# Patient Record
Sex: FEMALE | Race: WHITE | Employment: OTHER | URBAN - METROPOLITAN AREA
[De-identification: names, ages, dates, MRNs, and addresses within clinical notes are randomized per-mention and may not be internally consistent; named-entity substitution may affect disease eponyms.]

---

## 2017-01-05 ENCOUNTER — TELEPHONE (OUTPATIENT)
Dept: FAMILY MEDICINE CLINIC | Age: 77
End: 2017-01-05

## 2017-01-05 NOTE — TELEPHONE ENCOUNTER
Fredrick Oliveira  [phone]    Ms. Brittney Josephia is requesting a return call from Leno Jernigan. She stated that she needs to speak to her regarding \"what she is going to do now that Dr. Pavel Sierra is leaving\".

## 2017-01-18 NOTE — TELEPHONE ENCOUNTER
LM that pt can see Dr Betzaida Mckeon and recommend that she set up an appt soon for her routine f/u.

## 2017-05-04 ENCOUNTER — OFFICE VISIT (OUTPATIENT)
Dept: FAMILY MEDICINE CLINIC | Age: 77
End: 2017-05-04

## 2017-05-04 ENCOUNTER — HOSPITAL ENCOUNTER (OUTPATIENT)
Dept: LAB | Age: 77
Discharge: HOME | End: 2017-05-04
Payer: MEDICARE

## 2017-05-04 VITALS
OXYGEN SATURATION: 97 % | SYSTOLIC BLOOD PRESSURE: 132 MMHG | DIASTOLIC BLOOD PRESSURE: 78 MMHG | RESPIRATION RATE: 18 BRPM | HEART RATE: 61 BPM | HEIGHT: 64 IN | TEMPERATURE: 98.2 F | WEIGHT: 173 LBS | BODY MASS INDEX: 29.53 KG/M2

## 2017-05-04 DIAGNOSIS — E78.5 HYPERLIPIDEMIA LDL GOAL <100: ICD-10-CM

## 2017-05-04 DIAGNOSIS — I10 HTN, GOAL BELOW 150/90: ICD-10-CM

## 2017-05-04 DIAGNOSIS — M81.0 OSTEOPOROSIS, UNSPECIFIED OSTEOPOROSIS TYPE, UNSPECIFIED PATHOLOGICAL FRACTURE PRESENCE: ICD-10-CM

## 2017-05-04 DIAGNOSIS — Z00.00 ENCOUNTER FOR MEDICARE ANNUAL WELLNESS EXAM: Primary | ICD-10-CM

## 2017-05-04 DIAGNOSIS — E03.9 UNSPECIFIED HYPOTHYROIDISM: ICD-10-CM

## 2017-05-04 PROCEDURE — 84443 ASSAY THYROID STIM HORMONE: CPT

## 2017-05-04 PROCEDURE — 80061 LIPID PANEL: CPT

## 2017-05-04 PROCEDURE — 80053 COMPREHEN METABOLIC PANEL: CPT

## 2017-05-04 NOTE — PROGRESS NOTES
Patient Name: Mikhail Blanchard   MRN: [id number]    Jose Alfredo Case is a 68 y.o. female who presents with the following: Transferring care from prior PCP Dr. Carney. The patient has hypertension and hyperlipidemia. She reports taking medications as instructed, no medication side effects noted, patient does not perform home BP monitoring, no TIA's, no chest pain on exertion, no dyspnea on exertion, no swelling of ankles, no orthostatic dizziness or lightheadedness. Diet and Lifestyle: generally follows a low fat low cholesterol diet, generally follows a low sodium diet, exercises regularly. Lab review: labs reviewed and discussed with patient. Hx of hypothyroidism diagnosed on lab work, on meds. Hx of Osteoporosis   Last DEXA in 2014. Was on Fosamax for 7 years about 20 years ago. Does not want to do further treatment for this but is amenable for repeat DEXA. Review of Systems   Constitutional: Negative for fever, malaise/fatigue and weight loss. Respiratory: Negative for cough, hemoptysis, shortness of breath and wheezing. Cardiovascular: Negative for chest pain, palpitations, leg swelling and PND. Gastrointestinal: Negative for abdominal pain, constipation, diarrhea, nausea and vomiting. The patient's medications, allergies, past medical history, surgical history, family history and social history were reviewed and updated where appropriate. Prior to Admission medications    Medication Sig Start Date End Date Taking? Authorizing Provider   levothyroxine (SYNTHROID) 112 mcg tablet Take 1 Tab by mouth Daily (before breakfast). 4/18/17  Yes Hung Hutchison MD   atenolol (TENORMIN) 25 mg tablet Take 1 Tab by mouth daily.  CHANGE OF THERAPY 11/4/16  Yes Chi Redd DO   losartan-hydroCHLOROthiazide [facility]) 100-25 mg per tablet TAKE 1 TABLET EVERY DAY 11/3/16  Yes Chi Redd DO   B.infantis-B.ani-B.long-B.bifi (PROBIOTIC 4X) 10-15 mg TbEC Take  by mouth.   Yes Historical Provider   simvastatin (ZOCOR) 40 mg tablet TAKE 1 TABLET BY MOUTH NIGHTLY 4/6/16  Yes Oneda Anon, DO   amoxicillin (AMOXIL) 500 mg capsule  9/23/15  Yes Historical Provider   calcium-vitamin D (CALCIUM 500 WITH VITAMIN D) 500 mg(1,250mg) -200 unit per tablet Take 1 Tab by mouth two (2) times daily (with meals). Yes Historical Provider   MULTIVITAMINS (MULTI-VITAMIN PO) Take  by mouth. Takes one po daily.    Yes Historical Provider       Allergies   Allergen Reactions    Benazepril Cough    Sulfa (Sulfonamide Antibiotics) Hives         Past Medical History:   Diagnosis Date    DDD (degenerative disc disease), lumbar 3/15/2016    Diverticulosis of colon 1/12    McGroarty/gi    DJD (degenerative joint disease)     Esophageal stricture 2/03    HTN, goal below 150/90     Hypercholesterolemia     IBS (irritable bowel syndrome)     IBS (irritable bowel syndrome) 8/30/2016    Osteoporosis     previously on Fosamax many years ago; does not want further treatment    Overactive bladder     Scarring of lung '04    Scarring @ the apices bilaterally Done @[facility]PI/histoplasmosis    Unspecified hypothyroidism             Past Surgical History:   Procedure Laterality Date    ABDOMEN SURGERY [address]    appendectomy    COLONOSCOPY  1/12    McGroarty/gi    HX APPENDECTOMY      HX CATARACT REMOVAL  3/12    L    HX CATARACT REMOVAL  04/2012    Bilateral     HX KNEE REPLACEMENT  1/10    right  Darnell/o/s    HX ORTHOPAEDIC  2006 and 2008    R knee arthroscopy Dr Selwyn Blank       Family History   Problem Relation Age of Onset    Heart Disease Mother     Diabetes Father     Heart Disease Father     Cancer Son      metastatic prostate ca    Diabetes Son     Heart Disease Son        Social History     Social History    Marital status: SINGLE     Spouse name: N/A    Number of children: N/A    Years of education: N/A     Occupational History    retired        but still works part time     Social History Main Topics    Smoking status: Former Smoker     Packs/day: 1.00     Years: 25.00     Types: Cigarettes    Smokeless tobacco: Never Used      Comment: quit in the '90's/cigarettes    Alcohol use Yes      Comment: social    Drug use: No    Sexual activity: Not on file     Other Topics Concern     Service No    Blood Transfusions No    Caffeine Concern No    Occupational Exposure No    Hobby Hazards No    Sleep Concern Yes     stress cause insomnia     Stress Concern Yes     son is a drug addict is dependent on her     Weight Concern No    Special Diet Yes     eats healthy and balanced     Back Care Yes     spinal stenosis,chronic pain    Exercise Yes     water aerobics    Bike Helmet No     NA    Seat Belt Yes    Self-Exams Yes     Social History Narrative           OBJECTIVE    Visit Vitals    /78 (BP 1 Location: Left arm, BP Patient Position: Sitting)    Pulse 61    Temp 98.2 °F (36.8 °C) (Oral)    Resp 18    Ht 5' 4\" (1.626 m)    Wt 173 lb (78.5 kg)    SpO2 97%    BMI 29.7 kg/m2       Physical Exam   Constitutional: She is well-developed, well-nourished, and in no distress. No distress. HENT:   Head: Normocephalic and atraumatic. Right Ear: External ear normal.   Left Ear: External ear normal.   Eyes: Conjunctivae and EOM are normal. Pupils are equal, round, and reactive to light. Neck: Normal range of motion. Neck supple. No thyromegaly present. Cardiovascular: Normal rate, regular rhythm and normal heart sounds. Exam reveals no gallop and no friction rub. No murmur heard. Pulmonary/Chest: Effort normal and breath sounds normal. No respiratory distress. She has no wheezes. Skin: She is not diaphoretic. Psychiatric: Mood, memory, affect and judgment normal.   Nursing note and vitals reviewed. ASSESSMENT  [address] Caralyn Picket is a 68 y.o. female who presents today for:    1.  Encounter for Medicare annual wellness exam  See other note. 2. HTN, goal below 150/90  Stable, continue current treatment.  - METABOLIC PANEL, COMPREHENSIVE    3. Hyperlipidemia LDL goal <100  Stable, continue current treatment pending review of labs. Will calculate ASCVD risk score pending labs. - LIPID PANEL    4. Unspecified hypothyroidism  Stable, continue current treatment pending review of labs. - TSH AND FREE T4    5. Osteoporosis, unspecified osteoporosis type, unspecified pathological fracture presence  Pt does not want further medication treatment; reviewed appropriate vitamin D and calcium intake. Continue weight bearing exercises. - DEXA BONE DENSITY STUDY AXIAL; Future       Medications Discontinued During This Encounter   Medication Reason    omeprazole (PRILOSEC) 40 mg capsule Not A Current Medication    dicyclomine (BENTYL) 10 mg capsule Not A Current Medication    FLUZONE HIGH-DOSE 2016-17, PF, syrg injection Not A Current Medication       Follow-up Disposition:  Return in about 6 months (around 11/4/2017) for HTN follow up. Medication risks/benefits/costs/interactions/alternatives discussed with patient. Advised patient to call back or return to office if symptoms worsen/change/persist. If patient cannot reach us or should anything more severe/urgent arise he/she should proceed directly to the nearest emergency department. Discussed expected course/resolution/complications of diagnosis in detail with patient. Patient given a written after visit summary which includes his/her diagnoses, current medications and vitals. Patient expressed understanding with the diagnosis and plan.      Va Contreras M.D.

## 2017-05-04 NOTE — PATIENT INSTRUCTIONS
For osteopenia or osteoporosis, it is recommended to do weight-bearing exercises, stop smoking, and to get at least vitamin D3 800 units/day and calcium at 1500 mg/day. Schedule of Personalized Health Plan  (Provide Copy to Patient)  The best way to stay healthy is to live a healthy lifestyle. A healthy lifestyle includes regular exercise, eating a well-balanced diet, keeping a healthy weight and not smoking. Regular physical exams and screening tests are another important way to take care of yourself. Preventive exams provided by health care providers can find health problems early when treatment works best and can keep you from getting certain diseases or illnesses. Preventive services include exams, lab tests, screenings, shots, monitoring and information to help you take care of your own health. All people over 65 should have a pneumonia shot. Pneumonia shots are usually only needed once in a lifetime unless your doctor decides differently. All people over 65 should have a yearly flu shot. People over 65 are at medium to high risk for Hepatitis B. Three shots are needed for complete protection. In addition to your physical exam, some screening tests are recommended:    Bone mass measurement (dexa scan) is recommended every two years  Diabetes Mellitus screening is recommended every year. Glaucoma is an eye disease caused by high pressure in the eye. An eye exam is recommended every year. Cardiovascular screening tests that check your cholesterol and other blood fat (lipid) levels are recommended every five years. Colorectal Cancer screening tests help to find pre-cancerous polyps (growths in the colon) so they can be removed before they turn into cancer. Tests ordered for screening depend on your personal and family history risk factors.     Screening for Breast Cancer is recommended yearly with a mammogram.    Screening for Cervical Cancer is recommended every two years (annually for certain risk factors, such as previous history of STD or abnormal PAP in past 7 years), with a Pelvic Exam with PAP    Here is a list of your current Health Maintenance items with a due date:  Health Maintenance   Topic Date Due    DTaP/Tdap/Td series (1 - Tdap) 05/10/1961    Pneumococcal 65+ Low/Medium Risk (2 of 2 - PPSV23) 10/19/2008    MEDICARE YEARLY EXAM  04/23/2017    INFLUENZA AGE 9 TO ADULT  08/01/2017    Bone Densitometry  09/02/2017    GLAUCOMA SCREENING Q2Y  04/05/2018    COLONOSCOPY  07/09/2023    ZOSTER VACCINE AGE 60>  Completed

## 2017-05-04 NOTE — PROGRESS NOTES
Jalyn Hunt is a 68 y.o. female and presents for annual Medicare Wellness Visit. Problem List: Reviewed with patient and discussed risk factors.     Patient Active Problem List   Diagnosis Code    HTN, goal below 150/90 I10    Other and unspecified hyperlipidemia E78.5    Unspecified hypothyroidism E03.9    Localized osteoarthritis of left knee M17.12    DDD (degenerative disc disease), lumbar M51.36    Lumbar spinal stenosis M48.06    Advance care planning Z71.89    Diverticulosis of large intestine K57.30    IBS (irritable bowel syndrome) K58.9    Esophageal stricture K22.2    Fibrosis of lung ([address] J84.10    Overactive bladder N32.81    Osteoporosis M81.0       Current medical providers:  Patient Care Team:  Domniic Jacinto MD as PCP - General (Family Practice)  Sommer Gamez MD (Gastroenterology)  Felton Wellington MD (Obstetrics & Gynecology)    PSH: Reviewed with patient  Past Surgical History:   Procedure Laterality Date    ABDOMEN SURGERY [address]    appendectomy    COLONOSCOPY  1/12    McGroarty/gi    HX APPENDECTOMY      HX CATARACT REMOVAL  3/12    L    HX CATARACT REMOVAL  04/2012    Bilateral     HX KNEE REPLACEMENT  1/10    right  Darnell/o/s   Caleb Gentile ORTHOPAEDIC  2006 and 2008    R knee arthroscopy Dr Blake Hernandez        SH: Reviewed with patient  Social History   Substance Use Topics    Smoking status: Former Smoker     Packs/day: 1.00     Years: 25.00     Types: Cigarettes    Smokeless tobacco: Never Used      Comment: quit in the '90's/cigarettes    Alcohol use Yes      Comment: social       FH: Reviewed with patient  Family History   Problem Relation Age of Onset    Heart Disease Mother     Diabetes Father     Heart Disease Father     Cancer Son      metastatic prostate ca    Diabetes Son     Heart Disease Son        Medications/Allergies: Reviewed with patient  Current Outpatient Prescriptions on File Prior to Visit   Medication Sig Dispense Refill    levothyroxine (SYNTHROID) 112 mcg tablet Take 1 Tab by mouth Daily (before breakfast). 90 Tab 0    atenolol (TENORMIN) 25 mg tablet Take 1 Tab by mouth daily. CHANGE OF THERAPY 90 Tab 3    losartan-hydroCHLOROthiazide (HYZAAR) 100-25 mg per tablet TAKE 1 TABLET EVERY DAY 90 Tab 3    B.infantis-B.ani-B.long-B.bifi (PROBIOTIC 4X) 10-15 mg TbEC Take  by mouth.  simvastatin (ZOCOR) 40 mg tablet TAKE 1 TABLET BY MOUTH NIGHTLY 90 Tab 3    amoxicillin (AMOXIL) 500 mg capsule   2    calcium-vitamin D (CALCIUM 500 WITH VITAMIN D) 500 mg(1,250mg) -200 unit per tablet Take 1 Tab by mouth two (2) times daily (with meals).  MULTIVITAMINS (MULTI-VITAMIN PO) Take  by mouth. Takes one po daily. No current facility-administered medications on file prior to visit. Allergies   Allergen Reactions    Benazepril Cough    Sulfa (Sulfonamide Antibiotics) Hives       Objective:  Visit Vitals    /78 (BP 1 Location: Left arm, BP Patient Position: Sitting)    Pulse 61    Temp 98.2 °F (36.8 °C) (Oral)    Resp 18    Ht 5' 4\" (1.626 m)    Wt 173 lb (78.5 kg)    SpO2 97%    BMI 29.7 kg/m2    Body mass index is 29.7 kg/(m^2). Assessment of cognitive impairment: Alert and oriented x 3    Depression Screen:   PHQ over the last two weeks 5/4/2017   Little interest or pleasure in doing things Not at all   Feeling down, depressed or hopeless Not at all   Total Score PHQ 2 0       Fall Risk Assessment:    Fall Risk Assessment, last 12 mths 5/4/2017   Able to walk? Yes   Fall in past 12 months? Yes   Fall with injury? No   Number of falls in past 12 months 1   Fall Risk Score 1       Functional Ability:   Does the patient exhibit a steady gait? yes   How long did it take the patient to get up and walk from a sitting position? 3 secs   Is the patient self reliant?  (ie can do own laundry, meals, household chores)  yes     Does the patient handle his/her own medications?   yes     Does the patient handle his/her own money? yes     Is the patients home safe (ie good lighting, handrails on stairs and bath, etc.)? yes     Did you notice or did patient express any hearing difficulties? no     Did you notice or did patient express any vision difficulties?   no     Were distance and reading eye charts used? no       Advance Care Planning:   Patient was offered the opportunity to discuss advance care planning:  yes     Does patient have an Advance Directive:  yes   If no, did you provide information on Caring Connections?  n/a       Plan:      Orders Placed This Encounter    DEXA BONE DENSITY STUDY AXIAL    METABOLIC PANEL, COMPREHENSIVE    LIPID PANEL    TSH AND FREE T4       Health Maintenance   Topic Date Due    DTaP/Tdap/Td series (1 - Tdap) 05/10/1961    Pneumococcal 65+ Low/Medium Risk (2 of 2 - PPSV23) 10/19/2008    INFLUENZA AGE 9 TO ADULT  08/01/2017    Bone Densitometry  09/02/2017    GLAUCOMA SCREENING Q2Y  04/05/2018    MEDICARE YEARLY EXAM  05/05/2018    COLONOSCOPY  07/09/2023    ZOSTER VACCINE AGE 60>  Completed       *Patient verbalized understanding and agreement with the plan. A copy of the After Visit Summary with personalized health plan was given to the patient today.

## 2017-05-04 NOTE — Clinical Note
Pt says she may have gotten tetanus and pneumonia vaccines at [address], [place], [address] - can we get records?

## 2017-05-04 NOTE — MR AVS SNAPSHOT
Visit Information Date & Time Provider Department Dept. Phone Encounter #  
 5/4/2017  8:00 AM Irasema Paula MD [facility] [phone] [id number] Follow-up Instructions Return in about 6 months (around 11/4/2017) for HTN follow up. Upcoming Health Maintenance Date Due DTaP/Tdap/Td series (1 - Tdap) 5/10/1961 Pneumococcal 65+ Low/Medium Risk (2 of 2 - PPSV23) 10/19/2008 MEDICARE YEARLY EXAM 4/23/2017 INFLUENZA AGE 9 TO ADULT 8/1/2017 Bone Densitometry 9/2/2017 GLAUCOMA SCREENING Q2Y 4/5/2018 COLONOSCOPY 7/9/2023 Allergies as of 5/4/2017  Review Complete On: 5/9/5163 By: Jeri Perdue LPN Severity Noted Reaction Type Reactions Benazepril  04/10/2013    Cough Sulfa (Sulfonamide Antibiotics)  03/20/2010    Hives Current Immunizations  Reviewed on 10/16/2013 Name Date Influenza High Dose Vaccine PF 10/12/2016 Influenza Vaccine 10/16/2013 Influenza Vaccine Split 10/9/2012, 4/6/2012, 9/28/2011 Pneumococcal Vaccine (Pcv) 10/19/2007 Varicella Virus Vaccine Live 7/2/2009 Not reviewed this visit You Were Diagnosed With   
  
 Codes Comments Encounter for Medicare annual wellness exam    -  Primary ICD-10-CM: Z00.00 ICD-9-CM: V70.0 Unspecified hypothyroidism     ICD-10-CM: E03.9 ICD-9-CM: 244.9 Hyperlipidemia LDL goal <100     ICD-10-CM: E78.5 ICD-9-CM: 272.4 Postmenopausal     ICD-10-CM: Z78.0 ICD-9-CM: V49.81 Screening for osteoporosis     ICD-10-CM: Z13.820 ICD-9-CM: V82.81 Osteoporosis, unspecified osteoporosis type, unspecified pathological fracture presence     ICD-10-CM: M81.0 ICD-9-CM: 733.00   
 HTN, goal below 150/90     ICD-10-CM: I10 
ICD-9-CM: 401.9 Vitals BP Pulse Temp Resp Height(growth percentile) Weight(growth percentile)  132/78 (BP 1 Location: Left arm, BP Patient Position: Sitting) 61 98.2 °F (36.8 °C) (Oral) 18 5' 4\" (1.626 m) 173 lb (78.5 kg) SpO2 BMI OB Status Smoking Status 97% 29.7 kg/m2 Postmenopausal Former Smoker BMI and BSA Data Body Mass Index Body Surface Area  
 29.7 kg/m 2 1.88 m 2 Preferred Pharmacy Pharmacy Name Phone [facility] [address] [place] [address] - [address] [place] [address] [address] [phone] Your Updated Medication List  
  
   
This list is accurate as of: 5/4/17  8:37 AM.  Always use your most recent med list.  
  
  
  
  
 amoxicillin 500 mg capsule Commonly known as:  AMOXIL  
  
 atenolol 25 mg tablet Commonly known as:  TENORMIN Take 1 Tab by mouth daily. CHANGE OF THERAPY CALCIUM 500 WITH VITAMIN D 500 mg(1,250mg) -200 unit per tablet Generic drug:  calcium-vitamin D Take 1 Tab by mouth two (2) times daily (with meals). levothyroxine 112 mcg tablet Commonly known as:  SYNTHROID Take 1 Tab by mouth Daily (before breakfast). losartan-hydroCHLOROthiazide 100-25 mg per tablet Commonly known as:  HYZAAR  
TAKE 1 TABLET EVERY DAY  
  
 MULTI-VITAMIN PO Take  by mouth. Takes one po daily. PROBIOTIC 4X 10-15 mg Tbec Generic drug:  B.infantis-B.ani-B.long-B.bifi Take  by mouth. simvastatin 40 mg tablet Commonly known as:  ZOCOR  
TAKE 1 TABLET BY MOUTH NIGHTLY We Performed the Following LIPID PANEL [08253 CPT(R)] METABOLIC PANEL, COMPREHENSIVE [99433 CPT(R)] TSH AND FREE T4 [56139 CPT(R)] Follow-up Instructions Return in about 6 months (around 11/4/2017) for HTN follow up. To-Do List   
 05/04/2017 Imaging:  DEXA BONE DENSITY STUDY AXIAL   
  
 05/31/2017 8:30 AM  
  Appointment with Miryam Nicolas MD; [facility] 2 at [facility] ([phone]) GENERAL INSTRUCTIONS Patient Instructions For osteopenia or osteoporosis, it is recommended to do weight-bearing exercises, stop smoking, and to get at least vitamin D3 800 units/day and calcium at 1500 mg/day. Schedule of Personalized Health Plan (Provide Copy to Patient) The best way to stay healthy is to live a healthy lifestyle. A healthy lifestyle includes regular exercise, eating a well-balanced diet, keeping a healthy weight and not smoking. Regular physical exams and screening tests are another important way to take care of yourself. Preventive exams provided by health care providers can find health problems early when treatment works best and can keep you from getting certain diseases or illnesses. Preventive services include exams, lab tests, screenings, shots, monitoring and information to help you take care of your own health. All people over 65 should have a pneumonia shot. Pneumonia shots are usually only needed once in a lifetime unless your doctor decides differently. All people over 65 should have a yearly flu shot. People over 65 are at medium to high risk for Hepatitis B. Three shots are needed for complete protection. In addition to your physical exam, some screening tests are recommended: 
 
 
Screening for Breast Cancer is recommended yearly with a mammogram. 
 
Screening for Cervical Cancer is recommended every two years (annually for certain risk factors, such as previous history of STD or abnormal PAP in past 7 years), with a Pelvic Exam with PAP Here is a list of your current Health Maintenance items with a due date: 
Health Maintenance Topic Date Due  
 DTaP/Tdap/Td series (1 - Tdap) 05/10/1961  Pneumococcal 65+ Low/Medium Risk (2 of 2 - PPSV23) 10/19/2008  MEDICARE YEARLY EXAM  04/23/2017  INFLUENZA AGE 9 TO ADULT  08/01/2017  Bone Densitometry  09/02/2017  GLAUCOMA SCREENING Q2Y  04/05/2018  COLONOSCOPY  07/09/2023  ZOSTER VACCINE AGE 60>  Completed Introducing [facility]! [employer] introduces [employer] patient portal. Now you can access parts of your medical record, email your doctor's office, and request medication refills online. 1. In your internet browser, go to [email or web address] 2. Click on the First Time User? Click Here link in the Sign In box. You will see the New Member Sign Up page. 3. Enter your Picaboo Access Code exactly as it appears below. You will not need to use this code after youve completed the sign-up process. If you do not sign up before the expiration date, you must request a new code. · Pic[employer] Access Code: [email or web address] Expires: 8/2/2017  8:24 AM 
 
4. Enter the last four digits of your Social Security Number (xxxx) and Date of Birth (mm/dd/yyyy) as indicated and click Submit. You will be taken to the next sign-up page. 5. Create a Picaboo ID. This will be your Picaboo login ID and cannot be changed, so think of one that is secure and easy to remember. 6. Create a Picaboo password. You can change your password at any time. 7. Enter your Password Reset Question and Answer. This can be used at a later time if you forget your password. 8. Enter your e-mail address. You will receive e-mail notification when new information is available in [address] 9. Click Sign Up. You can now view and download portions of your medical record. 10. Click the Download Summary menu link to download a portable copy of your medical information. If you have questions, please visit the Frequently Asked Questions section of the [employer] website. Remember, [employer] is NOT to be used for urgent needs. For medical emergencies, dial 911. Now available from your iPhone and Android! Please provide this summary of care documentation to your next provider. Your primary care clinician is listed as Easton Alva. If you have any questions after today's visit, please call [phone].

## 2017-05-04 NOTE — PROGRESS NOTES
1. Have you been to the ER, urgent care clinic since your last visit? Hospitalized since your last visit? No    2. Have you seen or consulted any other health care providers outside of the [address] [place] [address] since your last visit? Include any pap smears or colon screening.  No     Chief Complaint   Patient presents with    Hypertension     6month follow up,(due for annual wellness)    Thyroid Problem     6month follow up    Cholesterol Problem     6month follow up     Learning Assessment 8/30/2016   PRIMARY LEARNER Patient   HIGHEST LEVEL OF EDUCATION - PRIMARY LEARNER  > 4 YEARS [address] CAREGIVER No   PRIMARY LANGUAGE ENGLISH   LEARNER PREFERENCE PRIMARY READING   ANSWERED BY Patient   RELATIONSHIP SELF

## 2017-05-04 NOTE — LETTER
5/11/2017 10:25 AM 
 
Ms. Stephanie Guajardo [address] [facility] [address] [id number] Dear Stephanie Guajardo: 
 
Please find your most recent results below. Resulted Orders METABOLIC PANEL, COMPREHENSIVE Result Value Ref Range Glucose 92 65 - 99 mg/dL BUN 11 8 - 27 mg/dL Creatinine 0.76 0.57 - 1.00 mg/dL GFR est non-AA 76 >59 mL/min/1.73 GFR est AA 88 >59 mL/min/1.73  
 BUN/Creatinine ratio 14 12 - 28 Sodium 134 134 - 144 mmol/L Potassium 4.1 3.5 - 5.2 mmol/L Chloride 91 (L) 96 - 106 mmol/L  
 CO2 25 18 - 29 mmol/L Calcium 10.1 8.7 - 10.3 mg/dL Protein, total 6.7 6.0 - 8.5 g/dL Albumin 4.2 3.5 - 4.8 g/dL GLOBULIN, TOTAL 2.5 1.5 - 4.5 g/dL A-G Ratio 1.7 1.2 - 2.2 Bilirubin, total 0.5 0.0 - 1.2 mg/dL Alk. phosphatase 83 39 - 117 IU/L  
 AST (SGOT) 18 0 - 40 IU/L  
 ALT (SGPT) 15 0 - 32 IU/L Narrative Performed at:  [address]  [id number] : Makc Martinez MD, Phone:  [phone] LIPID PANEL Result Value Ref Range Cholesterol, total 159 100 - 199 mg/dL Triglyceride 86 0 - 149 mg/dL HDL Cholesterol 62 >39 mg/dL VLDL, calculated 17 5 - 40 mg/dL LDL, calculated 80 0 - 99 mg/dL Narrative Performed at:  [address]  [id number] : Mack Martinez MD, Phone:  [phone] TSH AND FREE T4 Result Value Ref Range TSH 3.000 0.450 - 4.500 uIU/mL T4, Free 1.60 0.82 - 1.77 ng/dL Narrative Performed at:  [address]  [id number] : Mack Martinez MD, Phone:  [phone] CVD REPORT Result Value Ref Range INTERPRETATION Note Comment:  
   Supplement report is available. Narrative Performed at:  [address] [place] [address]  [id number] : Ashvin Corbett PhD, Phone:  [phone] RECOMMENDATIONS: 
Your cholesterol numbers are normal; keep taking your statin and your thyroid levels are normal; keep taking your thyroid medications. Please call me if you have any questions: [phone] Sincerely, Va Contreras MD

## 2017-05-05 LAB
ALBUMIN SERPL-MCNC: 4.2 G/DL (ref 3.5–4.8)
ALBUMIN/GLOB SERPL: 1.7 {RATIO} (ref 1.2–2.2)
ALP SERPL-CCNC: 83 IU/L (ref 39–117)
ALT SERPL-CCNC: 15 IU/L (ref 0–32)
AST SERPL-CCNC: 18 IU/L (ref 0–40)
BILIRUB SERPL-MCNC: 0.5 MG/DL (ref 0–1.2)
BUN SERPL-MCNC: 11 MG/DL (ref 8–27)
BUN/CREAT SERPL: 14 (ref 12–28)
CALCIUM SERPL-MCNC: 10.1 MG/DL (ref 8.7–10.3)
CHLORIDE SERPL-SCNC: 91 MMOL/L (ref 96–106)
CHOLEST SERPL-MCNC: 159 MG/DL (ref 100–199)
CO2 SERPL-SCNC: 25 MMOL/L (ref 18–29)
CREAT SERPL-MCNC: 0.76 MG/DL (ref 0.57–1)
GLOBULIN SER CALC-MCNC: 2.5 G/DL (ref 1.5–4.5)
GLUCOSE SERPL-MCNC: 92 MG/DL (ref 65–99)
HDLC SERPL-MCNC: 62 MG/DL
INTERPRETATION, 910389: NORMAL
LDLC SERPL CALC-MCNC: 80 MG/DL (ref 0–99)
POTASSIUM SERPL-SCNC: 4.1 MMOL/L (ref 3.5–5.2)
PROT SERPL-MCNC: 6.7 G/DL (ref 6–8.5)
SODIUM SERPL-SCNC: 134 MMOL/L (ref 134–144)
T4 FREE SERPL-MCNC: 1.6 NG/DL (ref 0.82–1.77)
TRIGL SERPL-MCNC: 86 MG/DL (ref 0–149)
TSH SERPL DL<=0.005 MIU/L-ACNC: 3 UIU/ML (ref 0.45–4.5)
VLDLC SERPL CALC-MCNC: 17 MG/DL (ref 5–40)

## 2017-05-06 NOTE — PROGRESS NOTES
Please notify patient regarding their test results:    Cholesterol numbers are normal; keep taking your statin. Thyroid levels are normal; keep taking your thyroid medications.

## 2017-05-11 ENCOUNTER — TELEPHONE (OUTPATIENT)
Dept: FAMILY MEDICINE CLINIC | Age: 77
End: 2017-05-11

## 2017-05-11 NOTE — PROGRESS NOTES
Arnol Nation  [phone]    -  Patient returned your call- Send letter regarding Labs if every thing is okay with her Labs-  Does not want to play phone tag-

## 2017-05-11 NOTE — TELEPHONE ENCOUNTER
Danae Bell  [phone]    -  Patient returned your call- Send letter regarding Labs if every thing is okay with her Labs-  Does not want to play phone tag-

## 2017-05-22 RX ORDER — SIMVASTATIN 40 MG/1
TABLET, FILM COATED ORAL
Qty: 90 TAB | Refills: 1 | Status: SHIPPED | OUTPATIENT
Start: 2017-05-22 | End: 2017-10-02 | Stop reason: SDUPTHER

## 2017-05-22 NOTE — TELEPHONE ENCOUNTER
LOV 05/04/2017  NOV 11/06/2017    Lab Results   Component Value Date/Time    Cholesterol, total 159 05/04/2017 08:50 AM    HDL Cholesterol 62 05/04/2017 08:50 AM    LDL, calculated 80 05/04/2017 08:50 AM    VLDL, calculated 17 05/04/2017 08:50 AM    Triglyceride 86 05/04/2017 08:50 AM    CHOL/HDL Ratio 2.1 05/04/2010 10:39 AM

## 2017-05-25 RX ORDER — LEVOTHYROXINE SODIUM 112 UG/1
TABLET ORAL
Qty: 90 TAB | Refills: 1 | Status: SHIPPED | OUTPATIENT
Start: 2017-05-25 | End: 2018-01-17 | Stop reason: SDUPTHER

## 2017-05-30 ENCOUNTER — HOSPITAL ENCOUNTER (OUTPATIENT)
Dept: MAMMOGRAPHY | Age: 77
Discharge: HOME | End: 2017-05-30
Attending: FAMILY MEDICINE
Payer: MEDICARE

## 2017-05-30 DIAGNOSIS — M81.0 OSTEOPOROSIS, UNSPECIFIED OSTEOPOROSIS TYPE, UNSPECIFIED PATHOLOGICAL FRACTURE PRESENCE: ICD-10-CM

## 2017-05-30 PROCEDURE — 77080 DXA BONE DENSITY AXIAL: CPT

## 2017-05-31 ENCOUNTER — HOSPITAL ENCOUNTER (OUTPATIENT)
Dept: INTERVENTIONAL RADIOLOGY/VASCULAR | Age: 77
Discharge: HOME | End: 2017-05-31
Attending: ORTHOPAEDIC SURGERY | Admitting: ORTHOPAEDIC SURGERY
Payer: MEDICARE

## 2017-05-31 VITALS
HEIGHT: 64 IN | DIASTOLIC BLOOD PRESSURE: 61 MMHG | RESPIRATION RATE: 18 BRPM | OXYGEN SATURATION: 96 % | TEMPERATURE: 98.2 F | BODY MASS INDEX: 29.02 KG/M2 | WEIGHT: 170 LBS | SYSTOLIC BLOOD PRESSURE: 149 MMHG | HEART RATE: 59 BPM

## 2017-05-31 DIAGNOSIS — M48.061 SPINAL STENOSIS OF LUMBAR REGION WITHOUT NEUROGENIC CLAUDICATION: ICD-10-CM

## 2017-05-31 DIAGNOSIS — M51.36 DISC DEGENERATION, LUMBAR: ICD-10-CM

## 2017-05-31 DIAGNOSIS — M43.10 ACQUIRED SPONDYLOLISTHESIS: ICD-10-CM

## 2017-05-31 DIAGNOSIS — M54.50 RIGHT-SIDED LOW BACK PAIN WITHOUT SCIATICA: ICD-10-CM

## 2017-05-31 PROCEDURE — 74011000250 HC RX REV CODE- 250: Performed by: RADIOLOGY

## 2017-05-31 PROCEDURE — 64483 NJX AA&/STRD TFRM EPI L/S 1: CPT

## 2017-05-31 PROCEDURE — 74011636320 HC RX REV CODE- 636/320: Performed by: RADIOLOGY

## 2017-05-31 PROCEDURE — 74011250636 HC RX REV CODE- 250/636: Performed by: RADIOLOGY

## 2017-05-31 RX ORDER — METHYLPREDNISOLONE ACETATE 40 MG/ML
80 INJECTION, SUSPENSION INTRA-ARTICULAR; INTRALESIONAL; INTRAMUSCULAR; SOFT TISSUE ONCE
Status: COMPLETED | OUTPATIENT
Start: 2017-05-31 | End: 2017-05-31

## 2017-05-31 RX ORDER — LIDOCAINE HYDROCHLORIDE 10 MG/ML
10 INJECTION, SOLUTION EPIDURAL; INFILTRATION; INTRACAUDAL; PERINEURAL ONCE
Status: COMPLETED | OUTPATIENT
Start: 2017-05-31 | End: 2017-05-31

## 2017-05-31 RX ADMIN — IOHEXOL 10 ML: 180 INJECTION INTRAVENOUS at 09:34

## 2017-05-31 RX ADMIN — SODIUM BICARBONATE 5 ML: 0.2 INJECTION, SOLUTION INTRAVENOUS at 09:34

## 2017-05-31 RX ADMIN — LIDOCAINE HYDROCHLORIDE 10 ML: 10 INJECTION, SOLUTION EPIDURAL; INFILTRATION; INTRACAUDAL; PERINEURAL at 09:34

## 2017-05-31 RX ADMIN — METHYLPREDNISOLONE ACETATE 80 MG: 40 INJECTION, SUSPENSION INTRA-ARTICULAR; INTRALESIONAL; INTRAMUSCULAR; SOFT TISSUE at 09:34

## 2017-05-31 NOTE — DISCHARGE INSTRUCTIONS
Steroid Injection Discharge Instructions    General Information:   A steroid injection was performed today, placing a combination of a steroid and an anesthetic (numbing medicine) into the space around the nerves of your spine. This is done to treat back pain. It may take 7-10 days for the injection to reach its full potential.  This procedure can be done at any level of the spinal column, depending on where your pain is. Your doctor will have ordered the appropriate level to be treated prior to your coming in for the procedure. Home Care Instructions: You can resume your regular diet. Do not drink alcohol today. You may notice that you have to use your pain medications less after your injection. Some people do not notice much of a change in their pain after the first injection. If that is the case, it is worth your time to have a second one done. This is why these injections are sometimes ordered in a series of three. Keep the puncture site clean and dry for 24 hours, and then you may remove the dressing. Showering is acceptable after the bandage is removed. Rest today be aware there maybe numbness or tingling that may last up to 12 hours after the inject. Call If:   You should call your Physician and/or the Radiology Nurse if you have any bleeding other than a small spot on your bandage. Call if you have any signs of infection, fever, increased pain at the puncture site, confusion, or a headache that worsens when you stand and eases when lying flat. Follow-Up Instructions:  Please see your ordering doctor as he/she has requested. Let your doctor know if you have relief from your pain so they may schedule another injection for you if it is indicated. To Reach Us:  Should you experience any significant changes, please call [phone] between the hours of 7:30 am and 10 pm or [phone] after hours.  After hours, ask the  to page the X-ray Technologist, and describe the problem to the technologist.         [phone] until 10 p.m.  Then [phone] after 10 pm  Date: 5/31/2017  Discharging Nurse:  Tom Jaquez RN

## 2017-05-31 NOTE — PROCEDURES
PROCEDURE:TFESI. INDICATION:right sided pain. ANESTHESIA:local.  COMPLICATION:NONE. SPECIMENS REMOVED:none. BLOOD LOSS:NONE. /ASSISTANT:FERNANDO Kramer RECOMMENDATIONS:none. CONSENT OBTAINED:YES.  NOTES:none.

## 2017-05-31 NOTE — PROGRESS NOTES
Please notify patient regarding their test results:    DEXA stable from prior but still meets criteria for osteoporosis treatment based on FRAX score (10 year likelihood of hip or osteoporosis-related fracture). Pre prior OV, pt declined further medical treatment; if she still wishes to decline, recommend weight-bearing exercises and to get at least vitamin D3 800 units/day and calcium at 1500 mg/day. If she is interested in options, could consider rheumatology referral to discuss options.

## 2017-09-26 ENCOUNTER — HOSPITAL ENCOUNTER (OUTPATIENT)
Dept: MAMMOGRAPHY | Age: 77
Discharge: HOME | End: 2017-09-26
Attending: FAMILY MEDICINE
Payer: MEDICARE

## 2017-09-26 DIAGNOSIS — Z12.31 VISIT FOR SCREENING MAMMOGRAM: ICD-10-CM

## 2017-09-26 PROCEDURE — 77063 BREAST TOMOSYNTHESIS BI: CPT

## 2017-10-03 RX ORDER — SIMVASTATIN 40 MG/1
TABLET, FILM COATED ORAL
Qty: 90 TAB | Refills: 1 | Status: SHIPPED | OUTPATIENT
Start: 2017-10-03 | End: 2018-04-16 | Stop reason: SDUPTHER

## 2017-10-10 DIAGNOSIS — I10 ESSENTIAL HYPERTENSION: ICD-10-CM

## 2017-10-10 RX ORDER — ATENOLOL 25 MG/1
25 TABLET ORAL DAILY
Qty: 90 TAB | Refills: 0 | Status: SHIPPED | OUTPATIENT
Start: 2017-10-10 | End: 2018-01-03

## 2017-10-10 NOTE — TELEPHONE ENCOUNTER
Call to patient she wants atenolol 25mg sent to local pharmacy [facility] on file.     Called [facility] they do have enough for #90 day supply

## 2017-10-10 NOTE — TELEPHONE ENCOUNTER
Patient is calling, she states that she orders her prescriptions through [facility] and her Atenolol medication is on a nationwide shortage. She would like to know if another medication can be called in place of the medication.     Best call back # for patient: [phone]

## 2017-11-06 ENCOUNTER — OFFICE VISIT (OUTPATIENT)
Dept: FAMILY MEDICINE CLINIC | Age: 77
End: 2017-11-06

## 2017-11-06 ENCOUNTER — HOSPITAL ENCOUNTER (OUTPATIENT)
Dept: LAB | Age: 77
Discharge: HOME | End: 2017-11-06
Payer: MEDICARE

## 2017-11-06 VITALS
RESPIRATION RATE: 18 BRPM | OXYGEN SATURATION: 98 % | SYSTOLIC BLOOD PRESSURE: 142 MMHG | WEIGHT: 168.4 LBS | TEMPERATURE: 98.2 F | DIASTOLIC BLOOD PRESSURE: 64 MMHG | BODY MASS INDEX: 28.75 KG/M2 | HEART RATE: 56 BPM | HEIGHT: 64 IN

## 2017-11-06 DIAGNOSIS — M81.0 OSTEOPOROSIS, UNSPECIFIED OSTEOPOROSIS TYPE, UNSPECIFIED PATHOLOGICAL FRACTURE PRESENCE: ICD-10-CM

## 2017-11-06 DIAGNOSIS — Z71.89 ADVANCE CARE PLANNING: ICD-10-CM

## 2017-11-06 DIAGNOSIS — R73.03 PRE-DIABETES: ICD-10-CM

## 2017-11-06 DIAGNOSIS — I10 HTN, GOAL BELOW 150/90: Primary | ICD-10-CM

## 2017-11-06 PROCEDURE — 83036 HEMOGLOBIN GLYCOSYLATED A1C: CPT

## 2017-11-06 PROCEDURE — 82306 VITAMIN D 25 HYDROXY: CPT

## 2017-11-06 RX ORDER — LOSARTAN POTASSIUM AND HYDROCHLOROTHIAZIDE 25; 100 MG/1; MG/1
TABLET ORAL
Qty: 90 TAB | Refills: 1 | Status: SHIPPED | OUTPATIENT
Start: 2017-11-06 | End: 2018-05-07 | Stop reason: SDUPTHER

## 2017-11-06 NOTE — PATIENT INSTRUCTIONS

## 2017-11-06 NOTE — MR AVS SNAPSHOT
Visit Information Date & Time Provider Department Dept. Phone Encounter #  
 11/6/2017  8:00 AM Deanna Earl  [facility] [phone] [id number] Follow-up Instructions Return in about 6 months (around 5/6/2018) for Medicare Wellness Visit. Upcoming Health Maintenance Date Due DTaP/Tdap/Td series (1 - Tdap) 5/10/1961 Pneumococcal 65+ Low/Medium Risk (2 of 2 - PPSV23) 10/19/2008 GLAUCOMA SCREENING Q2Y 4/5/2018 MEDICARE YEARLY EXAM 5/5/2018 Bone Densitometry 5/30/2020 COLONOSCOPY 7/9/2023 Allergies as of 11/6/2017  Review Complete On: 11/6/2017 By: Deanna Earl MD  
  
 Severity Noted Reaction Type Reactions Benazepril  04/10/2013    Cough Sulfa (Sulfonamide Antibiotics)  03/20/2010    Hives Current Immunizations  Reviewed on 10/16/2013 Name Date Influenza High Dose Vaccine PF 10/12/2016 Influenza Vaccine 10/16/2013 Influenza Vaccine Split 10/9/2012, 4/6/2012, 9/28/2011 Pneumococcal Vaccine (Pcv) 10/19/2007 Varicella Virus Vaccine Live 7/2/2009 Not reviewed this visit You Were Diagnosed With   
  
 Codes Comments HTN, goal below 150/90    -  Primary ICD-10-CM: I10 
ICD-9-CM: 401.9 Encounter for immunization     ICD-10-CM: D46 ICD-9-CM: V03.89 Osteoporosis, unspecified osteoporosis type, unspecified pathological fracture presence     ICD-10-CM: M81.0 ICD-9-CM: 733.00 Pre-diabetes     ICD-10-CM: R73.03 
ICD-9-CM: 790.29 Vitals BP Pulse Temp Resp Height(growth percentile) Weight(growth percentile) 142/64 (BP 1 Location: Left arm, BP Patient Position: Sitting) (!) 56 98.2 °F (36.8 °C) (Oral) 18 5' 4\" (1.626 m) 168 lb 6.4 oz (76.4 kg) SpO2 BMI OB Status Smoking Status 98% 28.91 kg/m2 Postmenopausal Former Smoker Vitals History BMI and BSA Data Body Mass Index Body Surface Area  
 28.91 kg/m 2 1.86 m 2 Preferred Pharmacy Pharmacy Name Phone [facility] [address] [place] [address] [facility] [address] [phone] Your Updated Medication List  
  
   
This list is accurate as of: 11/6/17  8:38 AM.  Always use your most recent med list.  
  
  
  
  
 amoxicillin 500 mg capsule Commonly known as:  AMOXIL Take 500 mg by mouth as needed (DENTIST). atenolol 25 mg tablet Commonly known as:  TENORMIN Take 1 Tab by mouth daily. CALCIUM 500 WITH VITAMIN D 500 mg(1,250mg) -200 unit per tablet Generic drug:  calcium-vitamin D Take 1 Tab by mouth two (2) times daily (with meals). levothyroxine 112 mcg tablet Commonly known as:  SYNTHROID  
TAKE 1 TABLET EVERY DAY BEFORE BREAKFAST  
  
 losartan-hydroCHLOROthiazide 100-25 mg per tablet Commonly known as:  HYZAAR  
TAKE 1 TABLET EVERY DAY  
  
 MULTI-VITAMIN PO Take  by mouth. Takes one po daily. PROBIOTIC 4X 10-15 mg Tbec Generic drug:  B.infantis-B.ani-B.long-B.bifi Take  by mouth. simvastatin 40 mg tablet Commonly known as:  ZOCOR  
TAKE 1 TABLET EVERY NIGHT Prescriptions Sent to Pharmacy Refills  
 losartan-hydroCHLOROthiazide (HYZAAR) 100-25 mg per tablet 1 Sig: TAKE 1 TABLET EVERY DAY Class: Normal  
 Pharmacy: [address] [place] [address], [address] [place] [address] #: [phone] We Performed the Following HEMOGLOBIN A1C WITH EAG [68829 CPT(R)] VITAMIN D, 25 HYDROXY [id number] CPT(R)] Follow-up Instructions Return in about 6 months (around 5/6/2018) for Medicare Wellness Visit. To-Do List   
 11/08/2017 10:30 AM  
  Appointment with Raymond Yepez MD; [facility] [address] at [address] ([phone]) GENERAL INSTRUCTIONS Patient Instructions DASH Diet: Care Instructions Your Care Instructions The DASH diet is an eating plan that can help lower your blood pressure. DASH stands for Dietary Approaches to Stop Hypertension. Hypertension is high blood pressure. The DASH diet focuses on eating foods that are high in calcium, potassium, and magnesium. These nutrients can lower blood pressure. The foods that are highest in these nutrients are fruits, vegetables, low-fat dairy products, nuts, seeds, and legumes. But taking calcium, potassium, and magnesium supplements instead of eating foods that are high in those nutrients does not have the same effect. The DASH diet also includes whole grains, fish, and poultry. The DASH diet is one of several lifestyle changes your doctor may recommend to lower your high blood pressure. Your doctor may also want you to decrease the amount of sodium in your diet. Lowering sodium while following the DASH diet can lower blood pressure even further than just the DASH diet alone. Follow-up care is a key part of your treatment and safety. Be sure to make and go to all appointments, and call your doctor if you are having problems. It's also a good idea to know your test results and keep a list of the medicines you take. How can you care for yourself at home? Following the DASH diet · Eat 4 to 5 servings of fruit each day. A serving is 1 medium-sized piece of fruit, ½ cup chopped or canned fruit, 1/4 cup dried fruit, or 4 ounces (½ cup) of fruit juice. Choose fruit more often than fruit juice. · Eat 4 to 5 servings of vegetables each day. A serving is 1 cup of lettuce or raw leafy vegetables, ½ cup of chopped or cooked vegetables, or 4 ounces (½ cup) of vegetable juice. Choose vegetables more often than vegetable juice. · Get 2 to 3 servings of low-fat and fat-free dairy each day. A serving is 8 ounces of milk, 1 cup of yogurt, or 1 ½ ounces of cheese. · Eat 6 to 8 servings of grains each day.  A serving is 1 slice of bread, 1 ounce of dry cereal, or ½ cup of cooked rice, pasta, or cooked cereal. Try to choose whole-grain products as much as possible. · Limit lean meat, poultry, and fish to 2 servings each day. A serving is 3 ounces, about the size of a deck of cards. · Eat 4 to 5 servings of nuts, seeds, and legumes (cooked dried beans, lentils, and split peas) each week. A serving is 1/3 cup of nuts, 2 tablespoons of seeds, or ½ cup of cooked beans or peas. · Limit fats and oils to 2 to 3 servings each day. A serving is 1 teaspoon of vegetable oil or 2 tablespoons of salad dressing. · Limit sweets and added sugars to 5 servings or less a week. A serving is 1 tablespoon jelly or jam, ½ cup sorbet, or 1 cup of lemonade. · Eat less than 2,300 milligrams (mg) of sodium a day. If you limit your sodium to 1,500 mg a day, you can lower your blood pressure even more. Tips for success · Start small. Do not try to make dramatic changes to your diet all at once. You might feel that you are missing out on your favorite foods and then be more likely to not follow the plan. Make small changes, and stick with them. Once those changes become habit, add a few more changes. · Try some of the following: ¨ Make it a goal to eat a fruit or vegetable at every meal and at snacks. This will make it easy to get the recommended amount of fruits and vegetables each day. ¨ Try yogurt topped with fruit and nuts for a snack or healthy dessert. ¨ Add lettuce, tomato, cucumber, and onion to sandwiches. ¨ Combine a ready-made pizza crust with low-fat mozzarella cheese and lots of vegetable toppings. Try using tomatoes, squash, spinach, broccoli, carrots, cauliflower, and onions. ¨ Have a variety of cut-up vegetables with a low-fat dip as an appetizer instead of chips and dip. ¨ Sprinkle sunflower seeds or chopped almonds over salads. Or try adding chopped walnuts or almonds to cooked vegetables. ¨ Try some vegetarian meals using beans and peas.  Add garbanzo or kidney beans to salads. Make burritos and tacos with mashed diallo beans or black beans. Where can you learn more? Go to http://dorota-merlene.info/. Enter X396 in the search box to learn more about \"DASH Diet: Care Instructions. \" Current as of: September 21, 2016 Content Version: 11.4 © 7228-4596 [employer]. Care instructions adapted under license by [employer] (which disclaims liability or warranty for this information). If you have questions about a medical condition or this instruction, always ask your healthcare professional. [place] [address] any warranty or liability for your use of this information. Introducing [facility]! Maribeth Hinson introduces PowerOasis patient portal. Now you can access parts of your medical record, email your doctor's office, and request medication refills online. 1. In your internet browser, go to [email or web address] 2. Click on the First Time User? Click Here link in the Sign In box. You will see the New Member Sign Up page. 3. Enter your PowerOasis Access Code exactly as it appears below. You will not need to use this code after youve completed the sign-up process. If you do not sign up before the expiration date, you must request a new code. · [employer] Access Code: [email or web address] Expires: 12/6/2017 10:04 AM 
 
4. Enter the last four digits of your Social Security Number (xxxx) and Date of Birth (mm/dd/yyyy) as indicated and click Submit. You will be taken to the next sign-up page. 5. Create a Open Dynamic[employer] ID. This will be your PowerOasis login ID and cannot be changed, so think of one that is secure and easy to remember. 6. Create a PowerOasis password. You can change your password at any time. 7. Enter your Password Reset Question and Answer. This can be used at a later time if you forget your password. 8. Enter your e-mail address.  You will receive e-mail notification when new information is available in [employer]. 9. Click Sign Up. You can now view and download portions of your medical record. 10. Click the Download Summary menu link to download a portable copy of your medical information. If you have questions, please visit the Frequently Asked Questions section of the [employer] website. Remember, [employer] is NOT to be used for urgent needs. For medical emergencies, dial 911. Now available from your iPhone and Android! Please provide this summary of care documentation to your next provider. Your primary care clinician is listed as Carine Barrera. If you have any questions after today's visit, please call [phone].

## 2017-11-06 NOTE — PROGRESS NOTES
Patient Name: Jade Moore   MRN: [id number]    Chelo Michaels is a 68 y.o. female who presents with the following: The patient has hypertension and remove hx of pre-DM. She reports taking medications as instructed, no medication side effects noted, patient does not perform home BP monitoring, no TIA's, no chest pain on exertion, no dyspnea on exertion, no swelling of ankles, no orthostatic dizziness or lightheadedness. Diet and Lifestyle: generally follows a low fat low cholesterol diet, generally follows a low sodium diet, exercises sporadically. Lab review: orders written for new lab studies as appropriate; see orders. Pt would like to check for DM as she occasionally has numbness in her feet which resolve when wiggling her toes. Hx of osteoporosis, does not want to do medications for this. BP Readings from Last 3 Encounters:   11/06/17 142/64   05/31/17 149/61   05/04/17 132/78       Review of Systems   Constitutional: Negative for fever, malaise/fatigue and weight loss. Respiratory: Negative for cough, hemoptysis, shortness of breath and wheezing. Cardiovascular: Negative for chest pain, palpitations, leg swelling and PND. Gastrointestinal: Negative for abdominal pain, constipation, diarrhea, nausea and vomiting. The patient's medications, allergies, past medical history, surgical history, family history and social history were reviewed and updated where appropriate. Prior to Admission medications    Medication Sig Start Date End Date Taking? Authorizing Provider   atenolol (TENORMIN) 25 mg tablet Take 1 Tab by mouth daily.  10/10/17  Yes Rubén Bates MD   simvastatin (ZOCOR) 40 mg tablet TAKE 1 TABLET EVERY NIGHT 10/3/17  Yes Rubén Bates MD   levothyroxine (SYNTHROID) 112 mcg tablet TAKE 1 TABLET EVERY DAY BEFORE BREAKFAST 5/25/17  Yes Carine Barrera MD   losartan-hydroCHLOROthiazide (HYZAAR) 100-25 mg per tablet TAKE 1 TABLET EVERY DAY 11/3/16  Yes Saritha Sylvester, DO   amoxicillin (AMOXIL) 500 mg capsule Take 500 mg by mouth as needed (DENTIST). 9/23/15  Yes Historical Provider   calcium-vitamin D (CALCIUM 500 WITH VITAMIN D) 500 mg(1,250mg) -200 unit per tablet Take 1 Tab by mouth two (2) times daily (with meals). Yes Historical Provider   MULTIVITAMINS (MULTI-VITAMIN PO) Take  by mouth. Takes one po daily. Yes Historical Provider   B.infantis-B.ani-B.long-B.bifi (PROBIOTIC 4X) 10-15 mg TbEC Take  by mouth. Historical Provider       Allergies   Allergen Reactions    Benazepril Cough    Sulfa (Sulfonamide Antibiotics) Hives           OBJECTIVE    Visit Vitals    /64 (BP 1 Location: Left arm, BP Patient Position: Sitting)    Pulse (!) 56    Temp 98.2 °F (36.8 °C) (Oral)    Resp 18    Ht 5' 4\" (1.626 m)    Wt 168 lb 6.4 oz (76.4 kg)    SpO2 98%    BMI 28.91 kg/m2       Physical Exam   Constitutional: She is well-developed, well-nourished, and in no distress. No distress. HENT:   Head: Normocephalic and atraumatic. Right Ear: External ear normal.   Left Ear: External ear normal.   Eyes: Conjunctivae and EOM are normal. Pupils are equal, round, and reactive to light. Cardiovascular: Normal rate, regular rhythm and normal heart sounds. Exam reveals no gallop and no friction rub. No murmur heard. Pulmonary/Chest: Effort normal and breath sounds normal. No respiratory distress. She has no wheezes. Skin: She is not diaphoretic. Psychiatric: Mood, memory, affect and judgment normal.   Nursing note and vitals reviewed. ASSESSMENT  Rue Saint-Anto[facility] Luciano Serum is a 68 y.o. female who presents today for:    1. HTN, goal below 150/90  Stable, continue current treatment. 2. Osteoporosis, unspecified osteoporosis type, unspecified pathological fracture presence  Stable, continue current treatment pending review of labs.   - VITAMIN D, 25 HYDROXY    3. Pre-diabetes  Stable, continue current treatment pending review of labs. - HEMOGLOBIN A1C WITH EAG         Medications Discontinued During This Encounter   Medication Reason    losartan-hydroCHLOROthiazide (HYZAAR) 100-25 mg per tablet Reorder       Follow-up Disposition:  Return in about 6 months (around 5/6/2018) for Medicare Wellness Visit. Time: 25 minutes was spent with this patient face to face discussing test results, follow up visits, and when repeat testing. I discussed diagnoses, risk factors and treatment for each based on current recommendations and literature. Greater than 50% of total visit time was spent in counseling and coordination of care. Medication risks/benefits/costs/interactions/alternatives discussed with patient. Advised patient to call back or return to office if symptoms worsen/change/persist. If patient cannot reach us or should anything more severe/urgent arise he/she should proceed directly to the nearest emergency department. Discussed expected course/resolution/complications of diagnosis in detail with patient. Patient given a written after visit summary which includes his/her diagnoses, current medications and vitals. Patient expressed understanding with the diagnosis and plan.      Vandana Lemus M.D.

## 2017-11-06 NOTE — PROGRESS NOTES
Chief Complaint   Patient presents with    Hypertension     6 month follow up     1. Have you been to the ER, urgent care clinic since your last visit? Hospitalized since your last visit? No    2. Have you seen or consulted any other health care providers outside of the [address] [place] [address] since your last visit? Include any pap smears or colon screening. YES, saw dermatologist Dr. Ernesto Eduardo.

## 2017-11-06 NOTE — ACP (ADVANCE CARE PLANNING)
Advance Care Planning:   Patient was offered the opportunity to discuss advance care planning:  no     Does patient have an Advance Directive:  yes   If no, did you provide information on [facility]?   In chart

## 2017-11-07 LAB
25(OH)D3+25(OH)D2 SERPL-MCNC: 60.1 NG/ML (ref 30–100)
EST. AVERAGE GLUCOSE BLD GHB EST-MCNC: 114 MG/DL
HBA1C MFR BLD: 5.6 % (ref 4.8–5.6)

## 2017-11-08 ENCOUNTER — HOSPITAL ENCOUNTER (OUTPATIENT)
Dept: INTERVENTIONAL RADIOLOGY/VASCULAR | Age: 77
Discharge: HOME | End: 2017-11-08
Attending: ORTHOPAEDIC SURGERY | Admitting: RADIOLOGY
Payer: MEDICARE

## 2017-11-08 VITALS
TEMPERATURE: 98.1 F | DIASTOLIC BLOOD PRESSURE: 59 MMHG | RESPIRATION RATE: 18 BRPM | BODY MASS INDEX: 28.68 KG/M2 | HEIGHT: 64 IN | SYSTOLIC BLOOD PRESSURE: 152 MMHG | WEIGHT: 168 LBS | OXYGEN SATURATION: 99 % | HEART RATE: 56 BPM

## 2017-11-08 DIAGNOSIS — M43.06 LUMBAR SPONDYLOLYSIS: ICD-10-CM

## 2017-11-08 DIAGNOSIS — M48.061 SPINAL STENOSIS, LUMBAR: ICD-10-CM

## 2017-11-08 DIAGNOSIS — M54.50 LOW BACK PAIN WITH RADIATION: ICD-10-CM

## 2017-11-08 PROCEDURE — 74011636320 HC RX REV CODE- 636/320: Performed by: RADIOLOGY

## 2017-11-08 PROCEDURE — 64483 NJX AA&/STRD TFRM EPI L/S 1: CPT

## 2017-11-08 PROCEDURE — 74011000250 HC RX REV CODE- 250: Performed by: RADIOLOGY

## 2017-11-08 PROCEDURE — 74011250636 HC RX REV CODE- 250/636: Performed by: RADIOLOGY

## 2017-11-08 RX ORDER — LIDOCAINE HYDROCHLORIDE 10 MG/ML
5 INJECTION, SOLUTION EPIDURAL; INFILTRATION; INTRACAUDAL; PERINEURAL
Status: DISCONTINUED | OUTPATIENT
Start: 2017-11-08 | End: 2017-11-08 | Stop reason: HOSPADM

## 2017-11-08 RX ORDER — LIDOCAINE HYDROCHLORIDE 10 MG/ML
10 INJECTION, SOLUTION EPIDURAL; INFILTRATION; INTRACAUDAL; PERINEURAL
Status: COMPLETED | OUTPATIENT
Start: 2017-11-08 | End: 2017-11-08

## 2017-11-08 RX ORDER — METHYLPREDNISOLONE ACETATE 40 MG/ML
80 INJECTION, SUSPENSION INTRA-ARTICULAR; INTRALESIONAL; INTRAMUSCULAR; SOFT TISSUE
Status: COMPLETED | OUTPATIENT
Start: 2017-11-08 | End: 2017-11-08

## 2017-11-08 RX ORDER — METHYLPREDNISOLONE ACETATE 40 MG/ML
40 INJECTION, SUSPENSION INTRA-ARTICULAR; INTRALESIONAL; INTRAMUSCULAR; SOFT TISSUE
Status: DISCONTINUED | OUTPATIENT
Start: 2017-11-08 | End: 2017-11-08 | Stop reason: HOSPADM

## 2017-11-08 RX ADMIN — IOHEXOL 5 ML: 180 INJECTION INTRAVENOUS at 11:54

## 2017-11-08 RX ADMIN — LIDOCAINE HYDROCHLORIDE 15 ML: 10 INJECTION, SOLUTION EPIDURAL; INFILTRATION; INTRACAUDAL; PERINEURAL at 11:53

## 2017-11-08 RX ADMIN — METHYLPREDNISOLONE ACETATE 120 MG: 40 INJECTION, SUSPENSION INTRA-ARTICULAR; INTRALESIONAL; INTRAMUSCULAR; SOFT TISSUE at 11:54

## 2017-11-08 NOTE — H&P
Radiology History and Physical    Patient: [place] 68 y.o. female     Chief Complaint: No chief complaint on file. History of Present Illness: LBP.     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 Done @[facility]/histoplasmosis    Unspecified hypothyroidism           Family History   Problem Relation Age of Onset    Heart Disease Mother     Diabetes Father     Heart Disease Father     Cancer Son      metastatic prostate ca    Diabetes Son     Heart Disease Son      Social History     Social History    Marital status: SINGLE     Spouse name: N/A    Number of children: N/A    Years of education: N/A     Occupational History    retired        but still works part time     Social History Main Topics    Smoking status: Former Smoker     Packs/day: 1.00     Years: 25.00     Types: Cigarettes    Smokeless tobacco: Never Used      Comment: quit in the '90's/cigarettes    Alcohol use Yes      Comment: social    Drug use: No    Sexual activity: Not on file     Other Topics Concern     Service No    Blood Transfusions No    Caffeine Concern No    Occupational Exposure No    Hobby Hazards No    Sleep Concern Yes     stress cause insomnia     Stress Concern Yes     son is a drug addict is dependent on her     Weight Concern No    Special Diet Yes     eats healthy and balanced     Back Care Yes     spinal stenosis,chronic pain    Exercise Yes     water aerobics    Bike Helmet No     NA    Seat Belt Yes    Self-Exams Yes Social History Narrative       Allergies: Allergies   Allergen Reactions    Benazepril Cough    Sulfa (Sulfonamide Antibiotics) Hives       Current Medications:  Current Facility-Administered Medications   Medication Dose Route Frequency    sodium bicarbonate (4%) (NEUT) injection 5 mL  5 mL SubCUTAneous RAD ONCE    methylPREDNISolone acetate (DEPO-MEDROL) 40 mg/mL injection 40 mg  40 mg IntraMUSCular RAD ONCE    lidocaine (PF) (XYLOCAINE) 10 mg/mL (1 %) injection 5 mL  5 mL SubCUTAneous RAD ONCE        Physical Exam:  Blood pressure 152/59, pulse (!) 56, temperature 98.1 °F (36.7 °C), resp. rate 18, height 5' 3.5\" (1.613 m), weight 76.2 kg (168 lb), SpO2 99 %. GENERAL: alert, cooperative, no distress, appears stated age      Alerts:    Hospital Problems  Date Reviewed: 11/6/2017    None          Laboratory:    No results for input(s): HGB, HCT, WBC, PLT, INR, BUN, CREA, K, CRCLT, HGBEXT, HCTEXT, PLTEXT in the last 72 hours. No lab exists for component: PTT, PT, INREXT      Plan of Care/Planned Procedure:  Risks, benefits, and alternatives reviewed with patient and she agrees to proceed with the procedure.

## 2017-11-08 NOTE — PROCEDURES
PROCEDURE:TFESI. INDICATION:LLE pain. ANESTHESIA:local.  COMPLICATION:NONE. SPECIMENS REMOVED:none. BLOOD LOSS:NONE. /ASSISTANT:FERNANDO Kramer RECOMMENDATIONS:none. CONSENT OBTAINED:YES.  NOTES:none.

## 2017-11-08 NOTE — DISCHARGE INSTRUCTIONS
Steroid Injection Discharge Instructions    General Information:   A steroid injection was performed today, placing a combination of a steroid and an anesthetic (numbing medicine) into the space around the nerves of your spine. This is done to treat back pain. It may take 7-10 days for the injection to reach its full potential.  This procedure can be done at any level of the spinal column, depending on where your pain is. Your doctor will have ordered the appropriate level to be treated prior to your coming in for the procedure. Home Care Instructions: You can resume your regular diet. Do not drink alcohol today. You may notice that you have to use your pain medications less after your injection. Some people do not notice much of a change in their pain after the first injection. If that is the case, it is worth your time to have a second one done. This is why these injections are sometimes ordered in a series of three. Keep the puncture site clean and dry for 24 hours, and then you may remove the dressing. Showering is acceptable after the bandage is removed. Rest today be aware there maybe numbness or tingling that may last up to 12 hours after the inject. Call If:   You should call your Physician and/or the Radiology Nurse if you have any bleeding other than a small spot on your bandage. Call if you have any signs of infection, fever, increased pain at the puncture site, confusion, or a headache that worsens when you stand and eases when lying flat. Follow-Up Instructions:  Please see your ordering doctor as he/she has requested. Let your doctor know if you have relief from your pain so they may schedule another injection for you if it is indicated. To Reach Us:  Should you experience any significant changes, please call [phone] between the hours of 7:30 am and 10 pm or [phone] after hours.  After hours, ask the  to page the X-ray Technologist, and describe the problem to the technologist.

## 2017-12-15 ENCOUNTER — TELEPHONE (OUTPATIENT)
Dept: FAMILY MEDICINE CLINIC | Age: 77
End: 2017-12-15

## 2017-12-15 NOTE — TELEPHONE ENCOUNTER
If pt continues to feel unwell regardless of what her BP is, recommend UC evaluation. Would not change her BP regimen but would instead recommend provider evaluation to rule out underlying illness.

## 2017-12-15 NOTE — TELEPHONE ENCOUNTER
Call to patient.  verified. Patient is not in town and is in [place] and will not be back home until after the holidays and she started feeling weak this morning and she checked her bp at [address] [place] [address] which was 106/64. Patient states that she is feeling better at this time. Patient denies, nausea, vomiting, dizziness, sweating. Patient has been taking her medication as prescribed. Patient took her atenolol this morning but has not taken her losartan-hctz yet. Patient blood pressure monitor is not with her. Advised patient if she experiences any s/s she previously denied or feels faint then she should be seen by UC since she is not near her pcp's office. Advised patient to drink plenty of water as well. Patient inquires if there needs to be any changes to her bp meds at this time. Informed patient she needs to check her bp again if it is below 110/60 advised she should hold of on taking her afternoon dosage. Advised I will notify provider for any further recommendations at this time.      Patient scheduled 1/3/17 for bp check with pcp

## 2017-12-15 NOTE — TELEPHONE ENCOUNTER
Patient is calling back, she is is [place] and she states she would really like to speak to her PCP in regards to how she's been feeling, she wants to know what she should do her bp was 106/54 she is very alarmed that her BP has dropped.   Best contact number is  [phone]

## 2017-12-15 NOTE — TELEPHONE ENCOUNTER
Patient is calling, she states that she is currently in [place] for the holidays and she has been experiencing some \"episodes\". She states that she is randomly becoming \"weak\", for example she was in the grocery store this morning and experienced one of there episodes, she became weak, the pharmacist took her BP and it was 106/64, the pharmacist requested that she give her PCP a call to see what she should do about this.      Best call back # for patient: [phone]

## 2017-12-19 NOTE — TELEPHONE ENCOUNTER
Incoming call from patient.  verified. Informed patient of pcp's recommendations. Patient stated her bp was 153/69 it has been fluctuating. Patient stated she went to UC but states people were coughing and sneezing and she did not want to get sick.  Reiterated pcp's recommendations again and patient verbalized understanding

## 2018-01-03 ENCOUNTER — OFFICE VISIT (OUTPATIENT)
Dept: FAMILY MEDICINE CLINIC | Age: 78
End: 2018-01-03

## 2018-01-03 VITALS
HEART RATE: 58 BPM | WEIGHT: 168.8 LBS | OXYGEN SATURATION: 97 % | RESPIRATION RATE: 18 BRPM | BODY MASS INDEX: 28.82 KG/M2 | TEMPERATURE: 98.3 F | DIASTOLIC BLOOD PRESSURE: 70 MMHG | HEIGHT: 64 IN | SYSTOLIC BLOOD PRESSURE: 120 MMHG

## 2018-01-03 DIAGNOSIS — R00.1 BRADYCARDIA: ICD-10-CM

## 2018-01-03 DIAGNOSIS — I10 HTN, GOAL BELOW 150/90: Primary | ICD-10-CM

## 2018-01-03 NOTE — PATIENT INSTRUCTIONS

## 2018-01-03 NOTE — PROGRESS NOTES
Patient Name: Courtney Mccartney   MRN: [id number]    Yung Romreo is a 68 y.o. female who presents with the following: The patient has hypertension. She reports taking medications as instructed, no medication side effects noted, home BP monitoring in range of 397-407'P systolic over 77'Q diastolic, no TIA's, no chest pain on exertion, no dyspnea on exertion, no swelling of ankles, no palpitations. Diet and Lifestyle: generally follows a low fat low cholesterol diet, generally follows a low sodium diet, no formal exercise but active during the day. Lab review: no lab studies available for review at time of visit. Has been in [place] with her son for the past month. Had an episode of sudden lightheadedness at the bookstore and noted that her BP was low. No further episodes but feels foggy in general.  No hx of CAD or CVA. Has been on atenolol for many years. BP Readings from Last 3 Encounters:   01/03/18 120/70   11/08/17 152/59   11/06/17 142/64       Review of Systems   Constitutional: Positive for malaise/fatigue. Negative for fever and weight loss. Respiratory: Negative for cough, hemoptysis, shortness of breath and wheezing. Cardiovascular: Negative for chest pain, palpitations, leg swelling and PND. Gastrointestinal: Negative for abdominal pain, constipation, diarrhea, nausea and vomiting. Neurological: Positive for dizziness. The patient's medications, allergies, past medical history, surgical history, family history and social history were reviewed and updated where appropriate. Prior to Admission medications    Medication Sig Start Date End Date Taking? Authorizing Provider   losartan-hydroCHLOROthiazide (HYZAAR) 100-25 mg per tablet TAKE 1 TABLET EVERY DAY 11/6/17  Yes Hezekiah Claude, MD   atenolol (TENORMIN) 25 mg tablet Take 1 Tab by mouth daily.  10/10/17  Yes Hezekiah Claude, MD   simvastatin (ZOCOR) 40 mg tablet TAKE 1 TABLET EVERY NIGHT 10/3/17  Yes Hezekiah Claude, MD   levothyroxine (SYNTHROID) 112 mcg tablet TAKE 1 TABLET EVERY DAY BEFORE BREAKFAST 5/25/17  Yes Hezekiah Claude, MD   amoxicillin (AMOXIL) 500 mg capsule Take 500 mg by mouth as needed (DENTIST). 9/23/15  Yes Historical Provider   calcium-vitamin D (CALCIUM 500 WITH VITAMIN D) 500 mg(1,250mg) -200 unit per tablet Take 1 Tab by mouth two (2) times daily (with meals). Yes Historical Provider   MULTIVITAMINS (MULTI-VITAMIN PO) Take 1 Tab by mouth daily. Takes one po daily. Yes Historical Provider   B.infantis-B.ani-B.long-B.bifi (PROBIOTIC 4X) 10-15 mg TbEC Take  by mouth. Historical Provider       Allergies   Allergen Reactions    Benazepril Cough    Sulfa (Sulfonamide Antibiotics) Hives           OBJECTIVE    Visit Vitals    /70 (BP 1 Location: Left arm, BP Patient Position: Sitting)    Pulse (!) 58    Temp 98.3 °F (36.8 °C) (Oral)    Resp 18    Ht 5' 3.5\" (1.613 m)    Wt 168 lb 12.8 oz (76.6 kg)    SpO2 97%    BMI 29.43 kg/m2       Physical Exam   Constitutional: She is well-developed, well-nourished, and in no distress. No distress. HENT:   Head: Normocephalic and atraumatic. Right Ear: External ear normal.   Left Ear: External ear normal.   Eyes: Conjunctivae and EOM are normal. Pupils are equal, round, and reactive to light. Cardiovascular: Regular rhythm and normal heart sounds. Exam reveals no gallop and no friction rub. No murmur heard. Sinus bradycardia   Pulmonary/Chest: Effort normal and breath sounds normal. No respiratory distress. She has no wheezes. Skin: She is not diaphoretic. Psychiatric: Mood, memory, affect and judgment normal.   Nursing note and vitals reviewed. ASSESSMENT  Rue Saint-Antoine Deloras Bares is a 68 y.o. female who presents today for:    1. HTN, goal below 150/90  Will d/c atenolol given lack of indication for beta blocker and pt to RTC in one month for BP check.      2. Bradycardia  Long hx of bradycardia on beta blocker. Will d/c as above. Medications Discontinued During This Encounter   Medication Reason    B.infantis-B.ani-B.long-B.bifi (PROBIOTIC 4X) 10-15 mg TbEC Not A Current Medication    atenolol (TENORMIN) 25 mg tablet Not A Current Medication       Follow-up Disposition:  Return in about 4 weeks (around 1/31/2018) for HTN follow up. Medication risks/benefits/costs/interactions/alternatives discussed with patient. Advised patient to call back or return to office if symptoms worsen/change/persist. If patient cannot reach us or should anything more severe/urgent arise he/she should proceed directly to the nearest emergency department. Discussed expected course/resolution/complications of diagnosis in detail with patient. Patient given a written after visit summary which includes his/her diagnoses, current medications and vitals. Patient expressed understanding with the diagnosis and plan.      Johnell Simmonds M.D.

## 2018-01-03 NOTE — MR AVS SNAPSHOT
Visit Information Date & Time Provider Department Dept. Phone Encounter #  
 1/3/2018  9:15 AM Gary Sanchez MD [address] [place] [address] [phone] [id number] Follow-up Instructions Return in about 4 weeks (around 1/31/2018) for HTN follow up. Your Appointments 5/7/2018  8:00 AM  
ROUTINE CARE with Gary Sanchez MD  
[facility]) Appt Note: 6 month follow up visit [address] [id number]  
[phone]  
  
   
 [address] [address] Upcoming Health Maintenance Date Due Pneumococcal 65+ Low/Medium Risk (2 of 2 - PPSV23) 10/19/2008 GLAUCOMA SCREENING Q2Y 4/5/2018 MEDICARE YEARLY EXAM 5/5/2018 Bone Densitometry 5/30/2020 COLONOSCOPY 7/9/2023 DTaP/Tdap/Td series (2 - Td) 11/19/2025 Allergies as of 1/3/2018  Review Complete On: 1/3/2018 By: Gary Sanchez MD  
  
 Severity Noted Reaction Type Reactions Benazepril  04/10/2013    Cough Sulfa (Sulfonamide Antibiotics)  03/20/2010    Hives Current Immunizations  Reviewed on 10/16/2013 Name Date Influenza High Dose Vaccine PF 10/12/2016 Influenza Vaccine 10/16/2013 Influenza Vaccine Split 10/9/2012, 4/6/2012, 9/28/2011 Pneumococcal Vaccine (Pcv) 10/19/2007 Tdap 11/19/2015 Varicella Virus Vaccine Live 7/2/2009 Not reviewed this visit You Were Diagnosed With   
  
 Codes Comments HTN, goal below 150/90    -  Primary ICD-10-CM: I10 
ICD-9-CM: 401.9 Vitals BP Pulse Temp Resp Height(growth percentile) Weight(growth percentile) 120/70 (BP 1 Location: Left arm, BP Patient Position: Sitting) (!) 58 98.3 °F (36.8 °C) (Oral) 18 5' 3.5\" (1.613 m) 168 lb 12.8 oz (76.6 kg) SpO2 BMI OB Status Smoking Status 97% 29.43 kg/m2 Postmenopausal Former Smoker Vitals History BMI and BSA Data  Body Mass Index Body Surface Area  
 29.43 kg/m 2 1.85 m 2  
 Preferred Pharmacy Pharmacy Name Phone [facility] [address] [place] [phone] [facility] [address] [phone] Your Updated Medication List  
  
   
This list is accurate as of: 1/3/18  9:55 AM.  Always use your most recent med list.  
  
  
  
  
 amoxicillin 500 mg capsule Commonly known as:  AMOXIL Take 500 mg by mouth as needed (DENTIST). CALCIUM 500 WITH VITAMIN D 500 mg(1,250mg) -200 unit per tablet Generic drug:  calcium-vitamin D Take 1 Tab by mouth two (2) times daily (with meals). levothyroxine 112 mcg tablet Commonly known as:  SYNTHROID  
TAKE 1 TABLET EVERY DAY BEFORE BREAKFAST  
  
 losartan-hydroCHLOROthiazide 100-25 mg per tablet Commonly known as:  HYZAAR  
TAKE 1 TABLET EVERY DAY  
  
 MULTI-VITAMIN PO Take 1 Tab by mouth daily. Takes one po daily. simvastatin 40 mg tablet Commonly known as:  ZOCOR  
TAKE 1 TABLET EVERY NIGHT Follow-up Instructions Return in about 4 weeks (around 1/31/2018) for HTN follow up. Patient Instructions DASH Diet: Care Instructions Your Care Instructions The DASH diet is an eating plan that can help lower your blood pressure. DASH stands for Dietary Approaches to Stop Hypertension. Hypertension is high blood pressure. The DASH diet focuses on eating foods that are high in calcium, potassium, and magnesium. These nutrients can lower blood pressure. The foods that are highest in these nutrients are fruits, vegetables, low-fat dairy products, nuts, seeds, and legumes. But taking calcium, potassium, and magnesium supplements instead of eating foods that are high in those nutrients does not have the same effect. The DASH diet also includes whole grains, fish, and poultry. The DASH diet is one of several lifestyle changes your doctor may recommend to lower your high blood pressure.  Your doctor may also want you to decrease the amount of sodium in your diet. Lowering sodium while following the DASH diet can lower blood pressure even further than just the DASH diet alone. Follow-up care is a key part of your treatment and safety. Be sure to make and go to all appointments, and call your doctor if you are having problems. It's also a good idea to know your test results and keep a list of the medicines you take. How can you care for yourself at home? Following the DASH diet · Eat 4 to 5 servings of fruit each day. A serving is 1 medium-sized piece of fruit, ½ cup chopped or canned fruit, 1/4 cup dried fruit, or 4 ounces (½ cup) of fruit juice. Choose fruit more often than fruit juice. · Eat 4 to 5 servings of vegetables each day. A serving is 1 cup of lettuce or raw leafy vegetables, ½ cup of chopped or cooked vegetables, or 4 ounces (½ cup) of vegetable juice. Choose vegetables more often than vegetable juice. · Get 2 to 3 servings of low-fat and fat-free dairy each day. A serving is 8 ounces of milk, 1 cup of yogurt, or 1 ½ ounces of cheese. · Eat 6 to 8 servings of grains each day. A serving is 1 slice of bread, 1 ounce of dry cereal, or ½ cup of cooked rice, pasta, or cooked cereal. Try to choose whole-grain products as much as possible. · Limit lean meat, poultry, and fish to 2 servings each day. A serving is 3 ounces, about the size of a deck of cards. · Eat 4 to 5 servings of nuts, seeds, and legumes (cooked dried beans, lentils, and split peas) each week. A serving is 1/3 cup of nuts, 2 tablespoons of seeds, or ½ cup of cooked beans or peas. · Limit fats and oils to 2 to 3 servings each day. A serving is 1 teaspoon of vegetable oil or 2 tablespoons of salad dressing. · Limit sweets and added sugars to 5 servings or less a week. A serving is 1 tablespoon jelly or jam, ½ cup sorbet, or 1 cup of lemonade. · Eat less than 2,300 milligrams (mg) of sodium a day.  If you limit your sodium to 1,500 mg a day, you can lower your blood pressure even more. Tips for success · Start small. Do not try to make dramatic changes to your diet all at once. You might feel that you are missing out on your favorite foods and then be more likely to not follow the plan. Make small changes, and stick with them. Once those changes become habit, add a few more changes. · Try some of the following: ¨ Make it a goal to eat a fruit or vegetable at every meal and at snacks. This will make it easy to get the recommended amount of fruits and vegetables each day. ¨ Try yogurt topped with fruit and nuts for a snack or healthy dessert. ¨ Add lettuce, tomato, cucumber, and onion to sandwiches. ¨ Combine a ready-made pizza crust with low-fat mozzarella cheese and lots of vegetable toppings. Try using tomatoes, squash, spinach, broccoli, carrots, cauliflower, and onions. ¨ Have a variety of cut-up vegetables with a low-fat dip as an appetizer instead of chips and dip. ¨ Sprinkle sunflower seeds or chopped almonds over salads. Or try adding chopped walnuts or almonds to cooked vegetables. ¨ Try some vegetarian meals using beans and peas. Add garbanzo or kidney beans to salads. Make burritos and tacos with mashed diallo beans or black beans. Where can you learn more? Go to http://dorota-merlene.info/. Enter O663 in the search box to learn more about \"DASH Diet: Care Instructions. \" Current as of: September 21, 2016 Content Version: 11.4 © 0607-4639 [employer]. Care instructions adapted under license by [employer] (which disclaims liability or warranty for this information). If you have questions about a medical condition or this instruction, always ask your healthcare professional. [address] any warranty or liability for your use of this information. Introducing [facility]! Braxton Gilman introduces [employer] patient portal. Now you can access parts of your medical record, email your doctor's office, and request medication refills online. 1. In your internet browser, go to [email or web address] 2. Click on the First Time User? Click Here link in the Sign In box. You will see the New Member Sign Up page. 3. Enter your [employer] Access Code exactly as it appears below. You will not need to use this code after youve completed the sign-up process. If you do not sign up before the expiration date, you must request a new code. · [employer] Access Code: [email or web address] Expires: 4/3/2018  9:55 AM 
 
4. Enter the last four digits of your Social Security Number (xxxx) and Date of Birth (mm/dd/yyyy) as indicated and click Submit. You will be taken to the next sign-up page. 5. Create a [employer] ID. This will be your [employer] login ID and cannot be changed, so think of one that is secure and easy to remember. 6. Create a [employer] password. You can change your password at any time. 7. Enter your Password Reset Question and Answer. This can be used at a later time if you forget your password. 8. Enter your e-mail address. You will receive e-mail notification when new information is available in [address] 9. Click Sign Up. You can now view and download portions of your medical record. 10. Click the Download Summary menu link to download a portable copy of your medical information. If you have questions, please visit the Frequently Asked Questions section of the [employer] website. Remember, [employer] is NOT to be used for urgent needs. For medical emergencies, dial 911. Now available from your iPhone and Android! Please provide this summary of care documentation to your next provider. Your primary care clinician is listed as Carine Barrera. If you have any questions after today's visit, please call [phone].

## 2018-01-03 NOTE — PROGRESS NOTES
Chief Complaint   Patient presents with    Blood Pressure Check     1. Have you been to the ER, urgent care clinic since your last visit? Hospitalized since your last visit? No    2. Have you seen or consulted any other health care providers outside of the [address] [place] [address] since your last visit? Include any pap smears or colon screening.  No

## 2018-01-18 RX ORDER — LEVOTHYROXINE SODIUM 112 UG/1
TABLET ORAL
Qty: 90 TAB | Refills: 1 | Status: SHIPPED | OUTPATIENT
Start: 2018-01-18 | End: 2018-06-20 | Stop reason: SDUPTHER

## 2018-02-05 ENCOUNTER — OFFICE VISIT (OUTPATIENT)
Dept: FAMILY MEDICINE CLINIC | Age: 78
End: 2018-02-05

## 2018-02-05 VITALS
WEIGHT: 167.2 LBS | SYSTOLIC BLOOD PRESSURE: 120 MMHG | DIASTOLIC BLOOD PRESSURE: 68 MMHG | RESPIRATION RATE: 18 BRPM | BODY MASS INDEX: 28.54 KG/M2 | HEART RATE: 79 BPM | OXYGEN SATURATION: 96 % | HEIGHT: 64 IN | TEMPERATURE: 98.1 F

## 2018-02-05 DIAGNOSIS — R05.9 COUGH: ICD-10-CM

## 2018-02-05 DIAGNOSIS — J84.10 FIBROSIS OF LUNG (HCC): ICD-10-CM

## 2018-02-05 DIAGNOSIS — I10 HTN, GOAL BELOW 150/90: Primary | ICD-10-CM

## 2018-02-05 DIAGNOSIS — R00.1 BRADYCARDIA: ICD-10-CM

## 2018-02-05 NOTE — MR AVS SNAPSHOT
Franchesca Bernard 
 
 
 [address] [facility] [address] 
[phone] Patient: Neva Goetz MRN: [id number] OL Visit Information Date & Time Provider Department Dept. Phone Encounter #  
 2018  3:45 PM Lauro Baxter MD [address] [place] [address] [phone] [id number] Follow-up Instructions Return in about 6 months (around 2018) for Medicare Wellness Visit (30 min). Your Appointments 2018  8:00 AM  
ROUTINE CARE with Lauro Baxter MD  
[facility]) Appt Note: 6 month follow up visit [address] [address] [phone]  
  
   
 [address] [address] Upcoming Health Maintenance Date Due Pneumococcal 65+ Low/Medium Risk (2 of 2 - PPSV23) 10/19/2008 GLAUCOMA SCREENING Q2Y 2018 MEDICARE YEARLY EXAM 2018 Bone Densitometry 2020 COLONOSCOPY 2023 DTaP/Tdap/Td series (2 - Td) 2025 Allergies as of 2018  Review Complete On: 2018 By: Raheel Vaughan Severity Noted Reaction Type Reactions Benazepril  04/10/2013    Cough Sulfa (Sulfonamide Antibiotics)  2010    Hives Current Immunizations  Reviewed on 10/16/2013 Name Date Influenza High Dose Vaccine PF 10/12/2016 Influenza Vaccine 10/16/2013 Influenza Vaccine Split 10/9/2012, 2012, 2011 Pneumococcal Vaccine (Pcv) 10/19/2007 Tdap 2015 Varicella Virus Vaccine Live 2009 Not reviewed this visit You Were Diagnosed With   
  
 Codes Comments Cough    -  Primary ICD-10-CM: Y34 ICD-9-CM: 934. 2 Vitals BP Pulse Temp Resp Height(growth percentile) Weight(growth percentile) 120/68 (BP 1 Location: Left arm, BP Patient Position: Sitting) 79 98.1 °F (36.7 °C) (Oral) 18 5' 3.5\" (1.613 m) 167 lb 3.2 oz (75.8 kg) SpO2 BMI OB Status Smoking Status 96% 29.15 kg/m2 Postmenopausal Former Smoker Vitals History BMI and BSA Data Body Mass Index Body Surface Area  
 29.15 kg/m 2 1.84 m 2 Preferred Pharmacy Pharmacy Name Phone [facility] [address] [place] [address]22 [facility] [address] [place] [address] [phone] Your Updated Medication List  
  
   
This list is accurate as of: 2/5/18  4:18 PM.  Always use your most recent med list.  
  
  
  
  
 amoxicillin 500 mg capsule Commonly known as:  AMOXIL Take 500 mg by mouth as needed (DENTIST). CALCIUM 500 WITH VITAMIN D 500 mg(1,250mg) -200 unit per tablet Generic drug:  calcium-vitamin D Take 1 Tab by mouth two (2) times daily (with meals). levothyroxine 112 mcg tablet Commonly known as:  SYNTHROID  
TAKE 1 TABLET EVERY DAY BEFORE BREAKFAST  
  
 losartan-hydroCHLOROthiazide 100-25 mg per tablet Commonly known as:  HYZAAR  
TAKE 1 TABLET EVERY DAY  
  
 MULTI-VITAMIN PO Take 1 Tab by mouth daily. Takes one po daily. simvastatin 40 mg tablet Commonly known as:  ZOCOR  
TAKE 1 TABLET EVERY NIGHT Follow-up Instructions Return in about 6 months (around 8/5/2018) for Medicare Wellness Visit (30 min). To-Do List   
 02/05/2018 Imaging:  XR CHEST PA LAT Patient Instructions DASH Diet: Care Instructions Your Care Instructions The DASH diet is an eating plan that can help lower your blood pressure. DASH stands for Dietary Approaches to Stop Hypertension. Hypertension is high blood pressure. The DASH diet focuses on eating foods that are high in calcium, potassium, and magnesium. These nutrients can lower blood pressure. The foods that are highest in these nutrients are fruits, vegetables, low-fat dairy products, nuts, seeds, and legumes.  But taking calcium, potassium, and magnesium supplements instead of eating foods that are high in those nutrients does not have the same effect. The DASH diet also includes whole grains, fish, and poultry. The DASH diet is one of several lifestyle changes your doctor may recommend to lower your high blood pressure. Your doctor may also want you to decrease the amount of sodium in your diet. Lowering sodium while following the DASH diet can lower blood pressure even further than just the DASH diet alone. Follow-up care is a key part of your treatment and safety. Be sure to make and go to all appointments, and call your doctor if you are having problems. It's also a good idea to know your test results and keep a list of the medicines you take. How can you care for yourself at home? Following the DASH diet · Eat 4 to 5 servings of fruit each day. A serving is 1 medium-sized piece of fruit, ½ cup chopped or canned fruit, 1/4 cup dried fruit, or 4 ounces (½ cup) of fruit juice. Choose fruit more often than fruit juice. · Eat 4 to 5 servings of vegetables each day. A serving is 1 cup of lettuce or raw leafy vegetables, ½ cup of chopped or cooked vegetables, or 4 ounces (½ cup) of vegetable juice. Choose vegetables more often than vegetable juice. · Get 2 to 3 servings of low-fat and fat-free dairy each day. A serving is 8 ounces of milk, 1 cup of yogurt, or 1 ½ ounces of cheese. · Eat 6 to 8 servings of grains each day. A serving is 1 slice of bread, 1 ounce of dry cereal, or ½ cup of cooked rice, pasta, or cooked cereal. Try to choose whole-grain products as much as possible. · Limit lean meat, poultry, and fish to 2 servings each day. A serving is 3 ounces, about the size of a deck of cards. · Eat 4 to 5 servings of nuts, seeds, and legumes (cooked dried beans, lentils, and split peas) each week. A serving is 1/3 cup of nuts, 2 tablespoons of seeds, or ½ cup of cooked beans or peas. · Limit fats and oils to 2 to 3 servings each day. A serving is 1 teaspoon of vegetable oil or 2 tablespoons of salad dressing. · Limit sweets and added sugars to 5 servings or less a week. A serving is 1 tablespoon jelly or jam, ½ cup sorbet, or 1 cup of lemonade. · Eat less than 2,300 milligrams (mg) of sodium a day. If you limit your sodium to 1,500 mg a day, you can lower your blood pressure even more. Tips for success · Start small. Do not try to make dramatic changes to your diet all at once. You might feel that you are missing out on your favorite foods and then be more likely to not follow the plan. Make small changes, and stick with them. Once those changes become habit, add a few more changes. · Try some of the following: ¨ Make it a goal to eat a fruit or vegetable at every meal and at snacks. This will make it easy to get the recommended amount of fruits and vegetables each day. ¨ Try yogurt topped with fruit and nuts for a snack or healthy dessert. ¨ Add lettuce, tomato, cucumber, and onion to sandwiches. ¨ Combine a ready-made pizza crust with low-fat mozzarella cheese and lots of vegetable toppings. Try using tomatoes, squash, spinach, broccoli, carrots, cauliflower, and onions. ¨ Have a variety of cut-up vegetables with a low-fat dip as an appetizer instead of chips and dip. ¨ Sprinkle sunflower seeds or chopped almonds over salads. Or try adding chopped walnuts or almonds to cooked vegetables. ¨ Try some vegetarian meals using beans and peas. Add garbanzo or kidney beans to salads. Make burritos and tacos with mashed diallo beans or black beans. Where can you learn more? Go to http://dorota-merlene.info/. Enter L537 in the search box to learn more about \"DASH Diet: Care Instructions. \" Current as of: September 21, 2016 Content Version: 11.4 © 8173-7470 PublishThis. Care instructions adapted under license by [employer] (which disclaims liability or warranty for this information).  If you have questions about a medical condition or this instruction, always ask your healthcare professional. [place] [address] any warranty or liability for your use of this information. Introducing \[facility] [place] [facility]\"" & HEALTH SERVICES! [employer] introduces [employer] patient portal. Now you can access parts of your medical record, email your doctor's office, and request medication refills online. 1. In your internet browser, go to [email or web address] 2. Click on the First Time User? Click Here link in the Sign In box. You will see the New Member Sign Up page. 3. Enter your [employer] Access Code exactly as it appears below. You will not need to use this code after youve completed the sign-up process. If you do not sign up before the expiration date, you must request a new code. · [employer] Access Code: [email or web address] Expires: 4/3/2018  9:55 AM 
 
4. Enter the last four digits of your Social Security Number (xxxx) and Date of Birth (mm/dd/yyyy) as indicated and click Submit. You will be taken to the next sign-up page. 5. Create a [employer] ID. This will be your [employer] login ID and cannot be changed, so think of one that is secure and easy to remember. 6. Create a [employer] password. You can change your password at any time. 7. Enter your Password Reset Question and Answer. This can be used at a later time if you forget your password. 8. Enter your e-mail address. You will receive e-mail notification when new information is available in [address] 9. Click Sign Up. You can now view and download portions of your medical record. 10. Click the Download Summary menu link to download a portable copy of your medical information. If you have questions, please visit the Frequently Asked Questions section of the [employer] website. Remember, [employer] is NOT to be used for urgent needs. For medical emergencies, dial 911. Now available from your iPhone and Android! Please provide this summary of care documentation to your next provider. Your primary care clinician is listed as Carine Barrera. If you have any questions after today's visit, please call [phone].

## 2018-02-05 NOTE — PATIENT INSTRUCTIONS

## 2018-02-05 NOTE — PROGRESS NOTES
Patient Name: Maico Jacobo   MRN: [id number]    Duane Melgoza is a 68 y.o. female who presents with the following:     Since last visit, discontinued atenolol secondary to lightheadedness and low blood pressure/heart rate. Has been doing well with normal readings at [facility] using their BP cuff. Patient reports chronic dry irritating cough that is bothersome during the daytime. States that this has been a chronic issue for years. Denies history of fever, chest pain, asthma. 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 Is not currently on any ACE inhibitors that would cause side effects. Occasionally feels short of breath or winded after walking up a flight of stairs. Denies PND or leg swelling. Reports that she often has to clear her throat due to drainage. BP Readings from Last 3 Encounters:   02/05/18 120/68   01/03/18 120/70   11/08/17 152/59       Review of Systems   Constitutional: Negative for fever, malaise/fatigue and weight loss. Respiratory: Positive for cough and shortness of breath. Negative for hemoptysis and wheezing. Cardiovascular: Negative for chest pain, palpitations, leg swelling and PND. Gastrointestinal: Negative for abdominal pain, constipation, diarrhea, nausea and vomiting. The patient's medications, allergies, past medical history, surgical history, family history and social history were reviewed and updated where appropriate. Prior to Admission medications    Medication Sig Start Date End Date Taking?  Authorizing Provider   levothyroxine (SYNTHROID) 112 mcg tablet TAKE 1 TABLET EVERY DAY BEFORE BREAKFAST 1/18/18  Yes Elaine Cobb MD   losartan-hydroCHLOROthiazide (HYZAAR) 100-25 mg per tablet TAKE 1 TABLET EVERY DAY 11/6/17  Yes Carine Barrera MD   simvastatin (ZOCOR) 40 mg tablet TAKE 1 TABLET EVERY NIGHT 10/3/17 Yes Mesfin Canales MD   amoxicillin (AMOXIL) 500 mg capsule Take 500 mg by mouth as needed (DENTIST). 9/23/15  Yes Historical Provider   calcium-vitamin D (CALCIUM 500 WITH VITAMIN D) 500 mg(1,250mg) -200 unit per tablet Take 1 Tab by mouth two (2) times daily (with meals). Yes Historical Provider   MULTIVITAMINS (MULTI-VITAMIN PO) Take 1 Tab by mouth daily. Takes one po daily. Yes Historical Provider       Allergies   Allergen Reactions    Benazepril Cough    Sulfa (Sulfonamide Antibiotics) Hives           OBJECTIVE      Visit Vitals    /68 (BP 1 Location: Left arm, BP Patient Position: Sitting)    Pulse 79    Temp 98.1 °F (36.7 °C) (Oral)    Resp 18    Ht 5' 3.5\" (1.613 m)    Wt 167 lb 3.2 oz (75.8 kg)    SpO2 96%    BMI 29.15 kg/m2       Physical Exam   Constitutional: She is oriented to person, place, and time and well-developed, well-nourished, and in no distress. No distress. HENT:   Head: Normocephalic and atraumatic. Right Ear: Tympanic membrane is not perforated and not erythematous. No middle ear effusion. No decreased hearing is noted. Left Ear: Tympanic membrane is not perforated and not erythematous. No middle ear effusion. No decreased hearing is noted. Nose: Nose normal. Right sinus exhibits no maxillary sinus tenderness and no frontal sinus tenderness. Left sinus exhibits no maxillary sinus tenderness and no frontal sinus tenderness. Mouth/Throat: Uvula is midline, oropharynx is clear and moist and mucous membranes are normal.   Neck: Normal range of motion. Neck supple. Cardiovascular: Normal rate, regular rhythm and normal heart sounds. Exam reveals no gallop and no friction rub. No murmur heard. Pulmonary/Chest: Effort normal and breath sounds normal. No respiratory distress. She has no wheezes. Lymphadenopathy:     She has no cervical adenopathy. Neurological: She is alert and oriented to person, place, and time. Skin: She is not diaphoretic. Psychiatric: Mood, memory, affect and judgment normal.   Nursing note and vitals reviewed. ASSESSMENT  Rue Saint-Anto[facility] Royce Holm is a 68 y.o. female who presents today for:    1. HTN, goal below 150/90  Stable, continue current treatment. 2. Bradycardia  Resolved after d/c beta blocker. 3. Cough  Unclear etiology. Possibly postnasal drip versus prior history of histoplasmosis. Will obtain updated chest x-ray. If normal, recommended patient to try daily Flonase to help with postnasal drip and continue to monitor symptoms. Reviewed signs and symptoms that would indicate a worsening medical condition which would require immediate evaluation and treatment; patient expressed understanding of plan. - XR CHEST PA LAT; Future    4. Fibrosis of lung ([facility] [address]  CXR as above. There are no discontinued medications. Follow-up Disposition:  Return in about 6 months (around 8/5/2018) for Medicare Wellness Visit (30 min). Medication risks/benefits/costs/interactions/alternatives discussed with patient. Advised patient to call back or return to office if symptoms worsen/change/persist. If patient cannot reach us or should anything more severe/urgent arise he/she should proceed directly to the nearest emergency department. Discussed expected course/resolution/complications of diagnosis in detail with patient. Patient given a written after visit summary which includes his/her diagnoses, current medications and vitals. Patient expressed understanding with the diagnosis and plan.      Chastity Palumbo M.D.

## 2018-02-05 NOTE — PROGRESS NOTES
Chief Complaint   Patient presents with    Hypertension     follow up     1. Have you been to the ER, urgent care clinic since your last visit? Hospitalized since your last visit? No    2. Have you seen or consulted any other health care providers outside of the [address] [place] [address] since your last visit? Include any pap smears or colon screening.  No

## 2018-03-05 ENCOUNTER — OFFICE VISIT (OUTPATIENT)
Dept: FAMILY MEDICINE CLINIC | Age: 78
End: 2018-03-05

## 2018-03-05 VITALS
RESPIRATION RATE: 18 BRPM | WEIGHT: 167.4 LBS | TEMPERATURE: 98.8 F | HEIGHT: 64 IN | HEART RATE: 57 BPM | OXYGEN SATURATION: 97 % | DIASTOLIC BLOOD PRESSURE: 64 MMHG | BODY MASS INDEX: 28.58 KG/M2 | SYSTOLIC BLOOD PRESSURE: 118 MMHG

## 2018-03-05 DIAGNOSIS — R05.3 CHRONIC COUGH: Primary | ICD-10-CM

## 2018-03-05 NOTE — PROGRESS NOTES
Patient Name: Tray Mack   MRN: [id number]    Fernando Ceron is a 68 y.o. female who presents with the following:     Patient reports chronic dry irritating cough that is bothersome during the daytime. States that this has been a chronic issue for years. Denies history of fever, chest pain, asthma. 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 Is not currently on any ACE inhibitors that would cause side effects. Former smoker for many years. Reports that she often has to clear her throat due to drainage. Since last visit, she attempted to take Flonase daily but was able to taste medication in the back of her mouth therefore it does not believe that she was taking it correctly. Chest x-ray done on 5/18 was within normal limits. XR Results (most recent):    Results from Appointment encounter on 02/05/18   XR CHEST PA LAT   Narrative INDICATION:  cough x 2 months, hx of histoplasmosis     COMPARISON: 8/21/2014    FINDINGS: PA and lateral views of the chest demonstrate a stable  cardiomediastinal silhouette and clear lungs bilaterally. The visualized osseous  structures are unremarkable. Impression IMPRESSION: No acute process            Review of Systems   Constitutional: Negative for fever, malaise/fatigue and weight loss. Respiratory: Positive for cough. Negative for hemoptysis, shortness of breath and wheezing. Cardiovascular: Negative for chest pain, palpitations, leg swelling and PND. Gastrointestinal: Negative for abdominal pain, constipation, diarrhea, nausea and vomiting. The patient's medications, allergies, past medical history, surgical history, family history and social history were reviewed and updated where appropriate. Prior to Admission medications    Medication Sig Start Date End Date Taking?  Authorizing Provider levothyroxine (SYNTHROID) 112 mcg tablet TAKE 1 TABLET EVERY DAY BEFORE BREAKFAST 1/18/18  Yes Christi Vega MD   losartan-hydroCHLOROthiazide (HYZAAR) 100-25 mg per tablet TAKE 1 TABLET EVERY DAY 11/6/17  Yes Carine Barrera MD   simvastatin (ZOCOR) 40 mg tablet TAKE 1 TABLET EVERY NIGHT 10/3/17  Yes Christi Vega MD   amoxicillin (AMOXIL) 500 mg capsule Take 500 mg by mouth as needed (DENTIST). 9/23/15  Yes Historical Provider   calcium-vitamin D (CALCIUM 500 WITH VITAMIN D) 500 mg(1,250mg) -200 unit per tablet Take 1 Tab by mouth two (2) times daily (with meals). Yes Historical Provider   MULTIVITAMINS (MULTI-VITAMIN PO) Take 1 Tab by mouth daily. Takes one po daily. Yes Historical Provider       Allergies   Allergen Reactions    Benazepril Cough    Sulfa (Sulfonamide Antibiotics) Hives           OBJECTIVE    Visit Vitals    /64 (BP 1 Location: Right arm, BP Patient Position: Sitting)    Pulse (!) 57    Temp 98.8 °F (37.1 °C) (Oral)    Resp 18    Ht 5' 3.5\" (1.613 m)    Wt 167 lb 6.4 oz (75.9 kg)    SpO2 97%    BMI 29.19 kg/m2       Physical Exam   Constitutional: She is oriented to person, place, and time and well-developed, well-nourished, and in no distress. No distress. Eyes: Conjunctivae and EOM are normal. Pupils are equal, round, and reactive to light. Cardiovascular: Normal rate, regular rhythm and normal heart sounds. Exam reveals no gallop and no friction rub. No murmur heard. Pulmonary/Chest: Effort normal and breath sounds normal. No respiratory distress. She has no wheezes. Neurological: She is alert and oriented to person, place, and time. Skin: Skin is warm and dry. No rash noted. She is not diaphoretic. Psychiatric: Mood, memory, affect and judgment normal.   Nursing note and vitals reviewed. ASSESSMENT  e [facility] Vic[facility] Anand is a 68 y.o. female who presents today for:    1.  Chronic cough  Recommend taking Zyrtec nightly for possible postnasal drip versus seasonal allergies. Given patient's history of smoking and histoplasmosis, would recommend pulmonology referral for formal PFT testing. Reviewed signs and symptoms that would indicate a worsening medical condition which would require immediate evaluation and treatment; patient expressed understanding of plan. - REFERRAL TO PULMONARY DISEASE       There are no discontinued medications. Follow-up Disposition:  Return if symptoms worsen or fail to improve. Medication risks/benefits/costs/interactions/alternatives discussed with patient. Advised patient to call back or return to office if symptoms worsen/change/persist. If patient cannot reach us or should anything more severe/urgent arise he/she should proceed directly to the nearest emergency department. Discussed expected course/resolution/complications of diagnosis in detail with patient. Patient given a written after visit summary which includes his/her diagnoses, current medications and vitals. Patient expressed understanding with the diagnosis and plan.      Sebas Dickson M.D.

## 2018-03-05 NOTE — PROGRESS NOTES
Chief Complaint   Patient presents with    Cough     clear mucus x 3 months       1. Have you been to the ER, urgent care clinic since your last visit? Hospitalized since your last visit? No    2. Have you seen or consulted any other health care providers outside of the [address] [place] [address] since your last visit? Include any pap smears or colon screening.  No

## 2018-03-05 NOTE — PATIENT INSTRUCTIONS
Chronic Cough: Care Instructions  Your Care Instructions    A cough is your body's response to something that bothers your throat or airways. Many things can cause a cough. You might cough because of a cold or the flu, bronchitis, or asthma. Smoking, postnasal drip, allergies, and stomach acid that backs up into your throat also can cause a cough. A cough can be short-term (acute) or long-term (chronic). A chronic cough lasts more than 3 weeks. A chronic cough is often caused by a long-term problem, such as asthma. Another cause might be a medicine, such as an ACE inhibitor. A cough is a symptom, not a disease. To treat a chronic cough, you may need to treat the problem that causes it. You can take a few steps at home to cough less and feel better. Some people cough or clear their throat out of habit for no clear reason. Follow-up care is a key part of your treatment and safety. Be sure to make and go to all appointments, and call your doctor if you are having problems. It's also a good idea to know your test results and keep a list of the medicines you take. How can you care for yourself at home? · Drink plenty of water and other fluids. This may help soothe a dry or sore throat. Honey or lemon juice in hot water or tea may ease a dry cough. · Prop up your head on pillows to help you breathe and ease a cough. · Do not smoke or allow others to smoke around you. Smoke can make a cough worse. If you need help quitting, talk to your doctor about stop-smoking programs and medicines. These can increase your chances of quitting for good. · Avoid exposure to smoke, dust, or other pollutants, or wear a face mask. Check with your doctor or pharmacist to find out which type of face mask will give you the most benefit. · Take cough medicine as directed by your doctor. · Try cough drops to soothe a dry or sore throat. Cough drops don't stop a cough.  Medicine-flavored cough drops are no better than candy-flavored drops or hard candy. Throat clearing  When you have a chronic cough or a disease that may cause this type of cough, you may often feel like you want to clear your throat. This helps bring up mucus. But throat clearing does not always have a cause. Throat clearing can become a habit. The more you do it, the more you feel like you need to do it. But frequent throat clearing can be hard on your vocal cords. It's like slamming them together. To help lessen throat clearing, you can try:  · Taking small sips of water. · Not clearing your throat when you feel you need to. · Swallowing hard when you want to clear your throat. You may want to ask your doctor if a medicine that thins mucus would help. When should you call for help? Call 911 anytime you think you may need emergency care. For example, call if:  ? · You have severe trouble breathing. ?Call your doctor now or seek immediate medical care if:  ? · You cough up blood. ? · You have new or worse trouble breathing. ? · You have a new or higher fever. ? Watch closely for changes in your health, and be sure to contact your doctor if:  ? · You cough more deeply or more often, especially if you notice more mucus or a change in the color of your mucus. ? · You do not get better as expected. Where can you learn more? Go to http://dorota-merlene.info/. Enter R974 in the search box to learn more about \"Chronic Cough: Care Instructions. \"  Current as of: May 12, 2017  Content Version: 11.4  © 0194-8648 [employer]. Care instructions adapted under license by [facility] (which disclaims liability or warranty for this information). If you have questions about a medical condition or this instruction, always ask your healthcare professional. [address] any warranty or liability for your use of this information.

## 2018-03-05 NOTE — MR AVS SNAPSHOT
[address] [facility] 
 
 
 [address] 
[phone] Patient: Chris Pantoja MRN: [id number] Visit Information Date & Time Provider Department Dept. Phone Encounter #  
 3/5/2018 11:00 AM Susanna Royal  [address] [phone] [id number] Follow-up Instructions Return if symptoms worsen or fail to improve. Your Appointments 5/7/2018  8:00 AM  
ROUTINE CARE with Susanna Royal MD  
[facility]) Appt Note: 6 month follow up visit [address] [address]  
[phone]  
  
   
 [address] [address] Upcoming Health Maintenance Date Due  
 GLAUCOMA SCREENING Q2Y 4/5/2018 MEDICARE YEARLY EXAM 5/5/2018 Bone Densitometry 5/30/2020 COLONOSCOPY 7/9/2023 DTaP/Tdap/Td series (2 - Td) 11/19/2025 Allergies as of 3/5/2018  Review Complete On: 3/5/2018 By: Jackelin De Luna Severity Noted Reaction Type Reactions Benazepril  04/10/2013    Cough Sulfa (Sulfonamide Antibiotics)  03/20/2010    Hives Current Immunizations  Reviewed on 10/16/2013 Name Date Influenza High Dose Vaccine PF 10/12/2016 Influenza Vaccine 10/16/2013 Influenza Vaccine Split 10/9/2012, 4/6/2012, 9/28/2011 Pneumococcal Polysaccharide (PPSV-23) 11/14/2017 Pneumococcal Vaccine (Pcv) 10/19/2007 Tdap 11/19/2015 Varicella Virus Vaccine Live 7/2/2009 Not reviewed this visit You Were Diagnosed With   
  
 Codes Comments Chronic cough    -  Primary ICD-10-CM: W91 ICD-9-CM: 765. 2 Vitals BP Pulse Temp Resp Height(growth percentile) Weight(growth percentile)  
 118/64 (BP 1 Location: Right arm, BP Patient Position: Sitting) (!) 57 98.8 °F (37.1 °C) (Oral) 18 5' 3.5\" (1.613 m) 167 lb 6.4 oz (75.9 kg) SpO2 BMI OB Status Smoking Status 97% 29.19 kg/m2 Postmenopausal Former Smoker Vitals History BMI and BSA Data Body Mass Index Body Surface Area  
 29.19 kg/m 2 1.84 m 2 Preferred Pharmacy Pharmacy Name Phone [address] [place] [address], [address] [place] [address] Rd. [phone] Your Updated Medication List  
  
   
This list is accurate as of 3/5/18 11:36 AM.  Always use your most recent med list.  
  
  
  
  
 amoxicillin 500 mg capsule Commonly known as:  AMOXIL Take 500 mg by mouth as needed (DENTIST). CALCIUM 500 WITH VITAMIN D 500 mg(1,250mg) -200 unit per tablet Generic drug:  calcium-vitamin D Take 1 Tab by mouth two (2) times daily (with meals). levothyroxine 112 mcg tablet Commonly known as:  SYNTHROID  
TAKE 1 TABLET EVERY DAY BEFORE BREAKFAST  
  
 losartan-hydroCHLOROthiazide 100-25 mg per tablet Commonly known as:  HYZAAR  
TAKE 1 TABLET EVERY DAY  
  
 MULTI-VITAMIN PO Take 1 Tab by mouth daily. Takes one po daily. simvastatin 40 mg tablet Commonly known as:  ZOCOR  
TAKE 1 TABLET EVERY NIGHT We Performed the Following REFERRAL TO PULMONARY DISEASE [ISR53 Custom] Comments:  
 Patient will schedule referral himself/herself Follow-up Instructions Return if symptoms worsen or fail to improve. Referral Information Referral ID Referred By Referred To  
  
 [id number] [facility] [address] Visits Status Start Date End Date 1 New Request 3/5/18 3/5/19 If your referral has a status of pending review or denied, additional information will be sent to support the outcome of this decision. Patient Instructions Chronic Cough: Care Instructions Your Care Instructions A cough is your body's response to something that bothers your throat or airways. Many things can cause a cough. You might cough because of a cold or the flu, bronchitis, or asthma. Smoking, postnasal drip, allergies, and stomach acid that backs up into your throat also can cause a cough. A cough can be short-term (acute) or long-term (chronic). A chronic cough lasts more than 3 weeks. A chronic cough is often caused by a long-term problem, such as asthma. Another cause might be a medicine, such as an ACE inhibitor. A cough is a symptom, not a disease. To treat a chronic cough, you may need to treat the problem that causes it. You can take a few steps at home to cough less and feel better. Some people cough or clear their throat out of habit for no clear reason. Follow-up care is a key part of your treatment and safety. Be sure to make and go to all appointments, and call your doctor if you are having problems. It's also a good idea to know your test results and keep a list of the medicines you take. How can you care for yourself at home? · Drink plenty of water and other fluids. This may help soothe a dry or sore throat. Honey or lemon juice in hot water or tea may ease a dry cough. · Prop up your head on pillows to help you breathe and ease a cough. · Do not smoke or allow others to smoke around you. Smoke can make a cough worse. If you need help quitting, talk to your doctor about stop-smoking programs and medicines. These can increase your chances of quitting for good. · Avoid exposure to smoke, dust, or other pollutants, or wear a face mask. Check with your doctor or pharmacist to find out which type of face mask will give you the most benefit. · Take cough medicine as directed by your doctor. · Try cough drops to soothe a dry or sore throat. Cough drops don't stop a cough. Medicine-flavored cough drops are no better than candy-flavored drops or hard candy. Throat clearing When you have a chronic cough or a disease that may cause this type of cough, you may often feel like you want to clear your throat. This helps bring up mucus. But throat clearing does not always have a cause. Throat clearing can become a habit. The more you do it, the more you feel like you need to do it. But frequent throat clearing can be hard on your vocal cords. It's like slamming them together. To help lessen throat clearing, you can try: · Taking small sips of water. · Not clearing your throat when you feel you need to. · Swallowing hard when you want to clear your throat. You may want to ask your doctor if a medicine that thins mucus would help. When should you call for help? Call 911 anytime you think you may need emergency care. For example, call if: 
? · You have severe trouble breathing. ?Call your doctor now or seek immediate medical care if: 
? · You cough up blood. ? · You have new or worse trouble breathing. ? · You have a new or higher fever. ? Watch closely for changes in your health, and be sure to contact your doctor if: 
? · You cough more deeply or more often, especially if you notice more mucus or a change in the color of your mucus. ? · You do not get better as expected. Where can you learn more? Go to http://dorota-merlene.info/. Enter Z972 in the search box to learn more about \"Chronic Cough: Care Instructions. \" Current as of: May 12, 2017 Content Version: 11.4 © 1585-5551 [employer]. Care instructions adapted under license by [employer] (which disclaims liability or warranty for this information). If you have questions about a medical condition or this instruction, always ask your healthcare professional. [address] any warranty or liability for your use of this information. Introducing [facility]!    
 Mariel Olson introduces [employer] patient portal. Now you can access parts of your medical record, email your doctor's office, and request medication refills online. 1. In your internet browser, go to [email or web address] 2. Click on the First Time User? Click Here link in the Sign In box. You will see the New Member Sign Up page. 3. Enter your LiveOps Access Code exactly as it appears below. You will not need to use this code after youve completed the sign-up process. If you do not sign up before the expiration date, you must request a new code. · LiveOps Access Code: [email or web address] Expires: 4/3/2018  9:55 AM 
 
4. Enter the last four digits of your Social Security Number (xxxx) and Date of Birth (mm/dd/yyyy) as indicated and click Submit. You will be taken to the next sign-up page. 5. Create a LiveOps ID. This will be your LiveOps login ID and cannot be changed, so think of one that is secure and easy to remember. 6. Create a LiveOps password. You can change your password at any time. 7. Enter your Password Reset Question and Answer. This can be used at a later time if you forget your password. 8. Enter your e-mail address. You will receive e-mail notification when new information is available in [address] 9. Click Sign Up. You can now view and download portions of your medical record. 10. Click the Download Summary menu link to download a portable copy of your medical information. If you have questions, please visit the Frequently Asked Questions section of the LiveOps website. Remember, LiveOps is NOT to be used for urgent needs. For medical emergencies, dial 911. Now available from your iPhone and Android! Please provide this summary of care documentation to your next provider. Your primary care clinician is listed as Carine aBrrera. If you have any questions after today's visit, please call [phone].

## 2018-04-09 RX ORDER — AMOXICILLIN 500 MG/1
500 CAPSULE ORAL AS NEEDED
Refills: 2 | Status: CANCELLED | OUTPATIENT
Start: 2018-04-09

## 2018-04-09 NOTE — TELEPHONE ENCOUNTER
Amoxicillin has not been previously prescribed by [facility] providers before for her dental procedures. Would recommend pt to call her dentist and orthopedic surgery to see if she still needs to take abx before dental procedures; recent studies and guidelines for abx prophylaxis before dental procedures has changed over the years so she may or may not need it. Her dentist or ortho doctor should be able to prescribe abx if they think they are warranted. I do not know her exact history of her knee surgery (i.e. Hx of infection of prosthetic joint, hx of infection of the blood or heart from the knee surgery, or no complications) therefore would defer to the other providers.

## 2018-04-09 NOTE — TELEPHONE ENCOUNTER
Patient is requesting a refill for the medication, She takes this medication before any dental procedure she have because she had a Knee surgery before. Her Orthopedics is Dr Qian Peck    . Requested Prescriptions     Pending Prescriptions Disp Refills    amoxicillin (AMOXIL) 500 mg capsule  2     Sig: Take 1 Cap by mouth as needed (DENTIST).        Patient last seen :  March 05, 2018  Last refill : 3/5/18   Pharmacy on file verified  Patient best call back : [phone]

## 2018-04-10 NOTE — TELEPHONE ENCOUNTER
Call to patient.  verified. Started to explain primary care physician's recommendations. Patient stated she does need the antibiotic. Asked patient if I could finish the recommendations so that she would understand fully. She agreed. Informed patient of all recommendations. She stated okay.

## 2018-04-17 RX ORDER — SIMVASTATIN 40 MG/1
TABLET, FILM COATED ORAL
Qty: 90 TAB | Refills: 1 | Status: SHIPPED | OUTPATIENT
Start: 2018-04-17 | End: 2018-10-09 | Stop reason: SDUPTHER

## 2018-05-07 ENCOUNTER — OFFICE VISIT (OUTPATIENT)
Dept: FAMILY MEDICINE CLINIC | Age: 78
End: 2018-05-07

## 2018-05-07 VITALS
BODY MASS INDEX: 28.75 KG/M2 | HEIGHT: 64 IN | TEMPERATURE: 99.2 F | WEIGHT: 168.4 LBS | DIASTOLIC BLOOD PRESSURE: 76 MMHG | SYSTOLIC BLOOD PRESSURE: 122 MMHG | OXYGEN SATURATION: 97 % | HEART RATE: 70 BPM | RESPIRATION RATE: 18 BRPM

## 2018-05-07 DIAGNOSIS — R73.03 PRE-DIABETES: ICD-10-CM

## 2018-05-07 DIAGNOSIS — I10 HTN, GOAL BELOW 150/90: ICD-10-CM

## 2018-05-07 DIAGNOSIS — E03.9 ACQUIRED HYPOTHYROIDISM: ICD-10-CM

## 2018-05-07 DIAGNOSIS — R41.3 MEMORY CHANGES: ICD-10-CM

## 2018-05-07 DIAGNOSIS — Z11.4 SCREENING FOR HIV WITHOUT PRESENCE OF RISK FACTORS: ICD-10-CM

## 2018-05-07 DIAGNOSIS — Z00.00 ENCOUNTER FOR MEDICARE ANNUAL WELLNESS EXAM: Primary | ICD-10-CM

## 2018-05-07 DIAGNOSIS — E78.5 HYPERLIPIDEMIA, UNSPECIFIED HYPERLIPIDEMIA TYPE: ICD-10-CM

## 2018-05-07 RX ORDER — LOSARTAN POTASSIUM AND HYDROCHLOROTHIAZIDE 25; 100 MG/1; MG/1
TABLET ORAL
Qty: 90 TAB | Refills: 1 | Status: SHIPPED | OUTPATIENT
Start: 2018-05-07 | End: 2018-10-29 | Stop reason: SDUPTHER

## 2018-05-07 NOTE — PROGRESS NOTES
Chief Complaint   Patient presents with   [facility] Annual Wellness Visit         Cough    Memory Loss     x 6 months     1. Have you been to the ER, urgent care clinic since your last visit? Hospitalized since your last visit? No    2. Have you seen or consulted any other health care providers outside of the [facility] since your last visit? Include any pap smears or colon screening.  No

## 2018-05-07 NOTE — PROGRESS NOTES
Patient Name: Mary Ruiz   MRN: [id number]    Avelino Bumpers is a 68 y.o. female who presents with the following: The patient has hypertension, hyperlipidemia and pre-dm. She reports taking medications as instructed, no medication side effects noted. Diet and Lifestyle: generally follows a low fat low cholesterol diet, generally follows a low sodium diet, exercises sporadically. Lab review: orders written for new lab studies as appropriate; see orders. Hx of hypothyroidism, on levothyroxine. Reports 6 month history of intermittent memory issues. For example, she cannot remember what side was the washer or dryer the other day. Has to repeat directions to herself before going out of the home. Admits that she is under a lot of stress. She lost her son about 8 years ago due to prostate cancer. Still has a living son who struggles with drug abuse. Does not want to consider therapy or medications at this time. No family history of early dementia. Review of Systems   Constitutional: Negative for fever, malaise/fatigue and weight loss. Respiratory: Negative for cough, hemoptysis, shortness of breath and wheezing. Cardiovascular: Negative for chest pain, palpitations, leg swelling and PND. Gastrointestinal: Negative for abdominal pain, constipation, diarrhea, nausea and vomiting. Psychiatric/Behavioral: Positive for depression and memory loss. Negative for hallucinations, substance abuse and suicidal ideas. The patient's medications, allergies, past medical history, surgical history, family history and social history were reviewed and updated where appropriate. Prior to Admission medications    Medication Sig Start Date End Date Taking?  Authorizing Provider   simvastatin (ZOCOR) 40 mg tablet TAKE 1 TABLET EVERY NIGHT 4/17/18  Yes Leoncio Lebron MD   levothyroxine (SYNTHROID) 112 mcg tablet TAKE 1 TABLET EVERY DAY BEFORE BREAKFAST 1/18/18  Yes Carine MD Holly   losartan-hydroCHLOROthiazide (HYZAAR) 100-25 mg per tablet TAKE 1 TABLET EVERY DAY 11/6/17  Yes Natalie Pete MD   calcium-vitamin D (CALCIUM 500 WITH VITAMIN D) 500 mg(1,250mg) -200 unit per tablet Take 1 Tab by mouth two (2) times daily (with meals). Yes Historical Provider   MULTIVITAMINS (MULTI-VITAMIN PO) Take 1 Tab by mouth daily. Takes one po daily. Yes Historical Provider       Allergies   Allergen Reactions    Benazepril Cough    Sulfa (Sulfonamide Antibiotics) Hives           OBJECTIVE    Visit Vitals    /76 (BP 1 Location: Left arm, BP Patient Position: Sitting)    Pulse 70    Temp 99.2 °F (37.3 °C) (Oral)    Resp 18    Ht 5' 3.5\" (1.613 m)    Wt 168 lb 6.4 oz (76.4 kg)    SpO2 97%    BMI 29.36 kg/m2       Physical Exam   Constitutional: She is oriented to person, place, and time and well-developed, well-nourished, and in no distress. No distress. Eyes: Conjunctivae and EOM are normal. Pupils are equal, round, and reactive to light. Cardiovascular: Normal rate, regular rhythm and normal heart sounds. Exam reveals no gallop and no friction rub. No murmur heard. Pulmonary/Chest: Effort normal and breath sounds normal. No respiratory distress. She has no wheezes. Neurological: She is alert and oriented to person, place, and time. Skin: Skin is warm and dry. No rash noted. She is not diaphoretic. Psychiatric: Mood, memory, affect and judgment normal.   Nursing note and vitals reviewed. ASSESSMENT  [facility] Jesusita Nava is a 68 y.o. female who presents today for:    1. Encounter for Medicare annual wellness exam  See other note. 2. HTN, goal below 150/90  Stable, continue current treatment. - losartan-hydroCHLOROthiazide (HYZAAR) 100-25 mg per tablet; TAKE 1 TABLET EVERY DAY  Dispense: 90 Tab; Refill: 1    3. Pre-diabetes  - HEMOGLOBIN A1C WITH EAG    4.  Acquired hypothyroidism  Stable, continue current treatment pending review of labs.  - TSH AND FREE T4    5. Hyperlipidemia, unspecified hyperlipidemia type  Will calculate ASCVD risk score pending labs. - METABOLIC PANEL, COMPREHENSIVE  - LIPID PANEL    6. Memory changes  Possible pseudodementia versus primary cognitive deficits. Recommend patient to consider therapy/medications for depression the patient declined. Also offered neuropsychological testing the patient declined. Will rule out other etiologies. - VITAMIN B12 & FOLATE  - CBC W/O DIFF    7. Screening for HIV without presence of risk factors  - HIV 1/2 AG/AB, 4TH GENERATION,W RFLX CONFIRM       Medications Discontinued During This Encounter   Medication Reason    amoxicillin (AMOXIL) 500 mg capsule Therapy Completed    losartan-hydroCHLOROthiazide (HYZAAR) 100-25 mg per tablet Reorder       Follow-up Disposition:  Return in about 6 months (around 11/7/2018) for HTN follow up. Medication risks/benefits/costs/interactions/alternatives discussed with patient. Advised patient to call back or return to office if symptoms worsen/change/persist. If patient cannot reach us or should anything more severe/urgent arise he/she should proceed directly to the nearest emergency department. Discussed expected course/resolution/complications of diagnosis in detail with patient. Patient given a written after visit summary which includes his/her diagnoses, current medications and vitals. Patient expressed understanding with the diagnosis and plan.      Gabi Peterson M.D.

## 2018-05-07 NOTE — PATIENT INSTRUCTIONS
Schedule of Personalized Health Plan  (Provide Copy to Patient)  The best way to stay healthy is to live a healthy lifestyle. A healthy lifestyle includes regular exercise, eating a well-balanced diet, keeping a healthy weight and not smoking. Regular physical exams and screening tests are another important way to take care of yourself. Preventive exams provided by health care providers can find health problems early when treatment works best and can keep you from getting certain diseases or illnesses. Preventive services include exams, lab tests, screenings, shots, monitoring and information to help you take care of your own health. All people over 65 should have a pneumonia shot. Pneumonia shots are usually only needed once in a lifetime unless your doctor decides differently. All people over 65 should have a yearly flu shot. People over 65 are at medium to high risk for Hepatitis B. Three shots are needed for complete protection. In addition to your physical exam, some screening tests are recommended:    Bone mass measurement (dexa scan) is recommended every two years  Diabetes Mellitus screening is recommended every year. Glaucoma is an eye disease caused by high pressure in the eye. An eye exam is recommended every year. Cardiovascular screening tests that check your cholesterol and other blood fat (lipid) levels are recommended every five years. Colorectal Cancer screening tests help to find pre-cancerous polyps (growths in the colon) so they can be removed before they turn into cancer. Tests ordered for screening depend on your personal and family history risk factors.     Screening for Breast Cancer is recommended yearly with a mammogram.    Screening for Cervical Cancer is recommended every two years (annually for certain risk factors, such as previous history of STD or abnormal PAP in past 7 years), with a Pelvic Exam with PAP    Here is a list of your current Health Maintenance items with a due date:  Health Maintenance   Topic Date Due    GLAUCOMA SCREENING Q2Y  04/05/2018    MEDICARE YEARLY EXAM  05/05/2018    Influenza Age 9 to Adult  08/01/2018    Bone Densitometry  05/30/2020    COLONOSCOPY  07/09/2023    DTaP/Tdap/Td series (2 - Td) 11/19/2025    ZOSTER VACCINE AGE 60>  Completed    Pneumococcal 65+ Low/Medium Risk  Completed

## 2018-05-07 NOTE — PROGRESS NOTES
Abel Akhtar is a 68 y.o. female and presents for annual Medicare Wellness Visit. Problem List: Reviewed with patient and discussed risk factors.     Patient Active Problem List   Diagnosis Code    HTN, goal below 150/90 I10    Other and unspecified hyperlipidemia E78.5    Unspecified hypothyroidism E03.9    Localized osteoarthritis of left knee M17.12    DDD (degenerative disc disease), lumbar M51.36    Lumbar spinal stenosis M48.061    Advance care planning Z71.89    Diverticulosis of large intestine K57.30    IBS (irritable bowel syndrome) K58.9    Esophageal stricture K22.2    Fibrosis of lung ([address] J84.10    Overactive bladder N32.81    Osteoporosis M81.0       Current medical providers:  Patient Care Team:  Duyen Heck MD as PCP - General (Family Practice)  Gregory Dailey MD (Gastroenterology)  Roxy Fernández MD (Obstetrics & Gynecology)  Silvia Moya MD (Orthopedic Surgery)    PSH: Reviewed with patient  Past Surgical History:   Procedure Laterality Date    ABDOMEN SURGERY [address]    appendectomy    COLONOSCOPY  1/12    McGroarty/gi    HX APPENDECTOMY      HX CATARACT REMOVAL  3/12    L    HX CATARACT REMOVAL  04/2012    Bilateral     HX KNEE REPLACEMENT  1/10    right  [facility]ell/o/s   [facility]  2006 and 2008    R knee arthroscopy Dr Ebony Stuart        SH: Reviewed with patient  Social History   Substance Use Topics    Smoking status: Former Smoker     Packs/day: 1.00     Years: 25.00     Types: Cigarettes    Smokeless tobacco: Never Used      Comment: quit in the '90's/cigarettes    Alcohol use Yes      Comment: social       FH: Reviewed with patient  Family History   Problem Relation Age of Onset    Heart Disease Mother     Diabetes Father     Heart Disease Father     Cancer Son      metastatic prostate ca    Diabetes Son     Heart Disease Son        Medications/Allergies: Reviewed with patient  Current Outpatient Prescriptions on File Prior to Visit   Medication Sig Dispense Refill    simvastatin (ZOCOR) 40 mg tablet TAKE 1 TABLET EVERY NIGHT 90 Tab 1    levothyroxine (SYNTHROID) 112 mcg tablet TAKE 1 TABLET EVERY DAY BEFORE BREAKFAST 90 Tab 1    calcium-vitamin D (CALCIUM 500 WITH VITAMIN D) 500 mg(1,250mg) -200 unit per tablet Take 1 Tab by mouth two (2) times daily (with meals).  MULTIVITAMINS (MULTI-VITAMIN PO) Take 1 Tab by mouth daily. Takes one po daily. No current facility-administered medications on file prior to visit. Allergies   Allergen Reactions    Benazepril Cough    Sulfa (Sulfonamide Antibiotics) Hives       Objective:  Visit Vitals    /76 (BP 1 Location: Left arm, BP Patient Position: Sitting)    Pulse 70    Temp 99.2 °F (37.3 °C) (Oral)    Resp 18    Ht 5' 3.5\" (1.613 m)    Wt 168 lb 6.4 oz (76.4 kg)    SpO2 97%    BMI 29.36 kg/m2    Body mass index is 29.36 kg/(m^2). Assessment of cognitive impairment: Alert and oriented x 3    Depression Screen:   PHQ over the last two weeks 5/7/2018   Little interest or pleasure in doing things Nearly every day   Feeling down, depressed or hopeless Nearly every day   Total Score PHQ 2 6   Trouble falling or staying asleep, or sleeping too much -   Feeling tired or having little energy -   Poor appetite or overeating -   Feeling bad about yourself - or that you are a failure or have let yourself or your family down -   Trouble concentrating on things such as school, work, reading or watching TV -   Moving or speaking so slowly that other people could have noticed; or the opposite being so fidgety that others notice -   Thoughts of being better off dead, or hurting yourself in some way -   How difficult have these problems made it for you to do your work, take care of your home and get along with others -       Fall Risk Assessment:    Fall Risk Assessment, last 12 mths 5/7/2018   Able to walk? Yes   Fall in past 12 months? No   Fall with injury?  - Number of falls in past 12 months -   Fall Risk Score -       Functional Ability:   Does the patient exhibit a steady gait? yes   How long did it take the patient to get up and walk from a sitting position? 3 secs   Is the patient self reliant?  (ie can do own laundry, meals, household chores)  yes     Does the patient handle his/her own medications? yes     Does the patient handle his/her own money? yes     Is the patients home safe (ie good lighting, handrails on stairs and bath, etc.)? yes     Did you notice or did patient express any hearing difficulties? no     Did you notice or did patient express any vision difficulties?   no     Were distance and reading eye charts used? no       Advance Care Planning:   Patient was offered the opportunity to discuss advance care planning:  yes     Does patient have an Advance Directive:  yes   If no, did you provide information on Caring Connections? In chart       Plan:      Orders Placed This Encounter    HEMOGLOBIN A1C WITH EAG    METABOLIC PANEL, COMPREHENSIVE    LIPID PANEL    TSH AND FREE T4    VITAMIN B12 & FOLATE    CBC W/O DIFF    HIV 1/2 AG/AB, 4TH GENERATION,W RFLX CONFIRM    losartan-hydroCHLOROthiazide (HYZAAR) 100-25 mg per tablet       Health Maintenance   Topic Date Due    GLAUCOMA SCREENING Q2Y  04/05/2018    MEDICARE YEARLY EXAM  05/05/2018    Influenza Age 9 to Adult  08/01/2018    Bone Densitometry  05/30/2020    COLONOSCOPY  07/09/2023    DTaP/Tdap/Td series (2 - Td) 11/19/2025    ZOSTER VACCINE AGE 60>  Completed    Pneumococcal 65+ Low/Medium Risk  Completed       *Patient verbalized understanding and agreement with the plan. A copy of the After Visit Summary with personalized health plan was given to the patient today.

## 2018-05-07 NOTE — MR AVS SNAPSHOT
Luz Bueno 
 
 
 [address] 
[phone] Patient: Sesar Shepherd MRN: [id number] RKS:[id number] Visit Information Date & Time Provider Department Dept. Phone Encounter #  
 5/7/2018  8:00 AM Allegra Rosado MD [address] [place] [phone] [id number] Follow-up Instructions Return in about 6 months (around 11/7/2018) for HTN follow up. Upcoming Health Maintenance Date Due  
 GLAUCOMA SCREENING Q2Y 4/5/2018 MEDICARE YEARLY EXAM 5/5/2018 Influenza Age 5 to Adult 8/1/2018 Bone Densitometry 5/30/2020 COLONOSCOPY 7/9/2023 DTaP/Tdap/Td series (2 - Td) 11/19/2025 Allergies as of 5/7/2018  Review Complete On: 5/7/2018 By: Allegra Rosado MD  
  
 Severity Noted Reaction Type Reactions Benazepril  04/10/2013    Cough Sulfa (Sulfonamide Antibiotics)  03/20/2010    Hives Current Immunizations  Reviewed on 10/16/2013 Name Date Influenza High Dose Vaccine PF 10/12/2016 Influenza Vaccine 10/16/2013 Influenza Vaccine Split 10/9/2012, 4/6/2012, 9/28/2011 Pneumococcal Polysaccharide (PPSV-23) 11/14/2017 Pneumococcal Vaccine (Pcv) 10/19/2007 Tdap 11/19/2015 Varicella Virus Vaccine Live 7/2/2009 Not reviewed this visit You Were Diagnosed With   
  
 Codes Comments Encounter for Medicare annual wellness exam    -  Primary ICD-10-CM: Z00.00 ICD-9-CM: V70.0   
 HTN, goal below 150/90     ICD-10-CM: I10 
ICD-9-CM: 401.9 Pre-diabetes     ICD-10-CM: R73.03 
ICD-9-CM: 790.29 Acquired hypothyroidism     ICD-10-CM: E03.9 ICD-9-CM: 244.9 Hyperlipidemia, unspecified hyperlipidemia type     ICD-10-CM: E78.5 ICD-9-CM: 272.4 Memory changes     ICD-10-CM: R41.3 ICD-9-CM: 780.93 Screening for HIV without presence of risk factors     ICD-10-CM: Z11.4 ICD-9-CM: V73.89 Vitals BP Pulse Temp Resp Height(growth percentile) Weight(growth percentile) 122/76 (BP 1 Location: Left arm, BP Patient Position: Sitting) 70 99.2 °F (37.3 °C) (Oral) 18 5' 3.5\" (1.613 m) 168 lb 6.4 oz (76.4 kg) SpO2 BMI OB Status Smoking Status 97% 29.36 kg/m2 Postmenopausal Former Smoker Vitals History BMI and BSA Data Body Mass Index Body Surface Area  
 29.36 kg/m 2 1.85 m 2 Preferred Pharmacy Pharmacy Name Phone [facility] [address] [place] [phone] [facility] [address] [phone] Your Updated Medication List  
  
   
This list is accurate as of 5/7/18  8:40 AM.  Always use your most recent med list.  
  
  
  
  
 CALCIUM 500 WITH VITAMIN D 500 mg(1,250mg) -200 unit per tablet Generic drug:  calcium-vitamin D Take 1 Tab by mouth two (2) times daily (with meals). levothyroxine 112 mcg tablet Commonly known as:  SYNTHROID  
TAKE 1 TABLET EVERY DAY BEFORE BREAKFAST  
  
 losartan-hydroCHLOROthiazide 100-25 mg per tablet Commonly known as:  HYZAAR  
TAKE 1 TABLET EVERY DAY  
  
 MULTI-VITAMIN PO Take 1 Tab by mouth daily. Takes one po daily. simvastatin 40 mg tablet Commonly known as:  ZOCOR  
TAKE 1 TABLET EVERY NIGHT Prescriptions Sent to Pharmacy Refills  
 losartan-hydroCHLOROthiazide (HYZAAR) 100-25 mg per tablet 1 Sig: TAKE 1 TABLET EVERY DAY Class: Normal  
 Pharmacy: [address] [place] [address], [address] [place] [address] Ph #: [phone] We Performed the Following CBC W/O DIFF [41061 CPT(R)] HEMOGLOBIN A1C WITH EAG [52962 CPT(R)] HIV 1/2 AG/AB, 4TH GENERATION,W RFLX CONFIRM [id number] CPT(R)] LIPID PANEL [23200 CPT(R)] METABOLIC PANEL, COMPREHENSIVE [97046 CPT(R)] TSH AND FREE T4 [34772 CPT(R)] VITAMIN B12 & FOLATE [80136 CPT(R)] Follow-up Instructions Return in about 6 months (around 11/7/2018) for HTN follow up. Patient Instructions Schedule of Personalized Health Plan (Provide Copy to Patient) The best way to stay healthy is to live a healthy lifestyle. A healthy lifestyle includes regular exercise, eating a well-balanced diet, keeping a healthy weight and not smoking. Regular physical exams and screening tests are another important way to take care of yourself. Preventive exams provided by health care providers can find health problems early when treatment works best and can keep you from getting certain diseases or illnesses. Preventive services include exams, lab tests, screenings, shots, monitoring and information to help you take care of your own health. All people over 65 should have a pneumonia shot. Pneumonia shots are usually only needed once in a lifetime unless your doctor decides differently. All people over 65 should have a yearly flu shot. People over 65 are at medium to high risk for Hepatitis B. Three shots are needed for complete protection. In addition to your physical exam, some screening tests are recommended: 
 
 
Screening for Breast Cancer is recommended yearly with a mammogram. 
 
Screening for Cervical Cancer is recommended every two years (annually for certain risk factors, such as previous history of STD or abnormal PAP in past 7 years), with a Pelvic Exam with PAP 
 
 Here is a list of your current Health Maintenance items with a due date: 
Health Maintenance Topic Date Due  GLAUCOMA SCREENING Q2Y  04/05/2018  MEDICARE YEARLY EXAM  05/05/2018  Influenza Age 5 to Adult  08/01/2018  Bone Densitometry  05/30/2020  COLONOSCOPY  07/09/2023  DTaP/Tdap/Td series (2 - Td) 11/19/2025  ZOSTER VACCINE AGE 60>  Completed  Pneumococcal 65+ Low/Medium Risk  Completed Introducing [facility]! [facility] introduces [employer] patient portal. Now you can access parts of your medical record, email your doctor's office, and request medication refills online. 1. In your internet browser, go to [email or web address] 2. Click on the First Time User? Click Here link in the Sign In box. You will see the New Member Sign Up page. 3. Enter your [employer] Access Code exactly as it appears below. You will not need to use this code after youve completed the sign-up process. If you do not sign up before the expiration date, you must request a new code. · [employer] Access Code: [email or web address] Expires: 8/5/2018  8:40 AM 
 
4. Enter the last four digits of your Social Security Number (xxxx) and Date of Birth (mm/dd/yyyy) as indicated and click Submit. You will be taken to the next sign-up page. 5. Create a [employer] ID. This will be your [employer] login ID and cannot be changed, so think of one that is secure and easy to remember. 6. Create a [employer] password. You can change your password at any time. 7. Enter your Password Reset Question and Answer. This can be used at a later time if you forget your password. 8. Enter your e-mail address. You will receive e-mail notification when new information is available in [address] 9. Click Sign Up. You can now view and download portions of your medical record. 10. Click the Download Summary menu link to download a portable copy of your medical information. If you have questions, please visit the Frequently Asked Questions section of the [facility] website. Remember, [facility] is NOT to be used for urgent needs. For medical emergencies, dial 911. Now available from your iPhone and Android! Please provide this summary of care documentation to your next provider. Your primary care clinician is listed as Carine Barrera. If you have any questions after today's visit, please call [phone].

## 2018-05-15 ENCOUNTER — HOSPITAL ENCOUNTER (OUTPATIENT)
Dept: LAB | Age: 78
Discharge: HOME | End: 2018-05-15
Payer: MEDICARE

## 2018-05-15 PROCEDURE — 84443 ASSAY THYROID STIM HORMONE: CPT

## 2018-05-15 PROCEDURE — 85027 COMPLETE CBC AUTOMATED: CPT

## 2018-05-15 PROCEDURE — 87389 HIV-1 AG W/HIV-1&-2 AB AG IA: CPT

## 2018-05-15 PROCEDURE — 83036 HEMOGLOBIN GLYCOSYLATED A1C: CPT

## 2018-05-15 PROCEDURE — 36415 COLL VENOUS BLD VENIPUNCTURE: CPT

## 2018-05-15 PROCEDURE — 80061 LIPID PANEL: CPT

## 2018-05-15 PROCEDURE — 80053 COMPREHEN METABOLIC PANEL: CPT

## 2018-05-15 PROCEDURE — 82607 VITAMIN B-12: CPT

## 2018-05-16 LAB
ALBUMIN SERPL-MCNC: 4.3 G/DL (ref 3.5–4.8)
ALBUMIN/GLOB SERPL: 1.8 {RATIO} (ref 1.2–2.2)
ALP SERPL-CCNC: 80 IU/L (ref 39–117)
ALT SERPL-CCNC: 15 IU/L (ref 0–32)
AST SERPL-CCNC: 20 IU/L (ref 0–40)
BILIRUB SERPL-MCNC: 0.6 MG/DL (ref 0–1.2)
BUN SERPL-MCNC: 16 MG/DL (ref 8–27)
BUN/CREAT SERPL: 17 (ref 12–28)
CALCIUM SERPL-MCNC: 10.3 MG/DL (ref 8.7–10.3)
CHLORIDE SERPL-SCNC: 92 MMOL/L (ref 96–106)
CHOLEST SERPL-MCNC: 144 MG/DL (ref 100–199)
CO2 SERPL-SCNC: 26 MMOL/L (ref 18–29)
CREAT SERPL-MCNC: 0.92 MG/DL (ref 0.57–1)
ERYTHROCYTE [DISTWIDTH] IN BLOOD BY AUTOMATED COUNT: 13.7 % (ref 12.3–15.4)
EST. AVERAGE GLUCOSE BLD GHB EST-MCNC: 111 MG/DL
FOLATE SERPL-MCNC: >20 NG/ML
GFR SERPLBLD CREATININE-BSD FMLA CKD-EPI: 60 ML/MIN/1.73
GFR SERPLBLD CREATININE-BSD FMLA CKD-EPI: 69 ML/MIN/1.73
GLOBULIN SER CALC-MCNC: 2.4 G/DL (ref 1.5–4.5)
GLUCOSE SERPL-MCNC: 95 MG/DL (ref 65–99)
HBA1C MFR BLD: 5.5 % (ref 4.8–5.6)
HCT VFR BLD AUTO: 37.2 % (ref 34–46.6)
HDLC SERPL-MCNC: 68 MG/DL
HGB BLD-MCNC: 12.6 G/DL (ref 11.1–15.9)
HIV 1+2 AB+HIV1 P24 AG SERPL QL IA: NON REACTIVE
INTERPRETATION, 910389: NORMAL
LDLC SERPL CALC-MCNC: 62 MG/DL (ref 0–99)
MCH RBC QN AUTO: 28.3 PG (ref 26.6–33)
MCHC RBC AUTO-ENTMCNC: 33.9 G/DL (ref 31.5–35.7)
MCV RBC AUTO: 83 FL (ref 79–97)
PLATELET # BLD AUTO: 300 X10E3/UL (ref 150–379)
POTASSIUM SERPL-SCNC: 4.1 MMOL/L (ref 3.5–5.2)
PROT SERPL-MCNC: 6.7 G/DL (ref 6–8.5)
RBC # BLD AUTO: 4.46 X10E6/UL (ref 3.77–5.28)
SODIUM SERPL-SCNC: 133 MMOL/L (ref 134–144)
T4 FREE SERPL-MCNC: 1.37 NG/DL (ref 0.82–1.77)
TRIGL SERPL-MCNC: 68 MG/DL (ref 0–149)
TSH SERPL DL<=0.005 MIU/L-ACNC: 3.9 UIU/ML (ref 0.45–4.5)
VIT B12 SERPL-MCNC: 1154 PG/ML (ref 232–1245)
VLDLC SERPL CALC-MCNC: 14 MG/DL (ref 5–40)
WBC # BLD AUTO: 6.1 X10E3/UL (ref 3.4–10.8)

## 2018-05-18 NOTE — PROGRESS NOTES
Please notify patient regarding their test results:    No diabetes. Thyroid level is at target range, continue current levothyroxine dose. Cholesterol numbers are normal, continue current statin. No signs of anemia, B12 deficiency. Negative HIV testing.

## 2018-06-21 RX ORDER — LEVOTHYROXINE SODIUM 112 UG/1
TABLET ORAL
Qty: 90 TAB | Refills: 1 | Status: SHIPPED | OUTPATIENT
Start: 2018-06-21 | End: 2019-01-08 | Stop reason: SDUPTHER

## 2018-07-06 ENCOUNTER — HOSPITAL ENCOUNTER (OUTPATIENT)
Dept: INTERVENTIONAL RADIOLOGY/VASCULAR | Age: 78
Discharge: HOME | End: 2018-07-06
Attending: RADIOLOGY | Admitting: RADIOLOGY
Payer: MEDICARE

## 2018-07-06 VITALS
OXYGEN SATURATION: 100 % | TEMPERATURE: 98.4 F | HEART RATE: 80 BPM | DIASTOLIC BLOOD PRESSURE: 79 MMHG | BODY MASS INDEX: 29.02 KG/M2 | HEIGHT: 64 IN | WEIGHT: 170 LBS | SYSTOLIC BLOOD PRESSURE: 164 MMHG | RESPIRATION RATE: 18 BRPM

## 2018-07-06 DIAGNOSIS — M43.16 SPONDYLOLISTHESIS OF LUMBAR REGION: ICD-10-CM

## 2018-07-06 DIAGNOSIS — M54.16 LUMBAR RADICULITIS: ICD-10-CM

## 2018-07-06 DIAGNOSIS — M48.061 SPINAL STENOSIS OF LUMBAR REGION: ICD-10-CM

## 2018-07-06 PROCEDURE — 74011000250 HC RX REV CODE- 250: Performed by: RADIOLOGY

## 2018-07-06 PROCEDURE — 64483 NJX AA&/STRD TFRM EPI L/S 1: CPT

## 2018-07-06 PROCEDURE — 74011636320 HC RX REV CODE- 636/320: Performed by: RADIOLOGY

## 2018-07-06 PROCEDURE — 74011250636 HC RX REV CODE- 250/636: Performed by: RADIOLOGY

## 2018-07-06 PROCEDURE — 77030003666 HC NDL SPINAL BD -A

## 2018-07-06 RX ORDER — DEXAMETHASONE SODIUM PHOSPHATE 4 MG/ML
12 INJECTION, SOLUTION INTRA-ARTICULAR; INTRALESIONAL; INTRAMUSCULAR; INTRAVENOUS; SOFT TISSUE ONCE
Status: COMPLETED | OUTPATIENT
Start: 2018-07-06 | End: 2018-07-06

## 2018-07-06 RX ORDER — LIDOCAINE HYDROCHLORIDE 10 MG/ML
10 INJECTION, SOLUTION EPIDURAL; INFILTRATION; INTRACAUDAL; PERINEURAL
Status: COMPLETED | OUTPATIENT
Start: 2018-07-06 | End: 2018-07-06

## 2018-07-06 RX ADMIN — IOHEXOL 20 ML: 180 INJECTION INTRAVENOUS at 13:27

## 2018-07-06 RX ADMIN — DEXAMETHASONE SODIUM PHOSPHATE 12 MG: 4 INJECTION, SOLUTION INTRAMUSCULAR; INTRAVENOUS at 13:27

## 2018-07-06 RX ADMIN — LIDOCAINE HYDROCHLORIDE 10 ML: 10 INJECTION, SOLUTION EPIDURAL; INFILTRATION; INTRACAUDAL; PERINEURAL at 13:26

## 2018-07-06 NOTE — DISCHARGE INSTRUCTIONS
Steroid Injection Discharge Instructions    General Information:   A steroid injection was performed today, placing a combination of a steroid and an anesthetic (numbing medicine) into the space around the nerves of your spine. This is done to treat back pain. It may take 7-10 days for the injection to reach its full potential.  This procedure can be done at any level of the spinal column, depending on where your pain is. Your doctor will have ordered the appropriate level to be treated prior to your coming in for the procedure. Home Care Instructions: You can resume your regular diet. Do not drink alcohol today. You may notice that you have to use your pain medications less after your injection. Some people do not notice much of a change in their pain after the first injection. If that is the case, it is worth your time to have a second one done. This is why these injections are sometimes ordered in a series of three. Keep the puncture site clean and dry for 24 hours, and then you may remove the dressing. Showering is acceptable after the bandage is removed. Rest today be aware there maybe numbness or tingling that may last up to 12 hours after the inject. Call If:   You should call your Physician and/or the Radiology Nurse if you have any bleeding other than a small spot on your bandage. Call if you have any signs of infection, fever, increased pain at the puncture site, confusion, or a headache that worsens when you stand and eases when lying flat. Follow-Up Instructions:  Please see your ordering doctor as he/she has requested. Let your doctor know if you have relief from your pain so they may schedule another injection for you if it is indicated. To Reach Us:  Should you experience any significant changes, please call [phone] between the hours of 7:30 am and 10 pm or [phone] after hours.  After hours, ask the  to page the X-ray Technologist, and describe the problem to the technologist.

## 2018-07-06 NOTE — PROGRESS NOTES
1250: pt to angio holding for lucia  1350: I have reviewed discharge instructions with the patient. The patient verbalized understanding. 1415: pt able to bear weight without issue. Pt to discharge area via wheelchair.

## 2018-07-06 NOTE — IP AVS SNAPSHOT
2709 [facility] [address] [place] [address] 
[phone] Patient: Benito Mota MRN: [id number] About your hospitalization You were admitted on:  July 6, 2018 You last received care in the:  [facility] RAD ANGIO IR You were discharged on:  July 6, 2018 Why you were hospitalized Your primary diagnosis was:  Not on File Follow-up Information None Your Scheduled Appointments Friday July 06, 2018  1:30 PM EDT  
IR INJ FORA EP LUM ANE/TRENTON with Anaya Dumont MD, [facility] ANGIO 1 [facility] RAD ANGIO IR ([address]) [address]  
[phone] GENERAL INSTRUCTIONS Patient should report to the Admitting Office just inside the main entrance to the Hospital 30 minutes prior to the appointment time unless instructed otherwise. (NOT FOR MRI). Discharge Orders None A check pool indicates which time of day the medication should be taken. My Medications ASK your doctor about these medications Instructions Each Dose to Equal  
 Morning Noon Evening Bedtime CALCIUM 500 WITH VITAMIN D2 500 mg(1,250mg) -200 unit per tablet Generic drug:  calcium-vitamin D Your last dose was: Your next dose is: Take 1 Tab by mouth two (2) times daily (with meals). 1 Tab  
    
   
   
   
  
 levothyroxine 112 mcg tablet Commonly known as:  SYNTHROID Your last dose was: Your next dose is: TAKE 1 TABLET EVERY DAY BEFORE BREAKFAST  
     
   
   
   
  
 losartan-hydroCHLOROthiazide 100-25 mg per tablet Commonly known as:  HYZAAR Your last dose was: Your next dose is: TAKE 1 TABLET EVERY DAY  
     
   
   
   
  
 MULTI-VITAMIN PO Your last dose was: Your next dose is: Take 1 Tab by mouth daily. Takes one po daily. 1 Tab simvastatin 40 mg tablet Commonly known as:  ZOCOR Your last dose was: Your next dose is: TAKE 1 TABLET EVERY NIGHT Discharge Instructions Steroid Injection Discharge Instructions General Information: A steroid injection was performed today, placing a combination of a steroid and an anesthetic (numbing medicine) into the space around the nerves of your spine. This is done to treat back pain. It may take 7-10 days for the injection to reach its full potential.  This procedure can be done at any level of the spinal column, depending on where your pain is. Your doctor will have ordered the appropriate level to be treated prior to your coming in for the procedure. Home Care Instructions: You can resume your regular diet. Do not drink alcohol today. You may notice that you have to use your pain medications less after your injection. Some people do not notice much of a change in their pain after the first injection. If that is the case, it is worth your time to have a second one done. This is why these injections are sometimes ordered in a series of three. Keep the puncture site clean and dry for 24 hours, and then you may remove the dressing. Showering is acceptable after the bandage is removed. Rest today be aware there maybe numbness or tingling that may last up to 12 hours after the inject. Call If: 
 You should call your Physician and/or the Radiology Nurse if you have any bleeding other than a small spot on your bandage. Call if you have any signs of infection, fever, increased pain at the puncture site, confusion, or a headache that worsens when you stand and eases when lying flat. Follow-Up Instructions:  Please see your ordering doctor as he/she has requested. Let your doctor know if you have relief from your pain so they may schedule another injection for you if it is indicated. To Reach Us:  Should you experience any significant changes, please call [phone] between the hours of 7:30 am and 10 pm or [phone] after hours. After hours, ask the  to page the X-ray Technologist, and describe the problem to the technologist.  
 
Fan Reyes Transitions of Care Linh [facility] offers a voluntary care coordination program to provide high quality service and care to [facility] fee-for-service beneficiaries. John Thacker was designed to help you enhance your health and well-being through the following services: ? Transitions of Care  support for individuals who are transitioning from one care setting to another (example: Hospital to home). ? Chronic and Complex Care Coordination  support for individuals and caregivers of those with serious or chronic illnesses or with more than one chronic (ongoing) condition and those who take a number of different medications. If you meet specific medical criteria, a [facility] [address] may call you directly to coordinate your care with your primary care physician and your other care providers. For questions about the [facility] programs, please, contact your physicians office. For general questions or additional information about Accountable Care Organizations: 
Please visit www.medicare.gov/acos. html or call 1-800-MEDICARE (6-547.571.1167) TTY users should call 8-430.392.8086. Introducing [facility] & HEALTH SERVICES! Maribeth Hinson introduces [employer] patient portal. Now you can access parts of your medical record, email your doctor's office, and request medication refills online. 1. In your internet browser, go to [email or web address] 2. Click on the First Time User? Click Here link in the Sign In box. You will see the New Member Sign Up page. 3. Enter your [employer] MOD Access Code exactly as it appears below.  You will not need to use this code after youve completed the sign-up process. If you do not sign up before the expiration date, you must request a new code. · Arcarios Access Code: [email or web address] Expires: 8/5/2018  8:40 AM 
 
4. Enter the last four digits of your Social Security Number (xxxx) and Date of Birth (mm/dd/yyyy) as indicated and click Submit. You will be taken to the next sign-up page. 5. Create a Arcarios ID. This will be your Arcarios login ID and cannot be changed, so think of one that is secure and easy to remember. 6. Create a Arcarios password. You can change your password at any time. 7. Enter your Password Reset Question and Answer. This can be used at a later time if you forget your password. 8. Enter your e-mail address. You will receive e-mail notification when new information is available in [address] 9. Click Sign Up. You can now view and download portions of your medical record. 10. Click the Download Summary menu link to download a portable copy of your medical information. If you have questions, please visit the Frequently Asked Questions section of the Arcarios website. Remember, Arcarios is NOT to be used for urgent needs. For medical emergencies, dial 911. Now available from your iPhone and Android! Introducing Dandre Schwartz As a [facility] patient, I wanted to make you aware of our electronic visit tool called Dandre Robertoreganprosper. [facility] 24/7 allows you to connect within minutes with a medical provider 24 hours a day, seven days a week via a mobile device or tablet or logging into a secure website from your computer. You can access Dandre Robertoreganfin from anywhere in the [place].  
 
A virtual visit might be right for you when you have a simple condition and feel like you just dont want to get out of bed, or cant get away from work for an appointment, when your regular [facility] provider is not available (evenings, weekends or holidays), or when youre out of town and need minor care. Electronic visits cost only $49 and if the [employer] 24/7 provider determines a prescription is needed to treat your condition, one can be electronically transmitted to a nearby pharmacy*. Please take a moment to enroll today if you have not already done so. The enrollment process is free and takes just a few minutes. To enroll, please download the [employer] 24/7 portillo to your tablet or phone, or visit [email or web address] to enroll on your computer. And, as an [address] [place] [address] patient with a On[employer] account, the results of your visits will be scanned into your electronic medical record and your primary care provider will be able to view the scanned results. We urge you to continue to see your regular [employer] provider for your ongoing medical care. And while your primary care provider may not be the one available when you seek a [facility] virtual visit, the peace of mind you get from getting a real diagnosis real time can be priceless. For more information on [facility], view our Frequently Asked Questions (FAQs) at [email or web address]. Sincerely, 
 
Gabriele Haley MD 
Chief Medical Officer [employer] *:  certain medications cannot be prescribed via [employer] Providers Seen During Your Hospitalization Provider Specialty Primary office phone Phillip Taylor MD Radiology [phone] Your Primary Care Physician (PCP) Primary Care Physician Office Phone Office Fax Barb Lawrence [phone] You are allergic to the following Allergen Reactions Benazepril Cough Sulfa (Sulfonamide Antibiotics) Hives Recent Documentation Height Weight BMI OB Status Smoking Status 1.626 m 77.1 kg 29.18 kg/m2 Postmenopausal Former Smoker Emergency Contacts Name Discharge Info Relation Home Work Mobile Brianne Amos (Niece) DISCHARGE CAREGIVER [3] Other Relative [6] Patient Belongings The following personal items are in your possession at time of discharge: 
                             
 
  
  
 Please provide this summary of care documentation to your next provider. Signatures-by signing, you are acknowledging that this After Visit Summary has been reviewed with you and you have received a copy. Patient Signature:  ____________________________________________________________ Date:  ____________________________________________________________  
  
Earnstine Francis Provider Signature:  ____________________________________________________________ Date:  ____________________________________________________________

## 2018-08-06 ENCOUNTER — TELEPHONE (OUTPATIENT)
Dept: FAMILY MEDICINE CLINIC | Age: 78
End: 2018-08-06

## 2018-08-06 DIAGNOSIS — Z12.83 SKIN CANCER SCREENING: Primary | ICD-10-CM

## 2018-08-06 NOTE — TELEPHONE ENCOUNTER
Received message from psr stating patient wants a referral to a dermatologist    Will send to provider  Call back number: [phone] ([facility]NA)

## 2018-08-29 ENCOUNTER — HOSPITAL ENCOUNTER (OUTPATIENT)
Dept: INTERVENTIONAL RADIOLOGY/VASCULAR | Age: 78
Discharge: HOME | End: 2018-08-29
Attending: ORTHOPAEDIC SURGERY | Admitting: RADIOLOGY
Payer: MEDICARE

## 2018-08-29 VITALS
RESPIRATION RATE: 13 BRPM | DIASTOLIC BLOOD PRESSURE: 77 MMHG | HEIGHT: 63 IN | WEIGHT: 165 LBS | SYSTOLIC BLOOD PRESSURE: 146 MMHG | TEMPERATURE: 98.4 F | HEART RATE: 73 BPM | BODY MASS INDEX: 29.23 KG/M2 | OXYGEN SATURATION: 99 %

## 2018-08-29 DIAGNOSIS — M48.061 SPINAL STENOSIS, LUMBAR REGION, WITHOUT NEUROGENIC CLAUDICATION: ICD-10-CM

## 2018-08-29 DIAGNOSIS — M43.16 SPONDYLOLISTHESIS OF LUMBAR REGION: ICD-10-CM

## 2018-08-29 DIAGNOSIS — M54.16 LUMBAR RADICULITIS: ICD-10-CM

## 2018-08-29 PROCEDURE — 74011636320 HC RX REV CODE- 636/320: Performed by: RADIOLOGY

## 2018-08-29 PROCEDURE — 64483 NJX AA&/STRD TFRM EPI L/S 1: CPT

## 2018-08-29 PROCEDURE — 74011250636 HC RX REV CODE- 250/636: Performed by: RADIOLOGY

## 2018-08-29 PROCEDURE — 77030003666 HC NDL SPINAL BD -A

## 2018-08-29 RX ORDER — DEXAMETHASONE SODIUM PHOSPHATE 10 MG/ML
10 INJECTION INTRAMUSCULAR; INTRAVENOUS ONCE
Status: COMPLETED | OUTPATIENT
Start: 2018-08-29 | End: 2018-08-29

## 2018-08-29 RX ORDER — LIDOCAINE HYDROCHLORIDE 10 MG/ML
10 INJECTION, SOLUTION EPIDURAL; INFILTRATION; INTRACAUDAL; PERINEURAL
Status: COMPLETED | OUTPATIENT
Start: 2018-08-29 | End: 2018-08-29

## 2018-08-29 RX ADMIN — IOHEXOL 5 ML: 180 INJECTION INTRAVENOUS at 14:13

## 2018-08-29 RX ADMIN — LIDOCAINE HYDROCHLORIDE 5 ML: 10 INJECTION, SOLUTION EPIDURAL; INFILTRATION; INTRACAUDAL; PERINEURAL at 14:13

## 2018-08-29 RX ADMIN — DEXAMETHASONE SODIUM PHOSPHATE 10 MG: 10 INJECTION, SOLUTION INTRAMUSCULAR; INTRAVENOUS at 14:13

## 2018-08-29 NOTE — DISCHARGE INSTRUCTIONS
Side effects of sedation medications and other medications used today have been reviewed. Notify us of nausea, itching, hives, dizziness, or any unusual symptoms. Should you experience any of these significant changes, please call [phone] between the hours of 7:30 am and 10 pm or [phone] after hours. After hours, ask the  to page the X-ray Technologist, and describe the problem to the technologist.     [address] [facility]  Special Procedures/Radiology Department      Steroidal Injection  Viv Thomas MD      Go home and rest.     No vigorous physical activity today. Be aware that numbness and/or tingling can occur up to 24 hours after the injection. No driving today. Resume your previous diet. Resume your previous medications. Depending on your job, you may return to work in 25 to 48 hours. It may take up to one week after the injection to see a change or an improvement in your symptoms. Be sure to follow up with your physician. Tell your physician if the injection helped with your symptoms or if the injection did nothing for your symptoms. For minor discomfort, you can take Tylenol, as directed on the label. Monitor the injection site for symptoms of infection, (redness, swelling, drainage, fever). Call your physician with these symptoms. Please call our department with any questions or concerns.

## 2018-08-29 NOTE — ROUTINE PROCESS
Discharged via wheel chair to friend. Able to stand and step without difficulty. No complaints voiced.

## 2018-09-28 ENCOUNTER — HOSPITAL ENCOUNTER (OUTPATIENT)
Dept: MAMMOGRAPHY | Age: 78
Discharge: HOME | End: 2018-09-28
Attending: FAMILY MEDICINE
Payer: MEDICARE

## 2018-09-28 DIAGNOSIS — Z12.39 BREAST SCREENING: ICD-10-CM

## 2018-09-28 PROCEDURE — 77063 BREAST TOMOSYNTHESIS BI: CPT

## 2018-10-09 ENCOUNTER — HOSPITAL ENCOUNTER (OUTPATIENT)
Dept: PHYSICAL THERAPY | Age: 78
Discharge: HOME | End: 2018-10-09
Payer: MEDICARE

## 2018-10-09 PROCEDURE — G8978 MOBILITY CURRENT STATUS: HCPCS | Performed by: PHYSICAL THERAPIST

## 2018-10-09 PROCEDURE — 97161 PT EVAL LOW COMPLEX 20 MIN: CPT | Performed by: PHYSICAL THERAPIST

## 2018-10-09 PROCEDURE — 97110 THERAPEUTIC EXERCISES: CPT | Performed by: PHYSICAL THERAPIST

## 2018-10-09 PROCEDURE — 97116 GAIT TRAINING THERAPY: CPT | Performed by: PHYSICAL THERAPIST

## 2018-10-09 PROCEDURE — G8979 MOBILITY GOAL STATUS: HCPCS | Performed by: PHYSICAL THERAPIST

## 2018-10-09 RX ORDER — SIMVASTATIN 40 MG/1
TABLET, FILM COATED ORAL
Qty: 90 TAB | Refills: 1 | Status: SHIPPED | OUTPATIENT
Start: 2018-10-09 | End: 2019-03-12 | Stop reason: SDUPTHER

## 2018-10-09 NOTE — PROGRESS NOTES
[employer] Physical Therapy [address] [place], [address] [facility] Phone: [phone]  Fax: [phone] Plan of Care/Statement of Necessity for Physical Therapy Services  2-15 Patient name: Renetta Cruz  : 1940  Provider#: [id number] Referral source: Paradisememoromario Gregg, * Medical/Treatment Diagnosis: Low back pain [M54.5] Prior Hospitalization: see medical history Comorbidities: see eval. 
Prior Level of Function: see eval.  
Medications: Verified on Patient Summary List 
Start of Care: see eval.      Onset Date: see eval.    
The Plan of Care and following information is based on the information from the initial evaluation. Assessment/ key information: Pt is a 65 yo female with increased proximal lateral left hip pain following x-ray guided cortisone injection for her back in 2018. Pt reports chronic low back pain for 20 years that is usually relieved with injections. Pt presents with increased pain with passive hip IR, strength testing of left hip IR, increased TTP glute. Tendon at greater troch and posterior aspect of greater troch, increased pain with ambulation, inability to perform sidelying hip ABD due to pain, increased pain with L SLS, decreased balance, decreased strength throughout L LE. Evaluation Complexity History HIGH Complexity :3+ comorbidities / personal factors will impact the outcome/ POC ; Examination MEDIUM Complexity : 3 Standardized tests and measures addressing body structure, function, activity limitation and / or participation in recreation  ;Presentation LOW Complexity : Stable, uncomplicated  ;Clinical Decision Making MEDIUM Complexity : FOTO score of 26-74 Overall Complexity Rating: LOW Treatment Plan may include any combination of the following: Therapeutic exercise, Therapeutic activities, Neuromuscular re-education, Gait/balance training, Manual therapy, Patient education and Self Care training Patient / Family readiness to learn indicated by: asking questions, trying to perform skills and interest 
Persons(s) to be included in education: patient (P) Barriers to Learning/Limitations: None Patient Goal (s): \"see eval Patient Self Reported Health Status: good Rehabilitation Potential: good Short Term Goals: To be accomplished in 2-3 weeks: 
 1) Pt independent in HEP from day 1 
 2) Pt will report she is ambulating with SPC to decrease pain/stress through L LE 
 3) Pt will report she is using cryotherapy at least 1x/day to left proximal/lateral hip to decrease inflammation Long Term Goals: To be accomplished in 4-6 weeks: 
 1) Pt independent in final HEP. 2) Pt will be able to ambulate into community gym to participate in aquatic exercise without lateral proximal hip pain > 1/10  
 3) Pt able to cook or clean house for 10 '  Or more without lateral hip pain > 1/10  
 4) Pt will show improved ability to hold SLS for 2 \" or more for improved stability in stance phase of gait Frequency / Duration: Patient to be seen 2 times per week for 4-6 weeks. Patient/ Caregiver education and instruction: self care and exercises 
 
[x]  Plan of care has been reviewed with PTA 
 
G-Codes (GP) Mobility  Current  CK= 40-59%   Goal  CK= 40-59% The severity rating is based on clinical judgment and the FOTO Score score. Certification Period: 10/09/18 - 01/01/19 Ada Morejon, PT PT,DPT,OCS 10/9/2018 11:01 AM 
 
________________________________________________________________________ I certify that the above Therapy Services are being furnished while the patient is under my care. I agree with the treatment plan and certify that this therapy is necessary. [de-identified] Signature:____________________  Date:____________Time: _________ 
 
___

## 2018-10-09 NOTE — PROGRESS NOTES
[facility] Physical Therapy and Sports Medicine [address], [place], [address] Phone: [phone]  Fax: [phone] PT INITIAL EVALUATION NOTE - [facility] 2-15 Patient Name: [place] Date:10/9/2018 : 1940 [x]  Patient  Verified Payor: [place] MED[place]ARE / Plan: [address] / Product Type: Medicare / In time:1100  Out time:1220 Total Treatment Time (min): 80 Total Timed Codes (min): 30 
1:1 Treatment Time (MC only): 80 Visit #: 1 Treatment Area: Low back pain [M54.5] SUBJECTIVE Any medication changes, allergies to medications, adverse drug reactions, diagnosis change, or new procedure performed?: [] No    [x] Yes (see summary sheet for update) Current symptoms/chief complaint:  Pt reports she goes 3x/week to water aerobics and this really helps. She reports Dr. Earl Knight has said that PT will make her hurt worse. She has had PT in the past for her TKA and her TMJ issues. She has never had PT for her back but she has had a lot of injections over the years, has had about 2x/year last few years. She did go to a pain clinic in [address] [facility].  She reports she had SIJ injections and these were helpful. Date of onset/injury: 2018 after the 2nd injection her pain became very severe. Her first injection of this year was 2018. Pt reports they were cortisone injections at [facility] and the first one did not help at all. These were x-ray guided. Aggravated by: Walking > standing. Simply walking from the parking area to the pool at the [facility]. Eased by: Sitting. Ice in the morning, afternoon. Lying down. \"I don't like heat. \"   
 
Pain Level (0-10 scale): Current:  0 Least: 0 Worst: 10 Location of symptoms: left lateral hip (proximal) and sometimes she has had pain superior anterior hip. This pain started after the 2nd injection Pain in the past has been in her low back (this is new that this pain is left lateral hip). PMH: Significant for R TKA. Had x-ray in July 2018 before first injection and told her she has spinal stenosis. Social/Recreation/Work: She lives in a townhouse. She lives alone and needs to cook and clean. Prior level of function: prior to flare up (August) she was able to walk more (for example in grocery store without pain). She was able to cook and clean house without having much pain. Patient goal(s): \"no pain\" Objective:   
 
Posture: L IC appears higher. Increased kyphosis. Pt notes twinge in L lateral proximal hip while standing. In supine L LE appears shorter. Gait:  
Description: antalgic LEFT, flexed posture. Lumbar Active Movements: ROM  AROM Comments:pain, area Forward flexion  Fingers mid anterior tib No change - needed hand on chair to return to neutral secondary to balance. Extension  [facility] for age No pain Seated Rotation right NT NT Seated Rotation left NT NT  
SB right 1 \" superior to superior patellar pole No change SB left As above Increased pain around her waist.   
 
In Supine: 
L hip ER approx 40 deg no pain L hip IR approx 20 deg with mild pain at end range. Strength:    
 L(0-5) R (0-5) N/T Hip Flexion (L1,2) 3+ p! mild 4 [] Knee Extension (L3,4) 5 5 [] Knee Flexion (S1,2) nt nt [] Ankle Dorsiflexion (L4) 4 4+ [] Great Toe Extension (L5) nt nt [] Ankle Plantarflexion (S1) 5 5 (with MMT) [] Ankle Eversion (S1) nt nt []  
   [] Hip IR 4 with pain lateral hip LEFT 
 5  [] Hip ER  5 5 [] Comments:  
L hip ABD:  Unable to attempt due to pain. L sidelying CLAM : 3+/5. Bridges: pt able to perform without pain but shows pelvic drop on the LEFT. Neurosensory: 
Sensory examination reveals intact L2 through S1 dermatomes. Palpation:  Severe TTP glute med tendon and surrounding left greater troch at posterior aspect, possible at greater troch. [address] Stabilization Tests ASLR Test:   [x] Pos  [] Neg With PT stabilization:  Pt shows increased pelvic rotation with ASLR but no pain with PT stabilizing no change. Hip: Niki Seoing:   [] R    [x] L    [x] +    [] - severe pain Scour:   [] R    [x] L    [x] +    [] - Piriformis(fair): [] R    [x] L    [x] +    [] - Flexibility Deficits: 
   Hamstrings: WNL for age Balance: 
R SLS: pt unable to hold 1 sec and shows + hip drop, worried that she will fall secondary to weakness in R LE. 
L SLS:  Pt unable to attempt secondary to pain per pt. Joint mobility:  NT today Outcome Measure: Patient presents with a FOTO score of in chart. 20 '  min Therapeutic Exercise:  [x] See flow sheet : see flow sheet / HEP sheet. Increased amount of time to coordinate TrA contraction, glute sets, bridges with TrA and glute set prior to bridge. Encouraged cont. Aquatic exercise program and cold pack 10-20 ' 2x/day to left lateral proximal hip. Rationale: increase strength and improve coordination to improve the patients ability to walk. Gait trainin ' with SPC : training in clinic to use SPC in R UE to decrease pain in L LE with ambulation, step through pattern. With 
 [] TE 
 [] TA 
 [] neuro 
 [] other: Patient Education: [x] Review HEP [] Progressed/Changed HEP based on:  
[] positioning   [] body mechanics   [] transfers   [] heat/ice application   
[] other:   
 
Pain Level (0-10 scale) post treatment: \"it hurts\" pt advised to ice at home. Assessment:  
[x] See POC [] Other: 
Plan:  
[x] See POC [] Other 
[] Discharge due to:  
 
John Servin, PT, DPT, OCS    
10/9/2018     00:57 AM   PT License #[id number]

## 2018-10-16 ENCOUNTER — APPOINTMENT (OUTPATIENT)
Dept: PHYSICAL THERAPY | Age: 78
End: 2018-10-16
Payer: MEDICARE

## 2018-10-18 ENCOUNTER — APPOINTMENT (OUTPATIENT)
Dept: PHYSICAL THERAPY | Age: 78
End: 2018-10-18
Payer: MEDICARE

## 2018-10-23 ENCOUNTER — APPOINTMENT (OUTPATIENT)
Dept: PHYSICAL THERAPY | Age: 78
End: 2018-10-23
Payer: MEDICARE

## 2018-10-26 ENCOUNTER — APPOINTMENT (OUTPATIENT)
Dept: PHYSICAL THERAPY | Age: 78
End: 2018-10-26
Payer: MEDICARE

## 2018-10-29 DIAGNOSIS — I10 HTN, GOAL BELOW 150/90: ICD-10-CM

## 2018-10-30 RX ORDER — LOSARTAN POTASSIUM AND HYDROCHLOROTHIAZIDE 25; 100 MG/1; MG/1
TABLET ORAL
Qty: 90 TAB | Refills: 1 | Status: SHIPPED | OUTPATIENT
Start: 2018-10-30 | End: 2019-04-19 | Stop reason: SDUPTHER

## 2018-11-08 ENCOUNTER — HOSPITAL ENCOUNTER (OUTPATIENT)
Dept: LAB | Age: 78
Discharge: HOME | End: 2018-11-08
Payer: MEDICARE

## 2018-11-08 ENCOUNTER — OFFICE VISIT (OUTPATIENT)
Dept: FAMILY MEDICINE CLINIC | Age: 78
End: 2018-11-08

## 2018-11-08 VITALS
HEART RATE: 77 BPM | OXYGEN SATURATION: 98 % | RESPIRATION RATE: 18 BRPM | WEIGHT: 158.8 LBS | HEIGHT: 63 IN | BODY MASS INDEX: 28.14 KG/M2 | DIASTOLIC BLOOD PRESSURE: 80 MMHG | SYSTOLIC BLOOD PRESSURE: 128 MMHG | TEMPERATURE: 97.6 F

## 2018-11-08 DIAGNOSIS — Z11.59 NEED FOR HEPATITIS C SCREENING TEST: ICD-10-CM

## 2018-11-08 DIAGNOSIS — R63.4 WEIGHT LOSS: ICD-10-CM

## 2018-11-08 DIAGNOSIS — I10 HTN, GOAL BELOW 150/90: Primary | ICD-10-CM

## 2018-11-08 DIAGNOSIS — R73.03 PRE-DIABETES: ICD-10-CM

## 2018-11-08 DIAGNOSIS — E03.9 ACQUIRED HYPOTHYROIDISM: ICD-10-CM

## 2018-11-08 PROCEDURE — 83036 HEMOGLOBIN GLYCOSYLATED A1C: CPT

## 2018-11-08 PROCEDURE — 85651 RBC SED RATE NONAUTOMATED: CPT

## 2018-11-08 PROCEDURE — 84443 ASSAY THYROID STIM HORMONE: CPT

## 2018-11-08 PROCEDURE — 86140 C-REACTIVE PROTEIN: CPT

## 2018-11-08 PROCEDURE — 80053 COMPREHEN METABOLIC PANEL: CPT

## 2018-11-08 PROCEDURE — 85025 COMPLETE CBC W/AUTO DIFF WBC: CPT

## 2018-11-08 PROCEDURE — 86803 HEPATITIS C AB TEST: CPT

## 2018-11-08 PROCEDURE — 36415 COLL VENOUS BLD VENIPUNCTURE: CPT

## 2018-11-08 NOTE — PROGRESS NOTES
Chief Complaint   Patient presents with    Hypertension     follow up     1. Have you been to the ER, urgent care clinic since your last visit? Hospitalized since your last visit? No    2. Have you seen or consulted any other health care providers outside of the [address] [place] [address] since your last visit? Include any pap smears or colon screening.  No

## 2018-11-08 NOTE — PATIENT INSTRUCTIONS
DASH Diet: Care Instructions  Your Care Instructions    The DASH diet is an eating plan that can help lower your blood pressure. DASH stands for Dietary Approaches to Stop Hypertension. Hypertension is high blood pressure. The DASH diet focuses on eating foods that are high in calcium, potassium, and magnesium. These nutrients can lower blood pressure. The foods that are highest in these nutrients are fruits, vegetables, low-fat dairy products, nuts, seeds, and legumes. But taking calcium, potassium, and magnesium supplements instead of eating foods that are high in those nutrients does not have the same effect. The DASH diet also includes whole grains, fish, and poultry. The DASH diet is one of several lifestyle changes your doctor may recommend to lower your high blood pressure. Your doctor may also want you to decrease the amount of sodium in your diet. Lowering sodium while following the DASH diet can lower blood pressure even further than just the DASH diet alone. Follow-up care is a key part of your treatment and safety. Be sure to make and go to all appointments, and call your doctor if you are having problems. It's also a good idea to know your test results and keep a list of the medicines you take. How can you care for yourself at home? Following the DASH diet  · Eat 4 to 5 servings of fruit each day. A serving is 1 medium-sized piece of fruit, ½ cup chopped or canned fruit, 1/4 cup dried fruit, or 4 ounces (½ cup) of fruit juice. Choose fruit more often than fruit juice. · Eat 4 to 5 servings of vegetables each day. A serving is 1 cup of lettuce or raw leafy vegetables, ½ cup of chopped or cooked vegetables, or 4 ounces (½ cup) of vegetable juice. Choose vegetables more often than vegetable juice. · Get 2 to 3 servings of low-fat and fat-free dairy each day. A serving is 8 ounces of milk, 1 cup of yogurt, or 1 ½ ounces of cheese. · Eat 6 to 8 servings of grains each day.  A serving is 1 slice of bread, 1 ounce of dry cereal, or ½ cup of cooked rice, pasta, or cooked cereal. Try to choose whole-grain products as much as possible. · Limit lean meat, poultry, and fish to 2 servings each day. A serving is 3 ounces, about the size of a deck of cards. · Eat 4 to 5 servings of nuts, seeds, and legumes (cooked dried beans, lentils, and split peas) each week. A serving is 1/3 cup of nuts, 2 tablespoons of seeds, or ½ cup of cooked beans or peas. · Limit fats and oils to 2 to 3 servings each day. A serving is 1 teaspoon of vegetable oil or 2 tablespoons of salad dressing. · Limit sweets and added sugars to 5 servings or less a week. A serving is 1 tablespoon jelly or jam, ½ cup sorbet, or 1 cup of lemonade. · Eat less than 2,300 milligrams (mg) of sodium a day. If you limit your sodium to 1,500 mg a day, you can lower your blood pressure even more. Tips for success  · Start small. Do not try to make dramatic changes to your diet all at once. You might feel that you are missing out on your favorite foods and then be more likely to not follow the plan. Make small changes, and stick with them. Once those changes become habit, add a few more changes. · Try some of the following:  ? Make it a goal to eat a fruit or vegetable at every meal and at snacks. This will make it easy to get the recommended amount of fruits and vegetables each day. ? Try yogurt topped with fruit and nuts for a snack or healthy dessert. ? Add lettuce, tomato, cucumber, and onion to sandwiches. ? Combine a ready-made pizza crust with low-fat mozzarella cheese and lots of vegetable toppings. Try using tomatoes, squash, spinach, broccoli, carrots, cauliflower, and onions. ? Have a variety of cut-up vegetables with a low-fat dip as an appetizer instead of chips and dip. ? Sprinkle sunflower seeds or chopped almonds over salads. Or try adding chopped walnuts or almonds to cooked vegetables.   ? Try some vegetarian meals using beans and peas. Add garbanzo or kidney beans to salads. Make burritos and tacos with mashed diallo beans or black beans. Where can you learn more? Go to http://dorota-merlene.info/. Enter R663 in the search box to learn more about \"DASH Diet: Care Instructions. \"  Current as of: December 6, 2017  Content Version: 11.8  © 6502-9874 [employer]. Care instructions adapted under license by [employer] (which disclaims liability or warranty for this information). If you have questions about a medical condition or this instruction, always ask your healthcare professional. [place] [address] any warranty or liability for your use of this information. Constipation: Care Instructions  Your Care Instructions    Constipation means that you have a hard time passing stools (bowel movements). People pass stools from 3 times a day to once every 3 days. What is normal for you may be different. Constipation may occur with pain in the rectum and cramping. The pain may get worse when you try to pass stools. Sometimes there are small amounts of bright red blood on toilet paper or the surface of stools. This is because of enlarged veins near the rectum (hemorrhoids). A few changes in your diet and lifestyle may help you avoid ongoing constipation. Your doctor may also prescribe medicine to help loosen your stool. Some medicines can cause constipation. These include pain medicines and antidepressants. Tell your doctor about all the medicines you take. Your doctor may want to make a medicine change to ease your symptoms. Follow-up care is a key part of your treatment and safety. Be sure to make and go to all appointments, and call your doctor if you are having problems. It's also a good idea to know your test results and keep a list of the medicines you take. How can you care for yourself at home?   · Drink plenty of fluids, enough so that your urine is light yellow or clear like water. If you have kidney, heart, or liver disease and have to limit fluids, talk with your doctor before you increase the amount of fluids you drink. · Include high-fiber foods in your diet each day. These include fruits, vegetables, beans, and whole grains. · Get at least 30 minutes of exercise on most days of the week. Walking is a good choice. You also may want to do other activities, such as running, swimming, cycling, or playing tennis or team sports. · Take a fiber supplement, such as Citrucel or Metamucil, every day. Read and follow all instructions on the label. · Schedule time each day for a bowel movement. A daily routine may help. Take your time having your bowel movement. · Support your feet with a small step stool when you sit on the toilet. This helps flex your hips and places your pelvis in a squatting position. · Your doctor may recommend an over-the-counter laxative to relieve your constipation. Examples are Milk of Magnesia and MiraLax. Read and follow all instructions on the label. Do not use laxatives on a long-term basis. When should you call for help? Call your doctor now or seek immediate medical care if:    · You have new or worse belly pain.     · You have new or worse nausea or vomiting.     · You have blood in your stools.    Watch closely for changes in your health, and be sure to contact your doctor if:    · Your constipation is getting worse.     · You do not get better as expected. Where can you learn more? Go to http://dorota-merlene.info/. Enter 21 [phone] in the search box to learn more about \"Constipation: Care Instructions. \"  Current as of: November 20, 2017  Content Version: 11.8  © 1483-7870 [employer]. Care instructions adapted under license by [employer] (which disclaims liability or warranty for this information).  If you have questions about a medical condition or this instruction, always ask your healthcare professional. [employer] disclaims any warranty or liability for your use of this information.

## 2018-11-08 NOTE — PROGRESS NOTES
Patient Name: Javier Coppola   MRN: [id number]    Kaleb Nagy is a 66 y.o. female who presents with the following:     Patient has lost about 10 pounds over the 6 months unintentionally. She admits that her depression has increased lately due to to relationship issues with her son and her other son's passing several years ago. She denies any SI/HI. She also reports new onset constipation without blood in stool, which is a new symptom for her. Denies any abdominal pain, nausea, vomiting, other symptoms. Last saw her gastroenterologist last year given her history of IBS-diarrhea and diverticulosis. History of hypothyroidism, currently on levothyroxine. Wt Readings from Last 3 Encounters:   11/08/18 158 lb 12.8 oz (72 kg)   08/29/18 165 lb (74.8 kg)   07/06/18 170 lb (77.1 kg)       Review of Systems   Constitutional: Positive for weight loss. Negative for fever and malaise/fatigue. Respiratory: Negative for cough, hemoptysis, shortness of breath and wheezing. Cardiovascular: Negative for chest pain, palpitations, leg swelling and PND. Gastrointestinal: Positive for constipation. Negative for abdominal pain, diarrhea, nausea and vomiting. Psychiatric/Behavioral: Positive for depression. Negative for suicidal ideas. The patient is not nervous/anxious and does not have insomnia. The patient's medications, allergies, past medical history, surgical history, family history and social history were reviewed and updated where appropriate. Prior to Admission medications    Medication Sig Start Date End Date Taking?  Authorizing Provider   losartan-hydroCHLOROthiazide (HYZAAR) 100-25 mg per tablet TAKE 1 TABLET EVERY DAY 10/30/18  Yes Caren Shah MD   simvastatin (ZOCOR) 40 mg tablet TAKE 1 TABLET EVERY NIGHT 10/9/18  Yes Caren Shah MD   levothyroxine (SYNTHROID) 112 mcg tablet TAKE 1 TABLET EVERY DAY BEFORE BREAKFAST 6/21/18  Yes Gee Keyes NP calcium-vitamin D (CALCIUM 500 WITH VITAMIN D) 500 mg(1,250mg) -200 unit per tablet Take 1 Tab by mouth two (2) times daily (with meals). Yes Provider, Historical   MULTIVITAMINS (MULTI-VITAMIN PO) Take 1 Tab by mouth daily. Takes one po daily. Yes Provider, Historical       Allergies   Allergen Reactions    Benazepril Cough    Sulfa (Sulfonamide Antibiotics) Hives           OBJECTIVE    Visit Vitals  /80 (BP 1 Location: Left arm, BP Patient Position: Sitting)   Pulse 77   Temp 97.6 °F (36.4 °C) (Oral)   Resp 18   Ht 5' 3\" (1.6 m)   Wt 158 lb 12.8 oz (72 kg)   SpO2 98%   BMI 28.13 kg/m²       Physical Exam   Constitutional: She is oriented to person, place, and time and well-developed, well-nourished, and in no distress. No distress. Eyes: Conjunctivae and EOM are normal. Pupils are equal, round, and reactive to light. Neck: Normal range of motion. Neck supple. No thyromegaly present. Abdominal: Soft. Bowel sounds are normal. She exhibits no distension and no mass. There is no tenderness. There is no rebound and no guarding. Musculoskeletal: Normal range of motion. Neurological: She is alert and oriented to person, place, and time. Gait normal.   Skin: Skin is warm and dry. She is not diaphoretic. Psychiatric: Mood, memory, affect and judgment normal.   Nursing note and vitals reviewed. ASSESSMENT  Rue Saint-Antoine Odean Bucker is a 66 y.o. female who presents today for:    1. HTN, goal below 150/90  Stable, continue current treatment. 2. Acquired hypothyroidism  Stable, continue current treatment pending review of labs. - TSH AND FREE T4    3. Weight loss  Will obtain lab work as below. Discussed with patient that if lab work is normal, she should follow-up with her GI doctor given her new symptoms of constipation and weight loss. May be attributable to uncontrolled depression but she declines any treatment at this time.  Otherwise, UTD on age appropriate cancer screening. Reviewed signs and symptoms that would indicate a worsening medical condition which would require immediate evaluation and treatment; patient expressed understanding of plan. - CBC WITH AUTOMATED DIFF  - METABOLIC PANEL, COMPREHENSIVE  - SED RATE (ESR)  - C REACTIVE PROTEIN, QT    4. Need for hepatitis C screening test  - HCV AB W/RFLX TO ELLEN    5. Pre-diabetes  I have reviewed/discussed the above normal BMI with the patient. I have recommended the following interventions: dietary management education, guidance, and counseling, encourage exercise, monitor weight and prescribed dietary intake.   - HEMOGLOBIN A1C WITH EAG       There are no discontinued medications. Follow-up Disposition:  Return in about 3 months (around 2/8/2019) for weight check. Medication risks/benefits/costs/interactions/alternatives discussed with patient. Advised patient to call back or return to office if symptoms worsen/change/persist. If patient cannot reach us or should anything more severe/urgent arise he/she should proceed directly to the nearest emergency department. Discussed expected course/resolution/complications of diagnosis in detail with patient. Patient given a written after visit summary which includes his/her diagnoses, current medications and vitals. Patient expressed understanding with the diagnosis and plan. Carine Riley M.D.

## 2018-11-09 LAB
ALBUMIN SERPL-MCNC: 4.3 G/DL (ref 3.5–4.8)
ALBUMIN/GLOB SERPL: 1.6 {RATIO} (ref 1.2–2.2)
ALP SERPL-CCNC: 71 IU/L (ref 39–117)
ALT SERPL-CCNC: 15 IU/L (ref 0–32)
AST SERPL-CCNC: 19 IU/L (ref 0–40)
BASOPHILS # BLD AUTO: 0 X10E3/UL (ref 0–0.2)
BASOPHILS NFR BLD AUTO: 1 %
BILIRUB SERPL-MCNC: 0.5 MG/DL (ref 0–1.2)
BUN SERPL-MCNC: 11 MG/DL (ref 8–27)
BUN/CREAT SERPL: 13 (ref 12–28)
CALCIUM SERPL-MCNC: 10.5 MG/DL (ref 8.7–10.3)
CHLORIDE SERPL-SCNC: 93 MMOL/L (ref 96–106)
CO2 SERPL-SCNC: 28 MMOL/L (ref 20–29)
CREAT SERPL-MCNC: 0.83 MG/DL (ref 0.57–1)
CRP SERPL-MCNC: 2.5 MG/L (ref 0–4.9)
EOSINOPHIL # BLD AUTO: 0.1 X10E3/UL (ref 0–0.4)
EOSINOPHIL NFR BLD AUTO: 1 %
ERYTHROCYTE [DISTWIDTH] IN BLOOD BY AUTOMATED COUNT: 13.3 % (ref 12.3–15.4)
ERYTHROCYTE [SEDIMENTATION RATE] IN BLOOD BY WESTERGREN METHOD: 4 MM/HR (ref 0–40)
EST. AVERAGE GLUCOSE BLD GHB EST-MCNC: 114 MG/DL
GLOBULIN SER CALC-MCNC: 2.7 G/DL (ref 1.5–4.5)
GLUCOSE SERPL-MCNC: 92 MG/DL (ref 65–99)
HBA1C MFR BLD: 5.6 % (ref 4.8–5.6)
HCT VFR BLD AUTO: 39.4 % (ref 34–46.6)
HCV AB S/CO SERPL IA: <0.1 S/CO RATIO (ref 0–0.9)
HCV AB SERPL QL IA: NORMAL
HGB BLD-MCNC: 13.2 G/DL (ref 11.1–15.9)
IMM GRANULOCYTES # BLD: 0 X10E3/UL (ref 0–0.1)
IMM GRANULOCYTES NFR BLD: 0 %
LYMPHOCYTES # BLD AUTO: 1.2 X10E3/UL (ref 0.7–3.1)
LYMPHOCYTES NFR BLD AUTO: 20 %
MCH RBC QN AUTO: 29.2 PG (ref 26.6–33)
MCHC RBC AUTO-ENTMCNC: 33.5 G/DL (ref 31.5–35.7)
MCV RBC AUTO: 87 FL (ref 79–97)
MONOCYTES # BLD AUTO: 0.5 X10E3/UL (ref 0.1–0.9)
MONOCYTES NFR BLD AUTO: 8 %
NEUTROPHILS # BLD AUTO: 4.2 X10E3/UL (ref 1.4–7)
NEUTROPHILS NFR BLD AUTO: 70 %
PLATELET # BLD AUTO: 305 X10E3/UL (ref 150–379)
POTASSIUM SERPL-SCNC: 4.3 MMOL/L (ref 3.5–5.2)
PROT SERPL-MCNC: 7 G/DL (ref 6–8.5)
RBC # BLD AUTO: 4.52 X10E6/UL (ref 3.77–5.28)
SODIUM SERPL-SCNC: 134 MMOL/L (ref 134–144)
T4 FREE SERPL-MCNC: 1.46 NG/DL (ref 0.82–1.77)
TSH SERPL DL<=0.005 MIU/L-ACNC: 2.21 UIU/ML (ref 0.45–4.5)
WBC # BLD AUTO: 6 X10E3/UL (ref 3.4–10.8)

## 2018-11-13 NOTE — PROGRESS NOTES
Please notify patient regarding their test results:    Thyroid level at goal, continue current levothyroxine dose. Otherwise, lab work is all within normal limits, with no specific reason for her weight loss. As discussed during office visit, recommend patient to follow-up closely with her gastroenterologist given her with bowel movement changes as well as her weight loss.

## 2018-11-13 NOTE — PROGRESS NOTES
Called patient but was not able to leave a voicemail because mailbox has not been set up. Will try again another day.

## 2018-11-19 NOTE — PROGRESS NOTES
Mailed a lab result letter to last know address, after several unsuccessful attempts to contact patient by phone.

## 2018-11-27 NOTE — ANCILLARY DISCHARGE INSTRUCTIONS
[facility] [address], [place], [address] Phone: [phone]  Fax: [phone] Discharge Summary Name: [place] : 1940 MD: Orlin Gregg, * Treatment Diagnosis: Low back pain [M54.5] Start of Care: 10/09/18 Visits from Start of Care: 1 Missed Visits: none Summary of Care:Pt did not return to PT after her evaluation. Pt will be D/C to [facility]. Assessment / Recommendations: Other: D/C to [facility] Jes Thurman, PT 2018 12:32 PM

## 2019-02-14 ENCOUNTER — OFFICE VISIT (OUTPATIENT)
Dept: FAMILY MEDICINE CLINIC | Age: 79
End: 2019-02-14

## 2019-02-14 VITALS
WEIGHT: 158.2 LBS | OXYGEN SATURATION: 97 % | HEART RATE: 66 BPM | BODY MASS INDEX: 28.03 KG/M2 | TEMPERATURE: 97.9 F | DIASTOLIC BLOOD PRESSURE: 78 MMHG | SYSTOLIC BLOOD PRESSURE: 124 MMHG | RESPIRATION RATE: 18 BRPM | HEIGHT: 63 IN

## 2019-02-14 DIAGNOSIS — R63.4 ABNORMAL LOSS OF WEIGHT: ICD-10-CM

## 2019-02-14 DIAGNOSIS — I10 HTN, GOAL BELOW 150/90: Primary | ICD-10-CM

## 2019-02-14 NOTE — PROGRESS NOTES
Patient Name: Shasha Omalley   MRN: [id number]    Nina Price is a 66 y.o. female who presents with the following:     Previously lost 10 pounds unintentionally over 6 months in November. Thought it was related due to increased depression. Today, her weight remains stable and she is feeling much better. No other complaints today. Wt Readings from Last 3 Encounters:   02/14/19 158 lb 3.2 oz (71.8 kg)   11/08/18 158 lb 12.8 oz (72 kg)   08/29/18 165 lb (74.8 kg)     BP Readings from Last 3 Encounters:   02/14/19 124/78   11/08/18 128/80   08/29/18 146/77     Review of Systems   Constitutional: Negative for fever, malaise/fatigue and weight loss. Respiratory: Negative for cough, hemoptysis, shortness of breath and wheezing. Cardiovascular: Negative for chest pain, palpitations, leg swelling and PND. Gastrointestinal: Negative for abdominal pain, constipation, diarrhea, nausea and vomiting. The patient's medications, allergies, past medical history, surgical history, family history and social history were reviewed and updated where appropriate. Prior to Admission medications    Medication Sig Start Date End Date Taking? Authorizing Provider   levothyroxine (SYNTHROID) 112 mcg tablet TAKE 1 TABLET EVERY DAY BEFORE BREAKFAST 1/8/19  Yes Jonathan Winter MD   losartan-hydroCHLOROthiazide (HYZAAR) 100-25 mg per tablet TAKE 1 TABLET EVERY DAY 10/30/18  Yes Jonathan Winter MD   simvastatin (ZOCOR) 40 mg tablet TAKE 1 TABLET EVERY NIGHT 10/9/18  Yes Jonathan Winter MD   calcium-vitamin D (CALCIUM 500 WITH VITAMIN D) 500 mg(1,250mg) -200 unit per tablet Take 1 Tab by mouth two (2) times daily (with meals). Yes Provider, Historical   MULTIVITAMINS (MULTI-VITAMIN PO) Take 1 Tab by mouth daily. Takes one po daily. Yes Provider, Historical       Allergies   Allergen Reactions    Benazepril Cough    Sulfa (Sulfonamide Antibiotics) Hives       .     OBJECTIVE    Visit Vitals  /78 (BP 1 Location: Left arm, BP Patient Position: Sitting)   Pulse 66   Temp 97.9 °F (36.6 °C) (Oral)   Resp 18   Ht 5' 3\" (1.6 m)   Wt 158 lb 3.2 oz (71.8 kg)   SpO2 97%   BMI 28.02 kg/m²       Physical Exam   Constitutional: She is oriented to person, place, and time and well-developed, well-nourished, and in no distress. No distress. Eyes: Conjunctivae and EOM are normal. Pupils are equal, round, and reactive to light. Cardiovascular: Normal rate, regular rhythm and normal heart sounds. Exam reveals no gallop and no friction rub. No murmur heard. Pulmonary/Chest: Effort normal and breath sounds normal. No respiratory distress. She has no wheezes. Neurological: She is alert and oriented to person, place, and time. Skin: Skin is warm and dry. No rash noted. She is not diaphoretic. Psychiatric: Mood, memory, affect and judgment normal.   Nursing note and vitals reviewed. ASSESSMENT  Rue Saint-Antoine Ellamae Shorts is a 66 y.o. female who presents today for:    1. HTN, goal below 150/90  Stable, continue current treatment. 2. Abnormal loss of weight  Now stable. There are no discontinued medications. Follow-up Disposition:  Return in about 6 months (around 8/14/2019) for Medicare Wellness Visit (30 min). Medication risks/benefits/costs/interactions/alternatives discussed with patient. Advised patient to call back or return to office if symptoms worsen/change/persist. If patient cannot reach us or should anything more severe/urgent arise he/she should proceed directly to the nearest emergency department. Discussed expected course/resolution/complications of diagnosis in detail with patient. Patient given a written after visit summary which includes his/her diagnoses, current medications and vitals. Patient expressed understanding with the diagnosis and plan. Carine Jain M.D.

## 2019-02-14 NOTE — PROGRESS NOTES
Chief Complaint   Patient presents with    Weight Management     1. Have you been to the ER, urgent care clinic since your last visit? Hospitalized since your last visit? No    2. Have you seen or consulted any other health care providers outside of the [address] [place] [address] since your last visit? Include any pap smears or colon screening.  No

## 2019-02-14 NOTE — PATIENT INSTRUCTIONS
DASH Diet: Care Instructions  Your Care Instructions    The DASH diet is an eating plan that can help lower your blood pressure. DASH stands for Dietary Approaches to Stop Hypertension. Hypertension is high blood pressure. The DASH diet focuses on eating foods that are high in calcium, potassium, and magnesium. These nutrients can lower blood pressure. The foods that are highest in these nutrients are fruits, vegetables, low-fat dairy products, nuts, seeds, and legumes. But taking calcium, potassium, and magnesium supplements instead of eating foods that are high in those nutrients does not have the same effect. The DASH diet also includes whole grains, fish, and poultry. The DASH diet is one of several lifestyle changes your doctor may recommend to lower your high blood pressure. Your doctor may also want you to decrease the amount of sodium in your diet. Lowering sodium while following the DASH diet can lower blood pressure even further than just the DASH diet alone. Follow-up care is a key part of your treatment and safety. Be sure to make and go to all appointments, and call your doctor if you are having problems. It's also a good idea to know your test results and keep a list of the medicines you take. How can you care for yourself at home? Following the DASH diet  · Eat 4 to 5 servings of fruit each day. A serving is 1 medium-sized piece of fruit, ½ cup chopped or canned fruit, 1/4 cup dried fruit, or 4 ounces (½ cup) of fruit juice. Choose fruit more often than fruit juice. · Eat 4 to 5 servings of vegetables each day. A serving is 1 cup of lettuce or raw leafy vegetables, ½ cup of chopped or cooked vegetables, or 4 ounces (½ cup) of vegetable juice. Choose vegetables more often than vegetable juice. · Get 2 to 3 servings of low-fat and fat-free dairy each day. A serving is 8 ounces of milk, 1 cup of yogurt, or 1 ½ ounces of cheese. · Eat 6 to 8 servings of grains each day.  A serving is 1 slice of bread, 1 ounce of dry cereal, or ½ cup of cooked rice, pasta, or cooked cereal. Try to choose whole-grain products as much as possible. · Limit lean meat, poultry, and fish to 2 servings each day. A serving is 3 ounces, about the size of a deck of cards. · Eat 4 to 5 servings of nuts, seeds, and legumes (cooked dried beans, lentils, and split peas) each week. A serving is 1/3 cup of nuts, 2 tablespoons of seeds, or ½ cup of cooked beans or peas. · Limit fats and oils to 2 to 3 servings each day. A serving is 1 teaspoon of vegetable oil or 2 tablespoons of salad dressing. · Limit sweets and added sugars to 5 servings or less a week. A serving is 1 tablespoon jelly or jam, ½ cup sorbet, or 1 cup of lemonade. · Eat less than 2,300 milligrams (mg) of sodium a day. If you limit your sodium to 1,500 mg a day, you can lower your blood pressure even more. Tips for success  · Start small. Do not try to make dramatic changes to your diet all at once. You might feel that you are missing out on your favorite foods and then be more likely to not follow the plan. Make small changes, and stick with them. Once those changes become habit, add a few more changes. · Try some of the following:  ? Make it a goal to eat a fruit or vegetable at every meal and at snacks. This will make it easy to get the recommended amount of fruits and vegetables each day. ? Try yogurt topped with fruit and nuts for a snack or healthy dessert. ? Add lettuce, tomato, cucumber, and onion to sandwiches. ? Combine a ready-made pizza crust with low-fat mozzarella cheese and lots of vegetable toppings. Try using tomatoes, squash, spinach, broccoli, carrots, cauliflower, and onions. ? Have a variety of cut-up vegetables with a low-fat dip as an appetizer instead of chips and dip. ? Sprinkle sunflower seeds or chopped almonds over salads. Or try adding chopped walnuts or almonds to cooked vegetables.   ? Try some vegetarian meals using beans and peas. Add garbanzo or kidney beans to salads. Make burritos and tacos with mashed diallo beans or black beans. Where can you learn more? Go to http://dorota-merlene.info/. Enter R820 in the search box to learn more about \"DASH Diet: Care Instructions. \"  Current as of: July 22, 2018  Content Version: 11.9  © 3996-7467 [employer]. Care instructions adapted under license by [employer] (which disclaims liability or warranty for this information). If you have questions about a medical condition or this instruction, always ask your healthcare professional. [address] any warranty or liability for your use of this information.

## 2019-03-12 RX ORDER — SIMVASTATIN 40 MG/1
TABLET, FILM COATED ORAL
Qty: 90 TAB | Refills: 1 | Status: SHIPPED | OUTPATIENT
Start: 2019-03-12 | End: 2019-09-12 | Stop reason: SDUPTHER

## 2019-03-18 ENCOUNTER — OFFICE VISIT (OUTPATIENT)
Dept: FAMILY MEDICINE CLINIC | Age: 79
End: 2019-03-18

## 2019-03-18 VITALS
SYSTOLIC BLOOD PRESSURE: 128 MMHG | HEART RATE: 73 BPM | OXYGEN SATURATION: 96 % | BODY MASS INDEX: 28.03 KG/M2 | WEIGHT: 158.2 LBS | RESPIRATION RATE: 18 BRPM | TEMPERATURE: 98.4 F | DIASTOLIC BLOOD PRESSURE: 70 MMHG | HEIGHT: 63 IN

## 2019-03-18 DIAGNOSIS — M19.90 ARTHRITIS: ICD-10-CM

## 2019-03-18 DIAGNOSIS — L98.9 SKIN LESION: Primary | ICD-10-CM

## 2019-03-18 NOTE — PROGRESS NOTES
Patient Name: Jono Alarcon   MRN: [id number]    Candance Pronto is a 66 y.o. female who presents with the following:     Reports 3-week history of a dark bump along her left mid back, along bra line. Appearance has not changed since she noticed it but it appears red and sore. Has a history of basal cell carcinoma in seborrheic keratosis. Her dermatologist recently retired so she has an upcoming appointment with another dermatologist at the end of this month. She would like to see a rheumatologist for the arthritis in both of her hands. States that there are new bumps occurring on her finger joints. Does not like to take any medications for it. Remains active and exercises with water aerobics. Review of Systems   Constitutional: Negative for fever, malaise/fatigue and weight loss. Respiratory: Negative for cough, hemoptysis, shortness of breath and wheezing. Cardiovascular: Negative for chest pain, palpitations, leg swelling and PND. Gastrointestinal: Negative for abdominal pain, constipation, diarrhea, nausea and vomiting. Musculoskeletal: Positive for joint pain. The patient's medications, allergies, past medical history, surgical history, family history and social history were reviewed and updated where appropriate. Prior to Admission medications    Medication Sig Start Date End Date Taking? Authorizing Provider   simvastatin (ZOCOR) 40 mg tablet TAKE 1 TABLET EVERY NIGHT 3/12/19  Yes Rudy Lam MD   levothyroxine (SYNTHROID) 112 mcg tablet TAKE 1 TABLET EVERY DAY BEFORE BREAKFAST 1/8/19  Yes Rudy Lam MD   losartan-hydroCHLOROthiazide (HYZAAR) 100-25 mg per tablet TAKE 1 TABLET EVERY DAY 10/30/18  Yes Rudy Lam MD   calcium-vitamin D (CALCIUM 500 WITH VITAMIN D) 500 mg(1,250mg) -200 unit per tablet Take 1 Tab by mouth two (2) times daily (with meals).    Yes Provider, Historical   MULTIVITAMINS (MULTI-VITAMIN PO) Take 1 Tab by mouth daily. Takes one po daily. Yes Provider, Historical       Allergies   Allergen Reactions    Benazepril Cough    Sulfa (Sulfonamide Antibiotics) Hives           OBJECTIVE    Visit Vitals  /70 (BP 1 Location: Left arm, BP Patient Position: Sitting)   Pulse 73   Temp 98.4 °F (36.9 °C) (Oral)   Resp 18   Ht 5' 3\" (1.6 m)   Wt 158 lb 3.2 oz (71.8 kg)   SpO2 96%   BMI 28.02 kg/m²       Physical Exam   Constitutional: She is oriented to person, place, and time and well-developed, well-nourished, and in no distress. No distress. Eyes: Conjunctivae and EOM are normal. Pupils are equal, round, and reactive to light. Musculoskeletal: Normal range of motion. Neurological: She is alert and oriented to person, place, and time. Gait normal.   Skin: Skin is warm and dry. She is not diaphoretic. 1 x 1 cm nodule with central hyperpigmentation and surrounding erythema. No drainage, bleeding, fluctuation, induration. Psychiatric: Mood, memory, affect and judgment normal.   Nursing note and vitals reviewed. ASSESSMENT  Rue Saint-Antoine Sara Vincent is a 66 y.o. female who presents today for:    1. Skin lesion  Possible irritated seborrheic keratosis vs skin cancerous origin. Recommend patient to stop wearing bra to avoid skin irritation and frequent warm compresses. Patient to follow-up with her dermatologist as this likely will need biopsy. If the pain increases with erythema, may consider short course of antibiotics. 2. Arthritis  - REFERRAL TO RHEUMATOLOGY       There are no discontinued medications. Follow-up Disposition:  Return if symptoms worsen or fail to improve. Medication risks/benefits/costs/interactions/alternatives discussed with patient. Advised patient to call back or return to office if symptoms worsen/change/persist. If patient cannot reach us or should anything more severe/urgent arise he/she should proceed directly to the nearest emergency department.   Discussed expected course/resolution/complications of diagnosis in detail with patient. Patient given a written after visit summary which includes his/her diagnoses, current medications and vitals. Patient expressed understanding with the diagnosis and plan. Carine Diaz M.D.

## 2019-03-18 NOTE — PATIENT INSTRUCTIONS
Skin Cancer Prevention: Care Instructions  Your Care Instructions    Skin cancer is the abnormal growth of cells in the skin. It usually appears as a growth that changes in color, shape, or size. This can be a sore that does not heal or a change in a wart or a mole. Skin cancer is almost always curable when found early and treated. So it is important to see your doctor if you have any of these changes in your skin. Skin cancer is the most common type of cancer. It often appears on areas of the body that have been exposed to the sun, such as the head, face, neck, back, chest, or shoulders. Follow-up care is a key part of your treatment and safety. Be sure to make and go to all appointments, and call your doctor if you are having problems. It's also a good idea to know your test results and keep a list of the medicines you take. How can you care for yourself at home? · Wear a wide-brimmed hat and long sleeves and pants if you are going to be outdoors for a long time. · Avoid the sun between 10 a.m. and 4 p.m., which is the peak time for UV rays. · Wear sunscreen on exposed skin. Make sure to use a broad-spectrum sunscreen that has a sun protection factor (SPF) of 30 or higher. Use it every day, even when it is cloudy. · Do not use tanning booths or sunlamps. · Use lip balm or cream that has sun protection factor (SPF) to protect your lips from getting sunburned. · Wear sunglasses that block UV rays. When should you call for help? Call your doctor now or seek immediate medical care if:    · You have signs of infection, such as:  ? Increased pain, swelling, warmth, or redness. ? Red streaks leading from the area. ? Pus draining from the area. ? A fever.    Watch closely for changes in your health, and be sure to contact your doctor if:    · You see a change in your skin, such as a growth or mole that:  ? Grows bigger. This may happen very slowly. ? Changes color. ? Changes shape.   ? Starts to bleed easily.     · You have swollen glands in your armpits, groin, or neck.     · You do not get better as expected. Where can you learn more? Go to http://dorota-merlene.info/. Enter P392 in the search box to learn more about \"Skin Cancer Prevention: Care Instructions. \"  Current as of: March 27, 2018  Content Version: 11.9  © 4873-0961 [employer]. Care instructions adapted under license by [employer] (which disclaims liability or warranty for this information). If you have questions about a medical condition or this instruction, always ask your healthcare professional. [place] [address] any warranty or liability for your use of this information.

## 2019-04-19 DIAGNOSIS — I10 HTN, GOAL BELOW 150/90: ICD-10-CM

## 2019-04-19 RX ORDER — LOSARTAN POTASSIUM AND HYDROCHLOROTHIAZIDE 25; 100 MG/1; MG/1
TABLET ORAL
Qty: 90 TAB | Refills: 1 | Status: SHIPPED | OUTPATIENT
Start: 2019-04-19 | End: 2019-09-12 | Stop reason: SDUPTHER

## 2019-09-12 ENCOUNTER — OFFICE VISIT (OUTPATIENT)
Dept: FAMILY MEDICINE CLINIC | Age: 79
End: 2019-09-12

## 2019-09-12 VITALS
DIASTOLIC BLOOD PRESSURE: 78 MMHG | WEIGHT: 156.6 LBS | OXYGEN SATURATION: 97 % | HEART RATE: 64 BPM | TEMPERATURE: 98.4 F | RESPIRATION RATE: 18 BRPM | HEIGHT: 63 IN | BODY MASS INDEX: 27.75 KG/M2 | SYSTOLIC BLOOD PRESSURE: 112 MMHG

## 2019-09-12 DIAGNOSIS — E78.5 HYPERLIPIDEMIA, UNSPECIFIED HYPERLIPIDEMIA TYPE: ICD-10-CM

## 2019-09-12 DIAGNOSIS — Z23 ENCOUNTER FOR IMMUNIZATION: ICD-10-CM

## 2019-09-12 DIAGNOSIS — Z13.820 SCREENING FOR OSTEOPOROSIS: ICD-10-CM

## 2019-09-12 DIAGNOSIS — I10 HTN, GOAL BELOW 150/90: ICD-10-CM

## 2019-09-12 DIAGNOSIS — R68.89 COLD INTOLERANCE: ICD-10-CM

## 2019-09-12 DIAGNOSIS — E03.9 ACQUIRED HYPOTHYROIDISM: ICD-10-CM

## 2019-09-12 DIAGNOSIS — Z00.00 MEDICARE ANNUAL WELLNESS VISIT, SUBSEQUENT: Primary | ICD-10-CM

## 2019-09-12 DIAGNOSIS — Z78.0 POSTMENOPAUSAL: ICD-10-CM

## 2019-09-12 DIAGNOSIS — R73.03 PRE-DIABETES: ICD-10-CM

## 2019-09-12 NOTE — PROGRESS NOTES
Chief Complaint   Patient presents with   [facility] Visit     G [id number]     1. Have you been to the ER, urgent care clinic since your last visit? Hospitalized since your last visit? No    2. Have you seen or consulted any other health care providers outside of the [address] [place] [address] since your last visit? Include any pap smears or colon screening. No      Patient stated that she saw the dermatologist due to a growth on her back, had it removed.

## 2019-09-12 NOTE — PROGRESS NOTES
This is the Subsequent Medicare Annual Wellness Exam, performed 12 months or more after the Initial AWV or the last Subsequent AWV    I have reviewed the patient's medical history in detail and updated the computerized patient record. History     Past Medical History:   Diagnosis Date    DDD (degenerative disc disease), lumbar 3/15/2016    Diverticulosis of colon 1/12    McGroarty/gi    DJD (degenerative joint disease)     Esophageal stricture 2/03    Fibrosis of lung (HCC)     Scarring @ the apices bilaterally Done @[facility]PI/histoplasmosis    History of basal cell cancer     HTN, goal below 150/90     Hypercholesterolemia     IBS (irritable bowel syndrome) 8/30/2016    Menopause     LMP-46years old?  Osteoporosis     previously on Fosamax many years ago; does not want further treatment    Overactive bladder     Scarring of lung '04    Scarring @ the apices bilaterally Done @[facility]PI/histoplasmosis    Unspecified hypothyroidism            Past Surgical History:   Procedure Laterality Date    ABDOMEN SURGERY [address]    appendectomy    COLONOSCOPY  1/12    McGroarty/gi    HX APPENDECTOMY      HX CATARACT REMOVAL  3/12    L    HX CATARACT REMOVAL  04/2012    Bilateral     HX KNEE REPLACEMENT  1/10    right  Darnell/o/s    HX ORTHOPAEDIC  2006 and 2008    R knee arthroscopy Dr Roseanna Lucio     Current Outpatient Medications   Medication Sig Dispense Refill    varicella-zoster recombinant, PF, (SHINGRIX, PF,) 50 mcg/0.5 mL susr injection 0.5 mL by IntraMUSCular route once for 1 dose.  Please fax vaccination documentation to [facility] at [phone], Attn: Dr. Ricki Gutierrez 0.5 mL 0    losartan-hydroCHLOROthiazide (HYZAAR) 100-25 mg per tablet TAKE 1 TABLET EVERY DAY 90 Tab 1    simvastatin (ZOCOR) 40 mg tablet TAKE 1 TABLET EVERY NIGHT 90 Tab 1    levothyroxine (SYNTHROID) 112 mcg tablet TAKE 1 TABLET EVERY DAY BEFORE BREAKFAST 90 Tab 3    calcium-vitamin D (CALCIUM 500 WITH VITAMIN D) 500 mg(1,250mg) -200 unit per tablet Take 1 Tab by mouth two (2) times daily (with meals).  MULTIVITAMINS (MULTI-VITAMIN PO) Take 1 Tab by mouth daily. Takes one po daily.        Allergies   Allergen Reactions    Benazepril Cough    Sulfa (Sulfonamide Antibiotics) Hives     Family History   Problem Relation Age of Onset    Heart Disease Mother     Diabetes Father     Heart Disease Father     Cancer Son         metastatic prostate ca    Diabetes Son     Heart Disease Son      Social History     Tobacco Use    Smoking status: Former Smoker     Packs/day: 1.00     Years: 25.00     Pack years: 25.00     Types: Cigarettes    Smokeless tobacco: Never Used    Tobacco comment: quit in the '90's/cigarettes   Substance Use Topics    Alcohol use: Yes     Comment: social     Patient Active Problem List   Diagnosis Code    HTN, goal below 150/90 I10    Other and unspecified hyperlipidemia E78.5    Unspecified hypothyroidism E03.9    Localized osteoarthritis of left knee M17.12    DDD (degenerative disc disease), lumbar M51.36    Lumbar spinal stenosis M48.061    Advance care planning Z71.89    Diverticulosis of large intestine K57.30    IBS (irritable bowel syndrome) K58.9    Esophageal stricture K22.2    Overactive bladder N32.81    Osteoporosis M81.0    History of basal cell cancer Z85.828    Scarring of lung J98.4       Depression Risk Factor Screening:     3 most recent PHQ Screens 3/18/2019   Little interest or pleasure in doing things Not at all   Feeling down, depressed, irritable, or hopeless Not at all   Total Score PHQ 2 0   Trouble falling or staying asleep, or sleeping too much -   Feeling tired or having little energy -   Poor appetite, weight loss, or overeating -   Feeling bad about yourself - or that you are a failure or have let yourself or your family down -   Trouble concentrating on things such as school, work, reading, or watching TV -   Moving or speaking so slowly that other people could have noticed; or the opposite being so fidgety that others notice -   Thoughts of being better off dead, or hurting yourself in some way -   How difficult have these problems made it for you to do your work, take care of your home and get along with others -     Alcohol Risk Factor Screening: On any occasion in the past three months you have had more than 3 drinks containing alcohol. Functional Ability and Level of Safety:   Hearing Loss  Hearing is good. Activities of Daily Living  The home contains: no safety equipment. Patient does total self care    Fall Risk  Fall Risk Assessment, last 12 mths 3/18/2019   Able to walk? Yes   Fall in past 12 months? Yes   Fall with injury? Yes   Number of falls in past 12 months 1   Fall Risk Score 2       Abuse Screen  Patient is not abused    Cognitive Screening   Evaluation of Cognitive Function:  Has your family/caregiver stated any concerns about your memory: no      Patient Care Team   Patient Care Team:  Haile Gregg MD as PCP - General (Family Practice)  Shellie Ohara MD (Gastroenterology)  Louetta Libman, MD (Obstetrics & Gynecology)  Eber May MD (Orthopedic Surgery)  Hermelinda Paul MD as Physician (Ophthalmology)    Assessment/Plan   Education and counseling provided:  Are appropriate based on today's review and evaluation    Diagnoses and all orders for this visit:    1. Medicare annual wellness visit, subsequent    2. HTN, goal below 150/90    3. Pre-diabetes  -     HEMOGLOBIN A1C WITH EAG    4. Hyperlipidemia, unspecified hyperlipidemia type  -     METABOLIC PANEL, COMPREHENSIVE  -     LIPID PANEL    5. Acquired hypothyroidism  -     TSH AND FREE T4    6. Cold intolerance  -     CBC WITH AUTOMATED DIFF    7. Screening for osteoporosis  -     DEXA BONE DENSITY STUDY AXIAL; Future    8. Postmenopausal  -     DEXA BONE DENSITY STUDY AXIAL; Future    9.  Encounter for immunization  - varicella-zoster recombinant, PF, (SHINGRIX, PF,) 50 mcg/0.5 mL susr injection; 0.5 mL by IntraMUSCular route once for 1 dose.  Please fax vaccination documentation to [facility] at [phone], Attn: Dr. Prabhakar Garcia (IIV), SUBUNIT, ADJUVANTED, IM  -     ADMIN INFLUENZA VIRUS VAC        Health Maintenance Due   Topic Date Due    Shingrix Vaccine Age 49> (1 of 2) 05/10/1990    GLAUCOMA SCREENING Q2Y  04/05/2018    Pneumococcal 65+ years (2 of 2 - PCV13) 11/14/2018    Influenza Age 9 to Adult  08/01/2019

## 2019-09-12 NOTE — PROGRESS NOTES
Patient Name: Kristyn Good   MRN: [id number]    Jefe Rider is a 78 y.o. female who presents with the following:     Feeling cold all the time. History of hypothyroidism, on levothyroxine. Recently was diagnosed with basal cell cancer which was removed by dermatology. She does have a history of significant sun exposure in her childhood. History of known osteoporosis and previously was on Fosamax. Her last DEXA scan 2017 did show osteoporosis but she did not want to see rheumatology at the time to discuss other treatment options. She does have some concern regarding bisphosphonates due to dental health. BP Readings from Last 3 Encounters:   09/12/19 112/78   03/18/19 128/70   02/14/19 124/78     Review of Systems   Constitutional: Negative for fever, malaise/fatigue and weight loss. Respiratory: Negative for cough, hemoptysis, shortness of breath and wheezing. Cardiovascular: Negative for chest pain, palpitations, leg swelling and PND. Gastrointestinal: Negative for abdominal pain, constipation, diarrhea, nausea and vomiting. Endo/Heme/Allergies:        Cold intolerance       The patient's medications, allergies, past medical history, surgical history, family history and social history were reviewed and updated where appropriate. Prior to Admission medications    Medication Sig Start Date End Date Taking? Authorizing Provider   losartan-hydroCHLOROthiazide (HYZAAR) 100-25 mg per tablet TAKE 1 TABLET EVERY DAY 4/19/19  Yes Marie Dalton MD   simvastatin (ZOCOR) 40 mg tablet TAKE 1 TABLET EVERY NIGHT 3/12/19  Yes Marie Dalton MD   levothyroxine (SYNTHROID) 112 mcg tablet TAKE 1 TABLET EVERY DAY BEFORE BREAKFAST 1/8/19  Yes Marie Dalton MD   calcium-vitamin D (CALCIUM 500 WITH VITAMIN D) 500 mg(1,250mg) -200 unit per tablet Take 1 Tab by mouth two (2) times daily (with meals).    Yes Provider, Historical   MULTIVITAMINS (MULTI-VITAMIN PO) Take 1 Tab by mouth daily. Takes one po daily. Yes Provider, Historical       Allergies   Allergen Reactions    Benazepril Cough    Sulfa (Sulfonamide Antibiotics) Hives           OBJECTIVE    Visit Vitals  /78 (BP 1 Location: Right arm, BP Patient Position: Sitting)   Pulse 64   Temp 98.4 °F (36.9 °C) (Oral)   Resp 18   Ht 5' 2.75\" (1.594 m)   Wt 156 lb 9.6 oz (71 kg)   SpO2 97%   BMI 27.96 kg/m²       Physical Exam   Constitutional: She is oriented to person, place, and time and well-developed, well-nourished, and in no distress. No distress. Eyes: Pupils are equal, round, and reactive to light. Conjunctivae and EOM are normal.   Neck: Normal range of motion. Neck supple. No thyromegaly present. Cardiovascular: Normal rate, regular rhythm and normal heart sounds. Exam reveals no gallop and no friction rub. No murmur heard. Pulmonary/Chest: Effort normal and breath sounds normal. No respiratory distress. She has no wheezes. Neurological: She is alert and oriented to person, place, and time. Skin: Skin is warm and dry. No rash noted. She is not diaphoretic. Psychiatric: Mood, memory, affect and judgment normal.   Nursing note and vitals reviewed. ASSESSMENT  Rue Saint-Antoine Aletha Up is a 78 y.o. female who presents today for:    1. Medicare annual wellness visit, subsequent  See other note    2. HTN, goal below 150/90  Stable, continue current treatment. I have reviewed/discussed the above normal BMI with the patient. I have recommended the following interventions: dietary management education, guidance, and counseling, encourage exercise, monitor weight and prescribed dietary intake. 3. Pre-diabetes  - HEMOGLOBIN A1C WITH EAG    4. Hyperlipidemia, unspecified hyperlipidemia type  - METABOLIC PANEL, COMPREHENSIVE  - LIPID PANEL    5. Acquired hypothyroidism  - TSH AND FREE T4    6.  Cold intolerance  - CBC WITH AUTOMATED DIFF    7. Screening for osteoporosis  - DEXA BONE DENSITY STUDY AXIAL; Future    8. Postmenopausal  - DEXA BONE DENSITY STUDY AXIAL; Future    9. Encounter for immunization  - varicella-zoster recombinant, PF, (SHINGRIX, PF,) 50 mcg/0.5 mL susr injection; 0.5 mL by IntraMUSCular route once for 1 dose. Please fax vaccination documentation to [facility] at [phone], Attn: Dr. Abelardo Martinez: 0.5 mL; Refill: 0  - INFLUENZA VACCINE INACTIVATED (IIV), SUBUNIT, ADJUVANTED, IM  - ADMIN INFLUENZA VIRUS VAC       There are no discontinued medications. Follow-up and Dispositions    · Return in about 6 months (around 3/12/2020) for HTN follow up. Medication risks/benefits/costs/interactions/alternatives discussed with patient. Advised patient to call back or return to office if symptoms worsen/change/persist. If patient cannot reach us or should anything more severe/urgent arise he/she should proceed directly to the nearest emergency department. Discussed expected course/resolution/complications of diagnosis in detail with patient. Patient given a written after visit summary which includes his/her diagnoses, current medications and vitals. Patient expressed understanding with the diagnosis and plan. Aivi N. Marylee Loffler M.D.

## 2019-09-12 NOTE — PATIENT INSTRUCTIONS
Medicare Wellness Visit, Female     The best way to live healthy is to have a lifestyle where you eat a well-balanced diet, exercise regularly, limit alcohol use, and quit all forms of tobacco/nicotine, if applicable. Regular preventive services are another way to keep healthy. Preventive services (vaccines, screening tests, monitoring & exams) can help personalize your care plan, which helps you manage your own care. Screening tests can find health problems at the earliest stages, when they are easiest to treat. [facility] follows the current, evidence-based guidelines published by the [facility] Jonatan ([facility]) when recommending preventive services for our patients. Because we follow these guidelines, sometimes recommendations change over time as research supports it. (For example, mammograms used to be recommended annually. Even though Medicare will still pay for an annual mammogram, the newer guidelines recommend a mammogram every two years for women of average risk.)  Of course, you and your doctor may decide to screen more often for some diseases, based on your risk and your health status. Preventive services for you include:  - Medicare offers their members a free annual wellness visit, which is time for you and your primary care provider to discuss and plan for your preventive service needs. Take advantage of this benefit every year!  -All adults over the age of 72 should receive the recommended pneumonia vaccines. Current USPSTF guidelines recommend a series of two vaccines for the best pneumonia protection.   -All adults should have a flu vaccine yearly and a tetanus vaccine every 10 years. All adults age 61 and older should receive a shingles vaccine once in their lifetime.    -A bone mass density test is recommended when a woman turns 65 to screen for osteoporosis. This test is only recommended one time, as a screening.  Some providers will use this same test as a disease monitoring tool if you already have osteoporosis. -All adults age 38-68 who are overweight should have a diabetes screening test once every three years.   -Other screening tests and preventive services for persons with diabetes include: an eye exam to screen for diabetic retinopathy, a kidney function test, a foot exam, and stricter control over your cholesterol.   -Cardiovascular screening for adults with routine risk involves an electrocardiogram (ECG) at intervals determined by your doctor.   -Colorectal cancer screenings should be done for adults age 54-65 with no increased risk factors for colorectal cancer. There are a number of acceptable methods of screening for this type of cancer. Each test has its own benefits and drawbacks. Discuss with your doctor what is most appropriate for you during your annual wellness visit. The different tests include: colonoscopy (considered the best screening method), a fecal occult blood test, a fecal DNA test, and sigmoidoscopy. -Breast cancer screenings are recommended every other year for women of normal risk, age 54-69.  -Cervical cancer screenings for women over age 72 are only recommended with certain risk factors.   -All adults born between [facility] should be screened once for Hepatitis C.      Here is a list of your current Health Maintenance items (your personalized list of preventive services) with a due date:  Health Maintenance Due   Topic Date Due    Shingles Vaccine (1 of 2) 05/10/1990    Glaucoma Screening   04/05/2018    Pneumococcal Vaccine (2 of 2 - PCV13) 11/14/2018    Annual Well Visit  05/08/2019    Flu Vaccine  08/01/2019

## 2019-09-13 RX ORDER — SIMVASTATIN 40 MG/1
TABLET, FILM COATED ORAL
Qty: 90 TAB | Refills: 1 | Status: SHIPPED | OUTPATIENT
Start: 2019-09-13 | End: 2020-04-16

## 2019-09-13 RX ORDER — LOSARTAN POTASSIUM AND HYDROCHLOROTHIAZIDE 25; 100 MG/1; MG/1
TABLET ORAL
Qty: 90 TAB | Refills: 1 | Status: SHIPPED | OUTPATIENT
Start: 2019-09-13 | End: 2020-04-16

## 2019-09-17 ENCOUNTER — HOSPITAL ENCOUNTER (OUTPATIENT)
Dept: LAB | Age: 79
Discharge: HOME | End: 2019-09-17
Payer: MEDICARE

## 2019-09-17 PROCEDURE — 80061 LIPID PANEL: CPT

## 2019-09-17 PROCEDURE — 80053 COMPREHEN METABOLIC PANEL: CPT

## 2019-09-17 PROCEDURE — 84443 ASSAY THYROID STIM HORMONE: CPT

## 2019-09-17 PROCEDURE — 36415 COLL VENOUS BLD VENIPUNCTURE: CPT

## 2019-09-17 PROCEDURE — 85025 COMPLETE CBC W/AUTO DIFF WBC: CPT

## 2019-09-17 PROCEDURE — 83036 HEMOGLOBIN GLYCOSYLATED A1C: CPT

## 2019-09-18 LAB
ALBUMIN SERPL-MCNC: 4.1 G/DL (ref 3.5–4.8)
ALBUMIN/GLOB SERPL: 1.8 {RATIO} (ref 1.2–2.2)
ALP SERPL-CCNC: 77 IU/L (ref 39–117)
ALT SERPL-CCNC: 13 IU/L (ref 0–32)
AST SERPL-CCNC: 18 IU/L (ref 0–40)
BASOPHILS # BLD AUTO: 0.1 X10E3/UL (ref 0–0.2)
BASOPHILS NFR BLD AUTO: 1 %
BILIRUB SERPL-MCNC: 0.5 MG/DL (ref 0–1.2)
BUN SERPL-MCNC: 13 MG/DL (ref 8–27)
BUN/CREAT SERPL: 15 (ref 12–28)
CALCIUM SERPL-MCNC: 9.9 MG/DL (ref 8.7–10.3)
CHLORIDE SERPL-SCNC: 95 MMOL/L (ref 96–106)
CHOLEST SERPL-MCNC: 141 MG/DL (ref 100–199)
CO2 SERPL-SCNC: 27 MMOL/L (ref 20–29)
CREAT SERPL-MCNC: 0.84 MG/DL (ref 0.57–1)
EOSINOPHIL # BLD AUTO: 0.1 X10E3/UL (ref 0–0.4)
EOSINOPHIL NFR BLD AUTO: 1 %
ERYTHROCYTE [DISTWIDTH] IN BLOOD BY AUTOMATED COUNT: 12.3 % (ref 12.3–15.4)
EST. AVERAGE GLUCOSE BLD GHB EST-MCNC: 105 MG/DL
GLOBULIN SER CALC-MCNC: 2.3 G/DL (ref 1.5–4.5)
GLUCOSE SERPL-MCNC: 84 MG/DL (ref 65–99)
HBA1C MFR BLD: 5.3 % (ref 4.8–5.6)
HCT VFR BLD AUTO: 38.3 % (ref 34–46.6)
HDLC SERPL-MCNC: 61 MG/DL
HGB BLD-MCNC: 12.4 G/DL (ref 11.1–15.9)
IMM GRANULOCYTES # BLD AUTO: 0 X10E3/UL (ref 0–0.1)
IMM GRANULOCYTES NFR BLD AUTO: 1 %
INTERPRETATION, 910389: NORMAL
LDLC SERPL CALC-MCNC: 64 MG/DL (ref 0–99)
LYMPHOCYTES # BLD AUTO: 1.2 X10E3/UL (ref 0.7–3.1)
LYMPHOCYTES NFR BLD AUTO: 14 %
MCH RBC QN AUTO: 28.2 PG (ref 26.6–33)
MCHC RBC AUTO-ENTMCNC: 32.4 G/DL (ref 31.5–35.7)
MCV RBC AUTO: 87 FL (ref 79–97)
MONOCYTES # BLD AUTO: 0.9 X10E3/UL (ref 0.1–0.9)
MONOCYTES NFR BLD AUTO: 10 %
NEUTROPHILS # BLD AUTO: 6.4 X10E3/UL (ref 1.4–7)
NEUTROPHILS NFR BLD AUTO: 73 %
PLATELET # BLD AUTO: 301 X10E3/UL (ref 150–450)
POTASSIUM SERPL-SCNC: 3.8 MMOL/L (ref 3.5–5.2)
PROT SERPL-MCNC: 6.4 G/DL (ref 6–8.5)
RBC # BLD AUTO: 4.39 X10E6/UL (ref 3.77–5.28)
SODIUM SERPL-SCNC: 137 MMOL/L (ref 134–144)
T4 FREE SERPL-MCNC: 1.29 NG/DL (ref 0.82–1.77)
TRIGL SERPL-MCNC: 80 MG/DL (ref 0–149)
TSH SERPL DL<=0.005 MIU/L-ACNC: 3.2 UIU/ML (ref 0.45–4.5)
VLDLC SERPL CALC-MCNC: 16 MG/DL (ref 5–40)
WBC # BLD AUTO: 8.7 X10E3/UL (ref 3.4–10.8)

## 2019-09-19 ENCOUNTER — OFFICE VISIT (OUTPATIENT)
Dept: FAMILY MEDICINE CLINIC | Age: 79
End: 2019-09-19

## 2019-09-19 VITALS
SYSTOLIC BLOOD PRESSURE: 118 MMHG | HEIGHT: 63 IN | RESPIRATION RATE: 18 BRPM | HEART RATE: 68 BPM | OXYGEN SATURATION: 98 % | DIASTOLIC BLOOD PRESSURE: 68 MMHG | BODY MASS INDEX: 27.96 KG/M2 | TEMPERATURE: 98.2 F

## 2019-09-19 DIAGNOSIS — R10.9 ABDOMINAL PAIN, UNSPECIFIED ABDOMINAL LOCATION: Primary | ICD-10-CM

## 2019-09-19 RX ORDER — CIPROFLOXACIN 500 MG/1
500 TABLET ORAL 2 TIMES DAILY
Qty: 20 TAB | Refills: 0 | Status: CANCELLED | OUTPATIENT
Start: 2019-09-19 | End: 2019-09-29

## 2019-09-19 RX ORDER — METRONIDAZOLE 500 MG/1
500 TABLET ORAL 3 TIMES DAILY
Qty: 30 TAB | Refills: 0 | Status: CANCELLED | OUTPATIENT
Start: 2019-09-19 | End: 2019-09-29

## 2019-09-19 NOTE — PROGRESS NOTES
Patient Name: Praveen Hope   MRN: [id number]    Michele Gambino is a 78 y.o. female who presents with the following:     Began to have a severe lower stomach ache on  Tuesday night. Woke up the next morning and had frequent stooling but denies diarrhea. Mostly clear mucus without blood. She has had episodes of diverticulitis before as well as IBS but she states the pain feels a little differently. Feels a little better today. Has never had a UTI. Denies fever, nausea, vomiting, dysuria. Recent CBC wnl. S/p appendectomy. Review of Systems   Constitutional: Negative for fever, malaise/fatigue and weight loss. Respiratory: Negative for cough, hemoptysis, shortness of breath and wheezing. Cardiovascular: Negative for chest pain, palpitations, leg swelling and PND. Gastrointestinal: Positive for abdominal pain and diarrhea. Negative for blood in stool, constipation, melena, nausea and vomiting. Genitourinary: Negative for dysuria, frequency and urgency. The patient's medications, allergies, past medical history, surgical history, family history and social history were reviewed and updated where appropriate. Prior to Admission medications    Medication Sig Start Date End Date Taking? Authorizing Provider   simvastatin (ZOCOR) 40 mg tablet TAKE 1 TABLET EVERY NIGHT 9/13/19  Yes Wilver Madrigal MD   losartan-hydroCHLOROthiazide (HYZAAR) 100-25 mg per tablet TAKE 1 TABLET EVERY DAY 9/13/19  Yes Wilver Madrigal MD   levothyroxine (SYNTHROID) 112 mcg tablet TAKE 1 TABLET EVERY DAY BEFORE BREAKFAST 1/8/19  Yes Wilver Madrigal MD   calcium-vitamin D (CALCIUM 500 WITH VITAMIN D) 500 mg(1,250mg) -200 unit per tablet Take 1 Tab by mouth two (2) times daily (with meals). Yes Provider, Historical   MULTIVITAMINS (MULTI-VITAMIN PO) Take 1 Tab by mouth daily. Takes one po daily.    Yes Provider, Historical       Allergies   Allergen Reactions    Benazepril Cough    Sulfa (Sulfonamide Antibiotics) Hives         OBJECTIVE    Visit Vitals  /68 (BP 1 Location: Left arm, BP Patient Position: Sitting)   Pulse 68   Temp 98.2 °F (36.8 °C) (Oral)   Resp 18   Ht 5' 2.75\" (1.594 m)   SpO2 98%   BMI 27.96 kg/m²       Physical Exam   Constitutional: She is oriented to person, place, and time and well-developed, well-nourished, and in no distress. No distress. HENT:   Head: Normocephalic. Right Ear: External ear normal.   Left Ear: External ear normal.   Eyes: Pupils are equal, round, and reactive to light. Conjunctivae and EOM are normal.   Cardiovascular: Normal rate, regular rhythm, normal heart sounds and intact distal pulses. Exam reveals no gallop and no friction rub. No murmur heard. Pulmonary/Chest: Effort normal and breath sounds normal. No respiratory distress. She has no wheezes. She has no rales. Abdominal: Soft. Bowel sounds are normal. She exhibits no distension. There is tenderness (mild TTP along RLQ). There is no rebound and no guarding. Neurological: She is alert and oriented to person, place, and time. Skin: Skin is warm and dry. She is not diaphoretic. Psychiatric: Memory, affect and judgment normal.         ASSESSMENT AND PLAN  Anna Milligan is a 78 y.o. female who presents today for:    1. Abdominal pain, unspecified abdominal location  DDX includes viral gastroenteritis vs IBS vs diverticulitis vs UTI. Recommend conservative tx with BRAT diet, probiotic, and hydration. Will r/o UTI. If symptoms worsen, pt to go to ER. Exam reassuring and VSS today so hold off on empiric abx of diverticulitis for now. Reviewed signs and symptoms that would indicate a worsening medical condition which would require immediate evaluation and treatment; patient expressed understanding of plan. - CULTURE, URINE       There are no discontinued medications. Medication risks/benefits/costs/interactions/alternatives discussed with patient.   Advised patient to call back or return to office if symptoms worsen/change/persist. If patient cannot reach us or should anything more severe/urgent arise he/she should proceed directly to the nearest emergency department. Discussed expected course/resolution/complications of diagnosis in detail with patient. Patient given a written after visit summary which includes his/her diagnoses, current medications and vitals. Patient expressed understanding with the diagnosis and plan. Carine Livingston M.D.

## 2019-09-19 NOTE — PATIENT INSTRUCTIONS

## 2019-09-20 ENCOUNTER — HOSPITAL ENCOUNTER (OUTPATIENT)
Dept: LAB | Age: 79
Discharge: HOME | End: 2019-09-20
Payer: MEDICARE

## 2019-09-20 PROCEDURE — 87086 URINE CULTURE/COLONY COUNT: CPT

## 2019-09-21 LAB — BACTERIA UR CULT: NORMAL

## 2019-09-25 ENCOUNTER — HOSPITAL ENCOUNTER (OUTPATIENT)
Dept: MAMMOGRAPHY | Age: 79
Discharge: HOME | End: 2019-09-25
Attending: FAMILY MEDICINE
Payer: MEDICARE

## 2019-09-25 DIAGNOSIS — Z13.820 SCREENING FOR OSTEOPOROSIS: ICD-10-CM

## 2019-09-25 DIAGNOSIS — Z12.39 BREAST SCREENING: ICD-10-CM

## 2019-09-25 DIAGNOSIS — Z78.0 POSTMENOPAUSAL: ICD-10-CM

## 2019-09-25 PROCEDURE — 77063 BREAST TOMOSYNTHESIS BI: CPT

## 2019-09-25 PROCEDURE — 77080 DXA BONE DENSITY AXIAL: CPT

## 2019-09-26 NOTE — PROGRESS NOTES
Spoke to patient verified , informed her of DEXA scan results. Patient states she will follow up wit rheumatology.

## 2019-10-01 ENCOUNTER — OFFICE VISIT (OUTPATIENT)
Dept: RHEUMATOLOGY | Age: 79
End: 2019-10-01

## 2019-10-01 VITALS
RESPIRATION RATE: 18 BRPM | BODY MASS INDEX: 28.52 KG/M2 | SYSTOLIC BLOOD PRESSURE: 167 MMHG | WEIGHT: 155 LBS | TEMPERATURE: 97.7 F | HEIGHT: 62 IN | HEART RATE: 74 BPM | DIASTOLIC BLOOD PRESSURE: 89 MMHG

## 2019-10-01 DIAGNOSIS — M19.041 PRIMARY OSTEOARTHRITIS OF BOTH HANDS: Primary | ICD-10-CM

## 2019-10-01 DIAGNOSIS — M81.0 AGE-RELATED OSTEOPOROSIS WITHOUT CURRENT PATHOLOGICAL FRACTURE: ICD-10-CM

## 2019-10-01 DIAGNOSIS — M19.042 PRIMARY OSTEOARTHRITIS OF BOTH HANDS: Primary | ICD-10-CM

## 2019-10-01 RX ORDER — ACETAMINOPHEN 500 MG/1
TABLET, FILM COATED ORAL
COMMUNITY
End: 2022-03-03

## 2019-10-01 NOTE — PATIENT INSTRUCTIONS
HAND OSTEOARTHRITIS. Osteoarthritis is also known as \"wear and tear arthritis,\" mechanical arthritis, or non-inflammatory arthritis. There are hereditary and vocational components and post-traumatic joint injuries may also predispose to it. It is not Rheumatoid Arthritis, however, patients with Rheumatoid Arthritis may also have osteoarthritis. I recommend:    (1) Non-medicinal modalities such as keeping hands warm, avoid activities that case pain, warm water soaks    (2) Oral medications, such as acetaminophen as first line (3000 mg maximum per day) and oral NSAIDs, such as naproxen (Aleve) two tablets of 220 mg twice daily with food, OR ibuprofen (Advil) two tablets of 200 mg three times daily, with food as second line therapy. Naproxen (Aleve) is associated with lower cardiovascular risk than other NSAIDs (Hailee FITZGERALD, Clinton SHEA. Clinical Pharmacology and Cardiovascular Safety of Naproxen. Am J Cardiovasc Drugs. 2016 Nov 8). NSAIDs should not be used in patients on blood thinners, chronic kidney disease, high risk coronary artery disease, and inflammatory bowel disease (ulcerative colitis or Crohn's disease)    (3) Topical medications, such as Capsaicin (which may cause a burning sensation) or NSAIDs, such as diclofenac gel    The combination of acetaminophen and NSAIDs are safe together. Please do not combine NSAIDs together, such as Aleve, Advil and Mobic (meloxicam)    Please do hand ball squeezing and holding until your hand fatigues and then alternate to the next hand.  Do that 10 times twice daily

## 2019-10-01 NOTE — LETTER
10/1/19 Patient: Karon Prakash YOB: 1940 Date of Visit: 10/1/2019 Irene Delacruz MD 
[address] [facility] [phone] VIA In Basket Dear Irene Delacruz MD, Thank you for referring Ms. Marion Perea to [address] [place] [address] for evaluation. My notes for this consultation are attached. If you have questions, please do not hesitate to call me. I look forward to following your patient along with you.  
 
 
Sincerely, 
 
Gretel Pope MD

## 2019-10-01 NOTE — PROGRESS NOTES
REASON FOR VISIT    This is the initial evaluation for Ms. Lennox Putt a 78 y.o.  female for question of an inflammatory arthritis. The patient is referred to the [facility] at the request of Dr. Donald Early. HISTORY OF PRESENT ILLNESS      I have reviewed and summarized old records from [facility]    In 1/04/2012, Colonoscopy showed Rectum: normal. Sigmoid: severe diverticulosis. Descending Colon: moderate diverticulosis. Transverse Colon: normal. Ascending Colon: normal. Cecum: focal erosive colitis - nonspecific. Terminal Ileum: normal. Cecum, biopsy: Moderate chronic active colitis. The cecum biopsy shows nonspecific chronic injury changes. Inflammatory bowel disease, particularly Crohn's disease, cannot be excluded. Random colon, biopsy: Unremarkable colonic mucosa    In 3/18/2019, she was referred to rheumatology for bilateral hand arthritis. In 9/17/2019, labs showed WBC 8.7, lymphocytes 1.2, Hct 38.3%, platelets 832,401, creatinine 0.84 mg/dL, eGFR 66, albumin 4.1 g/dL, ALK 77 U/L, ALT 13 U/L, AST 18 U/L, TSH 3.200, FT4 1.29    Today, she reports intermittent pain in her hands but today she feels well. Her mother had hand arthritis. At times, they hurt but are self-limited. She was a  but did not type. She has osteoporosis and has not started treatment. 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 She has chronic back pain from spinal stenosis and used to have injections which did not help. Therapy History includes:  Current DMARD therapy includes: none  Prior DMARD therapy includes: none  The following DMARDs have been ineffective: none  The following DMARDs were stopped because of side effects: none    REVIEW OF SYSTEMS    A 15 point review of systems was performed and summarized below. The questionnaire was reviewed with the patient and scanned into the patient's medical record.     General: endorses weakness, denies recent weight gain, recent weight loss, fatigue,  fever, drenching night sweats  Musculoskeletal: denies joint pain, joint swelling, morning stiffness, muscle pain  Ears: denies ringing in ears, hearing loss, deafness  Eyes: endorses dryness, denies pain, light sensitive, redness, blindness, double vision, blurred vision, excess tearing, foreign body sensation  Mouth: denies sore tongue, oral ulcers, loss of taste, dryness, increased dental caries  Nose: denies nosebleeds, nasal ulcers  Throat: denies food stuck when swallowing, difficulty with swallowing, hoarseness, pain in jaw while chewing  Neck: denies swollen glands, tender glands  Cardiopulmonary: denies pain in chest with deep breaths, pain in chest when lying down, murmurs, sudden changes in heart beat, wheezing, dry cough, productive cough, shortness of breath at rest, shortness of breath on exertion, coughing of blood  Gastrointestinal: denies nausea, heartburn, stomach pain relieved by food, chronic constipation, chronic diarrhea, blood in stools, black stools  Genitourinary: denies vaginal dryness, pain or burning on urination, blood in urine, cloudy urine, vaginal ulcers   Hematologic: denies anemia, bleeding tendency, blood clots, bleeding gums  Skin: denies easy bruising, hair loss, rash, rash worsened after sun exposure, hives/urticaria, skin thickening, skin tightness, nodules/bumps, color changes of hands or feet in the cold (Raynaud's)  Neurologic: denies numbness or tingling in hands, numbness or tingling in feet, muscle weakness  Psychiatric: denies depression, excessive worries, PTSD, Bipolar  Sleep: endorses difficulty staying asleep, denies poor sleep (7 hours), denies snoring, apnea, daytime somnolence, difficulty falling asleep    PAST MEDICAL HISTORY    She has a past medical history of DDD (degenerative disc disease), lumbar (3/15/2016), Diverticulosis of colon (1/12), DJD (degenerative joint disease), Esophageal stricture (2/03), [facility] History of basal cell cancer, HTN, goal below 150/90, Hypercholesterolemia, IBS (irritable bowel syndrome) (2016), Menopause, Osteoporosis, Overactive bladder, 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 She also has no past medical history of Fracture. FAMILY HISTORY    Her family history includes Cancer in her son; Diabetes in her father and son; Heart Disease in her father, mother, and son. SOCIAL HISTORY    She reports that she has quit smoking. Her smoking use included cigarettes. She has a 25.00 pack-year smoking history. She has never used smokeless tobacco. She reports that she drinks alcohol. She reports that she does not use drugs. GYNECOLOGIC HISTORY     2, Para 1, Living 1, Miscarriage 0  She denies severe pre-eclampsia, eclampsia or placental insufficiency    HEALTH MAINTENANCE    Immunizations  Immunization History   Administered Date(s) Administered    Influenza High Dose Vaccine PF 10/12/2016, 2018    Influenza Vaccine 10/16/2013    Influenza Vaccine (Tri) Adjuvanted 2019    Influenza Vaccine Split 2011, 2012, 10/09/2012    Pneumococcal Polysaccharide (PPSV-23) 2017    Pneumococcal Vaccine (Pcv) 10/19/2007    Tdap 2015    Varicella Virus Vaccine Live 2009     Age Appropriate Cancer Screening    Colonoscopy: 2012  PAP Smear:   Mammogram: 2019    MEDICATIONS    Current Outpatient Medications   Medication Sig Dispense Refill    acetaminophen (TYLENOL EXTRA STRENGTH) 500 mg tablet Take  by mouth every six (6) hours as needed for Pain.  Lactobacillus acidophilus (PROBIOTIC PO) Take  by mouth daily.       simvastatin (ZOCOR) 40 mg tablet TAKE 1 TABLET EVERY NIGHT 90 Tab 1    losartan-hydroCHLOROthiazide (HYZAAR) 100-25 mg per tablet TAKE 1 TABLET EVERY DAY 90 Tab 1    levothyroxine (SYNTHROID) 112 mcg tablet TAKE 1 TABLET EVERY DAY BEFORE BREAKFAST 90 Tab 3    calcium-vitamin D (CALCIUM 500 WITH VITAMIN D) 500 mg(1,250mg) -200 unit per tablet Take 1 Tab by mouth two (2) times daily (with meals).  MULTIVITAMINS (MULTI-VITAMIN PO) Take 1 Tab by mouth daily. Takes one po daily. ALLERGIES    Allergies   Allergen Reactions    Benazepril Cough    Sulfa (Sulfonamide Antibiotics) Hives       PHYSICAL EXAMINATION    Visit Vitals  /89   Pulse 74   Temp 97.7 °F (36.5 °C)   Resp 18   Ht 5' 2\" (1.575 m)   Wt 155 lb (70.3 kg)   BMI 28.35 kg/m²     Body mass index is 28.35 kg/m². General: Patient is alert, oriented x 3, not in acute distress    HEENT:   Conjunctiva are not injected and appear moist, oral mucous membranes are moist, there are no ulcers present, there is no alopecia, neck is supple, there is no lymphadenopathy. Salivary glands are normal    Cardiovascular:  Heart is regular rate and rhythm, no murmurs. Chest:  Lungs are clear to auscultation bilaterally. Extremities:  Free of clubbing, cyanosis, edema, extremities well perfused. Neurological exam:  Muscle strength is full in upper and lower extremities. Skin exam:  There are no rashes, no tophi, no psoriasis, no active Raynaud's, no livedo reticularis, no periungual erythema. Musculoskeletal exam:  A comprehensive musculoskeletal exam was performed for all joints of each upper and lower extremity and assessed for swelling, tenderness and range of motion. Pertinent results are documented as below:    Bilateral Agustín and Heberden nodes. Bilateral squaring of CMC    DATA REVIEW    Prior medical records were reviewed and are summarized as below:    Laboratory data: summarized in the HPI    Imaging: summarized in the HPI. ASSESSMENT AND PLAN    1) Bilateral Hand Osteoarthritis. The patient has osteoarthritis, which is also known as \"wear and tear arthritis,\" non-inflammatory arthritis or mechanical arthritis. There are hereditary, vocational and posttraumatic joint injuries predisposing factors.  I recommend non-pharmacologic modalities such as keeping hands warm, avoid activities that case pain, warm water soaks, in addition to (2) pharmacologic modalities such as acetaminophen as first line, oral and topical NSAIDs as second line. Naproxen is a low LONGO-2 selectivity inhibitor that poses lower cardiovascular risk than other NSAIDs (Hailee FITZGERALD, Clinton SHEA. Clinical Pharmacology and Cardiovascular Safety of Naproxen. Am J Cardiovasc Drugs. 2016 Nov 8). NSAIDs should not be used in patients on blood thinners, chronic kidney disease, high risk coronary artery disease, and inflammatory bowel disease (ulcerative colitis or Crohn's disease) I recommended ball squeezing and holding until hand fatigue then alternating to other hand exercises 10 times twice daily. 2) Age-Related Osteoporosis. I defer to her PCP or endocrinologist.    The patient voiced understanding of the aforementioned assessment and plan. Summary of plan was provided in the After Visit Summary patient instructions. I also provided education about MyChart setup and utility. TODAY'S ORDERS    None    No future appointments.     Donaldo Weiss MD, [address] [facility]    Adult Rheumatology   Rheumatology Ultrasound Certified  [address]  [facility], [facility], [address] [place] [address]   Phone [phone]  Fax [phone]

## 2020-01-06 ENCOUNTER — OFFICE VISIT (OUTPATIENT)
Dept: DERMATOLOGY | Facility: AMBULATORY SURGERY CENTER | Age: 80
End: 2020-01-06

## 2020-01-06 VITALS
HEART RATE: 74 BPM | WEIGHT: 155 LBS | HEIGHT: 62 IN | DIASTOLIC BLOOD PRESSURE: 84 MMHG | BODY MASS INDEX: 28.52 KG/M2 | OXYGEN SATURATION: 99 % | TEMPERATURE: 98.5 F | SYSTOLIC BLOOD PRESSURE: 128 MMHG | RESPIRATION RATE: 14 BRPM

## 2020-01-06 DIAGNOSIS — C44.722 SCC (SQUAMOUS CELL CARCINOMA), LEG, RIGHT: Primary | ICD-10-CM

## 2020-01-06 RX ORDER — OXYCODONE AND ACETAMINOPHEN 5; 325 MG/1; MG/1
1 TABLET ORAL
Qty: 10 TAB | Refills: 0 | Status: SHIPPED | OUTPATIENT
Start: 2020-01-06 | End: 2020-01-09

## 2020-01-06 RX ORDER — CEPHALEXIN 500 MG/1
500 CAPSULE ORAL 3 TIMES DAILY
Qty: 21 CAP | Refills: 0 | Status: SHIPPED | OUTPATIENT
Start: 2020-01-06 | End: 2020-01-13

## 2020-01-06 NOTE — PROGRESS NOTES
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 year old female referred by  Orquidea Dias MD for Mohs surgery to treat the following lesion:  Lesion Info  Location: right shin  Size: 2.4 x 1.5 cm  Type: squamous  Duration: 3 months  Path Lab: [facility]  Path #: [id number]  Prior Treatment: none     Symptoms of the lesion include increase in size. ROS:  [place] is feeling well and in their usual state of health today. She is not in pain. She does not have any other skin concerns. Exam:  [place] is an awake, alert, oriented, well-appearing female in no distress. The right lower extremity was examined. Findings are:  On the right shin there is a pink plaque with scaly border. 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 The diagnosis was reviewed. The Mohs surgery procedure was reviewed. Indications, risks, and options were discussed with Ms. Sarkar preoperatively. Risks including, but not limited to: pain, bleeding, infection, tumor recurrence, scarring and damage to motor and/or sensory nerves, were discussed. Ms. Real Gaviria chose Mohs surgery. Ms. Real Gaviria was an acceptable surgery candidate. I performed Mohs surgery using standard technique after verbal and written consent were obtained. The lesion was identified and confirmed with the patient and photograph, if available. The surgical site was marked with gentian violet, prepped, draped and anesthetized in standard fashion. The tumor was debulked by curettage and orientation hashes were placed. The tumor and any associated scar was excised using beveled incision. Hemostasis was achieved, the site was bandaged, and the tissue was transported to the Mohs lab. While maintaining anatomic orientation the tissue was divided, if needed, and marked with colored inks that were noted on the corresponding Mohs map.   The tissue was prepared by Mohs en-face technique for fresh frozen section analysis. The resulting slides were examined for residual tumor, scar and other concerns, all of which were marked on the corresponding Mohs map, if present. The Mohs map was used to guide subsequent stages of surgery, if necessary, and the above process was repeated until a tumor-free plane was achieved. Once the tumor was cleared the map was marked as such and signed. Dr. Andres May acted as surgeon and pathologist for the entire case, performing all stages of the surgical excision as well as examination and interpretation of the histologic slides. See table below for details regarding the surgical case. 1 stage(s) were required to reach a tumor-free plane, resulting in a 2.8 x 2.0 cm defect extending to the subcutaneous. There were not complications. [place] will follow up as needed in the postoperative period. Regular skin examinations will be with  Grace Flores MD.    The wound management options of second intent healing, layered closure, local flap, and/or full thickness skin graft were discussed. Ms. Fay Kohler understands the aims, risks, alternatives, and possible complications and elects to proceed with an intermediate layered closure. Wound margins were debeveled, edges widely undermined in the subcutaneous plane, and standing cones corrected at both poles followed by layered intermediate closure. The wound was closed with buried 3-0 vicryl suture to reduce tension on the skin edges, and skin edges were approximated with 3-0 ethilon suture. The final closure length was 5.1 cm. The wound was bandaged with Petrolatum ointment, Telfa, gauze and Coverroll. Wound care instructions (written and/or verbal) and a follow up appointment were given to Ms. Fay Kohler before discharge. Ms. Fay Kohler was discharged in good condition.             right shin  Mohs Lesion Operative Report  Date: 01/06/20  Room: Procedure room 3  Indications: Site, Size  Pre-op BP: 126/84  Pre-op pulse: 79  1st Assistant: Mary Richard LPN  Stage #: 1  Stage 1 Sections: 1  Stage 1 # Pos: 0  Perineural Involvement: No  Lymphadenopathy: No  Defect Size: 2.8 x 2.0 cm  Depth: subcutaneous  Wound Mgt: complex  Suture: Surface, Buried  Buried details: 3.0 poly  Surface Details: 3.0 ethilon  Undermining: SubQ  Closure Length: 5.1 cm  Estimated Blood Loss: 0.2mL  Hemostasis: Suture, Electrosurgery  Anesthesia: 1% Lidocaine w/1:100,000 epi  1% Lidocaine: 12 cc  Complications: none  Dressing: pressure, telfa, vaseline, coband  Post-op BP: 128/84  Post-op Pulse: 74  Pos-op Meds: Keflex 500 mg, percocet 5-325mg  W/C Instructions: Written, Verbal  Follow-up: follow up in two weeks for suture removal      [facility]   OFFICE PROCEDURE PROGRESS NOTE   Chart reviewed for the following:   ILucy MD have reviewed the History, Physical and updated the Allergic reactions for [address] [place] [address]. TIME OUT performed immediately prior to start of procedure:   Toeny Apodaca MD, have performed the following reviews on [address] [place] [address]   prior to the start of the procedure:     * Patient was identified by name and date of birth   * Agreement on procedure being performed was verified   * Risks and Benefits explained to the patient   * Procedure site verified and marked as necessary   * Patient was positioned for comfort   * Consent was signed and verified     Time: 1 PM  Date of procedure: 1/6/2020  Procedure performed by:  Lucy Macedo MD  Provider assisted by: Mary Richard LPN  Patient assisted by: self   How tolerated by patient: tolerated the procedure well with no complications   Comments: none

## 2020-01-06 NOTE — LETTER
1/6/2020 4:45 PM 
 
Patient:  Farzaneh Byrnes YOB: 1940 Date of Visit: 1/6/2020 Dear Kyle Valenzuela MD 
[facility] [address] [place] [address] Dermatology [address] [place] [id number] VIA Facsimile: [phone] Thank you for referring Farzaneh Byrnes to me for evaluation/treatment. Below are the relevant portions of my assessment and plan of care. Ms. 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 1 stage(s) of Mohs surgery were required to achieve tumor free margins. I repaired the defect with a(n) primary closure. She tolerated the procedure well. Please see the attached procedure note(s) for additional details. Ms. Camille Louis will return to me for suture removal and/or wound checks at an appropriate interval and I will follow-up with her regarding any issues arising from or relating to this surgery. I will otherwise defer any additional dermatologic care back to you. If you have questions, please do not hesitate to call me. I look forward to following Ms. Camille Louis along with you. Sincerely, Sameera Pérez MD 
[phone] (cell)

## 2020-01-06 NOTE — PATIENT INSTRUCTIONS
WOUND CARE INSTRUCTIONS     1. Keep the dressing clean and dry and do not remove for 48 hours. 2. Then change the dressing once a day as follows:  a. Wash hands before and after each dressing change. b. Remove dressing and wash site gently with mild soap and water, rinse, and pat dry.  c. Apply liberal amounts of an ointment (Petroleum jelly or Aquaphor). d. Apply a non-stick (Telfa) dressing or Band-Aid to cover the wound. 3. Watch for:  BLEEDING: A small amount of drainage may occur. If bleeding occurs, elevate and rest the surgery site. Apply gauze and steady pressure for 20 minutes. If bleeding continues repeat pressure once more. If bleeding still does not stop, call this office. If after hours, call Dr. Cristin Doll at [phone]. INFECTION: Signs of infection include increased redness, pain, warmth, drainage of pus, and fever. If this occurs, please call this office. 4. Special Instructions (follow any that are checked):  · [x] You have stitches that DO need to be removed. · [] Avoid bending at the waist and heavy lifting for two days. · [] Sleep with your head elevated for the next two nights. · [x] Rest the surgery site and keep it elevated as much as possible for two days. · [] You may apply an ice-pack for 10-15 minutes every waking hour for the rest of the day. · [] Eat a soft diet and avoid hot food and hot drinks for the rest of the day. · [] Other instructions: Follow up as directed. Take Tylenol for pain as needed. Once the site is healed with no remaining bandages or open areas, protect your surgical site and scar from the sun, as this area will be more sensitive. Use a broad spectrum sunscreen SPF 30 or higher daily, and a chemical free product (one containing zinc oxide or titanium dioxide) is a good choice if the area is sensitive. You may begin to gently massage the surgical site in 3 weeks, rubbing in a circular motion along the scar.  This can help reduce swelling and thickness of a scar. If you have any questions or concerns, please call our office Monday through Friday at [phone]. If after hours, please call Dr. Kym Banuelos at [phone].

## 2020-01-17 ENCOUNTER — TELEPHONE (OUTPATIENT)
Dept: FAMILY MEDICINE CLINIC | Age: 80
End: 2020-01-17

## 2020-01-17 RX ORDER — LEVOTHYROXINE SODIUM 112 UG/1
TABLET ORAL
Qty: 90 TAB | Refills: 3 | Status: SHIPPED | OUTPATIENT
Start: 2020-01-17 | End: 2020-02-25

## 2020-01-17 NOTE — TELEPHONE ENCOUNTER
----- Message from Guillaume Pack sent at 1/17/2020 11:00 AM EST -----  Regarding: Dr. Jadon Plummer: [phone]  General Message/Vendor Calls    Caller's first and last name:      Reason for call: She stated  she wants to cancel the recent order she requested to the local [facility]. She stated she found a bottle with a few pills in it. Callback required yes/no and why: No, unless there are any questions or concerns.       Best contact number(s): [phone]      Guillaume Pack

## 2020-01-17 NOTE — TELEPHONE ENCOUNTER
Patient is requesting 90 day supply . She is running out. Chantal Maryyossi   Requested Prescriptions     Pending Prescriptions Disp Refills    levothyroxine (SYNTHROID) 112 mcg tablet 90 Tab 3     Las refill : 1/8/2019  LOV:  Thursday, September 19, 2019 09:15 AM  Last labs : 9/12/19      Component Value Flag Ref Range Units Status   TSH 3.200   0.450 - 4.500 uIU/mL Final   T4, Free 1.29   0.82 - 1.77 ng/dL Final

## 2020-01-20 ENCOUNTER — OFFICE VISIT (OUTPATIENT)
Dept: DERMATOLOGY | Facility: AMBULATORY SURGERY CENTER | Age: 80
End: 2020-01-20

## 2020-01-20 DIAGNOSIS — C44.722 SCC (SQUAMOUS CELL CARCINOMA), LEG, RIGHT: Primary | ICD-10-CM

## 2020-01-20 RX ORDER — DOXYCYCLINE 100 MG/1
100 CAPSULE ORAL 2 TIMES DAILY
Qty: 14 CAP | Refills: 0 | Status: SHIPPED | OUTPATIENT
Start: 2020-01-20 | End: 2020-01-21

## 2020-01-20 NOTE — PROGRESS NOTES
Wound check/suture removal:    Chief complaint: wound check. 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 Exam: The surgical site was examined. There is evidence of infection. There is erythema. There is edema. A/P:  Wound check. The surgical site is healing well. Additional care was reviewed including liberal application of Vaseline several times daily. Follow up will be in 1 month. Doxycycline 100 mg p.o. twice daily x7days was prescribed today.

## 2020-01-20 NOTE — TELEPHONE ENCOUNTER
----- Message from Sindy Onofre sent at 1/17/2020  5:19 PM EST -----  Regarding: Dr. Park Grover: [phone] include 234 beds at the [facility] (if not patient): [address] [id number] mail order  Relationship of caller (if not patient): pharmacist  Best contact number(s): [phone]  Name of medication and dosage if known: \"Levothyroxine 112 mcg\"  Is patient out of this medication (yes/no): n/a  Pharmacy name: [facility]  Pharmacy listed in chart? (yes/no): yes  Pharmacy phone number: [phone];  Fax [phone]  Date of last visit: 9-19-19  Details to clarify the request:

## 2020-01-21 RX ORDER — DOXYCYCLINE 100 MG/1
100 TABLET ORAL 2 TIMES DAILY
Qty: 14 TAB | Refills: 0 | Status: SHIPPED | OUTPATIENT
Start: 2020-01-21 | End: 2020-08-03 | Stop reason: ALTCHOICE

## 2020-01-27 ENCOUNTER — TELEPHONE (OUTPATIENT)
Dept: DERMATOLOGY | Facility: AMBULATORY SURGERY CENTER | Age: 80
End: 2020-01-27

## 2020-01-27 NOTE — TELEPHONE ENCOUNTER
Pt would like to speak with a nurse about her surgery site.  She can be best reached at [phone]

## 2020-01-27 NOTE — TELEPHONE ENCOUNTER
4:42 PM  Today, Dr. Andres May received a request from the patient's pharmacy for a refill on an antibiotic the patient should no longer be taking, I tried to reach the patient via her home and cell numbers to ask about this and return patient's call from earlier today, but I was unable to reach her, however I left  requesting call back to [facility]. I will attempt to reach her again 1/28/2020.

## 2020-01-30 ENCOUNTER — OFFICE VISIT (OUTPATIENT)
Dept: DERMATOLOGY | Facility: AMBULATORY SURGERY CENTER | Age: 80
End: 2020-01-30

## 2020-01-30 DIAGNOSIS — C44.722 SCC (SQUAMOUS CELL CARCINOMA), LEG, RIGHT: Primary | ICD-10-CM

## 2020-01-30 NOTE — PROGRESS NOTES
Wound check/suture removal:    Chief complaint: wound check. HPI: [place] presents for wound check following Mohs surgery to treat a biopsy-proven squamous cell carcinoma on the right anterior leg repaired primarily performed about 3 weeks ago. Last week the patient was given a prescription for doxycycline due to erythema and edema at her surgical site. She reports that later last week she developed an itchy rash on her arms and legs that spares her trunk. It has been resolving slowly and she is not sure if it was related to the doxycycline. She has not been treating the rash in any particular way. Exam: The surgical site was examined. There is not evidence of infection. There is not erythema. There is edema. A/P:  Wound check. The surgical site is healing well. Additional care was reviewed including liberal application of Vaseline several times daily. Follow up will be in 1 month. It is possible that the patient's cutaneous eruption was then morbilliform eruption due to the doxycycline, however it is unclear at this time. She was counseled regarding the benign course associated with this eruption and the expected time course resolution over the next 2 to 3 weeks.

## 2020-02-17 ENCOUNTER — OFFICE VISIT (OUTPATIENT)
Dept: DERMATOLOGY | Facility: AMBULATORY SURGERY CENTER | Age: 80
End: 2020-02-17

## 2020-02-17 DIAGNOSIS — C44.722 SCC (SQUAMOUS CELL CARCINOMA), LEG, RIGHT: Primary | ICD-10-CM

## 2020-02-25 RX ORDER — LEVOTHYROXINE SODIUM 112 UG/1
TABLET ORAL
Qty: 90 TAB | Refills: 3 | Status: SHIPPED | OUTPATIENT
Start: 2020-02-25 | End: 2021-03-30

## 2020-04-15 DIAGNOSIS — I10 HTN, GOAL BELOW 150/90: ICD-10-CM

## 2020-04-16 RX ORDER — LOSARTAN POTASSIUM AND HYDROCHLOROTHIAZIDE 25; 100 MG/1; MG/1
TABLET ORAL
Qty: 90 TAB | Refills: 1 | Status: SHIPPED | OUTPATIENT
Start: 2020-04-16 | End: 2020-09-25

## 2020-04-16 RX ORDER — SIMVASTATIN 40 MG/1
TABLET, FILM COATED ORAL
Qty: 90 TAB | Refills: 1 | Status: SHIPPED | OUTPATIENT
Start: 2020-04-16 | End: 2020-09-24 | Stop reason: SDUPTHER

## 2020-08-03 ENCOUNTER — OFFICE VISIT (OUTPATIENT)
Dept: FAMILY MEDICINE CLINIC | Age: 80
End: 2020-08-03
Payer: MEDICARE

## 2020-08-03 VITALS
HEIGHT: 62 IN | HEART RATE: 80 BPM | SYSTOLIC BLOOD PRESSURE: 120 MMHG | BODY MASS INDEX: 29.66 KG/M2 | WEIGHT: 161.2 LBS | DIASTOLIC BLOOD PRESSURE: 76 MMHG | TEMPERATURE: 98.2 F | OXYGEN SATURATION: 95 % | RESPIRATION RATE: 18 BRPM

## 2020-08-03 DIAGNOSIS — E78.5 HYPERLIPIDEMIA, UNSPECIFIED HYPERLIPIDEMIA TYPE: ICD-10-CM

## 2020-08-03 DIAGNOSIS — I10 HTN, GOAL BELOW 150/90: Primary | ICD-10-CM

## 2020-08-03 DIAGNOSIS — E03.9 ACQUIRED HYPOTHYROIDISM: ICD-10-CM

## 2020-08-03 DIAGNOSIS — R73.03 PRE-DIABETES: ICD-10-CM

## 2020-08-03 PROCEDURE — 99214 OFFICE O/P EST MOD 30 MIN: CPT | Performed by: FAMILY MEDICINE

## 2020-08-03 PROCEDURE — 1090F PRES/ABSN URINE INCON ASSESS: CPT | Performed by: FAMILY MEDICINE

## 2020-08-03 PROCEDURE — G8417 CALC BMI ABV UP PARAM F/U: HCPCS | Performed by: FAMILY MEDICINE

## 2020-08-03 PROCEDURE — G8427 DOCREV CUR MEDS BY ELIG CLIN: HCPCS | Performed by: FAMILY MEDICINE

## 2020-08-03 PROCEDURE — G8752 SYS BP LESS 140: HCPCS | Performed by: FAMILY MEDICINE

## 2020-08-03 PROCEDURE — 1101F PT FALLS ASSESS-DOCD LE1/YR: CPT | Performed by: FAMILY MEDICINE

## 2020-08-03 PROCEDURE — G8536 NO DOC ELDER MAL SCRN: HCPCS | Performed by: FAMILY MEDICINE

## 2020-08-03 PROCEDURE — G8510 SCR DEP NEG, NO PLAN REQD: HCPCS | Performed by: FAMILY MEDICINE

## 2020-08-03 PROCEDURE — G8754 DIAS BP LESS 90: HCPCS | Performed by: FAMILY MEDICINE

## 2020-08-03 NOTE — PROGRESS NOTES
Chief Complaint   Patient presents with    Hypertension     follow up     1. Have you been to the ER, urgent care clinic since your last visit? Hospitalized since your last visit? No    2. Have you seen or consulted any other health care providers outside of the [address] [place] [address] since your last visit? Include any pap smears or colon screening.  No     Eye exam completed recently with Dr. Lynne Bolton

## 2020-08-03 NOTE — PROGRESS NOTES
Patient Name: Matt Tipton   MRN: [id number]    Barrera Lyles is a [de-identified] y.o. female who presents with the following: The patient has hypertension, hyperlipidemia and pre-dm. She reports taking medications as instructed, no medication side effects noted. Diet and Lifestyle: generally follows a low fat low cholesterol diet, generally follows a low sodium diet, no formal exercise but active during the day. Lab review: orders written for new lab studies as appropriate; see orders. Would like to reduce statin if possible. No prior hx of CAD or CVA. BP Readings from Last 3 Encounters:   08/03/20 120/76   01/06/20 128/84   10/01/19 167/89       Review of Systems   Constitutional: Negative for fever, malaise/fatigue and weight loss. Respiratory: Negative for cough, hemoptysis, shortness of breath and wheezing. Cardiovascular: Negative for chest pain, palpitations, leg swelling and PND. Gastrointestinal: Negative for abdominal pain, constipation, diarrhea, nausea and vomiting. The patient's medications, allergies, past medical history, surgical history, family history and social history were reviewed and updated where appropriate. Prior to Admission medications    Medication Sig Start Date End Date Taking? Authorizing Provider   simvastatin (ZOCOR) 40 mg tablet TAKE 1 TABLET EVERY NIGHT 4/16/20  Yes Umesh Keyes NP   losartan-hydroCHLOROthiazide (HYZAAR) 100-25 mg per tablet TAKE 1 TABLET EVERY DAY 4/16/20  Yes Umesh Keyes NP   levothyroxine (SYNTHROID) 112 mcg tablet TAKE 1 TABLET EVERY DAY BEFORE BREAKFAST 2/25/20  Yes Kassy Owens MD   acetaminophen (TYLENOL EXTRA STRENGTH) 500 mg tablet Take  by mouth every six (6) hours as needed for Pain. Yes Provider, Historical   calcium-vitamin D (CALCIUM 500 WITH VITAMIN D) 500 mg(1,250mg) -200 unit per tablet Take 1 Tab by mouth two (2) times daily (with meals).    Yes Provider, Historical   MULTIVITAMINS (MULTI-VITAMIN PO) Take 1 Tab by mouth daily. Takes one po daily. Yes Provider, Historical   doxycycline (ADOXA) 100 mg tablet Take 1 Tab by mouth two (2) times a day. 1/21/20 8/3/20  Gabby Madison MD   Lactobacillus acidophilus (PROBIOTIC PO) Take  by mouth daily. Provider, Historical       Allergies   Allergen Reactions    Benazepril Cough    Sulfa (Sulfonamide Antibiotics) Hives         OBJECTIVE    Visit Vitals  /76 (BP 1 Location: Left arm, BP Patient Position: Sitting)   Pulse 80   Temp 98.2 °F (36.8 °C) (Oral)   Resp 18   Ht 5' 2\" (1.575 m)   Wt 161 lb 3.2 oz (73.1 kg)   SpO2 95%   BMI 29.48 kg/m²       Physical Exam  Vitals signs and nursing note reviewed. Constitutional:       General: She is not in acute distress. Appearance: Normal appearance. She is not toxic-appearing. HENT:      Head: Normocephalic and atraumatic. Eyes:      Pupils: Pupils are equal, round, and reactive to light. Pulmonary:      Effort: Pulmonary effort is normal.   Musculoskeletal: Normal range of motion. Skin:     General: Skin is warm and dry. Neurological:      Mental Status: She is alert. Mental status is at baseline. Psychiatric:         Mood and Affect: Mood normal.         Behavior: Behavior normal.           ASSESSMENT AND PLAN  Anna Wu is a [de-identified] y.o. female who presents today for:    1. HTN, goal below 150/90  Stable, continue current treatment. 2. Hyperlipidemia, unspecified hyperlipidemia type  If lipids WNL, okay to take statin every other day per pt preference. - CBC W/O DIFF  - METABOLIC PANEL, COMPREHENSIVE  - LIPID PANEL    3. Acquired hypothyroidism  - TSH AND FREE T4    4. Pre-diabetes  - HEMOGLOBIN A1C WITH EAG       Medications Discontinued During This Encounter   Medication Reason    doxycycline (ADOXA) 100 mg tablet Therapy Completed       Follow-up and Dispositions    · Return in about 6 months (around 2/3/2021) for Medicare Wellness Visit (30 min). Treatment risks/benefits/costs/interactions/alternatives discussed with patient. Advised patient to call back or return to office if symptoms worsen/change/persist. If patient cannot reach us or should anything more severe/urgent arise he/she should proceed directly to the nearest emergency department. Discussed expected course/resolution/complications of diagnosis in detail with patient. Patient expressed understanding with the diagnosis and plan. Carine Ovalle M.D.

## 2020-08-26 ENCOUNTER — APPOINTMENT (OUTPATIENT)
Dept: FAMILY MEDICINE CLINIC | Age: 80
End: 2020-08-26

## 2020-08-26 ENCOUNTER — HOSPITAL ENCOUNTER (OUTPATIENT)
Dept: LAB | Age: 80
Discharge: HOME | End: 2020-08-26
Payer: MEDICARE

## 2020-08-26 PROCEDURE — 84443 ASSAY THYROID STIM HORMONE: CPT

## 2020-08-26 PROCEDURE — 36415 COLL VENOUS BLD VENIPUNCTURE: CPT

## 2020-08-26 PROCEDURE — 83036 HEMOGLOBIN GLYCOSYLATED A1C: CPT

## 2020-08-26 PROCEDURE — 80053 COMPREHEN METABOLIC PANEL: CPT

## 2020-08-26 PROCEDURE — 85027 COMPLETE CBC AUTOMATED: CPT

## 2020-08-26 PROCEDURE — 80061 LIPID PANEL: CPT

## 2020-08-27 LAB
ALBUMIN SERPL-MCNC: 4.3 G/DL (ref 3.7–4.7)
ALBUMIN/GLOB SERPL: 2 {RATIO} (ref 1.2–2.2)
ALP SERPL-CCNC: 87 IU/L (ref 39–117)
ALT SERPL-CCNC: 13 IU/L (ref 0–32)
AST SERPL-CCNC: 19 IU/L (ref 0–40)
BILIRUB SERPL-MCNC: 0.6 MG/DL (ref 0–1.2)
BUN SERPL-MCNC: 14 MG/DL (ref 8–27)
BUN/CREAT SERPL: 16 (ref 12–28)
CALCIUM SERPL-MCNC: 10.6 MG/DL (ref 8.7–10.3)
CHLORIDE SERPL-SCNC: 91 MMOL/L (ref 96–106)
CHOLEST SERPL-MCNC: 160 MG/DL (ref 100–199)
CO2 SERPL-SCNC: 25 MMOL/L (ref 20–29)
CREAT SERPL-MCNC: 0.88 MG/DL (ref 0.57–1)
ERYTHROCYTE [DISTWIDTH] IN BLOOD BY AUTOMATED COUNT: 12.5 % (ref 11.7–15.4)
EST. AVERAGE GLUCOSE BLD GHB EST-MCNC: 111 MG/DL
GLOBULIN SER CALC-MCNC: 2.2 G/DL (ref 1.5–4.5)
GLUCOSE SERPL-MCNC: 91 MG/DL (ref 65–99)
HBA1C MFR BLD: 5.5 % (ref 4.8–5.6)
HCT VFR BLD AUTO: 38.7 % (ref 34–46.6)
HDLC SERPL-MCNC: 68 MG/DL
HGB BLD-MCNC: 13 G/DL (ref 11.1–15.9)
INTERPRETATION, 910389: NORMAL
LDLC SERPL CALC-MCNC: 76 MG/DL (ref 0–99)
MCH RBC QN AUTO: 28.5 PG (ref 26.6–33)
MCHC RBC AUTO-ENTMCNC: 33.6 G/DL (ref 31.5–35.7)
MCV RBC AUTO: 85 FL (ref 79–97)
PLATELET # BLD AUTO: 297 X10E3/UL (ref 150–450)
POTASSIUM SERPL-SCNC: 3.9 MMOL/L (ref 3.5–5.2)
PROT SERPL-MCNC: 6.5 G/DL (ref 6–8.5)
RBC # BLD AUTO: 4.56 X10E6/UL (ref 3.77–5.28)
SODIUM SERPL-SCNC: 133 MMOL/L (ref 134–144)
T4 FREE SERPL-MCNC: 1.33 NG/DL (ref 0.82–1.77)
TRIGL SERPL-MCNC: 78 MG/DL (ref 0–149)
TSH SERPL DL<=0.005 MIU/L-ACNC: 2.96 UIU/ML (ref 0.45–4.5)
VLDLC SERPL CALC-MCNC: 16 MG/DL (ref 5–40)
WBC # BLD AUTO: 7.5 X10E3/UL (ref 3.4–10.8)

## 2020-08-28 NOTE — PROGRESS NOTES
Please send a LETTER with the following: Your calcium levels are a little high; please stop any calcium supplements if you are currently taking them. We can monitor this over time. Other labs are normal/stable. You may start taking your statin medication every other day as we discussed.

## 2020-09-22 ENCOUNTER — TELEPHONE (OUTPATIENT)
Dept: FAMILY MEDICINE CLINIC | Age: 80
End: 2020-09-22

## 2020-09-22 NOTE — TELEPHONE ENCOUNTER
----- Message from Tiago Tate sent at 9/22/2020  9:50 AM EDT -----  Regarding: Dr. Guallpa July / telephone  General Message/Vendor Calls    Caller's first and last name:   Cristobal Corona for call:  lab results      Callback required yes/no and why:  yes      Best contact number(s):  [phone]      Details to clarify the request:  patient stated she needs to get results of lab work, and would like to known status for RX  Simvastatin 40mg       Rebeka Chapman

## 2020-09-22 NOTE — TELEPHONE ENCOUNTER
Outbound call to patient. Patients date of birth verified. Patient made aware of lab results and recommendations per / Chantelle Estes and verbalized understanding. Patient would like to know how long she should continue taking the statin medications every other day.

## 2020-09-24 RX ORDER — SIMVASTATIN 40 MG/1
40 TABLET, FILM COATED ORAL EVERY OTHER DAY
Qty: 90 TAB | Refills: 1 | Status: SHIPPED | OUTPATIENT
Start: 2020-09-24 | End: 2021-10-12

## 2020-09-24 NOTE — TELEPHONE ENCOUNTER
She should take the statin every other day until our next follow up in 6 months; we can recheck her levels then and discuss any med changes from there. Refill sent.

## 2020-09-25 DIAGNOSIS — I10 HTN, GOAL BELOW 150/90: ICD-10-CM

## 2020-09-25 RX ORDER — LOSARTAN POTASSIUM AND HYDROCHLOROTHIAZIDE 25; 100 MG/1; MG/1
TABLET ORAL
Qty: 90 TAB | Refills: 1 | Status: SHIPPED | OUTPATIENT
Start: 2020-09-25 | End: 2021-04-08 | Stop reason: SDUPTHER

## 2020-10-07 ENCOUNTER — TRANSCRIBE ORDER (OUTPATIENT)
Dept: REGISTRATION | Age: 80
End: 2020-10-07

## 2020-10-07 DIAGNOSIS — Z12.31 VISIT FOR SCREENING MAMMOGRAM: Primary | ICD-10-CM

## 2020-10-08 ENCOUNTER — HOSPITAL ENCOUNTER (OUTPATIENT)
Dept: MAMMOGRAPHY | Age: 80
Discharge: HOME | End: 2020-10-08
Attending: FAMILY MEDICINE
Payer: MEDICARE

## 2020-10-08 DIAGNOSIS — Z12.31 VISIT FOR SCREENING MAMMOGRAM: ICD-10-CM

## 2020-10-08 PROCEDURE — 77063 BREAST TOMOSYNTHESIS BI: CPT

## 2021-03-30 RX ORDER — LEVOTHYROXINE SODIUM 112 UG/1
TABLET ORAL
Qty: 90 TAB | Refills: 3 | Status: SHIPPED | OUTPATIENT
Start: 2021-03-30 | End: 2022-03-09

## 2021-04-08 DIAGNOSIS — I10 HTN, GOAL BELOW 150/90: ICD-10-CM

## 2021-04-08 NOTE — TELEPHONE ENCOUNTER
Last Visit: 8/3/20 MD José Miguel Villalobos  Next Appointment: Not scheduled- due for Medicare wellness  Previous Refill Encounter(s): 9/25/20 90 + 1    Requested Prescriptions     Pending Prescriptions Disp Refills    losartan-hydroCHLOROthiazide (HYZAAR) 100-25 mg per tablet 90 Tab 1     Sig: Take 1 Tab by mouth daily.

## 2021-04-09 RX ORDER — LOSARTAN POTASSIUM AND HYDROCHLOROTHIAZIDE 25; 100 MG/1; MG/1
1 TABLET ORAL DAILY
Qty: 90 TAB | Refills: 1 | Status: SHIPPED | OUTPATIENT
Start: 2021-04-09 | End: 2021-09-23

## 2021-08-25 NOTE — PROGRESS NOTES
1050: pt to angio holding for lucia. I have reviewed discharge instructions with the patient. The patient verbalized understanding. 1220: I have reviewed discharge instructions with the patient. The patient verbalized understanding.
Pain Service Follow-up  Postop Day  1    S/P  C- Section    T(C): 37 (08-24-21 @ 10:05), Max: 37 (08-24-21 @ 10:05)  HR: 73 (08-24-21 @ 10:05) (64 - 78)  BP: 100/67 (08-24-21 @ 10:05) (100/67 - 145/78)  RR: 17 (08-24-21 @ 10:05) (12 - 21)  SpO2: 100% (08-24-21 @ 10:05) (97% - 100%)  Wt(kg): --      THERAPY:  Spinal Morphine     Sedation Score:	  [X] Alert	      [  ] Drowsy       [  ] Arousable	[  ] Asleep         [  ] Unresponsive    Side Effects:	  [X] None	      [  ] Nausea       [  ] Pruritus        [  ] Weakness   [  ] Numbness        ASSESSMENT/ PLAN   [ X ] Discontinue         [  ] Continue    [ X ] Documentation and Verification of current medications       Satisfactory Post Anesthetic Course    
Post Op C/S 1      Pt without complaints    Vital Signs Last 24 Hrs  T(C): 36.8 (24 Aug 2021 06:20), Max: 36.8 (23 Aug 2021 18:07)  T(F): 98.2 (24 Aug 2021 06:20), Max: 98.3 (24 Aug 2021 02:30)  HR: 78 (24 Aug 2021 06:20) (64 - 78)  BP: 104/56 (24 Aug 2021 06:20) (104/56 - 145/78)  BP(mean): 80 (24 Aug 2021 01:00) (80 - 104)  RR: 18 (24 Aug 2021 06:20) (12 - 21)  SpO2: 98% (24 Aug 2021 06:20) (97% - 100%)  MEDICATIONS  (STANDING):  acetaminophen   Tablet .. 975 milliGRAM(s) Oral <User Schedule>  acetaminophen   Tablet .. 975 milliGRAM(s) Oral <User Schedule>  diphtheria/tetanus/pertussis (acellular) Vaccine (ADAcel) 0.5 milliLiter(s) IntraMuscular once  diphtheria/tetanus/pertussis (acellular) Vaccine (ADAcel) 0.5 milliLiter(s) IntraMuscular once  ferrous    sulfate 325 milliGRAM(s) Oral daily  heparin   Injectable 5000 Unit(s) SubCutaneous every 12 hours  ibuprofen  Tablet. 600 milliGRAM(s) Oral every 6 hours  ibuprofen  Tablet. 600 milliGRAM(s) Oral every 6 hours  ketorolac   Injectable 30 milliGRAM(s) IV Push every 6 hours  ketorolac   Injectable 30 milliGRAM(s) IV Push every 6 hours  lactated ringers. 1000 milliLiter(s) (125 mL/Hr) IV Continuous <Continuous>  lactated ringers. 1000 milliLiter(s) (125 mL/Hr) IV Continuous <Continuous>  oxytocin Infusion 333.333 milliUNIT(s)/Min (1000 mL/Hr) IV Continuous <Continuous>  oxytocin Infusion 333.333 milliUNIT(s)/Min (1000 mL/Hr) IV Continuous <Continuous>  prenatal multivitamin 1 Tablet(s) Oral daily  senna 1 Tablet(s) Oral two times a day    MEDICATIONS  (PRN):  diphenhydrAMINE 25 milliGRAM(s) Oral every 6 hours PRN Pruritus  diphenhydrAMINE 25 milliGRAM(s) Oral every 6 hours PRN Pruritus  lanolin Ointment 1 Application(s) Topical every 6 hours PRN Sore Nipples  lanolin Ointment 1 Application(s) Topical every 6 hours PRN Sore Nipples  magnesium hydroxide Suspension 30 milliLiter(s) Oral two times a day PRN Constipation  magnesium hydroxide Suspension 30 milliLiter(s) Oral two times a day PRN Constipation  oxyCODONE    IR 5 milliGRAM(s) Oral every 3 hours PRN Moderate to Severe Pain (4-10)  oxyCODONE    IR 5 milliGRAM(s) Oral once PRN Moderate to Severe Pain (4-10)  oxyCODONE    IR 5 milliGRAM(s) Oral every 3 hours PRN Moderate to Severe Pain (4-10)  oxyCODONE    IR 5 milliGRAM(s) Oral once PRN Moderate to Severe Pain (4-10)  simethicone 80 milliGRAM(s) Chew every 4 hours PRN Gas  simethicone 80 milliGRAM(s) Chew every 4 hours PRN Gas        Abdomen soft  fundus firm  Incision clean dry and intact  extremities non tender  lochia wnl                          10.3   11.73 )-----------( 181      ( 24 Aug 2021 07:00 )             30.7     Patient doing well    Routine post operative care
Postpartum Note,  Section  She is a  36y woman who is now post-operative day: 2    Subjective:  The patient feels well.  She is ambulating.   passed flatus  She is tolerating regular diet.  She denies nausea and vomiting.  She is voiding.  Her pain is controlled.  She reports normal postpartum bleeding    Physical exam:    Vital Signs Last 24 Hrs  T(C): 36.7 (25 Aug 2021 06:36), Max: 37 (24 Aug 2021 22:00)  T(F): 98.1 (25 Aug 2021 06:36), Max: 98.6 (24 Aug 2021 22:00)  HR: 74 (25 Aug 2021 06:36) (70 - 81)  BP: 111/66 (25 Aug 2021 06:36) (110/64 - 133/68)  BP(mean): --  RR: 17 (25 Aug 2021 06:36) (17 - 18)  SpO2: 97% (25 Aug 2021 06:36) (96% - 100%)    Gen: NAD  Breast: Soft, nontender, not engorged.  Abdomen: Soft, nontender, no distension , firm uterine fundus at umbilicus.  Incision: Clean, dry, and intact with steri strips  Pelvic: Normal lochia noted  Ext: No calf tenderness    LABS:                        10.3   11.73 )-----------( 181      ( 24 Aug 2021 07:00 )             30.7                         11.8   8.42  )-----------( 235      ( 23 Aug 2021 19:45 )             36.4         Allergies    No Known Allergies    Intolerances      MEDICATIONS  (STANDING):  acetaminophen   Tablet .. 975 milliGRAM(s) Oral <User Schedule>  acetaminophen   Tablet .. 975 milliGRAM(s) Oral <User Schedule>  diphtheria/tetanus/pertussis (acellular) Vaccine (ADAcel) 0.5 milliLiter(s) IntraMuscular once  diphtheria/tetanus/pertussis (acellular) Vaccine (ADAcel) 0.5 milliLiter(s) IntraMuscular once  ferrous    sulfate 325 milliGRAM(s) Oral daily  heparin   Injectable 5000 Unit(s) SubCutaneous every 12 hours  ibuprofen  Tablet. 600 milliGRAM(s) Oral every 6 hours  ibuprofen  Tablet. 600 milliGRAM(s) Oral every 6 hours  lactated ringers. 1000 milliLiter(s) (125 mL/Hr) IV Continuous <Continuous>  lactated ringers. 1000 milliLiter(s) (125 mL/Hr) IV Continuous <Continuous>  oxytocin Infusion 333.333 milliUNIT(s)/Min (1000 mL/Hr) IV Continuous <Continuous>  oxytocin Infusion 333.333 milliUNIT(s)/Min (1000 mL/Hr) IV Continuous <Continuous>  prenatal multivitamin 1 Tablet(s) Oral daily  senna 1 Tablet(s) Oral two times a day    MEDICATIONS  (PRN):  diphenhydrAMINE 25 milliGRAM(s) Oral every 6 hours PRN Pruritus  diphenhydrAMINE 25 milliGRAM(s) Oral every 6 hours PRN Pruritus  lanolin Ointment 1 Application(s) Topical every 6 hours PRN Sore Nipples  lanolin Ointment 1 Application(s) Topical every 6 hours PRN Sore Nipples  magnesium hydroxide Suspension 30 milliLiter(s) Oral two times a day PRN Constipation  magnesium hydroxide Suspension 30 milliLiter(s) Oral two times a day PRN Constipation  oxyCODONE    IR 5 milliGRAM(s) Oral every 3 hours PRN Moderate to Severe Pain (4-10)  oxyCODONE    IR 5 milliGRAM(s) Oral once PRN Moderate to Severe Pain (4-10)  oxyCODONE    IR 5 milliGRAM(s) Oral every 3 hours PRN Moderate to Severe Pain (4-10)  oxyCODONE    IR 5 milliGRAM(s) Oral once PRN Moderate to Severe Pain (4-10)  simethicone 80 milliGRAM(s) Chew every 4 hours PRN Gas  simethicone 80 milliGRAM(s) Chew every 4 hours PRN Gas        Assessment and Plan  POD #2 s/p  section  Doing well.  Encourage ambulation.  D/C home with insructions          
Patient seen and examined at bedside, no acute overnight events. No acute complaints, pain well controlled. Since arriving to floor, patient has not ambulated. Tolerates small quantities of PO intake. Francis currently in place, not passing gas. Denies CP, SOB, N/V, HA, blurred vision, epigastric pain.    Vital Signs Last 24 Hours  T(C): 36.8 (08-24-21 @ 06:20), Max: 36.8 (08-23-21 @ 18:07)  HR: 78 (08-24-21 @ 06:20) (64 - 78)  BP: 104/56 (08-24-21 @ 06:20) (104/56 - 145/78)  RR: 18 (08-24-21 @ 06:20) (12 - 21)  SpO2: 98% (08-24-21 @ 06:20) (97% - 100%)    I&O's Summary    23 Aug 2021 07:01  -  24 Aug 2021 07:00  --------------------------------------------------------  IN: 3450 mL / OUT: 1017 mL / NET: 2433 mL    Physical Exam:  General: NAD  Cardio: regular rate and rhythm  Pulm: lungs clear to auscultation bilaterally  Abdomen: Soft, non-tender, non-distended, fundus firm  Incision: Pfannenstiel incision CDI, subcuticular suture closure  Pelvic: Lochia wnl, external exam of perineum clean and dry without swelling    Labs:    Blood Type: O Positive  Antibody Screen: Negative               10.3   11.73 )-----------( 181      ( 08-24 @ 07:00 )             30.7                11.8   8.42  )-----------( 235      ( 08-23 @ 19:45 )             36.4                11.9   9.25  )-----------( 258      ( 08-12 @ 13:53 )             36.2         MEDICATIONS  (STANDING):  acetaminophen   Tablet .. 975 milliGRAM(s) Oral <User Schedule>  acetaminophen   Tablet .. 975 milliGRAM(s) Oral <User Schedule>  diphtheria/tetanus/pertussis (acellular) Vaccine (ADAcel) 0.5 milliLiter(s) IntraMuscular once  diphtheria/tetanus/pertussis (acellular) Vaccine (ADAcel) 0.5 milliLiter(s) IntraMuscular once  ferrous    sulfate 325 milliGRAM(s) Oral daily  heparin   Injectable 5000 Unit(s) SubCutaneous every 12 hours  ibuprofen  Tablet. 600 milliGRAM(s) Oral every 6 hours  ibuprofen  Tablet. 600 milliGRAM(s) Oral every 6 hours  ketorolac   Injectable 30 milliGRAM(s) IV Push every 6 hours  ketorolac   Injectable 30 milliGRAM(s) IV Push every 6 hours  lactated ringers. 1000 milliLiter(s) (125 mL/Hr) IV Continuous <Continuous>  lactated ringers. 1000 milliLiter(s) (125 mL/Hr) IV Continuous <Continuous>  oxytocin Infusion 333.333 milliUNIT(s)/Min (1000 mL/Hr) IV Continuous <Continuous>  oxytocin Infusion 333.333 milliUNIT(s)/Min (1000 mL/Hr) IV Continuous <Continuous>  prenatal multivitamin 1 Tablet(s) Oral daily  senna 1 Tablet(s) Oral two times a day    MEDICATIONS  (PRN):  diphenhydrAMINE 25 milliGRAM(s) Oral every 6 hours PRN Pruritus  diphenhydrAMINE 25 milliGRAM(s) Oral every 6 hours PRN Pruritus  lanolin Ointment 1 Application(s) Topical every 6 hours PRN Sore Nipples  lanolin Ointment 1 Application(s) Topical every 6 hours PRN Sore Nipples  magnesium hydroxide Suspension 30 milliLiter(s) Oral two times a day PRN Constipation  magnesium hydroxide Suspension 30 milliLiter(s) Oral two times a day PRN Constipation  oxyCODONE    IR 5 milliGRAM(s) Oral every 3 hours PRN Moderate to Severe Pain (4-10)  oxyCODONE    IR 5 milliGRAM(s) Oral once PRN Moderate to Severe Pain (4-10)  oxyCODONE    IR 5 milliGRAM(s) Oral every 3 hours PRN Moderate to Severe Pain (4-10)  oxyCODONE    IR 5 milliGRAM(s) Oral once PRN Moderate to Severe Pain (4-10)  simethicone 80 milliGRAM(s) Chew every 4 hours PRN Gas  simethicone 80 milliGRAM(s) Chew every 4 hours PRN Gas

## 2021-09-13 ENCOUNTER — TELEPHONE (OUTPATIENT)
Dept: FAMILY MEDICINE CLINIC | Age: 81
End: 2021-09-13

## 2021-09-13 NOTE — TELEPHONE ENCOUNTER
----- Message from Duarte Silver sent at 9/13/2021  8:02 AM EDT -----  Regarding: /Telephone  General Message/Vendor Calls    Caller's first and last name: [employer]      Reason for call: Stomach Pain, Clear Liquid      Callback required yes/no and why: Yes      Best contact number(s): [phone]      Details to clarify the request: Patient was experiencing stomach pain last night, she is not sure what's going on, she stated when she went to the bathroom several times it came out clear. She is inquiring of she needs a prescription.       Duarte Silver

## 2021-09-14 ENCOUNTER — VIRTUAL VISIT (OUTPATIENT)
Dept: FAMILY MEDICINE CLINIC | Age: 81
End: 2021-09-14
Payer: MEDICARE

## 2021-09-14 DIAGNOSIS — R19.7 DIARRHEA, UNSPECIFIED TYPE: Primary | ICD-10-CM

## 2021-09-14 PROCEDURE — 99442 PR PHYS/QHP TELEPHONE EVALUATION 11-20 MIN: CPT | Performed by: NURSE PRACTITIONER

## 2021-09-14 RX ORDER — LOPERAMIDE HYDROCHLORIDE 2 MG/1
2 CAPSULE ORAL
Qty: 20 CAPSULE | Refills: 0 | Status: SHIPPED | OUTPATIENT
Start: 2021-09-14 | End: 2021-09-24

## 2021-09-14 NOTE — PROGRESS NOTES
Anne Marie Lozano  80 y.o. female  1940  [address]   [id number]     ZAMZAM [address]       Encounter Date: 9/14/2021           Established Patient Visit Note: Lesly Garza NP    Reason for Appointment:  No chief complaint on file. History of Present Illness:  History provided by patient    Anne Marie Lozano is a 80 y.o. female who presents today for diarrhea x 2 days. It is associated with abdominal cramping just before bowel movement. Denies chills, fever, bloody stools , nausea or vomiting  She is not taking anything for diarrhea         Review of Systems  Review of Systems   Constitutional: Negative. HENT: Negative. Respiratory: Negative. Cardiovascular: Negative. Gastrointestinal: Positive for diarrhea. Genitourinary: Negative. Allergies: Benazepril and Sulfa (sulfonamide antibiotics)    Medications: (Updated to reflect final medication list after visit)    Current Outpatient Medications:     loperamide (Imodium A-D) 2 mg capsule, Take 1 Capsule by mouth four (4) times daily as needed for Diarrhea for up to 10 days. , Disp: 20 Capsule, Rfl: 0    losartan-hydroCHLOROthiazide (HYZAAR) 100-25 mg per tablet, Take 1 Tab by mouth daily. , Disp: 90 Tab, Rfl: 1    levothyroxine (SYNTHROID) 112 mcg tablet, TAKE 1 TABLET EVERY DAY BEFORE BREAKFAST, Disp: 90 Tab, Rfl: 3    simvastatin (ZOCOR) 40 mg tablet, Take 1 Tab by mouth every other day., Disp: 90 Tab, Rfl: 1    acetaminophen (TYLENOL EXTRA STRENGTH) 500 mg tablet, Take  by mouth every six (6) hours as needed for Pain., Disp: , Rfl:     Lactobacillus acidophilus (PROBIOTIC PO), Take  by mouth daily. , Disp: , Rfl:     calcium-vitamin D (CALCIUM 500 WITH VITAMIN D) 500 mg(1,250mg) -200 unit per tablet, Take 1 Tab by mouth two (2) times daily (with meals). , Disp: , Rfl:     MULTIVITAMINS (MULTI-VITAMIN PO), Take 1 Tab by mouth daily. Takes one po daily. , Disp: , Rfl: History  Patient Care Team:  Patrice Cruz MD as PCP - General (Family Medicine)  Patrice Cruz MD as PCP - [address] [place] [address] [facility]  Fara Cloud MD (Gastroenterology)  oJse M Osorio MD (Obstetrics & Gynecology)  Radha Rivas MD (Orthopedic Surgery)  Augusto Rico MD as Physician (Ophthalmology)    Past Medical History: she has a past medical history of DDD (degenerative disc disease), lumbar (3/15/2016), Diverticulosis of colon (1/12), DJD (degenerative joint disease), Esophageal stricture (2/03), Fibrosis of lung ([facility] [address] History of basal cell cancer, HTN, goal below 150/90, Hypercholesterolemia, IBS (irritable bowel syndrome) (8/30/2016), Menopause, Osteoporosis, Overactive bladder, Scarring of lung ('04), Skin cancer (01/06/2020), and Unspecified hypothyroidism. She also has no past medical history of Fracture. Past Surgical History: she has a past surgical history that includes pr abdomen surgery proc unlisted (1953); hx orthopaedic (2006 and 2008); hx knee replacement (1/10); colonoscopy (1/12); hx cataract removal (3/12); hx cataract removal (04/2012); and hx appendectomy. Family Medical History: family history includes Cancer in her son; Diabetes in her father and son; Heart Disease in her father, mother, and son. Social History: she reports that she has quit smoking. Her smoking use included cigarettes. She has a 25.00 pack-year smoking history. She has never used smokeless tobacco. She reports current alcohol use. She reports that she does not use drugs. No specialty comments available. Objective:   Vital Signs  Unable to obtain vital signs today as patient does not have equipment for this at home    Physical Exam  Neurological:      Mental Status: She is alert. Psychiatric:         Mood and Affect: Mood normal.         Thought Content:  Thought content normal.     Patient didn't have smart phone and thus wasn't able to establish vidoe    Assessment & Plan:    1. Diarrhea, unspecified type    - loperamide (Imodium A-D) 2 mg capsule; Take 1 Capsule by mouth four (4) times daily as needed for Diarrhea for up to 10 days. Dispense: 20 Capsule; Refill: 0    Eat yogurt, toast, increase fluid intake  Avoid raw vegetables and fruits  Call back if symptoms persists        I was in the office while conducting this encounter. Consent:  She and/or her healthcare decision maker is aware that this patient-initiated Telehealth encounter is a billable service, with coverage as determined by her insurance carrier. She is aware that she may receive a bill and has provided verbal consent to proceed: Yes    This virtual visit was conducted via [employer]. Pursuant to the emergency declaration under the [facility] waiver authority and the [employer] and [employer] Act, this Virtual  Visit was conducted to reduce the patient's risk of exposure to COVID-19 and provide continuity of care for an established patient. Services were provided through a video synchronous discussion virtually to substitute for in-person clinic visit. Due to this being a TeleHealth evaluation, many elements of the physical examination are unable to be assessed. Total Time: minutes: 11-20 minutes. I have discussed the diagnosis with the patient and the intended plan as seen in the above orders. The patient has received an after-visit summary along with patient information handout. I have discussed medication side effects and warnings with the patient as well.     Disposition        Wade Will NP

## 2021-09-16 ENCOUNTER — TELEPHONE (OUTPATIENT)
Dept: FAMILY MEDICINE CLINIC | Age: 81
End: 2021-09-16

## 2021-09-16 NOTE — TELEPHONE ENCOUNTER
Patient has a cough tried to give a virtual but she stated she could not do virtual.  Wants the nurse to give her a call.     Best call back #[phone]

## 2021-09-17 NOTE — TELEPHONE ENCOUNTER
Called pt and confirmed two patients identifiers. Pt states that she has a chronic cough that comes and goes. No other symptoms related to COVID.

## 2021-09-20 NOTE — TELEPHONE ENCOUNTER
Called pt, confirmed two identifiers. Pt states that her cough has subsided and she no longer needs an appointment.

## 2021-09-20 NOTE — TELEPHONE ENCOUNTER
How long as this cough been going on for? If she has not other COVID symptoms and this is been ongoing for at least 3 months, okay with office visit.

## 2021-10-04 ENCOUNTER — TRANSCRIBE ORDER (OUTPATIENT)
Dept: SCHEDULING | Age: 81
End: 2021-10-04

## 2021-10-04 DIAGNOSIS — Z12.31 SCREENING MAMMOGRAM FOR HIGH-RISK PATIENT: Primary | ICD-10-CM

## 2021-10-12 ENCOUNTER — HOSPITAL ENCOUNTER (OUTPATIENT)
Dept: MAMMOGRAPHY | Age: 81
Discharge: HOME | End: 2021-10-12
Attending: FAMILY MEDICINE
Payer: MEDICARE

## 2021-10-12 DIAGNOSIS — Z12.31 SCREENING MAMMOGRAM FOR HIGH-RISK PATIENT: ICD-10-CM

## 2021-10-12 PROCEDURE — 77067 SCR MAMMO BI INCL CAD: CPT

## 2021-10-12 RX ORDER — SIMVASTATIN 40 MG/1
TABLET, FILM COATED ORAL
Qty: 45 TABLET | Refills: 0 | Status: SHIPPED | OUTPATIENT
Start: 2021-10-12 | End: 2021-11-04 | Stop reason: SDUPTHER

## 2021-10-18 ENCOUNTER — TELEPHONE (OUTPATIENT)
Dept: FAMILY MEDICINE CLINIC | Age: 81
End: 2021-10-18

## 2021-10-18 NOTE — TELEPHONE ENCOUNTER
Patient called stated she is having knee surgery on 11/17/2021 needs a pre-op done no openings. Patient would like for the nurse or provider to give her a call.     Best call back #[phone]

## 2021-10-19 NOTE — TELEPHONE ENCOUNTER
Okay to double book at 1:15 slot (Mon or Thurs) but please ask pt to come early as if it is a 1:00 appointment.

## 2021-10-19 NOTE — TELEPHONE ENCOUNTER
Pt called and said she is waiting to speak with the nurse regarding the pre op appt.        BCB# [phone]

## 2021-12-16 ENCOUNTER — TRANSCRIBE ORDER (OUTPATIENT)
Dept: REGISTRATION | Age: 81
End: 2021-12-16

## 2021-12-16 DIAGNOSIS — Z01.812 PRE-PROCEDURE LAB EXAM: Primary | ICD-10-CM

## 2021-12-30 ENCOUNTER — TELEPHONE (OUTPATIENT)
Dept: FAMILY MEDICINE CLINIC | Age: 81
End: 2021-12-30

## 2022-01-03 ENCOUNTER — TRANSCRIBE ORDER (OUTPATIENT)
Dept: REGISTRATION | Age: 82
End: 2022-01-03

## 2022-01-03 DIAGNOSIS — Z01.812 PRE-PROCEDURE LAB EXAM: Primary | ICD-10-CM

## 2022-02-25 ENCOUNTER — OFFICE VISIT (OUTPATIENT)
Dept: ORTHOPEDIC SURGERY | Age: 82
End: 2022-02-25
Payer: MEDICARE

## 2022-02-25 DIAGNOSIS — M17.12 PRIMARY OSTEOARTHRITIS OF LEFT KNEE: Primary | ICD-10-CM

## 2022-02-25 PROCEDURE — G8756 NO BP MEASURE DOC: HCPCS | Performed by: ORTHOPAEDIC SURGERY

## 2022-02-25 PROCEDURE — G8432 DEP SCR NOT DOC, RNG: HCPCS | Performed by: ORTHOPAEDIC SURGERY

## 2022-02-25 PROCEDURE — 1101F PT FALLS ASSESS-DOCD LE1/YR: CPT | Performed by: ORTHOPAEDIC SURGERY

## 2022-02-25 PROCEDURE — G8421 BMI NOT CALCULATED: HCPCS | Performed by: ORTHOPAEDIC SURGERY

## 2022-02-25 PROCEDURE — G8427 DOCREV CUR MEDS BY ELIG CLIN: HCPCS | Performed by: ORTHOPAEDIC SURGERY

## 2022-02-25 PROCEDURE — 99204 OFFICE O/P NEW MOD 45 MIN: CPT | Performed by: ORTHOPAEDIC SURGERY

## 2022-02-25 PROCEDURE — G8536 NO DOC ELDER MAL SCRN: HCPCS | Performed by: ORTHOPAEDIC SURGERY

## 2022-02-25 PROCEDURE — 1090F PRES/ABSN URINE INCON ASSESS: CPT | Performed by: ORTHOPAEDIC SURGERY

## 2022-02-25 NOTE — PROGRESS NOTES
[place] (: 1940) is a 80 y.o. female patient, here for evaluation of the following chief complaint(s):  Knee Pain (left knee pain)       ASSESSMENT/PLAN:  Below is the assessment and plan developed based on review of pertinent history, physical exam, labs, studies, and medications. 80-year-old female comes in today for follow-up. She has been seen in the past for left knee pain. She has known osteoarthritic changes. Changes appear to be mainly in the medial compartment. This is where her pain is also located. Patient has failed multiple modalities of conservative treatment. She would like to move forward with surgery. Discussed surgical treatment options. We will plan for a partial knee replacement. Risks and benefits discussed with patient. Patient verbalizes understanding. Plan discussed with Dr. Orion Granado who is in agreement with plan. Plans to follow-up postoperatively or sooner as needed. 1. Primary osteoarthritis of left knee  -     XR KNEE LT MIN 4 V; Future      Encounter Diagnosis   Name Primary?  Primary osteoarthritis of left knee Yes        No follow-ups on file. SUBJECTIVE/OBJECTIVE:  [place] (: 1940) is a 80 y.o. female who presents today for the following:  Chief Complaint   Patient presents with    Knee Pain     left knee pain       80-year-old female comes in today for left knee pain. Pain is mainly located medially based. Rates it as moderate to severe sharp in nature. Is constant. She is unable to walk any sort of distance without discomfort. She has failed multiple modalities of conservative treatment including medications injections braces and physical therapy. She would like to move forward with a surgical intervention. IMAGING:  XR Results (most recent):  Results from Appointment encounter on 22    XR KNEE LT MIN 4 V    Narrative  4 views left knee x-rays ordered and personally reviewed.   Patient with severe bone-on-bone osteoarthritic changes noted to the medial compartment. Lateral and patellofemoral remained relatively well-preserved. Allergies   Allergen Reactions    Benazepril Cough    Sulfa (Sulfonamide Antibiotics) Hives       Current Outpatient Medications   Medication Sig    simvastatin (ZOCOR) 40 mg tablet TAKE 1 TABLET EVERY OTHER DAY    losartan-hydroCHLOROthiazide (HYZAAR) 100-25 mg per tablet TAKE 1 TABLET EVERY DAY    levothyroxine (SYNTHROID) 112 mcg tablet TAKE 1 TABLET EVERY DAY BEFORE BREAKFAST    acetaminophen (TYLENOL EXTRA STRENGTH) 500 mg tablet Take  by mouth every six (6) hours as needed for Pain.  Lactobacillus acidophilus (PROBIOTIC PO) Take  by mouth daily.  calcium-vitamin D (CALCIUM 500 WITH VITAMIN D) 500 mg(1,250mg) -200 unit per tablet Take 1 Tab by mouth two (2) times daily (with meals).  MULTIVITAMINS (MULTI-VITAMIN PO) Take 1 Tab by mouth daily. Takes one po daily. No current facility-administered medications for this visit. Past Medical History:   Diagnosis Date    DDD (degenerative disc disease), lumbar 3/15/2016    Diverticulosis of colon 1/12    McGroarty/gi    DJD (degenerative joint disease)     Esophageal stricture 2/03    Fibrosis of lung (HCC)     Scarring @ the apices bilaterally Done @VPI/histoplasmosis    History of basal cell cancer     HTN, goal below 150/90     Hypercholesterolemia     IBS (irritable bowel syndrome) 8/30/2016    Menopause     LMP-48years old?     Osteoporosis     previously on Fosamax many years ago; does not want further treatment    Overactive bladder     Scarring of lung '04    Scarring @ the apices bilaterally Done @VPI/histoplasmosis    Skin cancer 01/06/2020    scc-right shin    Unspecified hypothyroidism              Past Surgical History:   Procedure Laterality Date    COLONOSCOPY  1/12    McGroarty/gi    HX APPENDECTOMY      HX CATARACT REMOVAL  3/12    L    HX CATARACT REMOVAL  04/2012 Bilateral     HX KNEE REPLACEMENT  1/10    right  Darnell/o/s   [facility] ORTHOPAEDIC  2006 and 2008    R knee arthroscopy Dr Tae Whitney    appendectomy       Family History   Problem Relation Age of Onset    Heart Disease Mother     Diabetes Father     Heart Disease Father     Cancer Son         metastatic prostate ca    Diabetes Son     Heart Disease Son         Social History     Tobacco Use    Smoking status: Former Smoker     Packs/day: 1.00     Years: 25.00     Pack years: 25.00     Types: Cigarettes    Smokeless tobacco: Never Used    Tobacco comment: quit in the '90's/cigarettes   Substance Use Topics    Alcohol use: Yes     Comment: socially        All systems reviewed x 12 and were negative with the exception of None      No flowsheet data found. Vitals: There were no vitals taken for this visit. There is no height or weight on file to calculate BMI. Physical Exam    General: NAD, well developed, well nourished, alert and oriented x 3. Cardiac: Extremities well perfused    Respiratory: Nonlabored breathing    LLE: Antalgic gait. Mild effusion noted. No previous incisions noted. ROM 0-120 degrees. Grossly stable to varus/valgus stress and anterior/posterior drawer tests. Medial joint line tenderness. Motor grossly intact. RLE: Normal gait and station. Negative stinchfield. No effusion noted. previous incisions noted -history of total knee replacement. ROM 0-120 degrees. Grossly stable to varus/valgus stress and anterior/posterior drawer tests. Negative McMurrays. Motor grossly intact. Vascular: Palpable pedal pulses, equal bilaterally. Skin: Warm well perfused, cap refill < 2 sec. Wing Bishop M.D. was available for immediate consultation as the supervising physician. An electronic signature was used to authenticate this note.   -- Kelin Santos PA-C

## 2022-02-28 DIAGNOSIS — M17.12 PRIMARY OSTEOARTHRITIS OF LEFT KNEE: Primary | ICD-10-CM

## 2022-03-03 ENCOUNTER — HOSPITAL ENCOUNTER (OUTPATIENT)
Dept: PREADMISSION TESTING | Age: 82
Discharge: HOME | End: 2022-03-03
Payer: MEDICARE

## 2022-03-03 VITALS
WEIGHT: 158.95 LBS | HEIGHT: 64 IN | OXYGEN SATURATION: 99 % | TEMPERATURE: 98.6 F | DIASTOLIC BLOOD PRESSURE: 75 MMHG | SYSTOLIC BLOOD PRESSURE: 163 MMHG | BODY MASS INDEX: 27.14 KG/M2 | HEART RATE: 74 BPM

## 2022-03-03 LAB
ABO + RH BLD: NORMAL
ANION GAP SERPL CALC-SCNC: 4 MMOL/L (ref 5–15)
APPEARANCE UR: CLEAR
ATRIAL RATE: 62 BPM
BACTERIA URNS QL MICRO: NEGATIVE /HPF
BILIRUB UR QL: NEGATIVE
BLOOD GROUP ANTIBODIES SERPL: NORMAL
BUN SERPL-MCNC: 16 MG/DL (ref 6–20)
BUN/CREAT SERPL: 15 (ref 12–20)
CALCIUM SERPL-MCNC: 9.5 MG/DL (ref 8.5–10.1)
CALCULATED P AXIS, ECG09: 63 DEGREES
CALCULATED R AXIS, ECG10: -3 DEGREES
CALCULATED T AXIS, ECG11: 45 DEGREES
CHLORIDE SERPL-SCNC: 99 MMOL/L (ref 97–108)
CO2 SERPL-SCNC: 31 MMOL/L (ref 21–32)
COLOR UR: ABNORMAL
CREAT SERPL-MCNC: 1.08 MG/DL (ref 0.55–1.02)
DIAGNOSIS, 93000: NORMAL
EPITH CASTS URNS QL MICRO: ABNORMAL /LPF
ERYTHROCYTE [DISTWIDTH] IN BLOOD BY AUTOMATED COUNT: 13.5 % (ref 11.5–14.5)
EST. AVERAGE GLUCOSE BLD GHB EST-MCNC: 123 MG/DL
GLUCOSE SERPL-MCNC: 105 MG/DL (ref 65–100)
GLUCOSE UR STRIP.AUTO-MCNC: NEGATIVE MG/DL
HBA1C MFR BLD: 5.9 % (ref 4–5.6)
HCT VFR BLD AUTO: 36 % (ref 35–47)
HGB BLD-MCNC: 12 G/DL (ref 11.5–16)
HGB UR QL STRIP: NEGATIVE
HYALINE CASTS URNS QL MICRO: ABNORMAL /LPF (ref 0–5)
INR PPP: 1 (ref 0.9–1.1)
KETONES UR QL STRIP.AUTO: NEGATIVE MG/DL
LEUKOCYTE ESTERASE UR QL STRIP.AUTO: ABNORMAL
MCH RBC QN AUTO: 28.8 PG (ref 26–34)
MCHC RBC AUTO-ENTMCNC: 33.3 G/DL (ref 30–36.5)
MCV RBC AUTO: 86.5 FL (ref 80–99)
NITRITE UR QL STRIP.AUTO: NEGATIVE
NRBC # BLD: 0 K/UL (ref 0–0.01)
NRBC BLD-RTO: 0 PER 100 WBC
P-R INTERVAL, ECG05: 176 MS
PH UR STRIP: 6.5 [PH] (ref 5–8)
PLATELET # BLD AUTO: 303 K/UL (ref 150–400)
PMV BLD AUTO: 10 FL (ref 8.9–12.9)
POTASSIUM SERPL-SCNC: 3.3 MMOL/L (ref 3.5–5.1)
PROT UR STRIP-MCNC: NEGATIVE MG/DL
PROTHROMBIN TIME: 10.5 SEC (ref 9–11.1)
Q-T INTERVAL, ECG07: 420 MS
QRS DURATION, ECG06: 80 MS
QTC CALCULATION (BEZET), ECG08: 426 MS
RBC # BLD AUTO: 4.16 M/UL (ref 3.8–5.2)
RBC #/AREA URNS HPF: ABNORMAL /HPF (ref 0–5)
SODIUM SERPL-SCNC: 134 MMOL/L (ref 136–145)
SP GR UR REFRACTOMETRY: 1.01 (ref 1–1.03)
SPECIMEN EXP DATE BLD: NORMAL
UA: UC IF INDICATED,UAUC: ABNORMAL
UROBILINOGEN UR QL STRIP.AUTO: 0.2 EU/DL (ref 0.2–1)
VENTRICULAR RATE, ECG03: 62 BPM
WBC # BLD AUTO: 6.6 K/UL (ref 3.6–11)
WBC URNS QL MICRO: ABNORMAL /HPF (ref 0–4)

## 2022-03-03 PROCEDURE — 81001 URINALYSIS AUTO W/SCOPE: CPT

## 2022-03-03 PROCEDURE — 83036 HEMOGLOBIN GLYCOSYLATED A1C: CPT

## 2022-03-03 PROCEDURE — 85610 PROTHROMBIN TIME: CPT

## 2022-03-03 PROCEDURE — 93005 ELECTROCARDIOGRAM TRACING: CPT

## 2022-03-03 PROCEDURE — 85027 COMPLETE CBC AUTOMATED: CPT

## 2022-03-03 PROCEDURE — 86900 BLOOD TYPING SEROLOGIC ABO: CPT

## 2022-03-03 PROCEDURE — 80048 BASIC METABOLIC PNL TOTAL CA: CPT

## 2022-03-03 PROCEDURE — 87086 URINE CULTURE/COLONY COUNT: CPT

## 2022-03-03 PROCEDURE — 36415 COLL VENOUS BLD VENIPUNCTURE: CPT

## 2022-03-03 RX ORDER — DEXTROMETHORPHAN HYDROBROMIDE, GUAIFENESIN 5; 100 MG/5ML; MG/5ML
1300 LIQUID ORAL
COMMUNITY

## 2022-03-03 RX ORDER — CALCIUM CARBONATE 400(1000)
1 TABLET,CHEWABLE ORAL AS NEEDED
COMMUNITY
End: 2022-08-15

## 2022-03-03 RX ORDER — DOCUSATE SODIUM AND SENNOSIDES 50; 8.6 MG/1; MG/1
CAPSULE, GELATIN COATED ORAL AS NEEDED
COMMUNITY

## 2022-03-03 NOTE — PERIOP NOTES
[facility] INSTRUCTIONS  ORTHOPAEDIC    Surgery Date:   3-16-22    [facility] staff will call you between 4 PM- 8 PM the day before surgery with your arrival time. If your surgery is on a Monday, we will call you the preceding Friday. Please call [phone] after 8 PM if you did not receive your arrival time. 1. Please report to [facility] Patient Access/Admitting on the 1st floor. Bring your insurance card, photo identification, and any copayment (if applicable). 2. If you are going home the same day of your surgery, you must have a responsible adult to drive you home. You need to have a responsible adult to stay with you the first 24 hours after surgery and you should not drive a car for 24 hours following your surgery. 3. Do NOT eat any solid foods after midnight the night before surgery including candy, mints or gum. You may drink clear liquids from midnight until 1 hour prior to arrival time. You may drink up to 12 ounces at one time every 4 hours. 4. Do NOT drink alcohol or smoke 24 hours before surgery. STOP smoking for 14 days prior as it helps with breathing and healing after surgery. 5. If your arrival time is 3pm or later, you may eat a light breakfast before 8am (toast, bagel-no butter, black coffee, plain tea, fruit juice-no pulp) Please note special instructions, if applicable, below for medications. 6. If you are being admitted to the hospital,please leave personal belongings/luggage in your car until you have an assigned hospital room number. 7. Please wear comfortable clothes. Wear your glasses instead of contacts. We ask that all money, jewelry and valuables be left at home. Wear no make up, particularly mascara, the day of surgery. 8.  All body piercings, rings, and jewelry need to be removed and left at home. Please remove any nail polish or artificial nails from your fingernails. Please wear your hair loose or down.  Please no pony-tails, buns, or any metal hair accessories. If you shower the morning of surgery, please do not apply any lotions or powders afterwards. You may wear deodorant. Do not shave any body area within 24 hours of your surgery. 9. Please follow all instructions to avoid any potential surgical cancellation. 10. Should your physical condition change, (i.e. fever, cold, flu, etc.) please notify your surgeon as soon as possible. 11. It is important to be on time. If a situation occurs where you may be delayed, please call:  [phone] / [phone] on the day of surgery. 12. The Preadmission Testing staff can be reached at [phone]. 13. Special instructions: [address]    Current Outpatient Medications   Medication Sig    acetaminophen (Tylenol Arthritis Pain) 650 mg TbER Take 1,300 mg by mouth every eight (8) hours as needed for Pain.  calcium carbonate (Tums Ultra) 400 mg calcium (1,000 mg) chew Take 1 Tablet by mouth as needed.  sennosides-docusate sodium (Stool Softener-Stimulant Laxat) 8.6-50 mg cap Take  by mouth as needed.  mineral oil/petrolatum,white (LUBRICANT EYE OP) Administer  to both eyes as needed.  losartan-hydroCHLOROthiazide (HYZAAR) 100-25 mg per tablet TAKE 1 TABLET EVERY DAY    levothyroxine (SYNTHROID) 112 mcg tablet TAKE 1 TABLET EVERY DAY BEFORE BREAKFAST    MULTIVITAMINS (MULTI-VITAMIN PO) Take 1 Tab by mouth daily. Takes one po daily.  simvastatin (ZOCOR) 40 mg tablet TAKE 1 TABLET EVERY OTHER DAY (Patient not taking: Reported on 3/3/2022)     No current facility-administered medications for this encounter. 1. YOU MUST ONLY TAKE THESE MEDICATIONS THE MORNING OF SURGERY WITH A SIP OF WATER: LEVOTHYROXINE  2. MEDICATIONS TO TAKE THE MORNING OF SURGERY ONLY IF NEEDED: TYLENOL  3. HOLD these prescription medications DAY OF Surgery: LOSARTAN-HCTZ  4. Ask your surgeon/prescribing physician about when/if to STOP taking these medications: N/A  5.  Stop any non-steroidal anti-inflammatory drugs (i.e. Ibuprofen, Naproxen, Advil, Aleve) 3 days before surgery. You may take Tylenol. STOP all vitamins and herbal supplements 1 week prior to  surgery. 6. If you are currently taking Plavix, Coumadin, or any other blood-thinning/anticoagulant medication contact your prescribing physician for instructions. Preventing Infections Before and After - Your Surgery    IMPORTANT INSTRUCTIONS    You play an important role in your health and preparation for surgery. To reduce the germs on your skin you will need to shower with CHG soap (Chorhexidine gluconate 4%) two times before surgery. CHG soap (Hibiclens, Hex-A-Clens or store brand)   CHG soap will be provided at your Preadmission Testing (PAT) appointment.  If you do not have a PAT appointment before surgery, you may arrange to  CHG soap from our office or purchase CHG soap at a pharmacy, grocery or department store.  You need to purchase TWO 4 ounce bottles to use for your 2 showers. Steps to follow:  1. Wash your hair with your normal shampoo and your body with regular soap and rinse well to remove shampoo and soap from your skin. 2. Wet a clean washcloth and turn off the shower. 3. Put CHG soap on washcloth and apply to your entire body from the neck down. Do not use on your head, face or private parts(genitals). Do not use CHG soap on open sores, wounds or areas of skin irritation. 4. Wash you body gently for 5 minutes. Do not wash your skin too hard. This soap does not create lather. Pay special attention to your underarms and from your belly button to your feet. 5. Turn the shower back on and rinse well to get CHG soap off your body. 6. Pat your skin dry with a clean, dry towel. Do not apply lotions or moisturizer. 7. Put on clean clothes and sleep on fresh bed sheets and do not allow pets to sleep with you.     Shower with CHG soap 2 times before your surgery   The evening before your surgery   The morning of your surgery      Tips to help prevent infections after your surgery:  1. Protect your surgical wound from germs:  ? Hand washing is the most important thing you and your caregivers can do to prevent infections. ? Keep your bandage clean and dry! ? Do not touch your surgical wound. 2. Use clean, freshly washed towels and washcloths every time you shower; do not share bath linens with others. 3. Until your surgical wound is healed, wear clothing and sleep on bed linens each day that are clean and freshly washed. 4. Do not allow pets to sleep in your bed with you or touch your surgical wound. 5. Do not smoke - smoking delays wound healing. This may be a good time to stop smoking. 6. If you have diabetes, it is important for you to manage your blood sugar levels properly before your surgery as well as after your surgery. Poorly managed blood sugar levels slow down wound healing and prevent you from healing completely. Prevention of Infection  Testing for Staphylococcus aureus on your skin before surgery    Staphylococcus aureus (staph) is a common bacteria that is found on the body. It normally does not cause infection on healthy skin. Before surgery, you will be tested to see if you have staph by swabbing the inside of your nose. When you have an incision with surgery, the goal is to protect that incision from infection. Removal of the staph bacteria before surgery can decrease the risk of a surgical site infection. If your nose swab is positive for staph you will be called. Your treatment will include 2 steps:   Prescription for Mupirocin ointment to be used in each nostril twice a day for 5 days.  Showering with Chlorhexidine (CHG) liquid soap for 5 days prior to surgery. How to use Mupirocin ointment in your nose  1.  the prescription from your pharmacy. You will receive a large tube of ointment which will be big enough for all of your treatments.  You will apply this ointment to each nostril 2 times a day for 5 days. 2. Wash your hands with  gel or soap and water for 20 seconds before using ointment. 3. Place a pea-sized amount of ointment on a cotton Q-tip. 4. Apply ointment just inside of each nostril with the Q-tip. Do not push Q-tip or ointment deep inside you nose. 5. Press your nostrils together and massage for a few seconds. 6. Wash your hands with  gel or soap and water after you are finished. 7. Do not get ointment near your eyes. If it gets into your eyes, rinse them with cool water. 8. If you need to use nasal spray, clean the tip of the bottle with alcohol before use and do not use both at the same time. 9. If you are scheduled for COVID testing during the 5 days, do NOT apply morning dose until after the COVID test has been performed. How to use Chlorhexidine (CHG) 4% liquid soap  1. Purchase an 8 ounce bottle of CHG liquid soap (Chlorhexidine 4%, Hibiclens, Hex-A-Clens or store brand) at a pharmacy or grocery store. 2. Wash your hair with your normal shampoo and your body with regular soap and rinse well to remove shampoo and soap from your skin. 3. Wet a clean washcloth and turn off the shower. 4. Put CHG soap on washcloth and apply to your entire body from the neck down. Do not use on your head, face or private parts(genitals). Do not use CHG soap on open sores, wounds or areas of skin irritation. 5. Wash your body gently for 5 minutes. Do not wash your skin too hard. This soap does not create lather. Pay special attention to your underarms and from your belly button to your feet. 6. Turn the shower back on and rinse well to get CHG soap off your body. 7. Pat your skin dry with a clean, dry towel. Do not apply lotions or moisturizer. 8. Put on clean clothes and sleep on fresh bed sheets the night before surgery. Do not allow pets to sleep with you.     Eating and Drinking Before Surgery     You may eat a regular dinner at the usual time on the day before your surgery.  Do NOT eat any solid foods after midnight unless your arrival time at the hospital is 3pm or later.  You may drink clear liquids only from 12 midnight until 1 hours prior to your arrival time at the hospital on the day of your surgery. Do NOT drink alcohol.  Clear liquids include:  o Water  o Fruit juices without pulp( i.e. apple juice)  o Carbonated beverages  o Black coffee (no cream/milk)  o Tea (no cream/milk)  o Gatorade   You may drink up to 12-16 ounces at one time every 4 hours between the hours of midnight and 1 hour before your arrival time at the hospital. Example- if your arrival time at the hospital is 6am, you may drink 12-16 ounces of clear liquids no later than 5am.   If your arrival time at the hospital is 3pm or later, you may eat a light breakfast before 8am.   A light breakfast includes:  o Toast or bagel (no butter)  o Black coffee (no cream/milk)  o Tea (no cream/milk)  o Fruit juices without pulp ( i.e. apple juice)  o Do NOT eat meat, eggs, vegetables or fruit   If you have any questions, please contact your surgeon's office. [facility]   Instructions for Pre-Surgery COVID-19 Testing     Across our ministry we have established standard guidelines to ensure the health and safety of our patients, residents and associates as we resume elective services for patients. All patients presenting for surgery are required to have a COVID-19 test result within 96 hours of their scheduled surgery. [employer] is providing this test free of charge to the patient.    Instructions for COVID-19 Testing:     Patients will receive a call from Pre-Admission Testing 4-5 days prior to surgery to schedule a date and time to come to the [address] [place] [address] Drive for their COVID-19 test   Patients are advised to self-quarantine after testing until their scheduled surgery   Once on site, patients will be registered and receive COVID test in their vehicle   If a patient is scheduled for normal Pre Admission Testing 96 hours from date of surgery, the patient will still have their COVID test done at the [address] [place] [address] located at [address] [place] [address] Positive results will be shared with the surgeon and anesthesiologist and may result in cancellation of the elective procedure    Testing Hours and Location:   Address:  [address] Admission [facility] in the Discharge Lot on Critical access hospital (Map Attached)  Jayna Joya, [address]   Hours: Monday- Friday 7a-3p    PAT Phone Number: [phone]            Patient Information Regarding COVID Restrictions    Patients are advised to self-quarantine after COVID testing up to the day of the scheduled procedure. Day of Procedure     Please park in the parking deck or any designated visitor parking lot.  Enter the facility through the Main Entrance of the hospital.   A temperature check and appropriate symptom/exposure screening will be done prior to entry to the facility.  On the day of surgery, please provide the cell phone number of the person who will be waiting for you to the Patient Access representative at the time of registration.  Please wear a mask on the day of your procedure.  We are now allowing one designated visitor per stay. Pediatric patients may have 2 designated visitors. This one person may come in with you on the day of your procedure.  No visitors under the age of 13.  The designated visitor must also wear a mask.  Once your procedure and the immediate recovery period is completed, a nurse in the recovery area will contact your designated visitor to inform them of your room number or to otherwise review other pertinent information regarding your care.  Social distancing practices are to be adhered to in waiting areas and the cafeteria. The patient was contacted in person.    She verbalized understanding of all instructions does not  need reinforcement.

## 2022-03-04 LAB
BACTERIA SPEC CULT: NORMAL
SERVICE CMNT-IMP: NORMAL
SERVICE CMNT-IMP: NORMAL

## 2022-03-04 NOTE — PERIOP NOTES
PAT Nurse Practitioner   Pre-Operative Chart Review/Assessment:-ORTHOPEDIC                Patient Name:  Leigh Marinelli                                                           Age:   80 y.o.    :  1940     Today's Date:  3/7/2022     Date of PAT:   3/3/2022      Date of Surgery:    3/16/2022      Procedure(s):  Left Total Knee Arthroplasty     Surgeon:   Dr. Sissy Richard                       PLAN:      1)  Medical Clearance:  Dr. Scott Anton      2)  Cardiac Clearance:  EKG and METs reviewed. No further cardiac evaluation requested. 3)  Diabetic Treatment Consult:  Not indicated. A1c-5.9      4)  Sleep Apnea evaluation:   Not indicated. DOT Score 2.       5) Treatment for MRSA/Staph Aureus:  Neg      6) Additional Concerns:  Former smoker, HTN, OAB, hx of histoplasmosis                Vital Signs:         Vitals:    22 1510   BP: (!) 163/75   Pulse: 74   Temp: 98.6 °F (37 °C)   SpO2: 99%   Weight: 72.1 kg (158 lb 15.2 oz)   Height: 5' 4\" (1.626 m)            ____________________________________________  PAST MEDICAL HISTORY  Past Medical History:   Diagnosis Date    Chronic pain     LEFT KNEE    DDD (degenerative disc disease), lumbar 3/15/2016    Diverticulosis of colon     McGroarty/gi    DJD (degenerative joint disease)     Esophageal stricture     Fibrosis of lung ([address]     Scarring @ the apices bilaterally Done @VPI/histoplasmosis    History of basal cell cancer     HTN, goal below 150/90     Hypercholesterolemia     IBS (irritable bowel syndrome) 2016    Menopause     LMP-48years old?     Osteoporosis     previously on Fosamax many years ago; does not want further treatment    Overactive bladder     Skin cancer 2020    scc-right shin    Unspecified hypothyroidism            ____________________________________________  PAST SURGICAL HISTORY  Past Surgical History:   Procedure Laterality Date    COLONOSCOPY      McGroarty/gi    HX APPENDECTOMY      HX CATARACT REMOVAL  3/12    L    HX CATARACT REMOVAL  2012    Bilateral     HX KNEE ARTHROSCOPY Right  and 2008    Dr Mcgill [place]  1/10    right  Dranell/o/s    HX OTHER SURGICAL  2021    EXC. SKIN CA TENISHA. LOWER LEGS      ____________________________________________  HOME MEDICATIONS  Current Outpatient Medications   Medication Sig    acetaminophen (Tylenol Arthritis Pain) 650 mg TbER Take 1,300 mg by mouth every eight (8) hours as needed for Pain.  calcium carbonate (Tums Ultra) 400 mg calcium (1,000 mg) chew Take 1 Tablet by mouth as needed.  sennosides-docusate sodium (Stool Softener-Stimulant Laxat) 8.6-50 mg cap Take  by mouth as needed.  mineral oil/petrolatum,white (LUBRICANT EYE OP) Administer  to both eyes as needed.  losartan-hydroCHLOROthiazide (HYZAAR) 100-25 mg per tablet TAKE 1 TABLET EVERY DAY    levothyroxine (SYNTHROID) 112 mcg tablet TAKE 1 TABLET EVERY DAY BEFORE BREAKFAST    MULTIVITAMINS (MULTI-VITAMIN PO) Take 1 Tab by mouth daily. Takes one po daily.     simvastatin (ZOCOR) 40 mg tablet TAKE 1 TABLET EVERY OTHER DAY (Patient not taking: Reported on 3/3/2022)     No current facility-administered medications for this encounter.      ____________________________________________  ALLERGIES  Allergies   Allergen Reactions    Benazepril Cough    Sulfa (Sulfonamide Antibiotics) Hives      ____________________________________________  SOCIAL HISTORY  Social History     Tobacco Use    Smoking status: Former Smoker     Packs/day: 1.00     Years: 25.00     Pack years: 25.00     Types: Cigarettes     Quit date: 3/3/2002     Years since quittin.0    Smokeless tobacco: Never Used    Tobacco comment: quit in the 's/cigarettes   Substance Use Topics    Alcohol use: Not Currently      ____________________________________________   Internal Administration   First Dose COVID-19, Pfizer Purple top, DILUTE for use, 12+ yrs, 30mcg/0.3mL dose  02/11/2021   Second Dose COVID-19, Postini top, DILUTE for use, 12+ yrs, 30mcg/0.3mL dose  03/11/2021      Last COVID Lab SARS-CoV-2 ( )   Date Value   03/11/2022 Not detected        Labs:     Hospital Outpatient Visit on 03/03/2022   Component Date Value Ref Range Status    Sodium 03/03/2022 134* 136 - 145 mmol/L Final    Potassium 03/03/2022 3.3* 3.5 - 5.1 mmol/L Final    Chloride 03/03/2022 99  97 - 108 mmol/L Final    CO2 03/03/2022 31  21 - 32 mmol/L Final    Anion gap 03/03/2022 4* 5 - 15 mmol/L Final    Glucose 03/03/2022 105* 65 - 100 mg/dL Final    BUN 03/03/2022 16  6 - 20 MG/DL Final    Creatinine 03/03/2022 1.08* 0.55 - 1.02 MG/DL Final    BUN/Creatinine ratio 03/03/2022 15  12 - 20   Final    GFR est AA 03/03/2022 59* >60 ml/min/1.73m2 Final    GFR est non-AA 03/03/2022 49* >60 ml/min/1.73m2 Final    Estimated GFR is calculated using the IDMS-traceable Modification of Diet in Renal Disease (MDRD) Study equation, reported for both  Americans (GFRAA) and non- Americans (GFRNA), and normalized to 1.73m2 body surface area. The physician must decide which value applies to the patient.     Calcium 03/03/2022 9.5  8.5 - 10.1 MG/DL Final    WBC 03/03/2022 6.6  3.6 - 11.0 K/uL Final    RBC 03/03/2022 4.16  3.80 - 5.20 M/uL Final    HGB 03/03/2022 12.0  11.5 - 16.0 g/dL Final    HCT 03/03/2022 36.0  35.0 - 47.0 % Final    MCV 03/03/2022 86.5  80.0 - 99.0 FL Final    MCH 03/03/2022 28.8  26.0 - 34.0 PG Final    MCHC 03/03/2022 33.3  30.0 - 36.5 g/dL Final    RDW 03/03/2022 13.5  11.5 - 14.5 % Final    PLATELET 81/89/3942 953  150 - 400 K/uL Final    MPV 03/03/2022 10.0  8.9 - 12.9 FL Final    NRBC 03/03/2022 0.0  0  WBC Final    ABSOLUTE NRBC 03/03/2022 0.00  0.00 - 0.01 K/uL Final    Crossmatch Expiration 03/03/2022 03/17/2022,2359   Final    ABO/Rh(D) 03/03/2022 B POSITIVE   Final    Antibody screen 03/03/2022 NEG   Final    INR 03/03/2022 1.0  0.9 - 1.1   Final    A single therapeutic range for Vit K antagonists may not be optimal for all indications - see June, 2008 issue of Chest, American College of Chest Physicians Evidence-Based Clinical Practice Guidelines, 8th Edition.  Prothrombin time 03/03/2022 10.5  9.0 - 11.1 sec Final    Color 03/03/2022 YELLOW/STRAW    Final    Color Reference Range: Straw, Yellow or Dark Yellow    Appearance 03/03/2022 CLEAR  CLEAR   Final    Specific gravity 03/03/2022 1.009  1.003 - 1.030   Final    pH (UA) 03/03/2022 6.5  5.0 - 8.0   Final    Protein 03/03/2022 Negative  NEG mg/dL Final    Glucose 03/03/2022 Negative  NEG mg/dL Final    Ketone 03/03/2022 Negative  NEG mg/dL Final    Bilirubin 03/03/2022 Negative  NEG   Final    Blood 03/03/2022 Negative  NEG   Final    Urobilinogen 03/03/2022 0.2  0.2 - 1.0 EU/dL Final    Nitrites 03/03/2022 Negative  NEG   Final    Leukocyte Esterase 03/03/2022 MODERATE* NEG   Final    UA:UC IF INDICATED 03/03/2022 URINE CULTURE ORDERED* CNI   Final    WBC 03/03/2022 20-50  0 - 4 /hpf Final    RBC 03/03/2022 0-5  0 - 5 /hpf Final    Epithelial cells 03/03/2022 FEW  FEW /lpf Final    Epithelial cell category consists of squamous cells and /or transitional urothelial cells. Renal tubular cells, if present, are separately identified as such.     Bacteria 03/03/2022 Negative  NEG /hpf Final    Hyaline cast 03/03/2022 0-2  0 - 5 /lpf Final    Ventricular Rate 03/03/2022 62  BPM Final    Atrial Rate 03/03/2022 62  BPM Final    P-R Interval 03/03/2022 176  ms Final    QRS Duration 03/03/2022 80  ms Final    Q-T Interval 03/03/2022 420  ms Final    QTC Calculation (Bezet) 03/03/2022 426  ms Final    Calculated P Axis 03/03/2022 63  degrees Final    Calculated R Axis 03/03/2022 -3  degrees Final    Calculated T Axis 03/03/2022 45  degrees Final    Diagnosis 03/03/2022    Final                    Value:Normal sinus rhythm  Normal ECG  When compared with ECG of 11-JAN-2010 13:31,  No significant change was found  Confirmed by Elisa Leiva MD, Andrés Freeman ([id number]) on 3/3/2022 8:20:53 PM      Hemoglobin A1c 03/03/2022 5.9* 4.0 - 5.6 % Final    Comment: NEW METHOD  PLEASE NOTE NEW REFERENCE RANGE  (NOTE)  HbA1C Interpretive Ranges  <5.7              Normal  5.7 - 6.4         Consider Prediabetes  >6.5              Consider Diabetes      Est. average glucose 03/03/2022 123  mg/dL Final    Special Requests: 03/03/2022 NO SPECIAL REQUESTS    Final    Culture result: 03/03/2022 MRSA NOT PRESENT    Final            Special Requests: 03/03/2022     Final                    Value:NO SPECIAL REQUESTS  Reflexed from [id number]      Culture result: 03/03/2022 No growth (<1,000 CFU/ML)    Final       Skin:     Denies open wounds, cuts, sores, rashes or other areas of concern in PAT assessment.           Caterina Jaquez, NP

## 2022-03-08 ENCOUNTER — OFFICE VISIT (OUTPATIENT)
Dept: FAMILY MEDICINE CLINIC | Age: 82
End: 2022-03-08
Payer: MEDICARE

## 2022-03-08 ENCOUNTER — TELEPHONE (OUTPATIENT)
Dept: FAMILY MEDICINE CLINIC | Age: 82
End: 2022-03-08

## 2022-03-08 VITALS
TEMPERATURE: 99.3 F | HEIGHT: 62 IN | HEART RATE: 66 BPM | WEIGHT: 156 LBS | OXYGEN SATURATION: 99 % | RESPIRATION RATE: 16 BRPM | SYSTOLIC BLOOD PRESSURE: 148 MMHG | DIASTOLIC BLOOD PRESSURE: 76 MMHG | BODY MASS INDEX: 28.71 KG/M2

## 2022-03-08 DIAGNOSIS — E03.9 ACQUIRED HYPOTHYROIDISM: Primary | ICD-10-CM

## 2022-03-08 DIAGNOSIS — E87.6 LOW SERUM POTASSIUM: ICD-10-CM

## 2022-03-08 DIAGNOSIS — E07.9 ASYMMETRICAL THYROID: ICD-10-CM

## 2022-03-08 DIAGNOSIS — E03.9 ACQUIRED HYPOTHYROIDISM: ICD-10-CM

## 2022-03-08 DIAGNOSIS — E78.5 HYPERLIPIDEMIA, UNSPECIFIED HYPERLIPIDEMIA TYPE: ICD-10-CM

## 2022-03-08 DIAGNOSIS — I10 HTN, GOAL BELOW 150/90: ICD-10-CM

## 2022-03-08 DIAGNOSIS — R73.03 PRE-DIABETES: ICD-10-CM

## 2022-03-08 DIAGNOSIS — Z01.818 PREOPERATIVE EXAMINATION: Primary | ICD-10-CM

## 2022-03-08 DIAGNOSIS — M81.0 OSTEOPOROSIS, UNSPECIFIED OSTEOPOROSIS TYPE, UNSPECIFIED PATHOLOGICAL FRACTURE PRESENCE: ICD-10-CM

## 2022-03-08 DIAGNOSIS — R79.89 ELEVATED SERUM CREATININE: ICD-10-CM

## 2022-03-08 PROCEDURE — 1090F PRES/ABSN URINE INCON ASSESS: CPT | Performed by: FAMILY MEDICINE

## 2022-03-08 PROCEDURE — 1101F PT FALLS ASSESS-DOCD LE1/YR: CPT | Performed by: FAMILY MEDICINE

## 2022-03-08 PROCEDURE — G8427 DOCREV CUR MEDS BY ELIG CLIN: HCPCS | Performed by: FAMILY MEDICINE

## 2022-03-08 PROCEDURE — G8753 SYS BP > OR = 140: HCPCS | Performed by: FAMILY MEDICINE

## 2022-03-08 PROCEDURE — G0463 HOSPITAL OUTPT CLINIC VISIT: HCPCS | Performed by: FAMILY MEDICINE

## 2022-03-08 PROCEDURE — G8536 NO DOC ELDER MAL SCRN: HCPCS | Performed by: FAMILY MEDICINE

## 2022-03-08 PROCEDURE — G8419 CALC BMI OUT NRM PARAM NOF/U: HCPCS | Performed by: FAMILY MEDICINE

## 2022-03-08 PROCEDURE — G8510 SCR DEP NEG, NO PLAN REQD: HCPCS | Performed by: FAMILY MEDICINE

## 2022-03-08 PROCEDURE — 99213 OFFICE O/P EST LOW 20 MIN: CPT | Performed by: FAMILY MEDICINE

## 2022-03-08 PROCEDURE — G8754 DIAS BP LESS 90: HCPCS | Performed by: FAMILY MEDICINE

## 2022-03-08 NOTE — PROGRESS NOTES
Chief Complaint   Patient presents with    Follow-up        1. \"Have you been to the ER, urgent care clinic since your last visit? Hospitalized since your last visit? \" No    2. \"Have you seen or consulted any other health care providers outside of the [address] [place] [address] since your last visit? \" No     3. For patients aged 39-70: Has the patient had a colonoscopy? NA - based on age     If the patient is female:    4. For patients aged 41-77: Has the patient had a mammogram within the past 2 years? NA - based on age    11. For patients aged 21-30: Has the patient had a pap smear?  NA - based on age    1 most recent PHQ Screens 3/8/2022   Little interest or pleasure in doing things Several days   Feeling down, depressed, irritable, or hopeless Several days   Total Score PHQ 2 2   Trouble falling or staying asleep, or sleeping too much -   Feeling tired or having little energy -   Poor appetite, weight loss, or overeating -   Feeling bad about yourself - or that you are a failure or have let yourself or your family down -   Trouble concentrating on things such as school, work, reading, or watching TV -   Moving or speaking so slowly that other people could have noticed; or the opposite being so fidgety that others notice -   Thoughts of being better off dead, or hurting yourself in some way -   How difficult have these problems made it for you to do your work, take care of your home and get along with others -

## 2022-03-08 NOTE — PROGRESS NOTES
Arthur Graham  80 y.o. female  1940  [address]  [id number]     [facility]       Encounter Date: 3/8/2022           Established Patient Visit Note: Marvin Grimaldo MD    Reason for Appointment:  Chief Complaint   Patient presents with    Follow-up         History of Present Illness:  History provided by patient    Arthur Graham is a 80 y.o. female who presents to clinic today for:     Preoperative Exam  Surgery: Knee replacement, left  Date of Surgery: 3/16/22  Surgeon: Dr. Simone Castle  Type of Anesthesia: General  Surgical History: last surgery was right knee replacement in 2010 (denies any complicatons with surgery or anesthesia)  Cardiac History: denies having any cardiac conditions  Comorbid conditions: see below  Functional Capacity: reports being able to walk up 2 flights of stairs without getting short of breath or having chest pain. She does endorse limitation from left knee pain. DOT Screening: she denies any hx of snoring or having witnessed apnea, she denies feeling sleep during the day. She does have HTN, age > 48. · Hypothyroidism: she takes synthroid in the am 1 hours prior to medications, but she does eat with it. Last TSH was 2.96 on 8/26/20  · HTN: she is not checking BP at home  · HLD: she reports that she stopped taking Zocor because she does not think it will benefit her. She denies having any side effects related to taking it. · Low Potassium: patient had potassium of 3.3 on 3/3/22; she is not taking potassium supplement. · Prediabetes: A1c 5.9 on 3/3/22  · Elevated Cretainine: last Creat was 1.08 on 3/3/22  · Diarrhea: see visit note from 9/14/21.  She reports that this resolved shortly after the visit  · Elevated Calcium: idenitied on labs of Aug, 2020; she stopped taking calcium supplements  · Osteoporosis: she was on Fosamax in the past. She is not interested in additional meds for this      Review of Systems  All other ROS were reviewed and are negative except as discussed in HPI        Allergies: Benazepril and Sulfa (sulfonamide antibiotics)    Medications:     Current Outpatient Medications:     acetaminophen (Tylenol Arthritis Pain) 650 mg TbER, Take 1,300 mg by mouth every eight (8) hours as needed for Pain., Disp: , Rfl:     calcium carbonate (Tums Ultra) 400 mg calcium (1,000 mg) chew, Take 1 Tablet by mouth as needed. , Disp: , Rfl:     sennosides-docusate sodium (Stool Softener-Stimulant Laxat) 8.6-50 mg cap, Take  by mouth as needed. , Disp: , Rfl:     mineral oil/petrolatum,white (LUBRICANT EYE OP), Administer  to both eyes as needed. , Disp: , Rfl:     losartan-hydroCHLOROthiazide (HYZAAR) 100-25 mg per tablet, TAKE 1 TABLET EVERY DAY, Disp: 90 Tablet, Rfl: 0    levothyroxine (SYNTHROID) 112 mcg tablet, TAKE 1 TABLET EVERY DAY BEFORE BREAKFAST, Disp: 90 Tab, Rfl: 3    MULTIVITAMINS (MULTI-VITAMIN PO), Take 1 Tab by mouth daily. Takes one po daily. , Disp: , Rfl:     History  Patient Care Team:  Wolf Ronquillo MD as PCP - General (Family Medicine)  Wolf Ronquillo MD as PCP - [facility] Provider  Niyah Campbell MD (Gastroenterology)  Eron Bynum MD (Obstetrics & Gynecology)  Kyle Patrick MD (Orthopedic Surgery)  Paloma Hyman MD as Physician (Ophthalmology)    Past Medical History: she has a past medical history of Chronic pain, DDD (degenerative disc disease), lumbar (3/15/2016), Diverticulosis of colon (1/12), DJD (degenerative joint disease), Esophageal stricture (2/03), Fibrosis of lung ([facility] [address] (2004), History of basal cell cancer, HTN, goal below 150/90, Hypercholesterolemia, IBS (irritable bowel syndrome) (8/30/2016), Menopause, Osteoporosis, Overactive bladder, Skin cancer (01/06/2020), and Unspecified hypothyroidism. She has no past medical history of Fracture.     Past Surgical History: she has a past surgical history that includes colonoscopy (1/12); hx appendectomy (1953); hx cataract removal (3/12); hx cataract removal (04/2012); hx other surgical (2020, 2021); hx knee replacement (1/10); and hx knee arthroscopy (Right, 2006 and 2008). Family Medical History: family history includes Cancer in her brother and son; Diabetes in her father and son; Heart Disease in her father, mother, and son; No Known Problems in her brother. Social History: she reports that she quit smoking about 20 years ago. Her smoking use included cigarettes. She has a 25.00 pack-year smoking history. She has never used smokeless tobacco. She reports previous alcohol use. She reports that she does not use drugs. Objective:   Visit Vitals  BP (!) 148/76   Pulse 66   Temp 99.3 °F (37.4 °C)   Resp 16   Ht 5' 2\" (1.575 m)   Wt 156 lb (70.8 kg)   SpO2 99%   BMI 28.53 kg/m²     Wt Readings from Last 3 Encounters:   03/08/22 156 lb (70.8 kg)   03/03/22 158 lb 15.2 oz (72.1 kg)   08/03/20 161 lb 3.2 oz (73.1 kg)       Physical Exam  Vitals and nursing note reviewed. Constitutional:       General: She is not in acute distress. Appearance: Normal appearance. HENT:      Head: Normocephalic and atraumatic. Nose: Nose normal.      Mouth/Throat:      Mouth: Mucous membranes are moist.   Eyes:      Extraocular Movements: Extraocular movements intact. Conjunctiva/sclera: Conjunctivae normal.      Pupils: Pupils are equal, round, and reactive to light. Neck:      Thyroid: Thyromegaly (left side enlargement) present. Cardiovascular:      Rate and Rhythm: Normal rate and regular rhythm. Pulses: Normal pulses. Heart sounds: Normal heart sounds. No murmur heard. No friction rub. No gallop. Pulmonary:      Effort: Pulmonary effort is normal. No respiratory distress. Breath sounds: Normal breath sounds. No wheezing, rhonchi or rales. Musculoskeletal:         General: Normal range of motion. Cervical back: Normal range of motion and neck supple. No rigidity. No muscular tenderness. Lymphadenopathy:      Cervical: No cervical adenopathy. Skin:     General: Skin is warm. Coloration: Skin is not jaundiced. Neurological:      General: No focal deficit present. Mental Status: She is alert. Mental status is at baseline. Cranial Nerves: Cranial nerves are intact. Motor: Motor function is intact. Coordination: Coordination is intact. Psychiatric:         Mood and Affect: Mood normal.         Behavior: Behavior normal.         Thought Content: Thought content normal.         Judgment: Judgment normal.             Assessment & Plan:      ICD-10-CM ICD-9-CM    1. Preoperative examination  Z01.818 V72.84    2. HTN, goal below 150/90  I10 401.9    3. Hyperlipidemia, unspecified hyperlipidemia type  E78.5 272.4 LIPID PANEL   4. Acquired hypothyroidism  E03.9 244.9 TSH 3RD GENERATION      T4, FREE   5. Pre-diabetes  R73.03 790.29    6. Elevated serum creatinine  R79.89 790.99    7. Low serum potassium  E87.6 276.8 MAGNESIUM      METABOLIC PANEL, COMPREHENSIVE     · Preoperative Examination: Patient average risk for age/sex/comorbidity for knee surgery under general anesthesia  · Thyroid Asymmetry/Enlargement: Advised discussion of thyroid enlargment with PCP at follow up visit and consideration of Ultrasound  · Low Creatinine, Low Potassium: Recheck as above   All other conditions listed above: Chronic, stable and/or managed by specialist. Will continue current treatment regimen. Will check labs as reflected above. Discussed following up with specialist as scheduled. Discussed recommendations for diet and exercise. I have discussed the diagnosis with the patient and the intended plan as seen in the above orders. The patient has received an after-visit summary along with patient information handout. I have discussed medication side effects and warnings with the patient as well.     Disposition  Follow-up and Dispositions    · Return in about 4 weeks (around 4/5/2022) for medicare wellness exam.           Marvin Grimaldo MD

## 2022-03-09 RX ORDER — LOSARTAN POTASSIUM AND HYDROCHLOROTHIAZIDE 25; 100 MG/1; MG/1
TABLET ORAL
Qty: 90 TABLET | Refills: 0 | Status: SHIPPED | OUTPATIENT
Start: 2022-03-09 | End: 2022-04-04

## 2022-03-09 RX ORDER — LEVOTHYROXINE SODIUM 112 UG/1
TABLET ORAL
Qty: 90 TABLET | Refills: 0 | Status: SHIPPED | OUTPATIENT
Start: 2022-03-09

## 2022-03-09 NOTE — TELEPHONE ENCOUNTER
Chief Complaint   Patient presents with    Medication Refill     levothyroxine 112 mcg tablet and losartan-hctz 100-25 mg tablet      Patient last office visit 3/8/2022. Carlos Alvarado LPN  Lab appointment 3/10/2022.   Carlos Alvarado LPN

## 2022-03-10 ENCOUNTER — LAB ONLY (OUTPATIENT)
Dept: FAMILY MEDICINE CLINIC | Age: 82
End: 2022-03-10

## 2022-03-10 DIAGNOSIS — E03.9 ACQUIRED HYPOTHYROIDISM: ICD-10-CM

## 2022-03-10 DIAGNOSIS — E87.6 LOW SERUM POTASSIUM: ICD-10-CM

## 2022-03-10 DIAGNOSIS — E78.5 HYPERLIPIDEMIA, UNSPECIFIED HYPERLIPIDEMIA TYPE: ICD-10-CM

## 2022-03-10 LAB
ALBUMIN SERPL-MCNC: 3.8 G/DL (ref 3.5–5)
ALBUMIN/GLOB SERPL: 1.1 {RATIO} (ref 1.1–2.2)
ALP SERPL-CCNC: 97 U/L (ref 45–117)
ALT SERPL-CCNC: 24 U/L (ref 12–78)
ANION GAP SERPL CALC-SCNC: 5 MMOL/L (ref 5–15)
AST SERPL-CCNC: 20 U/L (ref 15–37)
BILIRUB SERPL-MCNC: 0.7 MG/DL (ref 0.2–1)
BUN SERPL-MCNC: 13 MG/DL (ref 6–20)
BUN/CREAT SERPL: 15 (ref 12–20)
CALCIUM SERPL-MCNC: 10.1 MG/DL (ref 8.5–10.1)
CHLORIDE SERPL-SCNC: 98 MMOL/L (ref 97–108)
CHOLEST SERPL-MCNC: 218 MG/DL
CO2 SERPL-SCNC: 30 MMOL/L (ref 21–32)
CREAT SERPL-MCNC: 0.89 MG/DL (ref 0.55–1.02)
GLOBULIN SER CALC-MCNC: 3.4 G/DL (ref 2–4)
GLUCOSE SERPL-MCNC: 91 MG/DL (ref 65–100)
HDLC SERPL-MCNC: 63 MG/DL
HDLC SERPL: 3.5 {RATIO} (ref 0–5)
LDLC SERPL CALC-MCNC: 136.4 MG/DL (ref 0–100)
MAGNESIUM SERPL-MCNC: 1.5 MG/DL (ref 1.6–2.4)
POTASSIUM SERPL-SCNC: 3.8 MMOL/L (ref 3.5–5.1)
PROT SERPL-MCNC: 7.2 G/DL (ref 6.4–8.2)
SODIUM SERPL-SCNC: 133 MMOL/L (ref 136–145)
T4 FREE SERPL-MCNC: 1.4 NG/DL (ref 0.8–1.5)
TRIGL SERPL-MCNC: 93 MG/DL (ref ?–150)
TSH SERPL DL<=0.05 MIU/L-ACNC: 2.5 UIU/ML (ref 0.36–3.74)
VLDLC SERPL CALC-MCNC: 18.6 MG/DL

## 2022-03-11 ENCOUNTER — HOSPITAL ENCOUNTER (OUTPATIENT)
Dept: PREADMISSION TESTING | Age: 82
Discharge: HOME | End: 2022-03-11
Attending: ORTHOPAEDIC SURGERY
Payer: MEDICARE

## 2022-03-11 ENCOUNTER — TRANSCRIBE ORDER (OUTPATIENT)
Dept: REGISTRATION | Age: 82
End: 2022-03-11

## 2022-03-11 DIAGNOSIS — U07.1 COVID-19: Primary | ICD-10-CM

## 2022-03-11 DIAGNOSIS — U07.1 COVID-19: ICD-10-CM

## 2022-03-11 PROCEDURE — U0005 INFEC AGEN DETEC AMPLI PROBE: HCPCS

## 2022-03-11 NOTE — PROGRESS NOTES
Please let patient know that her cholesterol has worsened since she stopped the Zocor. Please let her know that it is up to her whether she takes it, but stopping this medicine and having higher cholesterol can be associated with higher risk for heart attack and stroke. Her potassium is improved, but her magnesium is a little low. Please have her check to see if her multivitamin has any magnesium in it.

## 2022-03-12 DIAGNOSIS — E78.5 HYPERLIPIDEMIA, UNSPECIFIED HYPERLIPIDEMIA TYPE: Primary | ICD-10-CM

## 2022-03-12 LAB
SARS-COV-2, XPLCVT: NOT DETECTED
SOURCE, COVRS: NORMAL

## 2022-03-12 RX ORDER — SIMVASTATIN 40 MG/1
40 TABLET, FILM COATED ORAL
Qty: 90 TABLET | Refills: 1 | Status: SHIPPED | OUTPATIENT
Start: 2022-03-12 | End: 2022-07-26 | Stop reason: SDUPTHER

## 2022-03-12 NOTE — TELEPHONE ENCOUNTER
PCP: Dorina Bynum MD    Last appt: 3/8/2022  No future appointments. Requested Prescriptions     Pending Prescriptions Disp Refills    simvastatin (ZOCOR) 40 mg tablet       Sig: Take 1 Tablet by mouth nightly.        Prior labs and Blood pressures:  BP Readings from Last 3 Encounters:   03/08/22 (!) 148/76   03/03/22 (!) 163/75   08/03/20 120/76     Lab Results   Component Value Date/Time    Sodium 133 (L) 03/10/2022 09:09 AM    Potassium 3.8 03/10/2022 09:09 AM    Chloride 98 03/10/2022 09:09 AM    CO2 30 03/10/2022 09:09 AM    Anion gap 5 03/10/2022 09:09 AM    Glucose 91 03/10/2022 09:09 AM    BUN 13 03/10/2022 09:09 AM    Creatinine 0.89 03/10/2022 09:09 AM    BUN/Creatinine ratio 15 03/10/2022 09:09 AM    GFR est AA >60 03/10/2022 09:09 AM    GFR est non-AA >60 03/10/2022 09:09 AM    Calcium 10.1 03/10/2022 09:09 AM     Lab Results   Component Value Date/Time    Hemoglobin A1c 5.9 (H) 03/03/2022 03:20 PM     Lab Results   Component Value Date/Time    Cholesterol, total 218 (H) 03/10/2022 09:09 AM    HDL Cholesterol 63 03/10/2022 09:09 AM    LDL, calculated 136.4 (H) 03/10/2022 09:09 AM    VLDL, calculated 18.6 03/10/2022 09:09 AM    Triglyceride 93 03/10/2022 09:09 AM    CHOL/HDL Ratio 3.5 03/10/2022 09:09 AM     Lab Results   Component Value Date/Time    Vitamin D 25-Hydroxy 41.2 07/14/2011 08:28 AM    VITAMIN D, 25-HYDROXY 60.1 11/06/2017 08:48 AM       Lab Results   Component Value Date/Time    TSH 2.50 03/10/2022 09:09 AM

## 2022-03-12 NOTE — PROGRESS NOTES
Called, spoke to pt. Two pt identifiers confirmed. Writer informed patient of Lab result . Patient request new order for Zocor. Order placed .

## 2022-03-16 ENCOUNTER — ANESTHESIA EVENT (OUTPATIENT)
Dept: SURGERY | Age: 82
End: 2022-03-16
Payer: MEDICARE

## 2022-03-16 ENCOUNTER — HOSPITAL ENCOUNTER (OUTPATIENT)
Age: 82
Discharge: HOME HEALTH | End: 2022-03-17
Attending: ORTHOPAEDIC SURGERY | Admitting: ORTHOPAEDIC SURGERY
Payer: MEDICARE

## 2022-03-16 ENCOUNTER — ANESTHESIA (OUTPATIENT)
Dept: SURGERY | Age: 82
End: 2022-03-16
Payer: MEDICARE

## 2022-03-16 DIAGNOSIS — M17.12 ARTHRITIS OF LEFT KNEE: ICD-10-CM

## 2022-03-16 DIAGNOSIS — Z96.652 S/P LEFT UNICOMPARTMENTAL KNEE REPLACEMENT: Primary | ICD-10-CM

## 2022-03-16 LAB
GLUCOSE BLD STRIP.AUTO-MCNC: 89 MG/DL (ref 65–117)
SERVICE CMNT-IMP: NORMAL

## 2022-03-16 PROCEDURE — 77030020274 HC MISC IMPL ORTHOPEDIC: Performed by: ORTHOPAEDIC SURGERY

## 2022-03-16 PROCEDURE — 77030000032 HC CUF TRNQT ZIMM -B: Performed by: ORTHOPAEDIC SURGERY

## 2022-03-16 PROCEDURE — 27446 REVISION OF KNEE JOINT: CPT | Performed by: ORTHOPAEDIC SURGERY

## 2022-03-16 PROCEDURE — 77030006835 HC BLD SAW SAG STRY -B: Performed by: ORTHOPAEDIC SURGERY

## 2022-03-16 PROCEDURE — 76060000035 HC ANESTHESIA 2 TO 2.5 HR: Performed by: ORTHOPAEDIC SURGERY

## 2022-03-16 PROCEDURE — 74011000250 HC RX REV CODE- 250: Performed by: PHYSICIAN ASSISTANT

## 2022-03-16 PROCEDURE — 51798 US URINE CAPACITY MEASURE: CPT

## 2022-03-16 PROCEDURE — C9290 INJ, BUPIVACAINE LIPOSOME: HCPCS | Performed by: ORTHOPAEDIC SURGERY

## 2022-03-16 PROCEDURE — 74011250636 HC RX REV CODE- 250/636: Performed by: PHYSICIAN ASSISTANT

## 2022-03-16 PROCEDURE — 82962 GLUCOSE BLOOD TEST: CPT

## 2022-03-16 PROCEDURE — 74011000250 HC RX REV CODE- 250: Performed by: STUDENT IN AN ORGANIZED HEALTH CARE EDUCATION/TRAINING PROGRAM

## 2022-03-16 PROCEDURE — 77030010507 HC ADH SKN DERMBND J&J -B: Performed by: ORTHOPAEDIC SURGERY

## 2022-03-16 PROCEDURE — 97530 THERAPEUTIC ACTIVITIES: CPT

## 2022-03-16 PROCEDURE — C1776 JOINT DEVICE (IMPLANTABLE): HCPCS | Performed by: ORTHOPAEDIC SURGERY

## 2022-03-16 PROCEDURE — 2709999900 HC NON-CHARGEABLE SUPPLY: Performed by: ORTHOPAEDIC SURGERY

## 2022-03-16 PROCEDURE — 77030007866 HC KT SPN ANES BBMI -B: Performed by: STUDENT IN AN ORGANIZED HEALTH CARE EDUCATION/TRAINING PROGRAM

## 2022-03-16 PROCEDURE — 77030035236 HC SUT PDS STRATFX BARB J&J -B: Performed by: ORTHOPAEDIC SURGERY

## 2022-03-16 PROCEDURE — 77030006822 HC BLD SAW SAG BRSM -B: Performed by: ORTHOPAEDIC SURGERY

## 2022-03-16 PROCEDURE — 77030042556 HC PNCL CAUT -B: Performed by: ORTHOPAEDIC SURGERY

## 2022-03-16 PROCEDURE — 27446 REVISION OF KNEE JOINT: CPT | Performed by: PHYSICIAN ASSISTANT

## 2022-03-16 PROCEDURE — 74011000250 HC RX REV CODE- 250: Performed by: ORTHOPAEDIC SURGERY

## 2022-03-16 PROCEDURE — 74011250636 HC RX REV CODE- 250/636: Performed by: ORTHOPAEDIC SURGERY

## 2022-03-16 PROCEDURE — 74011250636 HC RX REV CODE- 250/636: Performed by: NURSE ANESTHETIST, CERTIFIED REGISTERED

## 2022-03-16 PROCEDURE — 77030008463 HC STPLR SKN PROX J&J -B: Performed by: ORTHOPAEDIC SURGERY

## 2022-03-16 PROCEDURE — 77030002933 HC SUT MCRYL J&J -A: Performed by: ORTHOPAEDIC SURGERY

## 2022-03-16 PROCEDURE — 74011250636 HC RX REV CODE- 250/636: Performed by: STUDENT IN AN ORGANIZED HEALTH CARE EDUCATION/TRAINING PROGRAM

## 2022-03-16 PROCEDURE — 77030003601 HC NDL NRV BLK BBMI -A

## 2022-03-16 PROCEDURE — 77030040750 HC INSTR NAV SYS DISP ORLN -G: Performed by: ORTHOPAEDIC SURGERY

## 2022-03-16 PROCEDURE — 76010000171 HC OR TIME 2 TO 2.5 HR INTENSV-TIER 1: Performed by: ORTHOPAEDIC SURGERY

## 2022-03-16 PROCEDURE — 74011000250 HC RX REV CODE- 250: Performed by: NURSE ANESTHETIST, CERTIFIED REGISTERED

## 2022-03-16 PROCEDURE — 77030031139 HC SUT VCRL2 J&J -A: Performed by: ORTHOPAEDIC SURGERY

## 2022-03-16 PROCEDURE — 77030005513 HC CATH URETH FOL11 MDII -B: Performed by: ORTHOPAEDIC SURGERY

## 2022-03-16 PROCEDURE — 77030040922 HC BLNKT HYPOTHRM STRY -A

## 2022-03-16 PROCEDURE — 76210000006 HC OR PH I REC 0.5 TO 1 HR: Performed by: ORTHOPAEDIC SURGERY

## 2022-03-16 PROCEDURE — 97116 GAIT TRAINING THERAPY: CPT

## 2022-03-16 PROCEDURE — 20985 CPTR-ASST DIR MS PX: CPT | Performed by: ORTHOPAEDIC SURGERY

## 2022-03-16 PROCEDURE — 74011250637 HC RX REV CODE- 250/637: Performed by: PHYSICIAN ASSISTANT

## 2022-03-16 PROCEDURE — C1713 ANCHOR/SCREW BN/BN,TIS/BN: HCPCS | Performed by: ORTHOPAEDIC SURGERY

## 2022-03-16 PROCEDURE — 97161 PT EVAL LOW COMPLEX 20 MIN: CPT

## 2022-03-16 PROCEDURE — 77030010783 HC BOWL MX BN CEM J&J -B: Performed by: ORTHOPAEDIC SURGERY

## 2022-03-16 DEVICE — EMPOWR PARTIAL KNEETM, FEMUR, NP, SIZE 1, LM-RL
Type: IMPLANTABLE DEVICE | Site: KNEE | Status: NON-FUNCTIONAL
Brand: DJO SURGICAL
Removed: 2022-04-01

## 2022-03-16 DEVICE — IMPL CAPPED KNEE K1 TOTAL/HEMI STD CEMENTED DJO: Type: IMPLANTABLE DEVICE | Status: FUNCTIONAL

## 2022-03-16 DEVICE — SMARTSET GHV GENTAMICIN HIGH VISCOSITY BONE CEMENT 40G
Type: IMPLANTABLE DEVICE | Site: KNEE | Status: FUNCTIONAL
Brand: SMARTSET

## 2022-03-16 DEVICE — EMPOWR PARTIAL KNEETM, TIBIAL INSERT, SIZE 3, 9MM
Type: IMPLANTABLE DEVICE | Site: KNEE | Status: FUNCTIONAL
Brand: DJO SURGICAL

## 2022-03-16 DEVICE — EMPOWR PARTIAL KNEETM, TIBIAL BASEPLATE, NP, SIZE 3, LM-RL
Type: IMPLANTABLE DEVICE | Site: KNEE | Status: FUNCTIONAL
Brand: DJO SURGICAL

## 2022-03-16 RX ORDER — SODIUM CHLORIDE 0.9 % (FLUSH) 0.9 %
5-40 SYRINGE (ML) INJECTION AS NEEDED
Status: DISCONTINUED | OUTPATIENT
Start: 2022-03-16 | End: 2022-03-16 | Stop reason: HOSPADM

## 2022-03-16 RX ORDER — EPHEDRINE SULFATE/0.9% NACL/PF 50 MG/5 ML
5 SYRINGE (ML) INTRAVENOUS AS NEEDED
Status: DISCONTINUED | OUTPATIENT
Start: 2022-03-16 | End: 2022-03-16 | Stop reason: HOSPADM

## 2022-03-16 RX ORDER — MIDAZOLAM HYDROCHLORIDE 1 MG/ML
0.5 INJECTION, SOLUTION INTRAMUSCULAR; INTRAVENOUS
Status: DISCONTINUED | OUTPATIENT
Start: 2022-03-16 | End: 2022-03-16 | Stop reason: HOSPADM

## 2022-03-16 RX ORDER — FENTANYL CITRATE 50 UG/ML
25 INJECTION, SOLUTION INTRAMUSCULAR; INTRAVENOUS
Status: DISCONTINUED | OUTPATIENT
Start: 2022-03-16 | End: 2022-03-16 | Stop reason: HOSPADM

## 2022-03-16 RX ORDER — KETOROLAC TROMETHAMINE 30 MG/ML
15 INJECTION, SOLUTION INTRAMUSCULAR; INTRAVENOUS EVERY 6 HOURS
Status: COMPLETED | OUTPATIENT
Start: 2022-03-16 | End: 2022-03-17

## 2022-03-16 RX ORDER — LIDOCAINE HYDROCHLORIDE 10 MG/ML
0.1 INJECTION, SOLUTION EPIDURAL; INFILTRATION; INTRACAUDAL; PERINEURAL AS NEEDED
Status: DISCONTINUED | OUTPATIENT
Start: 2022-03-16 | End: 2022-03-16 | Stop reason: HOSPADM

## 2022-03-16 RX ORDER — SODIUM CHLORIDE 9 MG/ML
125 INJECTION, SOLUTION INTRAVENOUS CONTINUOUS
Status: DISCONTINUED | OUTPATIENT
Start: 2022-03-16 | End: 2022-03-16 | Stop reason: HOSPADM

## 2022-03-16 RX ORDER — SODIUM CHLORIDE, SODIUM LACTATE, POTASSIUM CHLORIDE, CALCIUM CHLORIDE 600; 310; 30; 20 MG/100ML; MG/100ML; MG/100ML; MG/100ML
125 INJECTION, SOLUTION INTRAVENOUS CONTINUOUS
Status: DISCONTINUED | OUTPATIENT
Start: 2022-03-16 | End: 2022-03-16 | Stop reason: HOSPADM

## 2022-03-16 RX ORDER — POLYETHYLENE GLYCOL 3350 17 G/17G
17 POWDER, FOR SOLUTION ORAL DAILY
Status: DISCONTINUED | OUTPATIENT
Start: 2022-03-16 | End: 2022-03-17 | Stop reason: HOSPADM

## 2022-03-16 RX ORDER — ACETAMINOPHEN 500 MG
1000 TABLET ORAL ONCE
Status: DISCONTINUED | OUTPATIENT
Start: 2022-03-16 | End: 2022-03-16 | Stop reason: HOSPADM

## 2022-03-16 RX ORDER — ONDANSETRON 2 MG/ML
4 INJECTION INTRAMUSCULAR; INTRAVENOUS AS NEEDED
Status: DISCONTINUED | OUTPATIENT
Start: 2022-03-16 | End: 2022-03-16 | Stop reason: HOSPADM

## 2022-03-16 RX ORDER — SODIUM CHLORIDE 9 MG/ML
125 INJECTION, SOLUTION INTRAVENOUS CONTINUOUS
Status: DISPENSED | OUTPATIENT
Start: 2022-03-16 | End: 2022-03-17

## 2022-03-16 RX ORDER — PREGABALIN 75 MG/1
75 CAPSULE ORAL ONCE
Status: COMPLETED | OUTPATIENT
Start: 2022-03-16 | End: 2022-03-16

## 2022-03-16 RX ORDER — SODIUM CHLORIDE 9 MG/ML
1000 INJECTION, SOLUTION INTRAVENOUS CONTINUOUS
Status: DISCONTINUED | OUTPATIENT
Start: 2022-03-16 | End: 2022-03-16 | Stop reason: HOSPADM

## 2022-03-16 RX ORDER — HYDROMORPHONE HYDROCHLORIDE 1 MG/ML
0.5 INJECTION, SOLUTION INTRAMUSCULAR; INTRAVENOUS; SUBCUTANEOUS
Status: ACTIVE | OUTPATIENT
Start: 2022-03-16 | End: 2022-03-17

## 2022-03-16 RX ORDER — PROPOFOL 10 MG/ML
INJECTION, EMULSION INTRAVENOUS
Status: DISCONTINUED | OUTPATIENT
Start: 2022-03-16 | End: 2022-03-16 | Stop reason: HOSPADM

## 2022-03-16 RX ORDER — SODIUM CHLORIDE 0.9 % (FLUSH) 0.9 %
5-40 SYRINGE (ML) INJECTION AS NEEDED
Status: DISCONTINUED | OUTPATIENT
Start: 2022-03-16 | End: 2022-03-17 | Stop reason: HOSPADM

## 2022-03-16 RX ORDER — DIPHENHYDRAMINE HYDROCHLORIDE 50 MG/ML
12.5 INJECTION, SOLUTION INTRAMUSCULAR; INTRAVENOUS AS NEEDED
Status: DISCONTINUED | OUTPATIENT
Start: 2022-03-16 | End: 2022-03-16 | Stop reason: HOSPADM

## 2022-03-16 RX ORDER — PROPOFOL 10 MG/ML
INJECTION, EMULSION INTRAVENOUS AS NEEDED
Status: DISCONTINUED | OUTPATIENT
Start: 2022-03-16 | End: 2022-03-16 | Stop reason: HOSPADM

## 2022-03-16 RX ORDER — ACETAMINOPHEN 500 MG
1000 TABLET ORAL ONCE
Status: COMPLETED | OUTPATIENT
Start: 2022-03-16 | End: 2022-03-16

## 2022-03-16 RX ORDER — ATORVASTATIN CALCIUM 10 MG/1
20 TABLET, FILM COATED ORAL DAILY
Status: DISCONTINUED | OUTPATIENT
Start: 2022-03-16 | End: 2022-03-17 | Stop reason: HOSPADM

## 2022-03-16 RX ORDER — OXYCODONE AND ACETAMINOPHEN 5; 325 MG/1; MG/1
1 TABLET ORAL AS NEEDED
Status: DISCONTINUED | OUTPATIENT
Start: 2022-03-16 | End: 2022-03-16 | Stop reason: HOSPADM

## 2022-03-16 RX ORDER — FACIAL-BODY WIPES
10 EACH TOPICAL DAILY PRN
Status: DISCONTINUED | OUTPATIENT
Start: 2022-03-16 | End: 2022-03-17 | Stop reason: HOSPADM

## 2022-03-16 RX ORDER — FENTANYL CITRATE 50 UG/ML
50 INJECTION, SOLUTION INTRAMUSCULAR; INTRAVENOUS AS NEEDED
Status: DISCONTINUED | OUTPATIENT
Start: 2022-03-16 | End: 2022-03-16 | Stop reason: HOSPADM

## 2022-03-16 RX ORDER — LEVOTHYROXINE SODIUM 112 UG/1
112 TABLET ORAL
Status: DISCONTINUED | OUTPATIENT
Start: 2022-03-17 | End: 2022-03-17 | Stop reason: HOSPADM

## 2022-03-16 RX ORDER — FAMOTIDINE 20 MG/1
20 TABLET, FILM COATED ORAL 2 TIMES DAILY
Status: DISCONTINUED | OUTPATIENT
Start: 2022-03-16 | End: 2022-03-17 | Stop reason: HOSPADM

## 2022-03-16 RX ORDER — BUPIVACAINE HYDROCHLORIDE 5 MG/ML
INJECTION, SOLUTION EPIDURAL; INTRACAUDAL
Status: COMPLETED | OUTPATIENT
Start: 2022-03-16 | End: 2022-03-16

## 2022-03-16 RX ORDER — ROPIVACAINE HYDROCHLORIDE 5 MG/ML
INJECTION, SOLUTION EPIDURAL; INFILTRATION; PERINEURAL
Status: COMPLETED | OUTPATIENT
Start: 2022-03-16 | End: 2022-03-16

## 2022-03-16 RX ORDER — SODIUM CHLORIDE 0.9 % (FLUSH) 0.9 %
5-40 SYRINGE (ML) INJECTION EVERY 8 HOURS
Status: DISCONTINUED | OUTPATIENT
Start: 2022-03-16 | End: 2022-03-17 | Stop reason: HOSPADM

## 2022-03-16 RX ORDER — TRANEXAMIC ACID 100 MG/ML
INJECTION, SOLUTION INTRAVENOUS AS NEEDED
Status: DISCONTINUED | OUTPATIENT
Start: 2022-03-16 | End: 2022-03-16 | Stop reason: HOSPADM

## 2022-03-16 RX ORDER — SODIUM CHLORIDE 0.9 % (FLUSH) 0.9 %
5-40 SYRINGE (ML) INJECTION EVERY 8 HOURS
Status: DISCONTINUED | OUTPATIENT
Start: 2022-03-16 | End: 2022-03-16 | Stop reason: HOSPADM

## 2022-03-16 RX ORDER — ASPIRIN 81 MG/1
81 TABLET ORAL EVERY 12 HOURS
Status: DISCONTINUED | OUTPATIENT
Start: 2022-03-16 | End: 2022-03-17 | Stop reason: HOSPADM

## 2022-03-16 RX ORDER — SODIUM CHLORIDE, SODIUM LACTATE, POTASSIUM CHLORIDE, CALCIUM CHLORIDE 600; 310; 30; 20 MG/100ML; MG/100ML; MG/100ML; MG/100ML
INJECTION, SOLUTION INTRAVENOUS
Status: DISCONTINUED | OUTPATIENT
Start: 2022-03-16 | End: 2022-03-16 | Stop reason: HOSPADM

## 2022-03-16 RX ORDER — ACETAMINOPHEN 325 MG/1
650 TABLET ORAL EVERY 6 HOURS
Status: DISCONTINUED | OUTPATIENT
Start: 2022-03-16 | End: 2022-03-17 | Stop reason: HOSPADM

## 2022-03-16 RX ORDER — ONDANSETRON 2 MG/ML
4 INJECTION INTRAMUSCULAR; INTRAVENOUS
Status: ACTIVE | OUTPATIENT
Start: 2022-03-16 | End: 2022-03-17

## 2022-03-16 RX ORDER — SODIUM CHLORIDE, SODIUM LACTATE, POTASSIUM CHLORIDE, CALCIUM CHLORIDE 600; 310; 30; 20 MG/100ML; MG/100ML; MG/100ML; MG/100ML
1000 INJECTION, SOLUTION INTRAVENOUS CONTINUOUS
Status: DISCONTINUED | OUTPATIENT
Start: 2022-03-16 | End: 2022-03-16 | Stop reason: HOSPADM

## 2022-03-16 RX ORDER — OXYCODONE HYDROCHLORIDE 5 MG/1
5 TABLET ORAL
Status: DISCONTINUED | OUTPATIENT
Start: 2022-03-16 | End: 2022-03-17 | Stop reason: HOSPADM

## 2022-03-16 RX ORDER — AMOXICILLIN 250 MG
1 CAPSULE ORAL 2 TIMES DAILY
Status: DISCONTINUED | OUTPATIENT
Start: 2022-03-16 | End: 2022-03-17 | Stop reason: HOSPADM

## 2022-03-16 RX ORDER — MORPHINE SULFATE 2 MG/ML
2 INJECTION, SOLUTION INTRAMUSCULAR; INTRAVENOUS
Status: DISCONTINUED | OUTPATIENT
Start: 2022-03-16 | End: 2022-03-16 | Stop reason: HOSPADM

## 2022-03-16 RX ORDER — MIDAZOLAM HYDROCHLORIDE 1 MG/ML
1 INJECTION, SOLUTION INTRAMUSCULAR; INTRAVENOUS AS NEEDED
Status: DISCONTINUED | OUTPATIENT
Start: 2022-03-16 | End: 2022-03-16 | Stop reason: HOSPADM

## 2022-03-16 RX ORDER — HYDROXYZINE HYDROCHLORIDE 10 MG/1
10 TABLET, FILM COATED ORAL
Status: DISCONTINUED | OUTPATIENT
Start: 2022-03-16 | End: 2022-03-17 | Stop reason: HOSPADM

## 2022-03-16 RX ORDER — HYDROMORPHONE HYDROCHLORIDE 1 MG/ML
0.2 INJECTION, SOLUTION INTRAMUSCULAR; INTRAVENOUS; SUBCUTANEOUS
Status: DISCONTINUED | OUTPATIENT
Start: 2022-03-16 | End: 2022-03-16 | Stop reason: HOSPADM

## 2022-03-16 RX ORDER — TRAMADOL HYDROCHLORIDE 50 MG/1
50 TABLET ORAL
Status: DISCONTINUED | OUTPATIENT
Start: 2022-03-16 | End: 2022-03-17 | Stop reason: HOSPADM

## 2022-03-16 RX ORDER — NALOXONE HYDROCHLORIDE 0.4 MG/ML
0.4 INJECTION, SOLUTION INTRAMUSCULAR; INTRAVENOUS; SUBCUTANEOUS AS NEEDED
Status: DISCONTINUED | OUTPATIENT
Start: 2022-03-16 | End: 2022-03-17 | Stop reason: HOSPADM

## 2022-03-16 RX ADMIN — SODIUM CHLORIDE, PRESERVATIVE FREE 10 ML: 5 INJECTION INTRAVENOUS at 14:00

## 2022-03-16 RX ADMIN — SODIUM CHLORIDE 125 ML/HR: 9 INJECTION, SOLUTION INTRAVENOUS at 13:10

## 2022-03-16 RX ADMIN — PROPOFOL 20 MG: 10 INJECTION, EMULSION INTRAVENOUS at 07:36

## 2022-03-16 RX ADMIN — FENTANYL CITRATE 50 MCG: 50 INJECTION, SOLUTION INTRAMUSCULAR; INTRAVENOUS at 07:18

## 2022-03-16 RX ADMIN — ACETAMINOPHEN 650 MG: 325 TABLET ORAL at 19:00

## 2022-03-16 RX ADMIN — PREGABALIN 75 MG: 75 CAPSULE ORAL at 06:34

## 2022-03-16 RX ADMIN — ACETAMINOPHEN 1000 MG: 500 TABLET ORAL at 06:34

## 2022-03-16 RX ADMIN — TRANEXAMIC ACID 1 G: 100 INJECTION, SOLUTION INTRAVENOUS at 07:53

## 2022-03-16 RX ADMIN — SODIUM CHLORIDE, PRESERVATIVE FREE 10 ML: 5 INJECTION INTRAVENOUS at 22:00

## 2022-03-16 RX ADMIN — ATORVASTATIN CALCIUM 20 MG: 10 TABLET, FILM COATED ORAL at 13:10

## 2022-03-16 RX ADMIN — SENNOSIDES AND DOCUSATE SODIUM 1 TABLET: 50; 8.6 TABLET ORAL at 18:00

## 2022-03-16 RX ADMIN — KETOROLAC TROMETHAMINE 15 MG: 30 INJECTION, SOLUTION INTRAMUSCULAR; INTRAVENOUS at 13:11

## 2022-03-16 RX ADMIN — ASPIRIN 81 MG: 81 TABLET, COATED ORAL at 13:10

## 2022-03-16 RX ADMIN — PROPOFOL 75 MCG/KG/MIN: 10 INJECTION, EMULSION INTRAVENOUS at 07:36

## 2022-03-16 RX ADMIN — KETOROLAC TROMETHAMINE 15 MG: 30 INJECTION, SOLUTION INTRAMUSCULAR; INTRAVENOUS at 19:56

## 2022-03-16 RX ADMIN — ROPIVACAINE HYDROCHLORIDE 30 ML: 5 INJECTION, SOLUTION EPIDURAL; INFILTRATION; PERINEURAL at 07:20

## 2022-03-16 RX ADMIN — PHENYLEPHRINE HYDROCHLORIDE 40 MCG/MIN: 10 INJECTION INTRAVENOUS at 07:44

## 2022-03-16 RX ADMIN — WATER 2 G: 1 INJECTION INTRAMUSCULAR; INTRAVENOUS; SUBCUTANEOUS at 16:00

## 2022-03-16 RX ADMIN — SODIUM CHLORIDE, POTASSIUM CHLORIDE, SODIUM LACTATE AND CALCIUM CHLORIDE: 600; 310; 30; 20 INJECTION, SOLUTION INTRAVENOUS at 07:25

## 2022-03-16 RX ADMIN — SENNOSIDES AND DOCUSATE SODIUM 1 TABLET: 50; 8.6 TABLET ORAL at 13:10

## 2022-03-16 RX ADMIN — BUPIVACAINE HYDROCHLORIDE 10 MG: 5 INJECTION, SOLUTION EPIDURAL; INTRACAUDAL; PERINEURAL at 07:42

## 2022-03-16 RX ADMIN — MIDAZOLAM 2 MG: 1 INJECTION INTRAMUSCULAR; INTRAVENOUS at 07:18

## 2022-03-16 RX ADMIN — POLYETHYLENE GLYCOL 3350 17 G: 17 POWDER, FOR SOLUTION ORAL at 13:11

## 2022-03-16 RX ADMIN — HYDROCHLOROTHIAZIDE: 25 TABLET ORAL at 13:10

## 2022-03-16 RX ADMIN — WATER 2 G: 1 INJECTION INTRAMUSCULAR; INTRAVENOUS; SUBCUTANEOUS at 07:53

## 2022-03-16 RX ADMIN — FAMOTIDINE 20 MG: 20 TABLET ORAL at 13:10

## 2022-03-16 RX ADMIN — ACETAMINOPHEN 650 MG: 325 TABLET ORAL at 13:11

## 2022-03-16 RX ADMIN — FAMOTIDINE 20 MG: 20 TABLET ORAL at 18:00

## 2022-03-16 RX ADMIN — ASPIRIN 81 MG: 81 TABLET, COATED ORAL at 21:53

## 2022-03-16 NOTE — PERIOP NOTES
TRANSFER - OUT REPORT:    Verbal report given to The Laci RN(name) on [place]  being transferred to [facility](unit) for routine post - op       Report consisted of patients Situation, Background, Assessment and   Recommendations(SBAR). Time Pre op antibiotic QPGRX:4508  Anesthesia Stop time: 7505    Information from the following report(s) SBAR, Procedure Summary, Intake/Output and MAR was reviewed with the receiving nurse. Opportunity for questions and clarification was provided. Is the patient on 02? NO           Is the patient on a monitor? NO    Is the nurse transporting with the patient? NO    Surgical Waiting Area notified of patient's transfer from PACU? YES      The following personal items collected during your admission accompanied patient upon transfer:   Dental Appliance: Dental Appliances: None  Vision:    Hearing Aid:    Jewelry: Jewelry: None  Clothing: Clothing:  (clothes and shoes to PACU)  Other Valuables: Other Valuables: None  Valuables sent to safe:      2 belongings bags sent up with patient. The Laci will call me back for rest of report. She needed to go check on a patient.

## 2022-03-16 NOTE — ANESTHESIA POSTPROCEDURE EVALUATION
Procedure(s):  LEFT UNI COMPARTMENTAL KNEE ARTHROPLASTY (REQ FLIP). regional, spinal    Anesthesia Post Evaluation      Multimodal analgesia: multimodal analgesia used between 6 hours prior to anesthesia start to PACU discharge  Patient location during evaluation: bedside  Patient participation: complete - patient participated  Level of consciousness: awake  Pain score: 0  Pain management: satisfactory to patient  Airway patency: patent  Anesthetic complications: no  Cardiovascular status: acceptable and blood pressure returned to baseline  Respiratory status: acceptable  Hydration status: acceptable  Comments: I have evaluated the patient and meets criteria for discharge from PACU. Patricia Number, DO. Post anesthesia nausea and vomiting:  none  Final Post Anesthesia Temperature Assessment:  Normothermia (36.0-37.5 degrees C)      INITIAL Post-op Vital signs:   Vitals Value Taken Time   /64 03/16/22 1000   Temp 36.6 °C (97.8 °F) 03/16/22 1000   Pulse 64 03/16/22 1008   Resp 17 03/16/22 1008   SpO2 99 % 03/16/22 1008   Vitals shown include unvalidated device data.

## 2022-03-16 NOTE — OP NOTES
Name: Jannie Baumgarten  MRN:  [id number]  : 1940  Age:  80 y.o. Surgery Date: 3/16/2022      OPERATIVE REPORT   LEFT UNCOMPARTMENTAL KNEE REPLACEMENT    PREOPERATIVE DIAGNOSIS: Osteoarthritis medial compartment, left knee. POSTOPERATIVE DIAGNOSIS: Osteoarthritis medial compartment, left knee. PROCEDURE PERFORMED: Computer navigated Vlad Hipps) Left unicompartmental knee arthroplasty. SURGEON: Spence Barthel, MD    FIRST ASSISTANT:  Rosaline Couch PA-C    ANESTHESIA: Spinal    PRE-OP ANTIBIOTIC: Ancef 2g    COMPLICATIONS: None. ESTIMATED BLOOD LOSS: 50 mL. SPECIMENS REMOVED: None. COMPONENTS IMPLANTED:   Implant Name Type Inv. Item Serial No.  Lot No. LRB No. Used Action   CEMENT BNE 40GM FULL DOSE PMMA W/ GENT HI VISC RADPQ LNG - SNA  CEMENT BNE 40GM FULL DOSE PMMA W/ GENT HI VISC RADPQ LNG NA [employer]_ [id number] Left 1 Implanted   COMPONENT FEM 1 LT MEDL RT LAT NP EMPOWR PART KNEE - SNA  COMPONENT FEM 1 LT MEDL RT LAT NP EMPOWR PART KNEE NA [facility] [employer] - [employer]_ [id number] Left 1 Implanted   BASEPLATE TIB 3 PART KNEE LT MEDL RT LAT NP EMPOWR PART KNEE - SNA  BASEPLATE TIB 3 PART KNEE LT MEDL RT LAT NP EMPOWR PART KNEE NA [facility] [employer] - [employer]_ [id number] Left 1 Implanted   INSERT TIB 3 9 MM PART KNEE EMPOWR PART KNEE - SNA  INSERT TIB 3 9 MM PART KNEE EMPOWR PART KNEE NA [facility] [employer] - [employer]_ [id number] Left 1 Implanted       INDICATIONS: The patient is an 80 yrs female with progressive debilitating left knee pain due to severe osteoarthritis. Symptoms have progressed despite comprehensive conservative treatment and he presents for left unicompartmental knee replacement. Risks, benefits, alternatives of the procedure were reviewed with them in detail and they desired to proceed. He understands the increased risk for perioperative medical complications due to their medical comorbidities.       DESCRIPTION OF PROCEDURE: Anesthetic was initiated. Preoperative dose of antibiotic was given. Hinton catheter was placed. Left side was confirmed as the operative side, prepped and draped in the usual sterile fashion. Skin was covered with Ioban occlusive dressing. Tourniquet was only employed for cementation. Total tourniquet time was 15 minutes. Mini Midvastus approach was made to the left knee. The knee was examined. Severe medial compartment disease. No issues on the lateral or patellofemoral joints. The knee was exposed. The [facility] [place], operated by [facility] computer navigated guide was used to make a proximal tibial cut at neutral with 5 degrees posterior slope. The femur was cut parallel to the tibia with a spacer block technique. The femur was sized. The cutting block was placed and provisionally pinned, examined the flexion gap, translated the femoral component as far lateral as possible and created a rectangular flexion gap with rotation. The femur was pinned and finished. The meniscus was removed. Cement was mixed and implants opened on the back table. Implants were placed and cement was allowed to fully cure with no motion and all excess cement was removed. At this point, the knee was ranged, irrigated copiously with pulsatile lavage. Soft tissues were infiltrated with local anesthetic. The arthrotomy was closed with #2 Vicryl sutures and 0 Vicryl sutures. Deep wound was again irrigated. Skin and subcutaneous were closed in standard fashion. Sterile dressing was applied. There were no complications. No specimen was sent. Neda Damon PA-C was critical throughout the case to assist with positioning, retraction, instrument manipulation, and wound closure. Please note that no intern, resident, or other hospital surgical staff was available to assist during this procedure. Procedure was unicompartmental arthroplasty, left knee. The patient was taken to the recovery room in good condition.     Jono Decker MD

## 2022-03-16 NOTE — PROGRESS NOTES
Problem: Mobility Impaired (Adult and Pediatric)  Goal: *Acute Goals and Plan of Care (Insert Text)  Outcome: Progressing Towards Goal  Note: FUNCTIONAL STATUS PRIOR TO ADMISSION: Patient was independent and active without use of DME. Pt lives in a 2 story condo - bedroom 2nd floor. HOME SUPPORT PRIOR TO ADMISSION: The patient lived alone with no local support. Pt states she does not want to disrupt her families life and states she does not plan to ask any friends or neighbors to stay with her when initially discharge. Per pt - after Right TKR - since living alone - pt discharged to SNF - pt adamant she does not want to go to SNF this hospitalization. Physical Therapy Goals  Initiated 3/16/2022    1. Patient will move from supine to sit and sit to supine  and scoot up and down in bed with modified independence within 4 days. 2. Patient will perform sit to stand with modified independence within 4 days. 3. Patient will ambulate with modified independence for > 150 feet with the least restrictive device within 4 days. 4. Patient will ascend/descend 12 stairs with one handrail/cane with modified independence within 4 days. 5. Patient will perform home exercise program per protocol with supervision/set-up within 4 days. 6. Patient will demonstrate AROM 0-90 degrees in operative joint within 4 days. PHYSICAL THERAPY EVALUATION  Patient: Sunita Mc (80 y.o. female)  Date: 3/16/2022  Primary Diagnosis: Arthritis of left knee [M17.12]  Procedure(s) (LRB):  LEFT UNI COMPARTMENTAL KNEE ARTHROPLASTY (REQ FLIP) (Left) Day of Surgery   Precautions:   Fall,WBAT    ASSESSMENT  Based on the objective data described below, the patient presents POD# 0 left uni - TKA with left knee pain, decreased AROM/strength and function left leg, decreased activity tolerance, decline in functional mobility and impaired standing balance/gait with RW.   Pt s/p right TKA 12 years ago and was discharged home to SNF for rehab - secondary pt lives alone 2 story condo. Pt adamant regarding discharge home - however - has not arranged for any support from family/friends at immediate discharge. Anticipate pt will require 2 - 3 PT sessions to ensure safe and independent mobility - especially managing stairs independently. Current Level of Function Impacting Discharge (mobility/balance): Standby assist for supine to/from sit; CGA for transfers/amb with RW    Functional Outcome Measure: The patient scored 50/100 on the Barthel Index outcome measure . Other factors to consider for discharge: Encouraging pt to reach out to friends/neighbors to plan on staying with pt a couple of days - pt stating she will call them if she needs them     Patient will benefit from skilled therapy intervention to address the above noted impairments. PLAN :  Recommendations and Planned Interventions: bed mobility training, transfer training, gait training, therapeutic exercises, patient and family training/education, and therapeutic activities      Frequency/Duration: Patient will be followed by physical therapy:  twice daily to address goals. Recommendation for discharge: (in order for the patient to meet his/her long term goals)  Physical therapy at least 2 days/week in the home and assist from family/friends    This discharge recommendation:  Has not yet been discussed the attending provider and/or case management    IF patient discharges home will need the following DME: patient owns DME required for discharge         SUBJECTIVE:   Patient stated I just don't want to bother anyone - I can call them if I need something.     OBJECTIVE DATA SUMMARY:   HISTORY:    Past Medical History:   Diagnosis Date    Chronic pain     LEFT KNEE    DDD (degenerative disc disease), lumbar 3/15/2016    Diverticulosis of colon 1/12    McGroarty/gi    DJD (degenerative joint disease)     Esophageal stricture 2/03    Fibrosis of lung ([facility] [address] 2004    Scarring @ the apices bilaterally Done @[facility]/histoplasmosis    History of basal cell cancer     HTN, goal below 150/90     Hypercholesterolemia     IBS (irritable bowel syndrome) 8/30/2016    Menopause     LMP-48years old? Osteoporosis     previously on Fosamax many years ago; does not want further treatment    Overactive bladder     Skin cancer 01/06/2020    scc-right shin    Unspecified hypothyroidism           Past Surgical History:   Procedure Laterality Date    COLONOSCOPY  1/12    McGroarty/gi    HX APPENDECTOMY  1953    HX CATARACT REMOVAL  3/12    L    HX CATARACT REMOVAL  04/2012    Bilateral     HX KNEE ARTHROSCOPY Right 2006 and 2008    Dr Bertha Hood  1/10    right  Darnell/o/s    HX OTHER SURGICAL  2020, 2021    EXC. SKIN CA TENISHA. LOWER LEGS       Personal factors and/or comorbidities impacting plan of care: as above  Home Situation  Home Environment: Apartment ([facility])  # Steps to Enter: 4  Rails to Enter: Yes  Hand Rails : Bilateral (Unable to reach both)  Wheelchair Ramp: No  One/Two Story Residence: Two story  # of Interior Steps: 12  Interior Rails: Both  Lift Chair Available: No  Living Alone: Yes  Support Systems: Other Family Member(s),Friend/Neighbor  Patient Expects to be Discharged to[de-identified] Home with family assistance  Current DME Used/Available at Home: Walker, rolling,Cane, straight,Safety frame toliet,Grab bars,Shower chair  Tub or Shower Type: Shower    EXAMINATION/PRESENTATION/DECISION MAKING:   Critical Behavior:  Neurologic State: Alert  Orientation Level: Appropriate for age,Oriented X4  Cognition: Appropriate decision making,Appropriate for age attention/concentration  Safety/Judgement: Awareness of environment  Skin:  Left leg covered with ace wrap - no drainage noted    Range Of Motion:  AROM: Within functional limits (except left leg)                       Strength:    Strength:  Within functional limits (except left leg)                    Tone & Sensation:   Tone: Normal Sensation: Intact               Coordination:  Coordination: Within functional limits  Functional Mobility:  Bed Mobility:     Supine to Sit: Stand-by assistance  Sit to Supine: Stand-by assistance  Scooting: Stand-by assistance  Transfers:  Sit to Stand: Contact guard assistance  Stand to Sit: Contact guard assistance  Stand Pivot Transfers: Contact guard assistance                    Balance:   Sitting: Intact; Without support  Standing: Impaired; With support  Standing - Static: Good;Constant support  Standing - Dynamic : Fair;Constant support  Ambulation/Gait Training:  Distance (ft): 40 Feet (ft)  Assistive Device: Walker, rolling;Gait belt  Ambulation - Level of Assistance: Contact guard assistance        Gait Abnormalities: Antalgic;Decreased step clearance; Step to gait  Right Side Weight Bearing: Full  Left Side Weight Bearing: As tolerated  Base of Support: Center of gravity altered;Shift to right  Stance: Left decreased  Speed/Adri: Slow  Step Length: Right shortened;Left shortened  Swing Pattern: Left asymmetrical          Therapeutic Exercises:   Pt instructed and performed ankle pumps, quad sets, hamstring sets and heel slides - to be performed 5 - 10 reps once hr when awake as tolerated. Pt demonstrated proper use of incentive spirometer - to utilize once hr when awake - 10 breaths. Functional Measure:  Barthel Index:    Bathin  Bladder: 5  Bowels: 10  Groomin  Dressin  Feeding: 10  Mobility: 0  Stairs: 0  Toilet Use: 5  Transfer (Bed to Chair and Back): 10  Total: 50/100       The Barthel ADL Index: Guidelines  1. The index should be used as a record of what a patient does, not as a record of what a patient could do. 2. The main aim is to establish degree of independence from any help, physical or verbal, however minor and for whatever reason. 3. The need for supervision renders the patient not independent.   4. A patient's performance should be established using the best available evidence. Asking the patient, friends/relatives and nurses are the usual sources, but direct observation and common sense are also important. However direct testing is not needed. 5. Usually the patient's performance over the preceding 24-48 hours is important, but occasionally longer periods will be relevant. 6. Middle categories imply that the patient supplies over 50 per cent of the effort. 7. Use of aids to be independent is allowed. Score Interpretation (from [facility], [place]) 83)    Independent   60-79 Minimally independent   40-59 Partially dependent   20-39 Very dependent   <20 Totally dependent     -Andie Vallecillo., Barthel, D.W. (1965). Functional evaluation: the Barthel Index. [address] ([address] [facility] [address] (1997). The Barthel activities of daily living index: self-reporting versus actual performance in the old (> or = 75 years). Journal of [address] [place] [address] 45(7), [address] [facility], RYDER, Kristen Gilbert., Hans Andrew. (1999). Measuring the change in disability after inpatient rehabilitation; comparison of the responsiveness of the Barthel Index and Functional [place] Measure. Journal of Neurology, Neurosurgery, and Psychiatry, 66(4), 044-862. Frandy Vazquez, N.J.A, DAVE Ulrich, & Jorden Homans, M.A. (2004) Assessment of post-stroke quality of life in cost-effectiveness studies: The usefulness of the Barthel Index and the EuroQoL-5D.  Quality of Life Research, 15, 377-00           Physical Therapy Evaluation Charge Determination   History Examination Presentation Decision-Making   LOW Complexity : Zero comorbidities / personal factors that will impact the outcome / POC LOW Complexity : 1-2 Standardized tests and measures addressing body structure, function, activity limitation and / or participation in recreation  LOW Complexity : Stable, uncomplicated  LOW Complexity : FOTO score of       Based on the above components, the patient evaluation is determined to be of the following complexity level: LOW     Pain Rating:  Left knee 3/10    Activity Tolerance:   Good POD# 0 - mobilized without difficulty     After treatment patient left in no apparent distress:   Supine in bed, Call bell within reach, and ice applied to left knee    COMMUNICATION/EDUCATION:   The patients plan of care was discussed with: Registered nurse. Fall prevention education was provided and the patient/caregiver indicated understanding., Patient/family have participated as able in goal setting and plan of care. , and Patient/family agree to work toward stated goals and plan of care.     Thank you for this referral.  Amy Rajan, PT   Time Calculation: 40 mins

## 2022-03-16 NOTE — ANESTHESIA PROCEDURE NOTES
Peripheral Block    Start time: 3/16/2022 7:15 AM  End time: 3/16/2022 7:20 AM  Performed by: Yamini Martell DO  Authorized by: Yamini Martell DO       Pre-procedure: Indications: at surgeon's request and post-op pain management    Preanesthetic Checklist: patient identified, risks and benefits discussed, site marked, timeout performed and patient being monitored    Timeout Time: 07:15 EDT          Block Type:   Block Type:   Adductor canal  Laterality:  Left  Monitoring:  Standard ASA monitoring, continuous pulse ox, frequent vital sign checks, heart rate, responsive to questions and oxygen  Injection Technique:  Single shot  Procedures: ultrasound guided    Patient Position: supine  Prep: chlorhexidine    Location:  Mid thigh  Needle Type:  Stimuplex  Needle Gauge:  21 G  Needle Localization:  Ultrasound guidance and anatomical landmarks  Medication Injected:  Ropivacaine (PF) (NAROPIN)(0.5%) 5 mg/mL injection, 30 mL  Med Admin Time: 3/16/2022 7:20 AM    Assessment:  Number of attempts:  1  Injection Assessment:  Incremental injection every 5 mL, local visualized surrounding nerve on ultrasound, negative aspiration for blood, no paresthesia, no intravascular symptoms and ultrasound image on chart  Patient tolerance:  Patient tolerated the procedure well with no immediate complications

## 2022-03-16 NOTE — PROGRESS NOTES
Ortho NP Note    POD# 0  s/p LEFT UNI COMPARTMENTAL KNEE ARTHROPLASTY (REQ FLIP)     Pt resting in bed. Complains of lack of sensation to LEs bilaterally but aware that this is due to spinal.  Complaining of pelvic pressure over bladder, no complaint of pain to knee. Has not yet eaten, no nausea/vomiting. No CP, SOB.        VSS Afebrile. Visit Vitals  BP (!) 160/84   Pulse (!) 58   Temp 98.2 °F (36.8 °C)   Resp 18   SpO2 98%         Most Recent Labs:   Lab Results   Component Value Date/Time    HGB 12.0 03/03/2022 03:30 PM    Hemoglobin A1c 5.9 (H) 03/03/2022 03:20 PM       There is no height or weight on file to calculate BMI. Reference: BMI greater than 30 is classified as obesity and greater than 40 is classified as morbid obesity. STOP BANG Score: 2    Voiding status: incontinent on arrival to floor. Ace wrap + gauze to LLE c.d.i. Cryotherapy in place over incision. Bilateral LEs warm, dry. 2+ DP pulses  Sensation to pressure intact bilaterally. DF/PF/EHL 2/5. SCDs for mechanical DVT proph    Plan:  1) PT: starting today, WBAT  2) Gena-op Antibiotics Ancef  3) Pain:  Received tylenol, Lyrica in preop. Perioperative anesthesia: Spinal, adductor canal block. Post operative analgesia includes scheduled tylenol, toradol and PRN tramadol, oxycodone, IV dilaudid depending on severity. 4) DVT Prophylaxis:  Aspirin 81 mg PO BID. Encouraged early mobilization, bed exercises, and SCD use. 5) GI Prophylaxis: Pepcid   6) Hx of HTN: Current /84, HR 58. Resume home Hyzaar. Continue routine VS.    7) Urinary incontinence: Concern for overflow incontinence due to small amount of incontinence + pelvic pressure. Bladder scan, straight cath if >400 mL. Follow POUR algorithm. 8) Discharge plans: to home with family support and HHPT.           Maryann Gardner NP

## 2022-03-16 NOTE — DISCHARGE INSTRUCTIONS
Discharge Instructions Knee Replacement  Dr. Allie Espinoza  Patient Name  Vandana Albarado  Date of procedure  3/16/2022    Procedure  Procedure(s):  LEFT UNI COMPARTMENTAL KNEE ARTHROPLASTY (REQ FLIP)  Surgeon  Surgeon(s) and Role:     * Jensen Bhat MD - Primary  Date of discharge: No discharge date for patient encounter. PCP: Joce Paul MD    Follow up care   Follow up visit with Dr. Allie Espinoza in 4 weeks. Call [phone] Vira Huitron to make an appointment.  If Home Health has been arranged for you, they will call you to arrange dates/times for visits. Call them if you do not hear from them within 24 hours after you go home. Activity at home   Take a short walk every hour; except at night when sleeping.  Do your Home Exercise Program 3 times every day.  After exercising lie down and elevate your leg on pillows for 15-30 minutes to decrease swelling.  Refer to your patient notebook for more information. Bathing and caring for your incision   You may take a shower with your waterproof dressing on your knee.  The waterproof dressing is to stay on your knee for 7 days.  On the 7th day have someone gently peel the dressing off by lifting the edge and stretching it to break the seal.   You may then leave your incision open to air unless you see drainage from your knee. Preventing blood clots   Take Aspirin 81mg twice each day for one month (30 days) following surgery.  Call Dr. Allie Espinoza for signs of a blood clot in your leg: calf pain, tenderness, redness, swelling of lower leg   Preventing lung congestion   Use your incentive spirometer 4 times a day; do 10 repetitions each time   Remember to keep the small blue ball between the two arrows when taking a slow, deep breath   Pain Management   Get up and walk a short distance to relieve pain and stiffness.  Place ice wrap on your knee except when you are walking. The gel ice packs should be changed about every 4 hours.    Elevate your leg on pillows for 15-30 minutes. Pain Medications   Take Tylenol 650mg (take two 325mg tablets) every 6 hours for the next 4 weeks.  Take Famotidine (Pepcid) 20mg twice a day to prevent an upset stomach (if taking Aspirin and/or Meloxicam).  If needed, take Tramadol (narcotic pain pill) every 6 hours as prescribed.  If Tramadol has not relieved your pain within 1 hour, take Oxycodone 5mg (narcotic pain pill). Take Oxycodone only if needed and stop once your pain is tolerable.  Take all medications with a small amount of food.  As your pain decreases, take the narcotics less often or take ½ of a pill.  Call Dr. Cirilo Ceja if you have side effects from your narcotic pain medication: itching, drowsiness, dizziness, upset stomach, dry mouth, constipation or if you medication is not relieving your pain. Diet after surgery   You may resume your normal diet. Include vegetables, fruit, whole grains, lean meats, and low-fat dairy products. Eat food high in fiber.  Drink plenty of fluids, including 8 cups of water daily.  Take a stool softener (Senokot-s or Colace) to prevent constipation. If constipation occurs you may take a laxative (Milk of Magnesia, Dulcolax tablets). Avoid after surgery   Do not take any over-the-counter medication for pain except Tylenol   Do not take more than 3000mg (3 Grams) of Tylenol in 24 hours   Do not drink alcoholic beverages   Do not smoke   Do not drive until seen for follow up appointment   Do not place frozen gel pack directly on your skin. It can cause frostbite.  Do not take a tub bath, swim or get in a hot tub for 8 weeks  Prevention of falls and safety at home   Set up an area where you can rest comfortably leaving space around furniture to allow you to walk with your walker.  Keep stairs, hallways and bathrooms well lit; especially at night.  Arrange for care for your pets   Keep your home free of clutter.    Call Dr. Cirilo Ceja at [phone] for:   Pain that is not relieved by pain medication, ice and activity   Side effects of medications   Increased/spread of bruising   Warning signs of infection:  ? persistent fever greater than 100 degrees  ? shaking or chills  ? increased redness, tenderness, swelling or drainage from incision  ? increased pain during activity or rest   Warning signs of a blood clot in your leg:  ? increased pain in your calf  ? tenderness or redness  ? increased swelling or knee, calf, ankle or foot    Call [phone] after 5pm or on a weekend.  The on call physician will return your phone call  Call your Primary Care Doctor for:    Concerns about your medical conditions such as diabetes, high blood pressure, asthma, congestive heart failure   Blood sugars greater than 180   Persistent headache or dizziness   Coughing or congestion   Constipation or diarrhea   Burning when you go to the bathroom   Abnormal heart rate (fast or  slow)      Call 911 and go to the nearest hospital for:    Sudden increased shortness of breath   Sudden onset of chest pain   Difficulty breathing   Localized chest pain with coughing or taking a deep breath

## 2022-03-16 NOTE — H&P
Date of Surgery Update:  Zuleika Cui was seen and examined. History and physical has been reviewed. The patient has been examined. There have been no significant clinical changes since the completion of the originally dated History and Physical.  Patient identified by surgeon; surgical site was confirmed by patient and surgeon.     Signed By: Makayla North MD     March 16, 2022 7:27 AM

## 2022-03-16 NOTE — ANESTHESIA PROCEDURE NOTES
Spinal Block    Start time: 3/16/2022 7:36 AM  End time: 3/16/2022 7:42 AM  Performed by: Maki Douglas CRNA  Authorized by: Gerson Deal DO     Pre-procedure:   Indications: primary anesthetic  Preanesthetic Checklist: patient identified, risks and benefits discussed, anesthesia consent, site marked, patient being monitored and timeout performed    Timeout Time: 07:36 EDT          Spinal Block:   Patient Position:  Seated  Prep Region:  Lumbar  Prep: Betadine      Location:  L2-3          Needle:   Needle Type:  Pencil-tip  Needle Gauge:  24 G  Attempts:  2      Events: CSF confirmed, no blood with aspiration and no paresthesia        Assessment:  Insertion:  Uncomplicated  Patient tolerance:  Patient tolerated the procedure well with no immediate complications

## 2022-03-16 NOTE — ANESTHESIA PREPROCEDURE EVALUATION
Relevant Problems   No relevant active problems       Anesthetic History   No history of anesthetic complications            Review of Systems / Medical History  Patient summary reviewed, nursing notes reviewed and pertinent labs reviewed    Pulmonary  Within defined limits                 Neuro/Psych   Within defined limits           Cardiovascular    Hypertension          Hyperlipidemia         GI/Hepatic/Renal                Endo/Other      Hypothyroidism  Arthritis     Other Findings              Physical Exam    Airway  Mallampati: II  TM Distance: 4 - 6 cm  Neck ROM: normal range of motion   Mouth opening: Normal     Cardiovascular    Rhythm: regular  Rate: normal         Dental  No notable dental hx       Pulmonary  Breath sounds clear to auscultation               Abdominal  GI exam deferred       Other Findings            Anesthetic Plan    ASA: 2  Anesthesia type: regional and spinal - saphenous block          Induction: Intravenous  Anesthetic plan and risks discussed with: Patient

## 2022-03-17 ENCOUNTER — TELEPHONE (OUTPATIENT)
Dept: FAMILY MEDICINE CLINIC | Age: 82
End: 2022-03-17

## 2022-03-17 VITALS
OXYGEN SATURATION: 98 % | TEMPERATURE: 98.3 F | SYSTOLIC BLOOD PRESSURE: 162 MMHG | DIASTOLIC BLOOD PRESSURE: 74 MMHG | HEART RATE: 65 BPM | RESPIRATION RATE: 16 BRPM

## 2022-03-17 LAB
ANION GAP SERPL CALC-SCNC: 4 MMOL/L (ref 5–15)
BUN SERPL-MCNC: 15 MG/DL (ref 6–20)
BUN/CREAT SERPL: 19 (ref 12–20)
CALCIUM SERPL-MCNC: 9.3 MG/DL (ref 8.5–10.1)
CHLORIDE SERPL-SCNC: 102 MMOL/L (ref 97–108)
CO2 SERPL-SCNC: 28 MMOL/L (ref 21–32)
CREAT SERPL-MCNC: 0.8 MG/DL (ref 0.55–1.02)
GLUCOSE SERPL-MCNC: 106 MG/DL (ref 65–100)
HGB BLD-MCNC: 11.6 G/DL (ref 11.5–16)
POTASSIUM SERPL-SCNC: 3.2 MMOL/L (ref 3.5–5.1)
SODIUM SERPL-SCNC: 134 MMOL/L (ref 136–145)

## 2022-03-17 PROCEDURE — 97116 GAIT TRAINING THERAPY: CPT

## 2022-03-17 PROCEDURE — 80048 BASIC METABOLIC PNL TOTAL CA: CPT

## 2022-03-17 PROCEDURE — 36415 COLL VENOUS BLD VENIPUNCTURE: CPT

## 2022-03-17 PROCEDURE — 97165 OT EVAL LOW COMPLEX 30 MIN: CPT

## 2022-03-17 PROCEDURE — 74011250636 HC RX REV CODE- 250/636: Performed by: PHYSICIAN ASSISTANT

## 2022-03-17 PROCEDURE — 85018 HEMOGLOBIN: CPT

## 2022-03-17 PROCEDURE — 97535 SELF CARE MNGMENT TRAINING: CPT

## 2022-03-17 PROCEDURE — 74011250637 HC RX REV CODE- 250/637

## 2022-03-17 PROCEDURE — 74011000250 HC RX REV CODE- 250: Performed by: PHYSICIAN ASSISTANT

## 2022-03-17 PROCEDURE — 74011250637 HC RX REV CODE- 250/637: Performed by: PHYSICIAN ASSISTANT

## 2022-03-17 PROCEDURE — 97530 THERAPEUTIC ACTIVITIES: CPT

## 2022-03-17 RX ORDER — ASPIRIN 81 MG/1
81 TABLET ORAL EVERY 12 HOURS
Qty: 60 TABLET | Refills: 0 | Status: SHIPPED | OUTPATIENT
Start: 2022-03-17 | End: 2022-04-04

## 2022-03-17 RX ORDER — MELOXICAM 7.5 MG/1
7.5 TABLET ORAL DAILY
Qty: 14 TABLET | Refills: 0 | Status: SHIPPED | OUTPATIENT
Start: 2022-03-17 | End: 2022-04-04

## 2022-03-17 RX ORDER — OXYCODONE HYDROCHLORIDE 5 MG/1
5 TABLET ORAL
Qty: 30 TABLET | Refills: 0 | Status: SHIPPED | OUTPATIENT
Start: 2022-03-17 | End: 2022-03-24

## 2022-03-17 RX ORDER — FAMOTIDINE 20 MG/1
20 TABLET, FILM COATED ORAL 2 TIMES DAILY
Qty: 60 TABLET | Refills: 0 | Status: SHIPPED | OUTPATIENT
Start: 2022-03-17 | End: 2022-04-16

## 2022-03-17 RX ORDER — POLYETHYLENE GLYCOL 3350 17 G/17G
17 POWDER, FOR SOLUTION ORAL DAILY
Qty: 14 PACKET | Refills: 0 | Status: SHIPPED | OUTPATIENT
Start: 2022-03-18 | End: 2022-04-04

## 2022-03-17 RX ORDER — TRAMADOL HYDROCHLORIDE 50 MG/1
50 TABLET ORAL
Qty: 28 TABLET | Refills: 0 | Status: SHIPPED | OUTPATIENT
Start: 2022-03-17 | End: 2022-03-24

## 2022-03-17 RX ADMIN — KETOROLAC TROMETHAMINE 15 MG: 30 INJECTION, SOLUTION INTRAMUSCULAR; INTRAVENOUS at 00:51

## 2022-03-17 RX ADMIN — KETOROLAC TROMETHAMINE 15 MG: 30 INJECTION, SOLUTION INTRAMUSCULAR; INTRAVENOUS at 06:06

## 2022-03-17 RX ADMIN — FAMOTIDINE 20 MG: 20 TABLET ORAL at 11:12

## 2022-03-17 RX ADMIN — HYDROCHLOROTHIAZIDE: 25 TABLET ORAL at 11:12

## 2022-03-17 RX ADMIN — ACETAMINOPHEN 650 MG: 325 TABLET ORAL at 06:07

## 2022-03-17 RX ADMIN — ASPIRIN 81 MG: 81 TABLET, COATED ORAL at 08:00

## 2022-03-17 RX ADMIN — POTASSIUM BICARBONATE 20 MEQ: 782 TABLET, EFFERVESCENT ORAL at 09:00

## 2022-03-17 RX ADMIN — POLYETHYLENE GLYCOL 3350 17 G: 17 POWDER, FOR SOLUTION ORAL at 09:00

## 2022-03-17 RX ADMIN — ACETAMINOPHEN 650 MG: 325 TABLET ORAL at 00:51

## 2022-03-17 RX ADMIN — ATORVASTATIN CALCIUM 20 MG: 10 TABLET, FILM COATED ORAL at 11:12

## 2022-03-17 RX ADMIN — LEVOTHYROXINE SODIUM 112 MCG: 0.11 TABLET ORAL at 07:30

## 2022-03-17 RX ADMIN — SENNOSIDES AND DOCUSATE SODIUM 1 TABLET: 50; 8.6 TABLET ORAL at 11:12

## 2022-03-17 RX ADMIN — WATER 2 G: 1 INJECTION INTRAMUSCULAR; INTRAVENOUS; SUBCUTANEOUS at 00:52

## 2022-03-17 RX ADMIN — SODIUM CHLORIDE, PRESERVATIVE FREE 10 ML: 5 INJECTION INTRAVENOUS at 06:07

## 2022-03-17 NOTE — PROGRESS NOTES
SIMON: Plan for discharge home with [facility]. Patient lives alone. Patient stated that she has two nieces that are close by that will be checking on her. [facility] has accepted patient. Patient owns walker and potty chair. Family will transport patient home. Care Management Interventions  PCP Verified by CM: Yes  Mode of Transport at Discharge: Other (see comment) (family/car)  Transition of Care Consult (CM Consult): [address]: No  Reason Outside [place]: Physician referred to specific agency  MyChart Signup: No  Discharge Durable Medical Equipment: No  Physical Therapy Consult: Yes  Occupational Therapy Consult: Yes  Support Systems: Spouse/Significant Other  Confirm Follow Up Transport: Family  The Patient and/or Patient Representative was Provided with a Choice of Provider and Agrees with the Discharge Plan?: Yes  Freedom of Choice List was Provided with Basic Dialogue that Supports the Patient's Individualized Plan of Care/Goals, Treatment Preferences and Shares the Quality Data Associated with the Providers?: Yes  Discharge Location  Patient Expects to be Discharged to[de-identified] Home with home health    Chart reviewed. CM met with patient at bedside. Patient stated that she lives alone, and she has two nieces that are very supportive that live 10 mins away and will be available to assist as needed. CM offered [facility] [employer], a free program to offer more assistance to transition patient home, however patient declined this and stated she does not need any additional assistance. Patient stated family is available to assist as needed. Patient prefers [facility] for [facility]. Patient owns two walkers and two potty chairs that are located on each level of the home. Family will transport patient home. Medicare Outpatient Observation Notice (MOON)/ [place] Outpatient Observation Notice (Sakshi Rincon) provided to patient/representative with verbal explanation of the notice.  Time allotted for questions regarding the notice. Patient /representative provided a completed copy of the MOON/VOON notice. Copy placed on bedside chart.     Everardo Lombardo BSW/CRM

## 2022-03-17 NOTE — TELEPHONE ENCOUNTER
----- Message from Ele Lucia sent at 3/17/2022  3:56 PM EDT -----  Subject: Message to Provider    QUESTIONS  Information for Provider? Ms. Aguila Pagan at [facility] would like patient's   notes and date of last office visit faxed to [phone]  ---------------------------------------------------------------------------  --------------  CALL BACK INFO  What is the best way for the office to contact you? OK to leave message on   voicemail  Preferred Call Back Phone Number? [phone]  ---------------------------------------------------------------------------  --------------  SCRIPT ANSWERS  Relationship to Patient? Third Party  Representative Name?  Natasha [id number]

## 2022-03-17 NOTE — PROGRESS NOTES
Problem: Mobility Impaired (Adult and Pediatric)  Goal: *Acute Goals and Plan of Care (Insert Text)  3/17/2022 1050 by Means, Joann CHRISTIE  Outcome: Progressing Towards Goal  Note:   Outcome: Progressing Towards Goal  Note: FUNCTIONAL STATUS PRIOR TO ADMISSION: Patient was independent and active without use of DME. Pt lives in a 2 story condo - bedroom 2nd floor. HOME SUPPORT PRIOR TO ADMISSION: The patient lived alone with no local support. Pt states she does not want to disrupt her families life and states she does not plan to ask any friends or neighbors to stay with her when initially discharge. Per pt - after Right TKR - since living alone - pt discharged to SNF - pt adamant she does not want to go to SNF this hospitalization. Physical Therapy Goals  Initiated 3/16/2022     1. Patient will move from supine to sit and sit to supine  and scoot up and down in bed with modified independence within 4 days. 2. Patient will perform sit to stand with modified independence within 4 days. 3. Patient will ambulate with modified independence for > 150 feet with the least restrictive device within 4 days. 4. Patient will ascend/descend 12 stairs with one handrail/cane with modified independence within 4 days. 5. Patient will perform home exercise program per protocol with supervision/set-up within 4 days. 6. Patient will demonstrate AROM 0-90 degrees in operative joint within 4 days. PHYSICAL THERAPY NOTE  Patient: Amador Dominguez (20 y.o. female)  Date: 3/17/2022  Primary Diagnosis: Arthritis of left knee [M17.12]  Procedure(s) (LRB):  LEFT UNI COMPARTMENTAL KNEE ARTHROPLASTY (REQ FLIP) (Left) 1 Day Post-Op   Precautions:  Fall,WBAT    Amador Dominguez was seen for morning PT session. She is walking 200 feet with the RW and w/ SBA/CGA. Patient was instructed in stair management and able to negotiate 8 steps using both rails w/ CGA. Demonstrates safety on steps and overall w/ gait.  She did require verbal cues to ensure she backs up all the way to chair and reaches for surface. Reviewed activity recommendations and restrictions with patient. Mirna Chao is clear for discharge home w/ HHPT from a mobility standpoint. Pt reports she prefers to go home tomorrow since she has no one at home to help her (lives alone but niece coming to bring pt home). Has RW for home use. RN aware. Morning session functional mobility:  Bed Mobility:     Supine to Sit:  (recevied OOB in chair)  Sit to Supine:  (returned to chair)     Transfers:  Sit to Stand: Supervision  Stand to Sit: Stand-by assistance;Assist x1        Bed to Chair: Supervision              Balance:   Sitting: Intact  Standing: Impaired; Without support  Standing - Static: Constant support;Good  Standing - Dynamic : Constant support;Good  Ambulation/Gait Training:  Distance (ft): 200 Feet (ft)  Assistive Device: Gait belt;Walker, rolling  Ambulation - Level of Assistance: Stand-by assistance;Contact guard assistance;Assist x1        Gait Abnormalities: Antalgic;Decreased step clearance  Right Side Weight Bearing: Full  Left Side Weight Bearing: As tolerated  Base of Support: Widened  Stance: Left decreased  Speed/Adri: Pace decreased (<100 feet/min); Slow  Step Length: Left shortened;Right shortened  Swing Pattern: Left asymmetrical     Interventions: Safety awareness training        Stairs:  Number of Stairs Trained: 4  Stairs - Level of Assistance: Contact guard assistance;Assist X1  Rail Use: Both    Thank you,  Joann Mcahado,PTA   Time Calculation: 27 mins

## 2022-03-17 NOTE — DISCHARGE SUMMARY
Ortho Discharge Summary    Patient ID:  Jose Alberto García  [id number]  female  80 y.o.  1940    Admit date: 3/16/2022    Discharge date: 3/17/2022    Admitting Physician: Katya Davis MD     Consulting Physician(s):   Treatment Team: Attending Provider: Duncan Gimenez MD; Utilization Review: Madai Dang; Staff Nurse: Vannesa Santillan, RN; Care Manager: Leopoldo Rahman    Date of Surgery:   3/16/2022     Preoperative Diagnosis:  OSTEOARTHRITIS    Postoperative Diagnosis:   OSTEOARTHRITIS    Procedure(s):   LEFT UNI COMPARTMENTAL KNEE ARTHROPLASTY (REQ FLIP)     Anesthesia Type:   Spinal     Surgeon: Duncan Gimenez MD                            HPI:  Pt is a 80 y.o. female who has a history of OSTEOARTHRITIS  with pain and limitations of activities of daily living who presents at this time for a left LEFT UNI COMPARTMENTAL KNEE ARTHROPLASTY (REQ FLIP) following the failure of conservative management. PMH:   Past Medical History:   Diagnosis Date    Chronic pain     LEFT KNEE    DDD (degenerative disc disease), lumbar 3/15/2016    Diverticulosis of colon 1/12    McGroarty/gi    DJD (degenerative joint disease)     Esophageal stricture 2/03    Fibrosis of lung ([facility] [address] 2004    Scarring @ the apices bilaterally Done @VPI/histoplasmosis    History of basal cell cancer     HTN, goal below 150/90     Hypercholesterolemia     IBS (irritable bowel syndrome) 8/30/2016    Menopause     LMP-48years old?  Osteoporosis     previously on Fosamax many years ago; does not want further treatment    Overactive bladder     Skin cancer 01/06/2020    scc-right shin    Unspecified hypothyroidism             There is no height or weight on file to calculate BMI. : A BMI > 30 is classified as obesity and > 40 is classified as morbid obesity. Medications upon admission :   Prior to Admission Medications   Prescriptions Last Dose Informant Patient Reported? Taking?    MULTIVITAMINS (MULTI-VITAMIN PO) 3/9/2022 at Unknown time  Yes Yes   Sig: Take 1 Tab by mouth daily. Takes one po daily. acetaminophen (Tylenol Arthritis Pain) 650 mg TbER 3/15/2022 at Unknown time  Yes Yes   Sig: Take 1,300 mg by mouth every eight (8) hours as needed for Pain. calcium carbonate (Tums Ultra) 400 mg calcium (1,000 mg) chew 3/15/2022 at Unknown time  Yes Yes   Sig: Take 1 Tablet by mouth as needed. levothyroxine (SYNTHROID) 112 mcg tablet 3/15/2022 at Unknown time  No Yes   Sig: TAKE 1 TABLET EVERY DAY BEFORE BREAKFAST   losartan-hydroCHLOROthiazide (HYZAAR) 100-25 mg per tablet 3/15/2022 at Unknown time  No Yes   Sig: TAKE 1 TABLET EVERY DAY   mineral oil/petrolatum,white (LUBRICANT EYE OP) 3/15/2022 at Unknown time  Yes Yes   Sig: Administer  to both eyes as needed. sennosides-docusate sodium (Stool Softener-Stimulant Laxat) 8.6-50 mg cap Not Taking at Unknown time  Yes No   Sig: Take  by mouth as needed. Patient not taking: Reported on 3/16/2022   simvastatin (ZOCOR) 40 mg tablet 3/15/2022 at Unknown time  No Yes   Sig: Take 1 Tablet by mouth nightly. Facility-Administered Medications: None        Allergies: Allergies   Allergen Reactions    Benazepril Cough    Sulfa (Sulfonamide Antibiotics) [facility] Course: The patient underwent surgery. Complications:  None; patient tolerated the procedure well. Was taken to the PACU in stable condition and then transferred to the ortho floor. Mobilized on POD 0 with therapy clearance on POD 1. Perioperative Antibiotics:  Ancef     Postoperative Pain Management:  Oxycodone & Tylenol, Tramadol    DVT Prophylaxis: Aspirin 81 mg PO BID    Postoperative transfusions:    Number of units banked PRBCs =   none     Post Op complications: none    Hemoglobin at discharge:    Lab Results   Component Value Date/Time    HGB 11.6 03/17/2022 06:05 AM    INR 1.0 03/03/2022 03:30 PM       Aquacel dressing to L knee remained in place - clean, dry and intact.  No significant erythema or swelling. Wound appears to be healing without any evidence of infection. Neurovascular exam found to be within normal limits. Physical Therapy started following surgery and participated in bed mobility, transfers and ambulation. Gait:  Gait  Base of Support: Widened  Speed/Adri: Pace decreased (<100 feet/min),Slow  Step Length: Left shortened,Right shortened  Swing Pattern: Left asymmetrical  Stance: Left decreased  Gait Abnormalities: Antalgic,Decreased step clearance  Ambulation - Level of Assistance: Stand-by assistance,Contact guard assistance,Assist x1  Distance (ft): 200 Feet (ft)  Assistive Device: Gait belt,Walker, rolling  Rail Use: Both  Stairs - Level of Assistance: Contact guard assistance,Assist X1  Number of Stairs Trained: 4  Interventions: Safety awareness training            Interventions: Safety awareness training      Discharged to: Home. Condition on Discharge:   good    Discharge instructions:  - Anticoagulate with Aspirin 81 mg PO BID  - Take pain medications as prescribed  - Resume pre hospital diet      - Discharge activity: activity as tolerated  - Ambulate with assistive device as needed. - Weight bearing status as tolerated  - Wound Care Keep wound clean and dry. See discharge instruction sheet. -DISCHARGE MEDICATION LIST     Current Discharge Medication List      START taking these medications    Details   aspirin delayed-release 81 mg tablet Take 1 Tablet by mouth every twelve (12) hours for 30 days. Qty: 60 Tablet, Refills: 0  Start date: 3/17/2022, End date: 4/16/2022      famotidine (PEPCID) 20 mg tablet Take 1 Tablet by mouth two (2) times a day for 30 days. Qty: 60 Tablet, Refills: 0  Start date: 3/17/2022, End date: 4/16/2022      polyethylene glycol (MIRALAX) 17 gram packet Take 1 Packet by mouth daily for 14 days.   Qty: 14 Packet, Refills: 0  Start date: 3/18/2022, End date: 4/1/2022      oxyCODONE IR (ROXICODONE) 5 mg immediate release tablet Take 1 Tablet by mouth every four (4) hours as needed for Pain for up to 7 days. Max Daily Amount: 30 mg.  Qty: 30 Tablet, Refills: 0  Start date: 3/17/2022, End date: 3/24/2022    Associated Diagnoses: S/P left unicompartmental knee replacement      traMADoL (ULTRAM) 50 mg tablet Take 1 Tablet by mouth every six (6) hours as needed for Pain for up to 7 days. Max Daily Amount: 200 mg. Qty: 28 Tablet, Refills: 0  Start date: 3/17/2022, End date: 3/24/2022    Associated Diagnoses: S/P left unicompartmental knee replacement      meloxicam (MOBIC) 7.5 mg tablet Take 1 Tablet by mouth daily for 14 days. Qty: 14 Tablet, Refills: 0  Start date: 3/17/2022, End date: 3/31/2022         CONTINUE these medications which have NOT CHANGED    Details   simvastatin (ZOCOR) 40 mg tablet Take 1 Tablet by mouth nightly. Qty: 90 Tablet, Refills: 1    Associated Diagnoses: Hyperlipidemia, unspecified hyperlipidemia type      losartan-hydroCHLOROthiazide (HYZAAR) 100-25 mg per tablet TAKE 1 TABLET EVERY DAY  Qty: 90 Tablet, Refills: 0    Associated Diagnoses: HTN, goal below 150/90      levothyroxine (SYNTHROID) 112 mcg tablet TAKE 1 TABLET EVERY DAY BEFORE BREAKFAST  Qty: 90 Tablet, Refills: 0    Associated Diagnoses: Acquired hypothyroidism      acetaminophen (Tylenol Arthritis Pain) 650 mg TbER Take 1,300 mg by mouth every eight (8) hours as needed for Pain. calcium carbonate (Tums Ultra) 400 mg calcium (1,000 mg) chew Take 1 Tablet by mouth as needed. mineral oil/petrolatum,white (LUBRICANT EYE OP) Administer  to both eyes as needed. MULTIVITAMINS (MULTI-VITAMIN PO) Take 1 Tab by mouth daily. Takes one po daily. sennosides-docusate sodium (Stool Softener-Stimulant Laxat) 8.6-50 mg cap Take  by mouth as needed.           per medical continuation form      -Follow up in office in 3-4 weeks      Signed:  Dino Farmer NP  Orthopaedic Nurse Practitioner    3/17/2022  12:39 PM

## 2022-03-17 NOTE — NURSE NAVIGATOR
[facility]  [facility] Orthopaedic Pathway Handoff     FROM:                                TO: At [address]                                                      ([address] [place] [address] or Facility name)  Joyce Reed [address]  [address] [address]  Dept: [address]: [phone]                                      Room#:  288/73                                                       Nurse Navigator:  Marleni High RN         SITUATION      ASAScore: ASA 2 - Patient with mild systemic disease with no functional limitations    Admitted:  3/16/2022  Hospital Day: 2      Attending Provider:  Mary Anne Rivera MD     Consultations:  None    PCP:  Juan R Duran MD   [phone]     Admitting Dx: Arthritis of left knee [M17.12]       Active Problems:    Arthritis of left knee (3/16/2022)      1 Day Post-Op of   Procedure(s):  LEFT UNI COMPARTMENTAL KNEE ARTHROPLASTY (REQ FLIP)   BY: Mary Anne Rivera MD             ON: 3/16/2022                  Code Status: Full Code             Advance Directive? Yes Not W Pt (Send w/patient)     Isolation:  There are currently no Active Isolations       MDRO: No current active infections    BACKGROUND     Allergies: Allergies   Allergen Reactions    Benazepril Cough    Sulfa (Sulfonamide Antibiotics) Hives       Past Medical History:   Diagnosis Date    Chronic pain     LEFT KNEE    DDD (degenerative disc disease), lumbar 3/15/2016    Diverticulosis of colon 1/12    McGroarty/gi    DJD (degenerative joint disease)     Esophageal stricture 2/03    Fibrosis of lung ([facility] [address] 2004    Scarring @ the apices bilaterally Done @VPI/histoplasmosis    History of basal cell cancer     HTN, goal below 150/90     Hypercholesterolemia     IBS (irritable bowel syndrome) 8/30/2016    Menopause     LMP-48years old?     Osteoporosis     previously on Fosamax many years ago; does not want further treatment    Overactive bladder     Skin cancer 01/06/2020    scc-right shin    Unspecified hypothyroidism             Past Surgical History:   Procedure Laterality Date    COLONOSCOPY  1/12    McGroarty/gi    HX APPENDECTOMY  1953    HX CATARACT REMOVAL  3/12    L    HX CATARACT REMOVAL  04/2012    Bilateral     HX KNEE ARTHROSCOPY Right 2006 and 2008    Dr Bertha Hood  1/10    right  Darnell/o/s    HX OTHER SURGICAL  2020, 2021    EXC. SKIN CA TENISHA. LOWER LEGS       Prior to Admission Medications   Prescriptions Last Dose Informant Patient Reported? Taking? MULTIVITAMINS (MULTI-VITAMIN PO) 3/9/2022 at Unknown time  Yes Yes   Sig: Take 1 Tab by mouth daily. Takes one po daily. acetaminophen (Tylenol Arthritis Pain) 650 mg TbER 3/15/2022 at Unknown time  Yes Yes   Sig: Take 1,300 mg by mouth every eight (8) hours as needed for Pain. calcium carbonate (Tums Ultra) 400 mg calcium (1,000 mg) chew 3/15/2022 at Unknown time  Yes Yes   Sig: Take 1 Tablet by mouth as needed. levothyroxine (SYNTHROID) 112 mcg tablet 3/15/2022 at Unknown time  No Yes   Sig: TAKE 1 TABLET EVERY DAY BEFORE BREAKFAST   losartan-hydroCHLOROthiazide (HYZAAR) 100-25 mg per tablet 3/15/2022 at Unknown time  No Yes   Sig: TAKE 1 TABLET EVERY DAY   mineral oil/petrolatum,white (LUBRICANT EYE OP) 3/15/2022 at Unknown time  Yes Yes   Sig: Administer  to both eyes as needed. sennosides-docusate sodium (Stool Softener-Stimulant Laxat) 8.6-50 mg cap Not Taking at Unknown time  Yes No   Sig: Take  by mouth as needed. Patient not taking: Reported on 3/16/2022   simvastatin (ZOCOR) 40 mg tablet 3/15/2022 at Unknown time  No Yes   Sig: Take 1 Tablet by mouth nightly.       Facility-Administered Medications: None       Vaccinations:    Immunization History   Administered Date(s) Administered    COVID-19, Pfizer Purple top, DILUTE for use, 12+ yrs, 30mcg/0.3mL dose 02/11/2021, 03/11/2021, 09/29/2021    Influenza High Dose Vaccine PF 10/12/2016, 09/20/2018, 09/29/2021    Influenza Vaccine 10/16/2013    Influenza Vaccine (Tri) Adjuvanted (>65 Yrs FLUAD TRI 98135) 09/12/2019    Influenza Vaccine Split 09/28/2011, 04/06/2012, 10/09/2012    Pneumococcal Polysaccharide (PPSV-23) 11/14/2017    Pneumococcal Vaccine (Pcv) 10/19/2007    Tdap 11/19/2015    Varicella Virus Vaccine Live 07/02/2009         ASSESSMENT   Age: 80 y.o. Gender: female        Height:                      Weight:      Patient Vitals for the past 8 hrs:   Temp Pulse Resp BP SpO2   03/17/22 0851 98.3 °F (36.8 °C) 65 16 (!) 162/74 98 %            Active Orders   Diet    ADULT DIET Regular       Orientation: Orientation Level: Oriented X4    Active Lines/Drains:  (Peg Tube / [place] / CL or S/L?):no    Urinary Status: Voiding      Last BM: Last Bowel Movement Date:  (PTA)     Skin Integrity: Incision (comment)             Mobility: Slightly limited   Weight Bearing Status: WBAT (Weight Bearing as Tolerated)      Gait Training  Assistive Device: Gait belt,Walker, rolling  Ambulation - Level of Assistance: Stand-by assistance,Contact guard assistance,Assist x1  Distance (ft): 200 Feet (ft)  Stairs - Level of Assistance: Contact guard assistance,Assist X1  Number of Stairs Trained: 4  Rail Use: Both  Interventions: Safety awareness training     On Anticoagulation?  YES  Aspirin                                         Pain Medications given:  oxycodone                                   Lab Results   Component Value Date/Time    Glucose 106 (H) 03/17/2022 06:05 AM    Hemoglobin A1c 5.9 (H) 03/03/2022 03:20 PM    INR 1.0 03/03/2022 03:30 PM    INR 1.5 (H) 02/08/2010 07:00 AM    HGB 11.6 03/17/2022 06:05 AM    HGB 12.0 03/03/2022 03:30 PM    HGB 13.0 08/26/2020 09:21 AM    HGB 12.4 09/17/2019 08:18 AM       Readmission Risks:  Score:         RECOMMENDATION     See After Visit Summary (AVS) for:  · Discharge instructions  · After [address]   · Medication Reconciliation          [facility] Orthopaedic Nurse Navigator  HAN Bautista, RN-BC       Office  [phone]  Cell      [phone]  Fax      [phone]  [email or web address]             . Kelsey

## 2022-03-17 NOTE — PROGRESS NOTES
OCCUPATIONAL THERAPY EVALUATION/DISCHARGE  Patient: Lopez Daniels (80 y.o. female)  Date: 3/17/2022  Primary Diagnosis: Arthritis of left knee [M17.12]  Procedure(s) (LRB):  LEFT UNI COMPARTMENTAL KNEE ARTHROPLASTY (REQ FLIP) (Left) 1 Day Post-Op   Precautions:   Fall,WBAT    ASSESSMENT  Based on the objective data described below, the patient presents with decreased independence with self care and functional mobility following admission for L unicompartmental knee. She had the right knee replaced 12 years ago and discharged to rehab setting. She does not wish to go to rehab this time as she did not have the best experience. She does prefer to stay one more night, however, at her current level of performance, she is cleared for discharge home. Recommend home with support from her nieces as needed. Pt is cleared for discharge home. Recommend home with support from family/friends as needed with basic ADL and IADL activities. Current Level of Function (ADLs/self-care): supervision to mod I    Functional Outcome Measure: The patient scored 70 on the Barthel Index outcome measure which is indicative of 30% impairment with ADL increased. Other factors to consider for discharge: pain     PLAN :  Recommend with staff: OOB to chair TID for meals. Recommend progression to and from bathroom with use of walker and gait belt for toileting. Recommendation for discharge: (in order for the patient to meet his/her long term goals)  No skilled occupational therapy/ follow up rehabilitation needs identified at this time. This discharge recommendation:  Has been made in collaboration with the attending provider and/or case management    IF patient discharges home will need the following DME: none       SUBJECTIVE:   Patient stated I am feeling alright.     OBJECTIVE DATA SUMMARY:   HISTORY:   Past Medical History:   Diagnosis Date    Chronic pain     LEFT KNEE    DDD (degenerative disc disease), lumbar 3/15/2016    Diverticulosis of colon 1/12    McGroarty/gi    DJD (degenerative joint disease)     Esophageal stricture 2/03    Fibrosis of lung ([facility] [address] 2004    Scarring @ the apices bilaterally Done @VPI/histoplasmosis    History of basal cell cancer     HTN, goal below 150/90     Hypercholesterolemia     IBS (irritable bowel syndrome) 8/30/2016    Menopause     LMP-48years old?  Osteoporosis     previously on Fosamax many years ago; does not want further treatment    Overactive bladder     Skin cancer 01/06/2020    scc-right shin    Unspecified hypothyroidism           Past Surgical History:   Procedure Laterality Date    COLONOSCOPY  1/12    McGroarty/gi    HX APPENDECTOMY  1953    HX CATARACT REMOVAL  3/12    L    HX CATARACT REMOVAL  04/2012    Bilateral     HX KNEE ARTHROSCOPY Right 2006 and 2008    Dr Andreas Lorenzana  1/10    right  Darnell/o/s    HX OTHER SURGICAL  2020, 2021    EXC. SKIN CA TENISHA. LOWER LEGS       Prior Level of Function/Environment/Context: Pt is independent at home prior to admission.    Expanded or extensive additional review of patient history:   Home Situation  Home Environment:  (Condo)  # Steps to Enter: 4  Rails to Enter: Yes  Hand Rails : Bilateral  Wheelchair Ramp: No  One/Two Story Residence: Two story  # of Interior Steps: 12  Interior Rails: Both  Lift Chair Available: No  Living Alone: Yes  Support Systems: Other Family Member(s)  Patient Expects to be Discharged to[de-identified] Home  Current DME Used/Available at Home: Walker, rolling,Cane, straight,Commode, bedside (raised toilet seats with BSCs)  Tub or Shower Type: Shower    Hand dominance: Right    EXAMINATION OF PERFORMANCE DEFICITS:  Cognitive/Behavioral Status:  Neurologic State: Alert  Orientation Level: Oriented X4  Cognition: Appropriate for age attention/concentration  Perception: Appears intact  Perseveration: No perseveration noted  Safety/Judgement: Good awareness of safety precautions    Skin: see nursing notes; ace wrap removed and aquacel clean and intact    Edema: slight noted at medial knee    Hearing:       Vision/Perceptual:                           Acuity: Within Defined Limits         Range of Motion:    AROM: Within functional limits                         Strength:    Strength: Within functional limits                Coordination:  Coordination: Within functional limits  Fine Motor Skills-Upper: Right Intact; Left Intact    Gross Motor Skills-Upper: Right Intact; Left Intact    Tone & Sensation:    Tone: Normal  Sensation: Intact                      Balance:  Sitting: Intact  Standing: Impaired; With support  Standing - Static: Good;Constant support  Standing - Dynamic : Good;Constant support    Functional Mobility and Transfers for ADLs:  Bed Mobility:  Supine to Sit: Additional time;Supervision  Sit to Supine:  (remained sitting in the chair)    Transfers:  Sit to Stand: Supervision  Stand to Sit: Supervision  Bed to Chair: Supervision  Bathroom Mobility: Supervision/set up  Toilet Transfer : Supervision    ADL Assessment:  Feeding: Independent    Oral Facial Hygiene/Grooming: Supervision    Bathing: Supervision    Upper Body Dressing: Supervision    Lower Body Dressing: Supervision    Toileting: Supervision                ADL Intervention and task modifications:     Bathing:  She progressed to the bathroom with supervision using RW to compelte bathing activities standing. Once standing, she declined full bathing and hitting her major areas only. She groomed standing demonstrating good standing endurance. Dressing training following TKR:  Pt participated with ADL routine while sitting on the commode initially for underpants and pants.     Pt provided training and education for donning clothing over surgical leg first.  Pt completed donning underpants with setup, pants with Storm, and completed socks and shoes from the chair with setup  Pt did well with eduction and training but will benefit from reinforcement of education provided. Following intervention, pt left sitting in the chair. .     Shower transfers:   Discussed technique for walk in shower transfers. Pt educated to step in with strong leg and to come out with operated leg to ensure safety with task. Pt instructed to have a family member or friend with them when they first attempt to get in the shower. Pt educated they can use the walker as needed to step into the shower for stability. Cognitive Retraining  Safety/Judgement: Good awareness of safety precautions    Functional Measure:    Barthel Index:  Bathin  Bladder: 10  Bowels: 10  Groomin  Dressin  Feeding: 10  Mobility: 10  Stairs: 5  Toilet Use: 5  Transfer (Bed to Chair and Back): 10  Total: 70/100      The Barthel ADL Index: Guidelines  1. The index should be used as a record of what a patient does, not as a record of what a patient could do. 2. The main aim is to establish degree of independence from any help, physical or verbal, however minor and for whatever reason. 3. The need for supervision renders the patient not independent. 4. A patient's performance should be established using the best available evidence. Asking the patient, friends/relatives and nurses are the usual sources, but direct observation and common sense are also important. However direct testing is not needed. 5. Usually the patient's performance over the preceding 24-48 hours is important, but occasionally longer periods will be relevant. 6. Middle categories imply that the patient supplies over 50 per cent of the effort. 7. Use of aids to be independent is allowed. Score Interpretation (from [address] [address])    Independent   60-79 Minimally independent   40-59 Partially dependent   20-39 Very dependent   <20 Totally dependent     -Andie Vallecillo., Barthel, D.W. (1965). Functional evaluation: the Barthel Index.  [address] [facility] ([address] [facility],[address] [place] [address],  (1997). The Barthel activities of daily living index: self-reporting versus actual performance in the old (> or = 75 years). Journal [address] 45(9), [facility], RYDER, Hali Landis., Monet Belcher. (1999). Measuring the change in disability after inpatient rehabilitation; comparison of the responsiveness of the Barthel Index and Functional [place]ley Measure. Journal of Neurology, Neurosurgery, and Psychiatry, 66(4), 326-824. YUKO Landis, DAVE Ulrich, & Sid Burt M.A. (2004) Assessment of post-stroke quality of life in cost-effectiveness studies: The usefulness of the Barthel Index and the EuroQoL-5D. Quality of Life Research, 15, 479-46         Occupational Therapy Evaluation Charge Determination   History Examination Decision-Making   LOW Complexity : Brief history review  LOW Complexity : 1-3 performance deficits relating to physical, cognitive , or psychosocial skils that result in activity limitations and / or participation restrictions  LOW Complexity : No comorbidities that affect functional and no verbal or physical assistance needed to complete eval tasks       Based on the above components, the patient evaluation is determined to be of the following complexity level: LOW   Pain Rating: Moderate pain in knee    Activity Tolerance:   Good    After treatment patient left in no apparent distress:    Sitting in chair and Call bell within reach    COMMUNICATION/EDUCATION:   The patients plan of care was discussed with: Physical therapist and Registered nurse.      Thank you for this referral.  Vic Lucio OT  Time Calculation: 44 mins

## 2022-03-17 NOTE — PROGRESS NOTES
Problem: Knee Replacement: Post-Op Day 1  Goal: Activity/Safety  Outcome: Progressing Towards Goal  Note: Fall precautions. Weight bearing as tolerated. Goal: Medications  Outcome: Progressing Towards Goal  Note: Administered Aspirin as anticoagulant and Ancef antibiotic  Goal: Respiratory  Outcome: Progressing Towards Goal  Note: Instructed the use of incentive spirometer 10x/hour. Head of bed at least 30 degrees. Goal: Discharge Planning  Outcome: Progressing Towards Goal  Note: Patient passed PT and OT.

## 2022-03-17 NOTE — PROGRESS NOTES
Resting comfortably      GEN:  NAD.  AOx3   ABD:  S/NT/ND   LLE:  Dressing C/D/I , 5/5 motor, Calf nttp (Bilat), Sensation rossly intact to light touch throughout, 1+ dp/pt pulses, foot perfused    Patient Vitals for the past 24 hrs:   Temp Pulse Resp BP SpO2   03/17/22 0851 98.3 °F (36.8 °C) 65 16 (!) 162/74 98 %   03/17/22 0412 97.9 °F (36.6 °C) 63 16 (!) 165/75 97 %   03/16/22 2051 98.3 °F (36.8 °C) 73 18 (!) 147/75 97 %   03/16/22 1419 98.2 °F (36.8 °C) -- 19 (!) 162/76 98 %   03/16/22 1320 98.3 °F (36.8 °C) 62 19 (!) 166/75 98 %       Current Facility-Administered Medications   Medication Dose Route Frequency    potassium bicarb-citric acid (EFFER-K) tablet 20 mEq  20 mEq Oral Q3H    levothyroxine (SYNTHROID) tablet 112 mcg  112 mcg Oral ACB    atorvastatin (LIPITOR) tablet 20 mg  20 mg Oral DAILY    sodium chloride (NS) flush 5-40 mL  5-40 mL IntraVENous Q8H    sodium chloride (NS) flush 5-40 mL  5-40 mL IntraVENous PRN    acetaminophen (TYLENOL) tablet 650 mg  650 mg Oral Q6H    oxyCODONE IR (ROXICODONE) tablet 5 mg  5 mg Oral Q3H PRN    naloxone (NARCAN) injection 0.4 mg  0.4 mg IntraVENous PRN    hydrOXYzine HCL (ATARAX) tablet 10 mg  10 mg Oral Q8H PRN    famotidine (PEPCID) tablet 20 mg  20 mg Oral BID    senna-docusate (PERICOLACE) 8.6-50 mg per tablet 1 Tablet  1 Tablet Oral BID    polyethylene glycol (MIRALAX) packet 17 g  17 g Oral DAILY    bisacodyL (DULCOLAX) suppository 10 mg  10 mg Rectal DAILY PRN    aspirin delayed-release tablet 81 mg  81 mg Oral Q12H    traMADoL (ULTRAM) tablet 50 mg  50 mg Oral Q6H PRN    losartan/hydroCHLOROthiazide (HYZAAR) 100/25 mg   Oral DAILY       Lab Results   Component Value Date/Time    HGB 11.6 03/17/2022 06:05 AM    INR 1.0 03/03/2022 03:30 PM       Lab Results   Component Value Date/Time    Sodium 134 (L) 03/17/2022 06:05 AM    Potassium 3.2 (L) 03/17/2022 06:05 AM    Chloride 102 03/17/2022 06:05 AM    CO2 28 03/17/2022 06:05 AM    BUN 15 03/17/2022 06:05 AM    Creatinine 0.80 03/17/2022 06:05 AM    Calcium 9.3 03/17/2022 06:05 AM    Magnesium 1.5 (L) 03/10/2022 09:09 AM            80 y.o. female s/p left uni knee arthroplasty on 3/16/2022  . Doing well.        ABX: Complete 24 hours perioperative abx  PATHWAY: Straight cath per protocol if needed  DVT Prophylaxis: Aspirin 81 mg enteric coated BID  Weight Bearing: WBAT LLE   Pain Control: Toradol, Tylenol, 15 mg meloxicam to begin evening POD 1 if patient still in hospital, tramadol every 6 hours, oxy 5 mg for breakthrough pain  Disposition: Home once cleared by PT and remains medically stable, HHPT

## 2022-03-19 PROBLEM — M19.042 PRIMARY OSTEOARTHRITIS OF BOTH HANDS: Status: ACTIVE | Noted: 2019-10-01

## 2022-03-19 PROBLEM — M19.041 PRIMARY OSTEOARTHRITIS OF BOTH HANDS: Status: ACTIVE | Noted: 2019-10-01

## 2022-03-22 ENCOUNTER — PATIENT OUTREACH (OUTPATIENT)
Dept: CASE MANAGEMENT | Age: 82
End: 2022-03-22

## 2022-03-22 NOTE — PROGRESS NOTES
Post Discharge Follow-up contact after Joint Replacement    Patient discharged on 3/17/22  By  Tomasa Orozco   following  right unicompartmental knee Arthroplasty. Spoke with patient today, who reports that \" I am doing well with walking, but I need help organizing my medications. \"  Denies Fever, Shortness of Breath or Chest Pain. Home Health has visited. Pt has a niece who assists her with medications. She has contacted niece to help her review the pill box to confirm that it is in order. Encourage pt to also contact [facility] if she has concerns about organizing medication.s    Patient also reports:  Surgical dressing clean, dry, intact  Calf is non-tender, operative extremity has moderate swelling. Pain is well managed. Discussed use of ice & elevation. Patient is progressing with therapy and is exercising independently. Taking Aspirin for anticoagulation, Tylenol for pain. Patient is experiencing symptoms of constipation, and she is managing with stool softeners BID, and increased fluids. Reviewed high fiber food suggestions; pt states that she will try to eat betters foods despite lack of appetite. She is urinating without difficulty. Discussed side effects of anticoagulants & pain medications (bleeding/bruising, constipation, lightheaded/dizziness)  Follow up appointment is scheduled. Discussed calling surgeon Dr Flavia Curry  for drainage, bleeding, swelling in operative extremity, fever or pain. Discussed calling PCP Dr Genevieve To with other medical issues.

## 2022-03-24 PROBLEM — M17.12 ARTHRITIS OF LEFT KNEE: Status: ACTIVE | Noted: 2022-03-16

## 2022-03-28 ENCOUNTER — HOSPITAL ENCOUNTER (INPATIENT)
Age: 82
LOS: 7 days | Discharge: NURSING, REHAB OR LONG-TERM CARE | DRG: 467 | End: 2022-04-04
Attending: ORTHOPAEDIC SURGERY | Admitting: ORTHOPAEDIC SURGERY
Payer: MEDICARE

## 2022-03-28 ENCOUNTER — OFFICE VISIT (OUTPATIENT)
Dept: ORTHOPEDIC SURGERY | Age: 82
End: 2022-03-28
Payer: MEDICARE

## 2022-03-28 VITALS — WEIGHT: 156 LBS | BODY MASS INDEX: 28.71 KG/M2 | HEIGHT: 62 IN

## 2022-03-28 DIAGNOSIS — Z96.659 PERIPROSTHETIC FRACTURE OF PROXIMAL END OF TIBIA, INITIAL ENCOUNTER: ICD-10-CM

## 2022-03-28 DIAGNOSIS — M97.8XXA PERIPROSTHETIC FRACTURE OF PROXIMAL END OF TIBIA, INITIAL ENCOUNTER: ICD-10-CM

## 2022-03-28 DIAGNOSIS — Z96.652 STATUS POST LEFT KNEE REPLACEMENT: Primary | ICD-10-CM

## 2022-03-28 PROCEDURE — G8427 DOCREV CUR MEDS BY ELIG CLIN: HCPCS | Performed by: ORTHOPAEDIC SURGERY

## 2022-03-28 PROCEDURE — G8432 DEP SCR NOT DOC, RNG: HCPCS | Performed by: ORTHOPAEDIC SURGERY

## 2022-03-28 PROCEDURE — G8419 CALC BMI OUT NRM PARAM NOF/U: HCPCS | Performed by: ORTHOPAEDIC SURGERY

## 2022-03-28 PROCEDURE — 65270000029 HC RM PRIVATE

## 2022-03-28 PROCEDURE — 99213 OFFICE O/P EST LOW 20 MIN: CPT | Performed by: ORTHOPAEDIC SURGERY

## 2022-03-28 PROCEDURE — 99221 1ST HOSP IP/OBS SF/LOW 40: CPT | Performed by: ORTHOPAEDIC SURGERY

## 2022-03-28 PROCEDURE — 1090F PRES/ABSN URINE INCON ASSESS: CPT | Performed by: ORTHOPAEDIC SURGERY

## 2022-03-28 PROCEDURE — G8756 NO BP MEASURE DOC: HCPCS | Performed by: ORTHOPAEDIC SURGERY

## 2022-03-28 PROCEDURE — 74011250636 HC RX REV CODE- 250/636: Performed by: ORTHOPAEDIC SURGERY

## 2022-03-28 PROCEDURE — 1101F PT FALLS ASSESS-DOCD LE1/YR: CPT | Performed by: ORTHOPAEDIC SURGERY

## 2022-03-28 PROCEDURE — 74011250637 HC RX REV CODE- 250/637: Performed by: ORTHOPAEDIC SURGERY

## 2022-03-28 PROCEDURE — G8536 NO DOC ELDER MAL SCRN: HCPCS | Performed by: ORTHOPAEDIC SURGERY

## 2022-03-28 RX ORDER — MELOXICAM 7.5 MG/1
7.5 TABLET ORAL DAILY
Status: DISCONTINUED | OUTPATIENT
Start: 2022-03-29 | End: 2022-03-29

## 2022-03-28 RX ORDER — POLYETHYLENE GLYCOL 3350 17 G/17G
17 POWDER, FOR SOLUTION ORAL DAILY
Status: DISCONTINUED | OUTPATIENT
Start: 2022-03-29 | End: 2022-04-02

## 2022-03-28 RX ORDER — SODIUM CHLORIDE 9 MG/ML
75 INJECTION, SOLUTION INTRAVENOUS CONTINUOUS
Status: DISCONTINUED | OUTPATIENT
Start: 2022-03-28 | End: 2022-03-30

## 2022-03-28 RX ORDER — ENOXAPARIN SODIUM 100 MG/ML
40 INJECTION SUBCUTANEOUS EVERY 24 HOURS
Status: COMPLETED | OUTPATIENT
Start: 2022-03-28 | End: 2022-03-31

## 2022-03-28 RX ORDER — HYDROMORPHONE HYDROCHLORIDE 1 MG/ML
0.5 INJECTION, SOLUTION INTRAMUSCULAR; INTRAVENOUS; SUBCUTANEOUS
Status: DISCONTINUED | OUTPATIENT
Start: 2022-03-28 | End: 2022-04-04 | Stop reason: HOSPADM

## 2022-03-28 RX ORDER — LEVOTHYROXINE SODIUM 112 UG/1
112 TABLET ORAL
Status: DISCONTINUED | OUTPATIENT
Start: 2022-03-29 | End: 2022-04-04 | Stop reason: HOSPADM

## 2022-03-28 RX ORDER — THERA TABS 400 MCG
1 TAB ORAL DAILY
Status: DISCONTINUED | OUTPATIENT
Start: 2022-03-29 | End: 2022-04-04 | Stop reason: HOSPADM

## 2022-03-28 RX ORDER — AMOXICILLIN 250 MG
1 CAPSULE ORAL
Status: DISCONTINUED | OUTPATIENT
Start: 2022-03-28 | End: 2022-03-29

## 2022-03-28 RX ORDER — CALCIUM CARBONATE 200(500)MG
400 TABLET,CHEWABLE ORAL AS NEEDED
Status: DISCONTINUED | OUTPATIENT
Start: 2022-03-28 | End: 2022-04-04 | Stop reason: HOSPADM

## 2022-03-28 RX ORDER — TRAMADOL HYDROCHLORIDE 50 MG/1
50 TABLET ORAL
Status: DISCONTINUED | OUTPATIENT
Start: 2022-03-28 | End: 2022-04-04 | Stop reason: HOSPADM

## 2022-03-28 RX ORDER — ATORVASTATIN CALCIUM 20 MG/1
20 TABLET, FILM COATED ORAL
Status: DISCONTINUED | OUTPATIENT
Start: 2022-03-28 | End: 2022-04-04 | Stop reason: HOSPADM

## 2022-03-28 RX ADMIN — ATORVASTATIN CALCIUM 20 MG: 20 TABLET, FILM COATED ORAL at 21:11

## 2022-03-28 RX ADMIN — ENOXAPARIN SODIUM 40 MG: 100 INJECTION SUBCUTANEOUS at 21:07

## 2022-03-28 NOTE — PROGRESS NOTES
[place] (: 1940) is a 80 y.o. female patient, here for evaluation of the following chief complaint(s):  Surgical Follow-up (left knee follow up)       ASSESSMENT/PLAN:  Below is the assessment and plan developed based on review of pertinent history, physical exam, labs, studies, and medications. 27-year-old female comes in today for follow-up. She is 2 weeks status post partial knee replacement. She is doing relatively well and was going up and down stairs by herself. She had a fall last night and fell directly onto her knee. After the fact she could not weight-bear. X-rays today reveal a fractured medial tibial plateau with some mild subsidence. We had discussion today regarding management options. We discussed the possibility of immediate revision with cone and possible constraint versus trying to allow the fracture to heal and then considering possible revision with improved medial bone stock. For now her T ROM brace. She lives alone and is unsafe there. She is going to get admitted. We will try to get her into a skilled nursing facility. When she mobilizes with physical therapy in the hospital we will take x-ray      1. Status post left knee replacement  -     XR KNEE LT 3 V; Future  -     REFERRAL TO DME  2. Periprosthetic fracture of proximal end of tibia, initial encounter      Encounter Diagnoses   Name Primary?  Status post left knee replacement Yes    Periprosthetic fracture of proximal end of tibia, initial encounter         No follow-ups on file. SUBJECTIVE/OBJECTIVE:  [place] (: 1940) is a 80 y.o. female who presents today for the following:  Chief Complaint   Patient presents with    Surgical Follow-up     left knee follow up       27-year-old female comes in today for follow-up. She is 2 weeks status post partial knee replacement. She is doing relatively well and was going up and down stairs by herself.   She had a fall last night and fell directly onto her knee. After the fact she could not weight-bear. IMAGING:  XR Results (most recent):  Results from Appointment encounter on 03/28/22    XR KNEE LT 3 V    Narrative  3 views ordered and reviewed left knee. Status post partial knee replacement. There is a fracture inferior to the implant with mild subsidence. There is certainly anterior tilting of the tibial tray seen on the lateral x-ray. Displacement measures about 2 to 3 mm       Allergies   Allergen Reactions    Benazepril Cough    Sulfa (Sulfonamide Antibiotics) Hives       Current Outpatient Medications   Medication Sig    aspirin delayed-release 81 mg tablet Take 1 Tablet by mouth every twelve (12) hours for 30 days.  famotidine (PEPCID) 20 mg tablet Take 1 Tablet by mouth two (2) times a day for 30 days.  polyethylene glycol (MIRALAX) 17 gram packet Take 1 Packet by mouth daily for 14 days.  meloxicam (MOBIC) 7.5 mg tablet Take 1 Tablet by mouth daily for 14 days.  simvastatin (ZOCOR) 40 mg tablet Take 1 Tablet by mouth nightly.  losartan-hydroCHLOROthiazide (HYZAAR) 100-25 mg per tablet TAKE 1 TABLET EVERY DAY    levothyroxine (SYNTHROID) 112 mcg tablet TAKE 1 TABLET EVERY DAY BEFORE BREAKFAST    acetaminophen (Tylenol Arthritis Pain) 650 mg TbER Take 1,300 mg by mouth every eight (8) hours as needed for Pain.  calcium carbonate (Tums Ultra) 400 mg calcium (1,000 mg) chew Take 1 Tablet by mouth as needed.  sennosides-docusate sodium (Stool Softener-Stimulant Laxat) 8.6-50 mg cap Take  by mouth as needed.  mineral oil/petrolatum,white (LUBRICANT EYE OP) Administer  to both eyes as needed.  MULTIVITAMINS (MULTI-VITAMIN PO) Take 1 Tab by mouth daily. Takes one po daily. No current facility-administered medications for this visit.        Past Medical History:   Diagnosis Date    Chronic pain     LEFT KNEE    DDD (degenerative disc disease), lumbar 3/15/2016    Diverticulosis of colon 1/12 McGroarty/gi    DJD (degenerative joint disease)     Esophageal stricture     Fibrosis of lung ([facility] [address] 2004    Scarring @ the apices bilaterally Done @VPI/histoplasmosis    History of basal cell cancer     HTN, goal below 150/90     Hypercholesterolemia     IBS (irritable bowel syndrome) 2016    Menopause     LMP-48years old?  Osteoporosis     previously on Fosamax many years ago; does not want further treatment    Overactive bladder     Skin cancer 2020    scc-right shin    Unspecified hypothyroidism              Past Surgical History:   Procedure Laterality Date    COLONOSCOPY      McGroarty/gi    HX APPENDECTOMY      HX CATARACT REMOVAL  3/12    L    HX CATARACT REMOVAL  2012    Bilateral     HX KNEE ARTHROSCOPY Right  and     Dr Marybeth Babcock  1/10    right  Darnell/o/s    HX OTHER SURGICAL  2021    EXC. SKIN CA TENISHA. LOWER LEGS       Family History   Problem Relation Age of Onset    Heart Disease Mother     Diabetes Father     Heart Disease Father     Cancer Son         metastatic prostate ca    Diabetes Son     Heart Disease Son     Cancer Brother         STOMACH    No Known Problems Brother     Anesth Problems Neg Hx         Social History     Tobacco Use    Smoking status: Former Smoker     Packs/day: 1.00     Years: 25.00     Pack years: 25.00     Types: Cigarettes     Quit date: 3/3/2002     Years since quittin.0    Smokeless tobacco: Never Used    Tobacco comment: quit in the 's/cigarettes   Substance Use Topics    Alcohol use: Not Currently        All systems reviewed x 12 and were negative with the exception of None      No flowsheet data found. Vitals:  Ht 5' 2\" (1.575 m)   Wt 156 lb (70.8 kg)   BMI 28.53 kg/m²    Body mass index is 28.53 kg/m². Physical Exam    Gen: NAD    Resp: Non-labored    LLE: Midline incision is healing well with no evidence of infection. No erythema.   Very large effusion. Range of motion 0-120°. No extensor lag. Grossly stable in the coronal and sagittal planes. No calf tenderness. No evidence of a DVT. Motor grossly intact. Sensation intact to light touch throughout. Palpable pedal pulses. An electronic signature was used to authenticate this note.   -- Caryn Moran MD

## 2022-03-28 NOTE — H&P
H and P    Subjective:         Jaimee Servin is a 80 y.o. female who is 2 weeks s/p partial knee replacement. She sustained a fall last night directly onto her lefg leg. Xrays taken today reveal a periprosthetic tibia fracture. She cannot mobilize at home so we admitted her directly.     Patient Active Problem List    Diagnosis Date Noted    Periprosthetic fracture of proximal end of tibia 2022    Arthritis of left knee 2022    Primary osteoarthritis of both hands 10/01/2019    History of basal cell cancer     Scarring of lung     Overactive bladder     Osteoporosis     Diverticulosis of large intestine 2016    IBS (irritable bowel syndrome) 2016    Advance care planning 2016    DDD (degenerative disc disease), lumbar 03/15/2016    Lumbar spinal stenosis 03/15/2016    Localized osteoarthritis of left knee 2016    Other and unspecified hyperlipidemia 10/21/2013    Unspecified hypothyroidism 10/21/2013    HTN, goal below 150/90 2013    Esophageal stricture 2003     Family History   Problem Relation Age of Onset    Heart Disease Mother     Diabetes Father     Heart Disease Father     Cancer Son         metastatic prostate ca    Diabetes Son     Heart Disease Son     Cancer Brother         STOMACH    No Known Problems Brother     Anesth Problems Neg Hx       Social History     Tobacco Use    Smoking status: Former Smoker     Packs/day: 1.00     Years: 25.00     Pack years: 25.00     Types: Cigarettes     Quit date: 3/3/2002     Years since quittin.0    Smokeless tobacco: Never Used    Tobacco comment: quit in the '90's/cigarettes   Substance Use Topics    Alcohol use: Not Currently     Past Medical History:   Diagnosis Date    Chronic pain     LEFT KNEE    DDD (degenerative disc disease), lumbar 3/15/2016    [facility]    DJD (degenerative joint disease)     Esophageal stricture     Fibrosis of lung ([facility] [address] 2004    Scarring @ the apices bilaterally Done @VPI/histoplasmosis    History of basal cell cancer     HTN, goal below 150/90     Hypercholesterolemia     IBS (irritable bowel syndrome) 8/30/2016    Menopause     LMP-48years old?  Osteoporosis     previously on Fosamax many years ago; does not want further treatment    Overactive bladder     Skin cancer 01/06/2020    scc-right shin    Unspecified hypothyroidism            Past Surgical History:   Procedure Laterality Date    COLONOSCOPY  1/12    McGroarty/gi    HX APPENDECTOMY  1953    HX CATARACT REMOVAL  3/12    L    HX CATARACT REMOVAL  04/2012    Bilateral     HX KNEE ARTHROSCOPY Right 2006 and 2008    Dr Peter Raymundo  1/10    right  Darnell/o/s    HX OTHER SURGICAL  2020, 2021    EXC. SKIN CA TENISHA. LOWER LEGS      Prior to Admission medications    Medication Sig Start Date End Date Taking? Authorizing Provider   aspirin delayed-release 81 mg tablet Take 1 Tablet by mouth every twelve (12) hours for 30 days. 3/17/22 4/16/22 Yes Moris Ortiz NP   famotidine (PEPCID) 20 mg tablet Take 1 Tablet by mouth two (2) times a day for 30 days. 3/17/22 4/16/22 Yes Moris Ortiz NP   polyethylene glycol (MIRALAX) 17 gram packet Take 1 Packet by mouth daily for 14 days. 3/18/22 4/1/22 Yes Moris Ortiz NP   meloxicam (MOBIC) 7.5 mg tablet Take 1 Tablet by mouth daily for 14 days. 3/17/22 3/31/22 Yes Moris Ortiz NP   simvastatin (ZOCOR) 40 mg tablet Take 1 Tablet by mouth nightly. 3/12/22  Yes Juan Talbert MD   losartan-hydroCHLOROthiazide [facility]) 100-25 mg per tablet TAKE 1 TABLET EVERY DAY 3/9/22  Yes Juan Talbert MD   levothyroxine (SYNTHROID) 112 mcg tablet TAKE 1 TABLET EVERY DAY BEFORE BREAKFAST 3/9/22  Yes Juan Talbert MD   acetaminophen (Tylenol Arthritis Pain) 650 mg TbER Take 1,300 mg by mouth every eight (8) hours as needed for Pain.    Yes Provider, Historical   calcium carbonate (Tums Ultra) 400 mg calcium (1,000 mg) chew Take 1 Tablet by mouth as needed. Yes Provider, Historical   sennosides-docusate sodium (Stool Softener-Stimulant Laxat) 8.6-50 mg cap Take  by mouth as needed. Yes Provider, Historical   mineral oil/petrolatum,white (LUBRICANT EYE OP) Administer  to both eyes as needed. Yes Provider, Historical   MULTIVITAMINS (MULTI-VITAMIN PO) Take 1 Tab by mouth daily. Takes one po daily. Yes Provider, Historical     Current Facility-Administered Medications   Medication Dose Route Frequency    . PHARMACY TO SUBSTITUTE PER PROTOCOL (Reordered from: calcium carbonate (Tums Ultra) 400 mg calcium (1,000 mg) chew)    Per Protocol    [START ON 3/29/2022] levothyroxine (SYNTHROID) tablet 112 mcg  112 mcg Oral ACB    . PHARMACY TO SUBSTITUTE PER PROTOCOL (Reordered from: losartan-hydroCHLOROthiazide (HYZAAR) 100-25 mg per tablet)    Per Protocol    [START ON 3/29/2022] meloxicam (MOBIC) tablet 7.5 mg  7.5 mg Oral DAILY    . PHARMACY TO SUBSTITUTE PER PROTOCOL (Reordered from: MULTIVITAMINS (MULTI-VITAMIN PO))    Per Protocol    [START ON 3/29/2022] polyethylene glycol (MIRALAX) packet 17 g  17 g Oral DAILY    . PHARMACY TO SUBSTITUTE PER PROTOCOL (Reordered from: sennosides-docusate sodium (Stool Softener-Stimulant Laxat) 8.6-50 mg cap)    Per Protocol    atorvastatin (LIPITOR) tablet 20 mg  20 mg Oral QHS    enoxaparin (LOVENOX) injection 40 mg  40 mg SubCUTAneous Q24H    0.9% sodium chloride infusion  25 mL/hr IntraVENous CONTINUOUS    traMADoL (ULTRAM) tablet 50 mg  50 mg Oral Q6H PRN    HYDROmorphone (DILAUDID) injection 0.5 mg  0.5 mg IntraVENous Q6H PRN      Allergies   Allergen Reactions    Benazepril Cough    Sulfa (Sulfonamide Antibiotics) Hives        Review of Systems:    Negative for fevers, chills, nausea, vomiting, chest pain, shortness of breath, headaches          Objective:     No data found. No data recorded.       Gen: NAD, A&Ox3  Resp: Non-labored breathing  CV: Extremities well perfused  Abd: soft, NT  LLE:  pain with ROM, signifitcant swelling effusion at fracture site, skin intact, warm well perfused, SILT in al distributions L3-S1, palpable pedal pulses, no calf tenderness    Imaging Review:     Labs: No results found for this or any previous visit (from the past 24 hour(s)). Current Facility-Administered Medications   Medication Dose Route Frequency    . PHARMACY TO SUBSTITUTE PER PROTOCOL (Reordered from: calcium carbonate (Tums Ultra) 400 mg calcium (1,000 mg) chew)    Per Protocol    [START ON 3/29/2022] levothyroxine (SYNTHROID) tablet 112 mcg  112 mcg Oral ACB    . PHARMACY TO SUBSTITUTE PER PROTOCOL (Reordered from: losartan-hydroCHLOROthiazide (HYZAAR) 100-25 mg per tablet)    Per Protocol    [START ON 3/29/2022] meloxicam (MOBIC) tablet 7.5 mg  7.5 mg Oral DAILY    . PHARMACY TO SUBSTITUTE PER PROTOCOL (Reordered from: MULTIVITAMINS (MULTI-VITAMIN PO))    Per Protocol    [START ON 3/29/2022] polyethylene glycol (MIRALAX) packet 17 g  17 g Oral DAILY    . PHARMACY TO SUBSTITUTE PER PROTOCOL (Reordered from: sennosides-docusate sodium (Stool Softener-Stimulant Laxat) 8.6-50 mg cap)    Per Protocol    atorvastatin (LIPITOR) tablet 20 mg  20 mg Oral QHS    enoxaparin (LOVENOX) injection 40 mg  40 mg SubCUTAneous Q24H    0.9% sodium chloride infusion  25 mL/hr IntraVENous CONTINUOUS    traMADoL (ULTRAM) tablet 50 mg  50 mg Oral Q6H PRN    HYDROmorphone (DILAUDID) injection 0.5 mg  0.5 mg IntraVENous Q6H PRN         Impression:     Active Problems:    Periprosthetic fracture of proximal end of tibia (3/28/2022)        Plan:   -Mobilize with PT tomorrow- TTWB  Will reorder xray after OOB  If further displacement will need revision TKA  If no movement will likely need SNF placement- patient lives alone  Gen diet  Analgesia  Lovenox 40 daily          Gudelia Molina MD

## 2022-03-29 LAB
ANION GAP SERPL CALC-SCNC: 6 MMOL/L (ref 5–15)
BUN SERPL-MCNC: 14 MG/DL (ref 6–20)
BUN/CREAT SERPL: 13 (ref 12–20)
CALCIUM SERPL-MCNC: 9.9 MG/DL (ref 8.5–10.1)
CHLORIDE SERPL-SCNC: 88 MMOL/L (ref 97–108)
CO2 SERPL-SCNC: 33 MMOL/L (ref 21–32)
CREAT SERPL-MCNC: 1.07 MG/DL (ref 0.55–1.02)
GLUCOSE SERPL-MCNC: 136 MG/DL (ref 65–100)
MAGNESIUM SERPL-MCNC: 1.9 MG/DL (ref 1.6–2.4)
POTASSIUM SERPL-SCNC: 2.9 MMOL/L (ref 3.5–5.1)
SODIUM SERPL-SCNC: 127 MMOL/L (ref 136–145)

## 2022-03-29 PROCEDURE — 97161 PT EVAL LOW COMPLEX 20 MIN: CPT

## 2022-03-29 PROCEDURE — 97165 OT EVAL LOW COMPLEX 30 MIN: CPT

## 2022-03-29 PROCEDURE — 36415 COLL VENOUS BLD VENIPUNCTURE: CPT

## 2022-03-29 PROCEDURE — 83735 ASSAY OF MAGNESIUM: CPT

## 2022-03-29 PROCEDURE — 74011250636 HC RX REV CODE- 250/636: Performed by: INTERNAL MEDICINE

## 2022-03-29 PROCEDURE — 74011250636 HC RX REV CODE- 250/636: Performed by: ORTHOPAEDIC SURGERY

## 2022-03-29 PROCEDURE — 74011250637 HC RX REV CODE- 250/637

## 2022-03-29 PROCEDURE — 74011250637 HC RX REV CODE- 250/637: Performed by: ORTHOPAEDIC SURGERY

## 2022-03-29 PROCEDURE — 65270000029 HC RM PRIVATE

## 2022-03-29 PROCEDURE — 97116 GAIT TRAINING THERAPY: CPT

## 2022-03-29 PROCEDURE — 74011250637 HC RX REV CODE- 250/637: Performed by: INTERNAL MEDICINE

## 2022-03-29 PROCEDURE — 80048 BASIC METABOLIC PNL TOTAL CA: CPT

## 2022-03-29 RX ORDER — AMOXICILLIN 250 MG
1 CAPSULE ORAL 2 TIMES DAILY
Status: DISCONTINUED | OUTPATIENT
Start: 2022-03-29 | End: 2022-04-04 | Stop reason: HOSPADM

## 2022-03-29 RX ORDER — LANOLIN ALCOHOL/MO/W.PET/CERES
400 CREAM (GRAM) TOPICAL DAILY
Status: DISPENSED | OUTPATIENT
Start: 2022-03-29 | End: 2022-04-02

## 2022-03-29 RX ORDER — POLYETHYLENE GLYCOL 3350 17 G/17G
17 POWDER, FOR SOLUTION ORAL DAILY
Status: DISCONTINUED | OUTPATIENT
Start: 2022-03-29 | End: 2022-03-29

## 2022-03-29 RX ORDER — POTASSIUM CHLORIDE 750 MG/1
40 TABLET, FILM COATED, EXTENDED RELEASE ORAL
Status: COMPLETED | OUTPATIENT
Start: 2022-03-29 | End: 2022-03-29

## 2022-03-29 RX ORDER — ADHESIVE BANDAGE
30 BANDAGE TOPICAL ONCE
Status: COMPLETED | OUTPATIENT
Start: 2022-03-29 | End: 2022-03-29

## 2022-03-29 RX ORDER — FACIAL-BODY WIPES
10 EACH TOPICAL ONCE
Status: COMPLETED | OUTPATIENT
Start: 2022-03-29 | End: 2022-03-29

## 2022-03-29 RX ADMIN — POTASSIUM CHLORIDE 40 MEQ: 750 TABLET, EXTENDED RELEASE ORAL at 19:23

## 2022-03-29 RX ADMIN — LEVOTHYROXINE SODIUM 112 MCG: 0.11 TABLET ORAL at 09:42

## 2022-03-29 RX ADMIN — SODIUM CHLORIDE 75 ML/HR: 9 INJECTION, SOLUTION INTRAVENOUS at 19:24

## 2022-03-29 RX ADMIN — THERA TABS 1 TABLET: TAB at 09:41

## 2022-03-29 RX ADMIN — HYDROCHLOROTHIAZIDE: 25 TABLET ORAL at 09:42

## 2022-03-29 RX ADMIN — POLYETHYLENE GLYCOL 3350 17 G: 17 POWDER, FOR SOLUTION ORAL at 09:41

## 2022-03-29 RX ADMIN — SENNOSIDES AND DOCUSATE SODIUM 1 TABLET: 50; 8.6 TABLET ORAL at 16:13

## 2022-03-29 RX ADMIN — BISACODYL 10 MG: 10 SUPPOSITORY RECTAL at 12:55

## 2022-03-29 RX ADMIN — ENOXAPARIN SODIUM 40 MG: 100 INJECTION SUBCUTANEOUS at 19:24

## 2022-03-29 RX ADMIN — MAGNESIUM HYDROXIDE 30 ML: 400 SUSPENSION ORAL at 16:13

## 2022-03-29 RX ADMIN — Medication 400 MG: at 19:24

## 2022-03-29 NOTE — PROGRESS NOTES
Problem: Self Care Deficits Care Plan (Adult)  Goal: *Acute Goals and Plan of Care (Insert Text)  Description: Occupational Therapy Goals  Initiated: 3/29/2022   1. Patient will perform grooming with supervision/set-up sitting in chair within 7 day(s). 2.  Patient will perform bathing with mod A from chair within 7 day(s). 3.  Patient will perform upper body dressing and lower body dressing with mod A within 7 day(s). 4.  Patient will perform toilet transfers with mod A within 7 day(s). 5.  Patient will perform all aspects of toileting with min A within 7 day(s). FUNCTIONAL STATUS PRIOR TO ADMISSION: Pt is mod I at home. She reports the last 2 weeks at home was going well at home prior to admission. She reports showering 1 time following discharge after unicondylar knee replacement. HOME SUPPORT: lives alone. Outcome: Progressing Towards Goal     OCCUPATIONAL THERAPY EVALUATION  Patient: Giovanna Wills (80 y.o. female)  Date: 3/29/2022  Primary Diagnosis: Periprosthetic fracture of proximal end of tibia [M97. 8XXA, Z96.659]        Precautions:   Fall,TTWB    ASSESSMENT  Based on the objective data described below, the patient presents with decreased independence with self care and functional mobility following readmission for a fall resulting with L tibial fracture. Pt s/p L unicondylar knee replacement 2 weeks ago and discharged POD 1 doing well overall with functional mobility and ADl activities. She had support from her 2 nieces at home. She reports she was getting up from the chair carrying a bowel from the table when she sustained a fall. She was not using the walker at the time of the fall. Per MD yesterday, they wanted repeat imaging following PT this date but in discussion with NP, Grady Sorenson MD will awaiting any further imaging in hopes to allow the fracture to heal before completing any revision surgeries.   At this time, she will not be safe to discharge home due to decreased insight for safety and now TTWB with knee brace in place. Recommend discharge to SNF facility. Current Level of Function Impacting Discharge (ADLs/self-care): max A for LB ADL activities    Functional Outcome Measure: The patient scored 50 on the Barthel Index outcome measure which is indicative of 50% impairment with ADl activities. Other factors to consider for discharge: debility, fall, TTWB     Patient will benefit from skilled therapy intervention to address the above noted impairments. PLAN :  Recommendations and Planned Interventions: self care training, functional mobility training, therapeutic exercise, balance training, therapeutic activities, endurance activities, patient education, home safety training, and family training/education    Frequency/Duration: Patient will be followed by occupational therapy 5 times a week to address goals. Recommendation for discharge: (in order for the patient to meet his/her long term goals)  Therapy up to 5 days/week in SNF setting    This discharge recommendation:  Has been made in collaboration with the attending provider and/or case management    IF patient discharges home will need the following DME: none       SUBJECTIVE:   Patient stated I am feeling alright but I really need to have a bowel movement.     OBJECTIVE DATA SUMMARY:   HISTORY:   Past Medical History:   Diagnosis Date    Chronic pain     LEFT KNEE    DDD (degenerative disc disease), lumbar 3/15/2016    Diverticulosis of colon 1/12    McGroarty/gi    DJD (degenerative joint disease)     Esophageal stricture 2/03    Fibrosis of lung ([facility] [address] 2004    Scarring @ the apices bilaterally Done @VPI/histoplasmosis    History of basal cell cancer     HTN, goal below 150/90     Hypercholesterolemia     IBS (irritable bowel syndrome) 8/30/2016    Menopause     LMP-48years old?     Osteoporosis     previously on Fosamax many years ago; does not want further treatment    Overactive bladder     Skin cancer 01/06/2020    scc-right shin    Unspecified hypothyroidism           Past Surgical History:   Procedure Laterality Date    COLONOSCOPY  1/12    McGroarty/gi    HX APPENDECTOMY  1953    HX CATARACT REMOVAL  3/12    L    HX CATARACT REMOVAL  04/2012    Bilateral     HX KNEE ARTHROSCOPY Right 2006 and 2008    Dr Vira Douglass  1/10    right  Darnell/o/s    HX OTHER SURGICAL  2020, 2021    EXC. SKIN CA TENISHA. LOWER LEGS       Expanded or extensive additional review of patient history:     Home Situation  Home Environment: Private residence [place])  # Steps to Enter: 6  Rails to Enter: Yes  Hand Rails : Bilateral  Wheelchair Ramp: No  One/Two Story Residence: Two story  # of Interior Steps: 12  Living Alone: Yes  Support Systems: Other Family Member(s)  Patient Expects to be Discharged to[de-identified] Skilled nursing facility  Current DME Used/Available at Home: Walker, rolling  Tub or Shower Type: Shower    Hand dominance: Right    EXAMINATION OF PERFORMANCE DEFICITS:  Cognitive/Behavioral Status:  Neurologic State: Alert  Orientation Level: Oriented X4  Cognition: Appropriate for age attention/concentration  Perception: Appears intact  Perseveration: No perseveration noted  Safety/Judgement: Decreased insight into deficits; Decreased awareness of need for assistance    Skin: see nursing notes    Edema: none noted    Hearing: Auditory  Auditory Impairment: None    Vision/Perceptual:                           Acuity: Within Defined Limits         Range of Motion:    AROM: Within functional limits  PROM: Within functional limits                      Strength:    Strength: Within functional limits                Coordination:  Coordination: Within functional limits  Fine Motor Skills-Upper: Right Intact; Left Intact    Gross Motor Skills-Upper: Right Intact; Left Intact    Tone & Sensation:    Tone: Normal  Sensation: Intact                      Balance:  Sitting: Impaired; With support  Sitting - Static: Fair (occasional)  Sitting - Dynamic: Poor (constant support)  Standing:  (refused to progress further than EOB)    Functional Mobility and Transfers for ADLs:  Bed Mobility:  Rolling: Minimum assistance  Supine to Sit: Minimum assistance  Sit to Supine: Minimum assistance  Scooting: Contact guard assistance;Assist x1    Transfers:  Sit to Stand:  (refused to progress with standing activities)  Stand to Sit:  (refused to progress with standing activities)  Bed to Chair:  (refused to progress with standing activities)  Bathroom Mobility:  (refused to progress with standing activities)  Toilet Transfer :  (using the [facility] for toileting needs due to TTWB)    ADL Assessment:  Feeding: Independent    Oral Facial Hygiene/Grooming: Setup    Bathing: Additional time; Moderate assistance    Upper Body Dressing: Supervision    Lower Body Dressing: Maximum assistance    Toileting: Minimum assistance                ADL Intervention and task modifications:                                     Cognitive Retraining  Safety/Judgement: Decreased insight into deficits; Decreased awareness of need for assistance      Functional Measure:    Barthel Index:  Bathin  Bladder: 5  Bowels: 10  Groomin  Dressin  Feeding: 10  Mobility: 0  Stairs: 0  Toilet Use: 5  Transfer (Bed to Chair and Back): 10  Total: 50/100      The Barthel ADL Index: Guidelines  1. The index should be used as a record of what a patient does, not as a record of what a patient could do. 2. The main aim is to establish degree of independence from any help, physical or verbal, however minor and for whatever reason. 3. The need for supervision renders the patient not independent. 4. A patient's performance should be established using the best available evidence. Asking the patient, friends/relatives and nurses are the usual sources, but direct observation and common sense are also important. However direct testing is not needed.   5. Usually the patient's performance over the preceding 24-48 hours is important, but occasionally longer periods will be relevant. 6. Middle categories imply that the patient supplies over 50 per cent of the effort. 7. Use of aids to be independent is allowed. Score Interpretation (from [address] [facility] 83)    Independent   60-79 Minimally independent   40-59 Partially dependent   20-39 Very dependent   <20 Totally dependent     -Andie Vallecillo., Barthel, D.W. (1965). Functional evaluation: the Barthel Index. [address] ([address] (1997). The Barthel activities of daily living index: self-reporting versus actual performance in the old (> or = 75 years). Journal of [address] [place] [address] 45(7), [facility], RYDER, Jones Hernandez., Josafat Dale. (1999). Measuring the change in disability after inpatient rehabilitation; comparison of the responsiveness of the Barthel Index and Functional [place] Measure. Journal of Neurology, Neurosurgery, and Psychiatry, 66(4), 460-727. Gladys Cutris, YUKO, DAVE Ulrich, & Dimple Thompson MREHAN. (2004) Assessment of post-stroke quality of life in cost-effectiveness studies: The usefulness of the Barthel Index and the EuroQoL-5D. Quality of Life Research, 15, 432-29     Occupational Therapy Evaluation Charge Determination   History Examination Decision-Making   LOW Complexity : Brief history review  HIGH Complexity : 5 or more performance deficits relating to physical, cognitive , or psychosocial skils that result in activity limitations and / or participation restrictions HIGH Complexity : Patient presents with comorbidities that affect occupational performance. Signifigant modification of tasks or assistance (eg, physical or verbal) with assessment (s) is necessary to enable patient to complete evaluation       Based on the above components, the patient evaluation is determined to be of the following complexity level: LOW   Pain Rating:   Moderate pain in the knee    Activity Tolerance:   Fair    After treatment patient left in no apparent distress:    Supine in bed and Heels elevated for pressure relief    COMMUNICATION/EDUCATION:   The patients plan of care was discussed with: Physical therapist and Registered nurse. Home safety education was provided and the patient/caregiver indicated understanding. and Patient/family have participated as able in goal setting and plan of care. This patients plan of care is appropriate for delegation to [facility].     Thank you for this referral.  Iker Olmedo OT  Time Calculation: 25 mins

## 2022-03-29 NOTE — PROGRESS NOTES
Problem: Mobility Impaired (Adult and Pediatric)  Goal: *Acute Goals and Plan of Care (Insert Text)  Description: FUNCTIONAL STATUS PRIOR TO ADMISSION: Patient was modified independent using a rolling walker for functional mobility. HOME SUPPORT PRIOR TO ADMISSION: The patient lived alone with no local support. Physical Therapy Goals  Initiated 3/29/2022  1. Patient will move from supine to sit and sit to supine , scoot up and down, and roll side to side in bed with modified independence within 7 day(s). 2.  Patient will transfer from bed to chair and chair to bed with modified independence using the least restrictive device within 7 day(s). 3.  Patient will perform sit to stand with modified independence within 7 day(s). 4.  Patient will ambulate with supervision/set-up for 100 feet with the least restrictive device within 7 day(s). 5.  Patient will ascend/descend 6 stairs with bilateral handrail(s) with supervision/set-up within 7 day(s). Outcome: Progressing Towards Goal     PHYSICAL THERAPY EVALUATION  Patient: Jenifer Solano (80 y.o. female)  Date: 3/29/2022  Primary Diagnosis: Periprosthetic fracture of proximal end of tibia [M97. 8XXA, Z96.659]       Precautions: Fall, TTWB LLE, knee immobilizer brace       ASSESSMENT  Based on the objective data described below, the patient presents with deficits as compared to PLOF. This is an 80year old female who underwent a partial knee replacement to LLE in the last 2 weeks. At that time patient discharged home to her [facility]. She has 6 [place]. Lived alone with no strong family support per patient. Does have two nieces who help her when they are able. Patient sustained a fall at home and was unable to WB through LLE. Patient found to have a periprosthetic fracture of proximal end of tibia. Patient is TTWB with knee brace. Some confusion of timeline and onset of injury noted however oriented to March 2022 and location.  Patient agreeable to attempt to participate in PT eval. Baseline pain levels 0/10. Patient required min A for rolling, supine to sit. Required extra assistance with management of LLE. Patient required min A for sit to stand transfers with constant cues and instructions to maintain TTWB to LLE. Patient able to ambulate 12ft with RW and min A and cues to maintain TTWB LLE. Patient with step-to gait pattern and antalgic gait pattern however only rating pain 3/10. Patient educated on use of call bell at all times and to not attempt mobility without staff. Nursing notified of session and deficits. Patient agrees she will likely need rehab following hospital stay. Per ortho, patient to have follow up imaging today after mobilizing to determine course of treatment. Continued PT indicated in acute care setting and probable SNF placement at discharge. Current Level of Function Impacting Discharge (mobility/balance): min A for bed mobility, min A for transfer and ambulation with RW. Constant VC for maintaining TTWB LLE. Functional Outcome Measure: The patient scored 35 on the Barthel outcome measure which is indicative of moderate to severe disability. Other factors to consider for discharge: lives alone, some safety awareness deficits today, cues to maintain WB status     Patient will benefit from skilled therapy intervention to address the above noted impairments. PLAN :  Recommendations and Planned Interventions: bed mobility training, transfer training, gait training, therapeutic exercises, neuromuscular re-education, patient and family training/education and therapeutic activities      Frequency/Duration: Patient will be followed by physical therapy:  daily to address goals.     Recommendation for discharge: (in order for the patient to meet his/her long term goals)  Therapy up to 5 days/week in SNF setting    This discharge recommendation:  Has not yet been discussed the attending provider and/or case management    IF patient discharges home will need the following DME: patient owns DME required for discharge         SUBJECTIVE:   Patient stated I don't know how much I can do but I will try.     OBJECTIVE DATA SUMMARY:   HISTORY:    Past Medical History:   Diagnosis Date    Chronic pain     LEFT KNEE    DDD (degenerative disc disease), lumbar 3/15/2016    Diverticulosis of colon 1/12    McGroarty/gi    DJD (degenerative joint disease)     Esophageal stricture 2/03    Fibrosis of lung ([facility] [address] 2004    Scarring @ the apices bilaterally Done @VPI/histoplasmosis    History of basal cell cancer     HTN, goal below 150/90     Hypercholesterolemia     IBS (irritable bowel syndrome) 8/30/2016    Menopause     LMP-48years old?  Osteoporosis     previously on Fosamax many years ago; does not want further treatment    Overactive bladder     Skin cancer 01/06/2020    scc-right shin    Unspecified hypothyroidism           Past Surgical History:   Procedure Laterality Date    COLONOSCOPY  1/12    McGroarty/gi    HX APPENDECTOMY  1953    HX CATARACT REMOVAL  3/12    L    HX CATARACT REMOVAL  04/2012    Bilateral     HX KNEE ARTHROSCOPY Right 2006 and 2008    Dr Lore Moore  1/10    right  Darnell/o/s    HX OTHER SURGICAL  2020, 2021    EXC. SKIN CA TENISHA.  LOWER LEGS       Personal factors and/or comorbidities impacting plan of care: recent partial knee replacement, new fracture    Home Situation  Home Environment: Other (comment) ([facility])  # Steps to Enter: 6  Rails to Enter: Yes  Hand Rails : Bilateral  Wheelchair Ramp: No  One/Two Story Residence: One story  Living Alone: Yes  Support Systems: Other (Comment) ([place] coming Friday to help)  Patient Expects to be Discharged to[de-identified] Rehab Unit Subacute  Current DME Used/Available at Home: Walker, rolling  Tub or Shower Type: Shower    EXAMINATION/PRESENTATION/DECISION MAKING:   Critical Behavior:  Neurologic State: Appropriate for age  Orientation Level: Oriented to person,Oriented to place,Oriented to situation  Cognition: Decreased attention/concentration,Follows commands,Impulsive  Safety/Judgement: Decreased awareness of need for assistance,Decreased insight into deficits  Hearing:     Skin:  Swelling/bruising to L knee  Edema: noted around area of fracture  Range Of Motion:    unable to assess LLE due to brace and recent fx. RLE WFL        Strength:         RLE WFL  LLE grossly decreased and limited     Vision:    WFL  Functional Mobility:  Bed Mobility:  Rolling: Minimum assistance;Assist x1  Supine to Sit: Minimum assistance;Assist x1  Sit to Supine: Contact guard assistance;Assist x1  Scooting: Contact guard assistance;Assist x1  Transfers:  Sit to Stand: Minimum assistance;Assist x1  Stand to Sit: Minimum assistance;Assist x1  Balance:   Sitting: Impaired; Without support  Sitting - Static: Fair (occasional)  Sitting - Dynamic: Poor (constant support)  Standing: Intact; With support  Ambulation/Gait Training:  Distance (ft): 12 Feet (ft)  Assistive Device: Walker, rolling;Gait belt  Ambulation - Level of Assistance: Minimal assistance;Assist x1        Gait Abnormalities: Antalgic; Step to gait; Decreased step clearance  Right Side Weight Bearing: As tolerated  Left Side Weight Bearing: Toe touch  Base of Support: Narrowed; Shift to right;Other (comment) (TTWB to LLE)  Stance: Left decreased;Right increased (TTWB LLE)  Speed/Adri: Slow;Shuffled;Pace decreased (<100 feet/min)  Step Length: Left shortened;Right shortened  Swing Pattern: Left asymmetrical               Constant verbal and tactile cues to maintain TTWB   Stairs:    NT    Functional Measure:  Barthel Index:    Bathin  Bladder: 0  Bowels: 10  Groomin  Dressin  Feeding: 10  Mobility: 0  Stairs: 0  Toilet Use: 5  Transfer (Bed to Chair and Back): 5  Total: 30/100       The Barthel ADL Index: Guidelines  1.  The index should be used as a record of what a patient does, not as a record of what a patient could do. 2. The main aim is to establish degree of independence from any help, physical or verbal, however minor and for whatever reason. 3. The need for supervision renders the patient not independent. 4. A patient's performance should be established using the best available evidence. Asking the patient, friends/relatives and nurses are the usual sources, but direct observation and common sense are also important. However direct testing is not needed. 5. Usually the patient's performance over the preceding 24-48 hours is important, but occasionally longer periods will be relevant. 6. Middle categories imply that the patient supplies over 50 per cent of the effort. 7. Use of aids to be independent is allowed. Score Interpretation (from [facility])    Independent   60-79 Minimally independent   40-59 Partially dependent   20-39 Very dependent   <20 Totally dependent     -Andie Vallecillo., Barthel, DTamikoW. (1965). Functional evaluation: the Barthel Index. [facility] ([address] (1997). The Barthel activities of daily living index: self-reporting versus actual performance in the old (> or = 75 years). Journal of [address] [place] [address] 45(7), [facility], .TamikoTamikoF, Kathaleen Boas., Shannan Schofield. (1999). Measuring the change in disability after inpatient rehabilitation; comparison of the responsiveness of the Barthel Index and Functional [place] Measure. Journal of Neurology, Neurosurgery, and Psychiatry, 66(4), 424-101. Vonita Schilder, NTamikoJ.A, MARZENA UlrichJ.AMNA, & Dafne Reed MTamikoA. (2004) Assessment of post-stroke quality of life in cost-effectiveness studies: The usefulness of the Barthel Index and the EuroQoL-5D.  Quality of Life Research, 15, 140-53          Physical Therapy Evaluation Charge Determination   History Examination Presentation Decision-Making   MEDIUM  Complexity : 1-2 comorbidities / personal factors will impact the outcome/ POC  LOW Complexity : 1-2 Standardized tests and measures addressing body structure, function, activity limitation and / or participation in recreation  MEDIUM Complexity : Evolving with changing characteristics  LOW Complexity : FOTO score of       Based on the above components, the patient evaluation is determined to be of the following complexity level: LOW     Pain Rating:  3/10 with WB, 0/10 at rest    Activity Tolerance:   Fair and requires rest breaks    After treatment patient left in no apparent distress:   Supine in bed, Call bell within reach and Side rails x 3    COMMUNICATION/EDUCATION:   The patients plan of care was discussed with: Registered nurse. Fall prevention education was provided and the patient/caregiver indicated understanding. and Patient/family have participated as able in goal setting and plan of care.     Thank you for this referral.  Bill Donohue, PT   Time Calculation: 18 mins

## 2022-03-29 NOTE — CONSULTS
[address] [facility] Adult  Hospitalist Group    Hospitalist Consult  Primary Care Provider: Juliette Sharma MD  Consult requested by: Dr. Vesna Wilson:     John Hammer is a 80 y.o. 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 She was status post left unicompartmental knee arthroplasty that was done on March 16, 2022. She was ambulating down the stairs at her home, when she fell on the affected knee. She was unable to bear weight so advised  By her orthopedic surgeon Dr. Arlette Plunkett to come to the hospital for admission. In the ED, SBP range between 158-169. Other VSS. Left knee x-ray showed displaced fracture inferior to the implant. She denies any headaches or dizziness. Denies any cough, chest pain, shortness of breath. Denies fever or chills. Denies nausea, vomiting, diarrhea, constipation. Review of Systems:    A comprehensive review of systems was negative except for that written in the History of Present Illness. Past Medical History:   Diagnosis Date    Chronic pain     LEFT KNEE    DDD (degenerative disc disease), lumbar 3/15/2016    Diverticulosis of colon 1/12    McGroarty/gi    DJD (degenerative joint disease)     Esophageal stricture 2/03    Fibrosis of lung ([facility] [address] 2004    Scarring @ the apices bilaterally Done @VPI/histoplasmosis    History of basal cell cancer     HTN, goal below 150/90     Hypercholesterolemia     IBS (irritable bowel syndrome) 8/30/2016    Menopause     LMP-48years old?     Osteoporosis     previously on Fosamax many years ago; does not want further treatment    Overactive bladder     Skin cancer 01/06/2020    scc-right shin    Unspecified hypothyroidism            Past Surgical History:   Procedure Laterality Date    COLONOSCOPY  1/12    McGroarty/gi  HX APPENDECTOMY  1953    HX CATARACT REMOVAL  3/12    L    HX CATARACT REMOVAL  04/2012    Bilateral     HX KNEE ARTHROSCOPY Right 2006 and 2008    Dr Bernie Lewis  1/10    right  Darnell/o/s    HX OTHER SURGICAL  2020, 2021    EXC. SKIN CA TENISHA. LOWER LEGS     Prior to Admission medications    Medication Sig Start Date End Date Taking? Authorizing Provider   aspirin delayed-release 81 mg tablet Take 1 Tablet by mouth every twelve (12) hours for 30 days. 3/17/22 4/16/22 Yes Murtaza Wilikns NP   famotidine (PEPCID) 20 mg tablet Take 1 Tablet by mouth two (2) times a day for 30 days. 3/17/22 4/16/22 Yes Murtaza Wilkins NP   polyethylene glycol (MIRALAX) 17 gram packet Take 1 Packet by mouth daily for 14 days. 3/18/22 4/1/22 Yes Murtaza Wilkins NP   meloxicam (MOBIC) 7.5 mg tablet Take 1 Tablet by mouth daily for 14 days. 3/17/22 3/31/22 Yes Murtaza Wilkins NP   simvastatin (ZOCOR) 40 mg tablet Take 1 Tablet by mouth nightly. 3/12/22  Yes Scooter Douglas MD   losartan-hydroCHLOROthiazide [facility]) 100-25 mg per tablet TAKE 1 TABLET EVERY DAY 3/9/22  Yes Scooter Douglas MD   levothyroxine (SYNTHROID) 112 mcg tablet TAKE 1 TABLET EVERY DAY BEFORE BREAKFAST 3/9/22  Yes Scooter Douglas MD   acetaminophen (Tylenol Arthritis Pain) 650 mg TbER Take 1,300 mg by mouth every eight (8) hours as needed for Pain. Yes Provider, Historical   calcium carbonate (Tums Ultra) 400 mg calcium (1,000 mg) chew Take 1 Tablet by mouth as needed. Yes Provider, Historical   sennosides-docusate sodium (Stool Softener-Stimulant Laxat) 8.6-50 mg cap Take  by mouth as needed. Yes Provider, Historical   mineral oil/petrolatum,white (LUBRICANT EYE OP) Administer  to both eyes as needed. Yes Provider, Historical   MULTIVITAMINS (MULTI-VITAMIN PO) Take 1 Tab by mouth daily. Takes one po daily.    Yes Provider, Historical     Allergies   Allergen Reactions    Benazepril Cough    Sulfa (Sulfonamide Antibiotics) Hives      Family History   Problem Relation Age of Onset    Heart Disease Mother     Diabetes Father     Heart Disease Father     Cancer Son         metastatic prostate ca    Diabetes Son     Heart Disease Son     Cancer Brother         STOMACH    No Known Problems Brother     Anesth Problems Neg Hx         SOCIAL HISTORY:  Patient resides at Home. Patient ambulates with rollator  Smoking history: none  Alcohol history: none    Objective:       Physical Exam:   Visit Vitals  BP (!) 169/75   Pulse 70   Temp 97.8 °F (36.6 °C)   Resp 16   SpO2 97%     General:  Alert, cooperative, no distress, appears stated age. Head:  Normocephalic, without obvious abnormality, atraumatic. Eyes:  Conjunctivae/corneas clear. PERRL, EOMs intact. Ears:  Normal TMs and external ear canals both ears. Nose: Nares normal. Septum midline. Mucosa normal. No drainage or sinus tenderness. Throat: Lips, mucosa, and tongue normal. Teeth and gums normal.   Neck: Supple, symmetrical, trachea midline, no adenopathy, thyroid: no enlargement/tenderness/nodules, no carotid bruit and no JVD. Back:   Symmetric, no curvature. ROM normal. No CVA tenderness. Lungs:   Clear to auscultation bilaterally. Chest wall:  No tenderness or deformity. Heart:  Regular rate and rhythm, S1, S2 normal, no murmur, click, rub or gallop. Abdomen:   Soft, non-tender. Bowel sounds normal. No masses,  No organomegaly. Extremities: LLE in external fixator, incision c/d/i   Pulses: 2+ radial pulses   Skin: Skin color, texture, turgor normal. No rashes or lesions. Data Review: All diagnostic labs and studies have been reviewed. Assessment:   Francisco Weiner is a 80 y.o. female who presents with left periprosthetic tibial fracture. We are asked to see the patient in consult to assist in managing periop clearance.     Active Problems:    Periprosthetic fracture of proximal end of tibia (3/28/2022)        Plan:     #L inferior periprosthetic displaced tibial fracture  -mgmt per ortho  -RCRI class II, low risk for intraop cardiac complication.   There are currently no contraindications for planned procedure    #HTN  #dyslipidemia  -continue home medications  -check BMP, and Mg2+  -continue statin    #Hypothyroidism  -last TSH 2.5 on 3/10/22  -continue home levothyroxine 112mcg daily    #remote hx histoplasmosis  -residual lung scarring, but never symptomatic, and never had o2 requirement      Signed By: Dinesh Mullen MD     Date of Service:  3/29/2022

## 2022-03-29 NOTE — PROGRESS NOTES
SIMON: SNF choices pending. The patient plans to discharge to SNF with BLS to transport when stable for discharge. RUR: 7%    1. The patient is from home and lives alone. 2. Orthopedics, PT/OT following. 3. The patient plans to go to SNF for rehab services. Medicare pt has received, reviewed, and signed  IM letter informing them of their right to appeal the discharge. Signed copied has been placed on pt bedside chart. Care Management Interventions  PCP Verified by CM: Yes  Palliative Care Criteria Met (RRAT>21 & CHF Dx)?: No  Mode of Transport at Discharge: Other (see comment)  Transition of Care Consult (CM Consult): Discharge Planning,SNF  MyChart Signup: No  Discharge Durable Medical Equipment: No  Health Maintenance Reviewed: Yes  Occupational Therapy Consult: Yes  Speech Therapy Consult: No  Support Systems: Other Family Member(s)  Confirm Follow Up Transport: Other (see comment) (BLS to transport.)  The Plan for Transition of Care is Related to the Following Treatment Goals : The patient plans to discharge to a SNF.   The Patient and/or Patient Representative was Provided with a Choice of Provider and Agrees with the Discharge Plan?: Yes  Freedom of Choice List was Provided with Basic Dialogue that Supports the Patient's Individualized Plan of Care/Goals, Treatment Preferences and Shares the Quality Data Associated with the Providers?: Yes   Resource Information Provided?: No  Discharge Location  Patient Expects to be Discharged to[de-identified] Skilled nursing facility     Reason for Admission:                       RUR Score:                     Plan for utilizing home health:          PCP: First and Last name:  Robbi Baumgarten, MD     Name of Practice:    Are you a current patient: Yes/No:    Approximate date of last visit:    Can you participate in a virtual visit with your PCP:                     Current Advanced Directive/Advance Care Plan: Full Code      Healthcare Decision Maker:   Click here to complete [address] including selection of the Healthcare Decision Maker Relationship (ie \"Primary\")             Primary Decision Maker: Elizabeth Camarillo - Other Relative - [phone]                  Transition of Care Plan:     CM met with the patient in room 557. The patient is alert and oriented x4. Confirmed demographics. The patient is independent and lives alone in a 2 level condo with  8 steps to enter and 10 steps up to 2nd level bedroom. The patient owns a rolling walker, cane and uses [address] on [address]. The patient's niece Radha Motley lives in [place] and possible will come stay with her later after rehab. Another sister, Yung Mendoza will be leaving [place] soon to stay at a 2nd home in [place] and will be unavailable. The patient plans to got to SNF for rehab services with BLS transport when stable for discharge. CM following for discharge needs.     Gregg Ashford RN/CRM

## 2022-03-29 NOTE — PROGRESS NOTES
Resting comfortably. Pain controlled. GEN:  NAD.  AOx3   ABD:  S/NT/ND   LLE:  Incision C/D/I , 5/5 motor, Calf nttp (Bilat), Sensation rossly intact to light touch throughout, 1+ dp/pt pulses, foot perfused    Patient Vitals for the past 24 hrs:   Temp Pulse Resp BP SpO2   03/29/22 0925 97.5 °F (36.4 °C) 73 16 (!) 157/84 97 %   03/29/22 0223 97.8 °F (36.6 °C) 70 16 (!) 169/75 97 %   03/28/22 2103 97.6 °F (36.4 °C) 72 16 (!) 171/75 --   03/28/22 1800 97.4 °F (36.3 °C) 79 18 (!) 158/65 --       Current Facility-Administered Medications   Medication Dose Route Frequency    senna-docusate (PERICOLACE) 8.6-50 mg per tablet 1 Tablet  1 Tablet Oral BID    bisacodyL (DULCOLAX) suppository 10 mg  10 mg Rectal ONCE    magnesium hydroxide (MILK OF MAGNESIA) 400 mg/5 mL oral suspension 30 mL  30 mL Oral ONCE    polyethylene glycol (MIRALAX) packet 17 g  17 g Oral DAILY    calcium carbonate (TUMS) chewable tablet 400 mg [elemental]  400 mg Oral PRN    levothyroxine (SYNTHROID) tablet 112 mcg  112 mcg Oral ACB    meloxicam (MOBIC) tablet 7.5 mg  7.5 mg Oral DAILY    therapeutic multivitamin (THERAGRAN) tablet 1 Tablet  1 Tablet Oral DAILY    polyethylene glycol (MIRALAX) packet 17 g  17 g Oral DAILY    atorvastatin (LIPITOR) tablet 20 mg  20 mg Oral QHS    enoxaparin (LOVENOX) injection 40 mg  40 mg SubCUTAneous Q24H    0.9% sodium chloride infusion  25 mL/hr IntraVENous CONTINUOUS    traMADoL (ULTRAM) tablet 50 mg  50 mg Oral Q6H PRN    HYDROmorphone (DILAUDID) injection 0.5 mg  0.5 mg IntraVENous Q6H PRN    losartan/hydroCHLOROthiazide (HYZAAR) 100/25 mg   Oral DAILY       Lab Results   Component Value Date/Time    HGB 11.6 03/17/2022 06:05 AM    INR 1.0 03/03/2022 03:30 PM       Lab Results   Component Value Date/Time    Sodium 134 (L) 03/17/2022 06:05 AM    Potassium 3.2 (L) 03/17/2022 06:05 AM    Chloride 102 03/17/2022 06:05 AM    CO2 28 03/17/2022 06:05 AM    BUN 15 03/17/2022 06:05 AM    Creatinine 0.80 03/17/2022 06:05 AM    Calcium 9.3 03/17/2022 06:05 AM    Magnesium 1.5 (L) 03/10/2022 09:09 AM            80 y.o. female s/p left uni compartmental knee arthroplasty on 3/16/22. Had a fall Sunday evening directly onto knee. Seen in clinic on 3/28/22 and found to have a left periprosthetic tibia fracture. Doing well. Upset about the fall, but pain controlled. Plans to work with CM for SNF placement. Patient to be TTWB on LLE with T ROM brace in place. Goal to allow fracture time to heal prior to considering a revision surgery. DVT Prophylaxis: Lovenox 40mg SQ daily for DVT prophylaxis. Weight Bearing: TTWB LLE, gentle ROM as tolerated. Pain Control:  Tyleno,tramadol every 6 hours, oxy 5 mg for breakthrough pain  Disposition: SNF once cleared by PT and placement found. CM aware of plan and working on placement.

## 2022-03-29 NOTE — PROGRESS NOTES
Hospitalist Progress Note:       Patient seen and examined. Consult completed earlier this morning by my colleague, Dr. Richar Kamara. Reviewed hospitalization and medications. Reviewed labs. Potassium replaced. Will give gentle fluids for hyponatremia. Replete K and Mag. Consider antihypertensives if blood pressures remain elevated.        Gustavo Lebron, DO

## 2022-03-29 NOTE — PROGRESS NOTES
Ortho NP Note    Readmit with L periprosthetic tibia fracture s/p L uni compartmental knee arthroplasty on 3/16/22. Pt seen in room. Pt resting in bed with brace in place to L LE. Denies pain at present. States that she is concerned that she has not yet had a bowel movement \"in days. \"  Requesting medication for BM. Denies difficulty with urination. No N/V. No CP, SOB.        VSS Afebrile. Visit Vitals  BP (!) 157/84 (BP 1 Location: Left upper arm, BP Patient Position: At rest)   Pulse 73   Temp 97.5 °F (36.4 °C)   Resp 16   SpO2 97%       Voiding status: spontaneous void, Pure wick in place. Labs    Lab Results   Component Value Date/Time    HGB 11.6 03/17/2022 06:05 AM      Lab Results   Component Value Date/Time    INR 1.0 03/03/2022 03:30 PM      Lab Results   Component Value Date/Time    Sodium 134 (L) 03/17/2022 06:05 AM    Potassium 3.2 (L) 03/17/2022 06:05 AM    Chloride 102 03/17/2022 06:05 AM    CO2 28 03/17/2022 06:05 AM    Glucose 106 (H) 03/17/2022 06:05 AM    BUN 15 03/17/2022 06:05 AM    Creatinine 0.80 03/17/2022 06:05 AM    Calcium 9.3 03/17/2022 06:05 AM     Recent Glucose Results: No results found for: GLU, GLUPOC, GLUCPOC        There is no height or weight on file to calculate BMI. : A BMI > 30 is classified as obesity and > 40 is classified as morbid obesity. Incision to L knee healing well, T ROM brace to LLE. Calves soft and supple; No pain with passive stretch  Bilateral LEs warm, dry. 2+ DP pulses. Sensation and motor intact - PF/DF/EHL intact 5/5  Foot Pumps for mechanical DVT proph while in bed     PLAN:  1) PT: BID  TTWB . OT ordered for evaluation/treatment. 2) Anticoagulation:  Lovenox 40 mg SQ  daily for DVT Prophylaxis. Encouraged early mobilization, bed exercises, and SCD use. 3) Pain - Multimodal approach including cryotherapy, scheduled Mobic with  PRN  tramadol, IV dilaudid depending on pain severity.    4) Constipation: Ordered once of milk of mag and bisacodyl suppository. Daily pericolace, Miralax. 5) Hx of HTN:  Current BP: 157/84. Resume home Hyzaar; continue routine VS.   6) Hx of hypothyroidism: Continue home synthroid 112 mcg. 7) Readniess for discharge: Patient will require SNF placement. CM aware and to speak to patient today regarding placement     [x] Vital Signs stable     [x] + Voiding    [x] Wound intact, drainage minimal    [] Tolerating PO intake     [] Cleared by PT (OT if applicable)     [] Stair training completed (if applicable)    [] Independent / Contact Guard Assist (household distance)     [] Bed mobility     [] Car transfers     [] ADLs    [x] Adequate pain control on oral medication alone     SNF placement pending: CM aware.      Christi Peace NP  Available via Perfect Serve

## 2022-03-30 ENCOUNTER — APPOINTMENT (OUTPATIENT)
Dept: GENERAL RADIOLOGY | Age: 82
DRG: 467 | End: 2022-03-30
Attending: PHYSICIAN ASSISTANT
Payer: MEDICARE

## 2022-03-30 LAB
ANION GAP SERPL CALC-SCNC: 4 MMOL/L (ref 5–15)
BUN SERPL-MCNC: 13 MG/DL (ref 6–20)
BUN/CREAT SERPL: 16 (ref 12–20)
CALCIUM SERPL-MCNC: 8.9 MG/DL (ref 8.5–10.1)
CHLORIDE SERPL-SCNC: 95 MMOL/L (ref 97–108)
CO2 SERPL-SCNC: 29 MMOL/L (ref 21–32)
COVID-19 RAPID TEST, COVR: NOT DETECTED
CREAT SERPL-MCNC: 0.8 MG/DL (ref 0.55–1.02)
GLUCOSE SERPL-MCNC: 88 MG/DL (ref 65–100)
HGB BLD-MCNC: 10.8 G/DL (ref 11.5–16)
MAGNESIUM SERPL-MCNC: 2.1 MG/DL (ref 1.6–2.4)
PHOSPHATE SERPL-MCNC: 1.9 MG/DL (ref 2.6–4.7)
POTASSIUM SERPL-SCNC: 3.7 MMOL/L (ref 3.5–5.1)
SODIUM SERPL-SCNC: 128 MMOL/L (ref 136–145)
SOURCE, COVRS: NORMAL

## 2022-03-30 PROCEDURE — 80048 BASIC METABOLIC PNL TOTAL CA: CPT

## 2022-03-30 PROCEDURE — 65270000029 HC RM PRIVATE

## 2022-03-30 PROCEDURE — 97530 THERAPEUTIC ACTIVITIES: CPT

## 2022-03-30 PROCEDURE — 74011250636 HC RX REV CODE- 250/636: Performed by: INTERNAL MEDICINE

## 2022-03-30 PROCEDURE — 74011250636 HC RX REV CODE- 250/636

## 2022-03-30 PROCEDURE — 74011250636 HC RX REV CODE- 250/636: Performed by: ORTHOPAEDIC SURGERY

## 2022-03-30 PROCEDURE — 87635 SARS-COV-2 COVID-19 AMP PRB: CPT

## 2022-03-30 PROCEDURE — 73560 X-RAY EXAM OF KNEE 1 OR 2: CPT

## 2022-03-30 PROCEDURE — 74011250637 HC RX REV CODE- 250/637: Performed by: STUDENT IN AN ORGANIZED HEALTH CARE EDUCATION/TRAINING PROGRAM

## 2022-03-30 PROCEDURE — 85018 HEMOGLOBIN: CPT

## 2022-03-30 PROCEDURE — 36415 COLL VENOUS BLD VENIPUNCTURE: CPT

## 2022-03-30 PROCEDURE — 74011250637 HC RX REV CODE- 250/637: Performed by: ORTHOPAEDIC SURGERY

## 2022-03-30 PROCEDURE — 97116 GAIT TRAINING THERAPY: CPT

## 2022-03-30 PROCEDURE — 83735 ASSAY OF MAGNESIUM: CPT

## 2022-03-30 PROCEDURE — 74011250637 HC RX REV CODE- 250/637: Performed by: INTERNAL MEDICINE

## 2022-03-30 PROCEDURE — 84100 ASSAY OF PHOSPHORUS: CPT

## 2022-03-30 PROCEDURE — 74011250637 HC RX REV CODE- 250/637

## 2022-03-30 RX ORDER — POTASSIUM CHLORIDE 7.45 MG/ML
10 INJECTION INTRAVENOUS
Status: CANCELLED | OUTPATIENT
Start: 2022-03-30 | End: 2022-03-30

## 2022-03-30 RX ORDER — HYDRALAZINE HYDROCHLORIDE 20 MG/ML
10 INJECTION INTRAMUSCULAR; INTRAVENOUS
Status: DISCONTINUED | OUTPATIENT
Start: 2022-03-30 | End: 2022-04-04 | Stop reason: HOSPADM

## 2022-03-30 RX ORDER — POTASSIUM CHLORIDE 750 MG/1
40 TABLET, FILM COATED, EXTENDED RELEASE ORAL
Status: COMPLETED | OUTPATIENT
Start: 2022-03-30 | End: 2022-03-30

## 2022-03-30 RX ADMIN — SENNOSIDES AND DOCUSATE SODIUM 1 TABLET: 50; 8.6 TABLET ORAL at 08:18

## 2022-03-30 RX ADMIN — ATORVASTATIN CALCIUM 20 MG: 20 TABLET, FILM COATED ORAL at 22:38

## 2022-03-30 RX ADMIN — TRAMADOL HYDROCHLORIDE 50 MG: 50 TABLET, COATED ORAL at 09:19

## 2022-03-30 RX ADMIN — LEVOTHYROXINE SODIUM 112 MCG: 0.11 TABLET ORAL at 07:56

## 2022-03-30 RX ADMIN — POLYETHYLENE GLYCOL 3350 17 G: 17 POWDER, FOR SOLUTION ORAL at 08:18

## 2022-03-30 RX ADMIN — Medication 400 MG: at 08:18

## 2022-03-30 RX ADMIN — SODIUM CHLORIDE 75 ML/HR: 9 INJECTION, SOLUTION INTRAVENOUS at 08:22

## 2022-03-30 RX ADMIN — ENOXAPARIN SODIUM 40 MG: 100 INJECTION SUBCUTANEOUS at 19:06

## 2022-03-30 RX ADMIN — HYDRALAZINE HYDROCHLORIDE 10 MG: 20 INJECTION INTRAMUSCULAR; INTRAVENOUS at 12:49

## 2022-03-30 RX ADMIN — THERA TABS 1 TABLET: TAB at 09:00

## 2022-03-30 RX ADMIN — POTASSIUM CHLORIDE 40 MEQ: 750 TABLET, EXTENDED RELEASE ORAL at 09:19

## 2022-03-30 RX ADMIN — ATORVASTATIN CALCIUM 20 MG: 20 TABLET, FILM COATED ORAL at 00:03

## 2022-03-30 RX ADMIN — HYDROCHLOROTHIAZIDE: 25 TABLET ORAL at 08:16

## 2022-03-30 NOTE — PROGRESS NOTES
[facility]                                                                                          Hospitalist Progress Note  Maximiliano Daniels MD  Answering service: [phone] from in house phone        Date of Service:  3/30/2022  NAME:  Yung Ingram  :  1940  MRN:  [id number]      Admission Summary:   HPI : \" Yung Ingram is a 80 y.o. 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 She was status post left unicompartmental knee arthroplasty that was done on 2022. She was ambulating down the stairs at her home, when she fell on the affected knee. She was unable to bear weight so advised  By her orthopedic surgeon Dr. Harjeet Mcdowell to come to the hospital for admission.     In the ED, SBP range between 158-169. Other VSS. Left knee x-ray showed displaced fracture inferior to the implant.     She denies any headaches or dizziness. Denies any cough, chest pain, shortness of breath. Denies fever or chills. Denies nausea, vomiting, diarrhea, constipation. \"    Interval history / Subjective:   Patient seen examined at bedside. Eating about half her breakfast this morning, no other acute acute complaint     Assessment & Plan:     #L inferior periprosthetic displaced tibial fracture  -mgmt per ortho, plan to rpt Xray to assess   -RCRI class II, low risk for intraop cardiac complication.         Mild hyponatremia  -Seems to be downtrending on IV fluids, stop IV fluids today, potassium repletion  -Follow-up a.m.     #HTN  #dyslipidemia  -continue home medications  -continue statin       #Hypothyroidism  -last TSH 2.5 on 3/10/22  -continue home levothyroxine 112mcg daily     #remote hx histoplasmosis  -residual lung scarring, but never symptomatic, and never had o2 requirement    Care Plan discussed with: Patient/Family  Anticipated Disposition: per primary      Hospital Problems  Date Reviewed: 3/28/2022          Codes Class Noted POA    Periprosthetic fracture of proximal end of tibia ICD-10-CM: M97. Tiffany Stewart  ICD-9-CM: 996.44, V43.65  3/28/2022 Unknown                Review of Systems:   A comprehensive review of systems was negative except for that written in the HPI. Vital Signs:    Last 24hrs VS reviewed since prior progress note. Most recent are:  Visit Vitals  BP (!) 155/79 (BP 1 Location: Left upper arm, BP Patient Position: At rest)   Pulse (!) 101   Temp 98.3 °F (36.8 °C)   Resp 18   SpO2 94%         Intake/Output Summary (Last 24 hours) at 3/30/2022 1448  Last data filed at 3/30/2022 0800  Gross per 24 hour   Intake 1050 ml   Output --   Net 1050 ml        Physical Examination:     I had a face to face encounter with this patient and independently examined them on 3/30/2022 as outlined below:          Constitutional:  No acute distress, cooperative, pleasant    ENT:  Oral mucosa moist, oropharynx benign. Resp:  CTA bilaterally. No wheezing/rhonchi/rales. No accessory muscle use   CV:  Regular rhythm, normal rate, no murmurs, gallops, rubs    GI:  Soft, non distended, non tender. normoactive bowel sounds, no hepatosplenomegaly     Musculoskeletal:  No edema, warm, 2+ pulses throughout    Neurologic:  Moves all extremities.   AAOx3, CN II-XII reviewed            Data Review:    Review and/or order of clinical lab test  Review and/or order of tests in the radiology section of CPT  Review and/or order of tests in the medicine section of CPT      Labs:     Recent Labs     03/30/22  0550   HGB 10.8*     Recent Labs     03/30/22  0550 03/29/22  1114   * 127*   K 3.7 2.9*   CL 95* 88*   CO2 29 33*   BUN 13 14   CREA 0.80 1.07*   GLU 88 136*   CA 8.9 9.9   MG 2.1 1.9   PHOS 1.9*  --      No results for input(s): ALT, AP, TBIL, TBILI, TP, ALB, GLOB, GGT, AML, LPSE in the last 72 hours. No lab exists for component: SGOT, GPT, AMYP, HLPSE  No results for input(s): INR, PTP, APTT, INREXT in the last 72 hours. No results for input(s): FE, TIBC, PSAT, FERR in the last 72 hours. Lab Results   Component Value Date/Time    Folate >20.0 05/15/2018 08:12 AM      No results for input(s): PH, PCO2, PO2 in the last 72 hours. No results for input(s): CPK, CKNDX, TROIQ in the last 72 hours.     No lab exists for component: CPKMB  Lab Results   Component Value Date/Time    Cholesterol, total 218 (H) 03/10/2022 09:09 AM    HDL Cholesterol 63 03/10/2022 09:09 AM    LDL, calculated 136.4 (H) 03/10/2022 09:09 AM    Triglyceride 93 03/10/2022 09:09 AM    CHOL/HDL Ratio 3.5 03/10/2022 09:09 AM     Lab Results   Component Value Date/Time    Glucose (POC) 89 03/16/2022 07:29 AM    Glucose (POC) 165 (H) 01/28/2010 12:15 PM     Lab Results   Component Value Date/Time    Color YELLOW/STRAW 03/03/2022 03:20 PM    Appearance CLEAR 03/03/2022 03:20 PM    Specific gravity 1.009 03/03/2022 03:20 PM    pH (UA) 6.5 03/03/2022 03:20 PM    Protein Negative 03/03/2022 03:20 PM    Glucose Negative 03/03/2022 03:20 PM    Ketone Negative 03/03/2022 03:20 PM    Bilirubin Negative 03/03/2022 03:20 PM    Urobilinogen 0.2 03/03/2022 03:20 PM    Nitrites Negative 03/03/2022 03:20 PM    Leukocyte Esterase MODERATE (A) 03/03/2022 03:20 PM    Epithelial cells FEW 03/03/2022 03:20 PM    Bacteria Negative 03/03/2022 03:20 PM    WBC 20-50 03/03/2022 03:20 PM    RBC 0-5 03/03/2022 03:20 PM         Medications Reviewed:     Current Facility-Administered Medications   Medication Dose Route Frequency    hydrALAZINE (APRESOLINE) 20 mg/mL injection 10 mg  10 mg IntraVENous Q6H PRN    senna-docusate (PERICOLACE) 8.6-50 mg per tablet 1 Tablet  1 Tablet Oral BID    magnesium oxide (MAG-OX) tablet 400 mg  400 mg Oral DAILY    calcium carbonate (TUMS) chewable tablet 400 mg [elemental]  400 mg Oral PRN    levothyroxine (SYNTHROID) tablet 112 mcg  112 mcg Oral ACB    therapeutic multivitamin (THERAGRAN) tablet 1 Tablet  1 Tablet Oral DAILY    polyethylene glycol (MIRALAX) packet 17 g  17 g Oral DAILY    atorvastatin (LIPITOR) tablet 20 mg  20 mg Oral QHS    enoxaparin (LOVENOX) injection 40 mg  40 mg SubCUTAneous Q24H    traMADoL (ULTRAM) tablet 50 mg  50 mg Oral Q6H PRN    HYDROmorphone (DILAUDID) injection 0.5 mg  0.5 mg IntraVENous Q6H PRN    losartan/hydroCHLOROthiazide (HYZAAR) 100/25 mg   Oral DAILY     ______________________________________________________________________  EXPECTED LENGTH OF STAY: 3d 16h  ACTUAL LENGTH OF STAY:          2                 Willie Nuñez MD

## 2022-03-30 NOTE — ROUTINE PROCESS
Primary Nurse Jose Garay RN and Jude Clarke RN performed a dual skin assessment on this patient No impairment noted  Lg score is 19

## 2022-03-30 NOTE — PROGRESS NOTES
Problem: Mobility Impaired (Adult and Pediatric)  Goal: *Acute Goals and Plan of Care (Insert Text)  Description: FUNCTIONAL STATUS PRIOR TO ADMISSION: Patient was modified independent using a rolling walker for functional mobility. HOME SUPPORT PRIOR TO ADMISSION: The patient lived alone with no local support. Physical Therapy Goals  Initiated 3/29/2022  1. Patient will move from supine to sit and sit to supine , scoot up and down, and roll side to side in bed with modified independence within 7 day(s). 2.  Patient will transfer from bed to chair and chair to bed with modified independence using the least restrictive device within 7 day(s). 3.  Patient will perform sit to stand with modified independence within 7 day(s). 4.  Patient will ambulate with supervision/set-up for 100 feet with the least restrictive device within 7 day(s). 5.  Patient will ascend/descend 6 stairs with bilateral handrail(s) with supervision/set-up within 7 day(s). Outcome: Progressing Towards Goal     PHYSICAL THERAPY TREATMENT  Patient: Jaimee Servin (80 y.o. female)  Date: 3/30/2022  Diagnosis: Periprosthetic fracture of proximal end of tibia [M97. Gema Clack <principal problem not specified>      Precautions: Fall,TTWB  Chart, physical therapy assessment, plan of care and goals were reviewed. ASSESSMENT  Patient continues with skilled PT services and is progressing towards goals. Patient making slow gains toward goals however demonstrates increased memory deficits and pain complaints today. At rest, patient reports no pain however upon weight bearing, patient states 9-10/10 today. Patient did not recall TTWB status on LLE, educated x 3, not able to verbally repeat WB status multiple times throughout session. Patient was able to maintain TTWB with cues despite not being aware of the precaution. Patient ambulates to/from bathroom with min A for cues and management of AD, 25ft x 2.  Patient assisted to the toilet with min A. Patient noted to be incontinent of bowel, patient unaware of this. Unable to assist with toileting or changing of brief. Patient reports she feels more confused this morning. Also noted to have elevated BP readings at rest and after exertion. Patient assisted back to bed. Supine to sit and sit to supine requiring mod A to manage LLE. Nursing entered room at end of session and notified of all of the above. Will continue to benefit from acute PT while in hospital. Will need SNF placement at discharge. Current Level of Function Impacting Discharge (mobility/balance): mod A for bed mobility, min A for transfers, min A for ambulation with RW 25ft x 2 (requires constant cues to maintain TTWB)    Other factors to consider for discharge: lives alone in a home         PLAN :  Patient continues to benefit from skilled intervention to address the above impairments. Continue treatment per established plan of care. to address goals. Recommendation for discharge: (in order for the patient to meet his/her long term goals)  Therapy up to 5 days/week in SNF setting    This discharge recommendation:  Has been made in collaboration with the attending provider and/or case management    IF patient discharges home will need the following DME: bedside commode, gait belt and wheelchair       SUBJECTIVE:   Patient stated I should not have gone home by myself before, this was a mistake.     OBJECTIVE DATA SUMMARY:   Critical Behavior:  Neurologic State: Alert,Confused  Orientation Level: Oriented to person,Oriented to place,Oriented to situation,Disoriented to time  Cognition: Decreased attention/concentration,Memory loss,Poor safety awareness  Safety/Judgement: Decreased insight into deficits,Decreased awareness of need for assistance  Functional Mobility Training:  Bed Mobility:  Rolling: Minimum assistance;Assist x1  Supine to Sit: Moderate assistance;Assist x1  Sit to Supine:  Moderate assistance;Assist x1  Scooting: Contact guard assistance;Assist x1        Transfers:  Sit to Stand: Minimum assistance;Assist x1;Additional time  Stand to Sit: Minimum assistance;Assist x1;Additional time           Balance:  Sitting: Impaired; With support  Sitting - Static: Fair (occasional)  Sitting - Dynamic: Poor (constant support)  Standing: Intact; With support  Ambulation/Gait Training:  Distance (ft): 25 Feet (ft) (x2)  Assistive Device: Walker, rolling;Gait belt  Ambulation - Level of Assistance: Minimal assistance;Assist x1;Additional time        Gait Abnormalities: Antalgic; Step to gait; Decreased step clearance        Base of Support: Narrowed; Shift to right  Stance: Left decreased;Right increased  Speed/Adri: Slow;Shuffled;Pace decreased (<100 feet/min)  Step Length: Right shortened;Left shortened  Swing Pattern: Left asymmetrical               Constant cues to maintain TTWB  Stairs:   NT             Pain Ratin/10    Activity Tolerance:   Fair and requires rest breaks     Vitals:    22 0814 22 0851 22 0857 22 0900   BP: (!) 151/77 (!) 150/79 (!) 141/105 (!) 165/78   BP 1 Location: Right upper arm Right upper arm Right upper arm Right upper arm   BP Patient Position: Supine Supine Sitting Other (Comment)  Comment: supine s/p ambulation   Pulse: 81 85  (!) 101   Temp: 98.3 °F (36.8 °C)      Resp: 18      SpO2: 94%          After treatment patient left in no apparent distress:   Supine in bed, Call bell within reach, Side rails x 3 and RN present    COMMUNICATION/COLLABORATION:   The patients plan of care was discussed with: Registered nurse and Physician.      Prudence Cough, PT   Time Calculation: 32 mins

## 2022-03-30 NOTE — PROGRESS NOTES
Physician Progress Note      PATIENT:               Tian East  [facility] #:                  [id number]  :                       1940  ADMIT DATE:       3/28/2022 5:34 PM  DISCH DATE:  RESPONDING  PROVIDER #:        SMOOTH QUIROZ DO          QUERY TEXT:    Pt admitted with fracture . Pt noted to have abnormal renal labs from baseline . If possible, please document in the progress notes and discharge summary if you are evaluating and/or treating any of the following: The medical record reflects the following:  Risk Factors: fracture  Clinical Indicators:  3/17/2022 06:05 -baseline  BUN: 15  Creatinine: 0.80  GFR est non-AA: >60    3/29/2022 11:14  BUN: 14  Creatinine: 1.07 (H)  GFR est non-AA: 49 (L)    Treatment: lab monitoring, IVF NS @ 75 cc/hr    Thank you,  Ana Guallpa RN, CDI, [place], CCDS  Certified Clinical   [phone]      Defined by Kidney Disease Improving Global Outcomes (KDIGO) clinical practice guideline for acute kidney injury:  -Increase in SCr by greater than or equal to 0.3 mg/dl within 48?hours; or  -Increase or decrease in SCr to greater than or equal to 1.5 times baseline, which is known or presumed to have occurred within the prior 7 days; or  -Urine volume < 0.5ml/kg/h for 6 hours  Options provided:  -- Acute kidney failure  -- Acute kidney injury  -- Acute kidney insufficiency  -- Other - I will add my own diagnosis  -- Disagree - Not applicable / Not valid  -- Disagree - Clinically unable to determine / Unknown  -- Refer to Clinical Documentation Reviewer    PROVIDER RESPONSE TEXT:    This patient has an Acute kidney insufficiency . Query created by: Venus Branch on 3/30/2022 6:48 AM      QUERY TEXT:    Patient admitted with fracture  noted to have abnormal potassium level . If possible, please document in progress notes and discharge summary if you are evaluating and/or treating any of the following:     The medical record reflects the following:  Risk Factors: fracture  Clinical Indicators:  3/29/2022 11:14  Potassium: 2.9 (L)    3/29 hospitalist note  Reviewed labs. Potassium replaced    Treatment: po supplemental potassium    Thank you,  Tiffanie Vernon RN, CDI, [facility], [address] [place] [address]  Certified Clinical   [phone]  Options provided:  -- hypokalemia  -- hypokalemia  not clinically significant  -- Other - I will add my own diagnosis  -- Disagree - Not applicable / Not valid  -- Disagree - Clinically unable to determine / Unknown  -- Refer to Clinical Documentation Reviewer    PROVIDER RESPONSE TEXT:    This patient has hypokalemia .     Query created by: Alla Elaine on 3/30/2022 6:49 AM      Electronically signed by:  Angelito Mcelroy DO 3/30/2022 7:46 AM

## 2022-03-30 NOTE — PROGRESS NOTES
Ortho NP Note    Readmit with L periprosthetic tibia fracture s/p L uni compartmental knee arthroplasty on 3/16/22. Pt resting in bed. Denies pain at present; when asked about pain during therapy, patient states: \"I don't remember. \"  When asked about recent events (fall leading up to hospitalization, mobility at home, nutrition), patient seems to be poor historian. Does endorse bowel movement \"some time\" yesterday. Ate about 50% of breakfast, denies N/V. No CP, SOB.       VSS Afebrile. Visit Vitals  BP (!) 165/78 (BP 1 Location: Right upper arm, BP Patient Position: Other (Comment)) Comment (BP Patient Position): supine s/p ambulation   Pulse (!) 101   Temp 98.3 °F (36.8 °C)   Resp 18   SpO2 94%       Voiding status: spontaneous void; Pure Wick  Output (mL)  Last Bowel Movement Date: 03/29/22 (03/30/22 0506)  Unmeasurable Output  Urine Occurrence(s): 1 (03/30/22 0539)  Stool Occurrence(s): 1 (03/30/22 0814)      Labs    Lab Results   Component Value Date/Time    HGB 10.8 (L) 03/30/2022 05:50 AM      Lab Results   Component Value Date/Time    INR 1.0 03/03/2022 03:30 PM      Lab Results   Component Value Date/Time    Sodium 128 (L) 03/30/2022 05:50 AM    Potassium 3.7 03/30/2022 05:50 AM    Chloride 95 (L) 03/30/2022 05:50 AM    CO2 29 03/30/2022 05:50 AM    Glucose 88 03/30/2022 05:50 AM    BUN 13 03/30/2022 05:50 AM    Creatinine 0.80 03/30/2022 05:50 AM    Calcium 8.9 03/30/2022 05:50 AM     Recent Glucose Results:   Lab Results   Component Value Date/Time    GLU 88 03/30/2022 05:50 AM     (H) 03/29/2022 11:14 AM           There is no height or weight on file to calculate BMI. : A BMI > 30 is classified as obesity and > 40 is classified as morbid obesity. Incision to L knee healing well, T ROM brace to LLE. Calves soft and supple; No pain with passive stretch  Bilateral LEs warm, dry. 2+ DP pulses.     Sensation and motor intact - PF/DF/EHL intact 5/5  Foot Pumps for mechanical DVT proph while in bed     PLAN:  1) PT: PT/OT. TTWB   2) Anticoagulation:  Lovenox 40 mg SQ  daily for DVT Prophylaxis. Encouraged early mobilization, bed exercises, and SCD use. 3) Pain - Multimodal approach including cryotherapy, scheduled Mobic with PRN  tramadol, IV dilaudid depending on pain severity. 4) Hx of HTN:  Current BP: 165/78,  with exertion during therapy. Continue home Hyzaar. Added hydralazine for SBP > 180. Continue routine VS.   5) Hx of hypothyroidism: Continue home synthroid 112 mcg. 6) Hypokalemia: K 3.7 today, repleted yesterday. Hospitalist following--appreciate input   7) Readniess for discharge: Patient will require SNF placement.   CM aware and to speak to patient today regarding placement     [x] Vital Signs stable    [x] + Voiding    [x] Wound intact, drainage minimal    [x] Tolerating PO intake     [] Cleared by PT (OT if applicable)     [] Stair training completed (if applicable)    [] Independent / Contact Guard Assist (household distance)     [] Bed mobility     [] Car transfers     [] ADLs    [x] Adequate pain control on oral medication alone     Dispo to SNF pending placement    Avelina Valdez NP  Available via Perfect Serve

## 2022-03-30 NOTE — ROUTINE PROCESS
Patient assessed for readiness to ambulate. Vital Signs  Level of Consciousness: Alert (0)  Temp: 97.6 °F (36.4 °C)  Temp Source: Axillary  Pulse (Heart Rate): 94  Heart Rate Source: Brachial  Resp Rate: 16  BP: (!) 159/79  MAP (Calculated): 106  BP 1 Location: Left upper arm  BP 1 Method: Automatic  BP Patient Position: Sitting  MEWS Score: 1. Patient ambulated with assistance of 1 nurses. Patient ambulated with gait belt and walker. Patient walked to Bedside commode. Patient returned safely to bed.

## 2022-03-30 NOTE — PROGRESS NOTES
Bedside and Verbal shift change report given to Price (oncoming nurse) by Alejandro Malone (offgoing nurse). Report included the following information SBAR and Kardex.

## 2022-03-30 NOTE — PROGRESS NOTES
Resting comfortably. Pain controlled. GEN:  NAD.  AOx3   ABD:  S/NT/ND   LLE:  Incision C/D/I , 5/5 motor, Calf nttp (Bilat), Sensation rossly intact to light touch throughout, 1+ dp/pt pulses, foot perfused    Patient Vitals for the past 24 hrs:   Temp Pulse Resp BP SpO2   03/30/22 1323 -- -- -- (!) 155/79 --   03/30/22 1240 -- -- -- (!) 162/87 --   03/30/22 0900 -- (!) 101 -- (!) 165/78 --   03/30/22 0857 -- -- -- (!) 141/105 --   03/30/22 0851 -- 85 -- (!) 150/79 --   03/30/22 0814 98.3 °F (36.8 °C) 81 18 (!) 151/77 94 %   03/30/22 0539 97.8 °F (36.6 °C) 83 18 (!) 172/81 93 %   03/29/22 2104 98 °F (36.7 °C) 81 18 (!) 168/82 95 %       Current Facility-Administered Medications   Medication Dose Route Frequency    hydrALAZINE (APRESOLINE) 20 mg/mL injection 10 mg  10 mg IntraVENous Q6H PRN    senna-docusate (PERICOLACE) 8.6-50 mg per tablet 1 Tablet  1 Tablet Oral BID    magnesium oxide (MAG-OX) tablet 400 mg  400 mg Oral DAILY    calcium carbonate (TUMS) chewable tablet 400 mg [elemental]  400 mg Oral PRN    levothyroxine (SYNTHROID) tablet 112 mcg  112 mcg Oral ACB    therapeutic multivitamin (THERAGRAN) tablet 1 Tablet  1 Tablet Oral DAILY    polyethylene glycol (MIRALAX) packet 17 g  17 g Oral DAILY    atorvastatin (LIPITOR) tablet 20 mg  20 mg Oral QHS    enoxaparin (LOVENOX) injection 40 mg  40 mg SubCUTAneous Q24H    traMADoL (ULTRAM) tablet 50 mg  50 mg Oral Q6H PRN    HYDROmorphone (DILAUDID) injection 0.5 mg  0.5 mg IntraVENous Q6H PRN    losartan/hydroCHLOROthiazide (HYZAAR) 100/25 mg   Oral DAILY       Lab Results   Component Value Date/Time    HGB 10.8 (L) 03/30/2022 05:50 AM    INR 1.0 03/03/2022 03:30 PM       Lab Results   Component Value Date/Time    Sodium 128 (L) 03/30/2022 05:50 AM    Potassium 3.7 03/30/2022 05:50 AM    Chloride 95 (L) 03/30/2022 05:50 AM    CO2 29 03/30/2022 05:50 AM    BUN 13 03/30/2022 05:50 AM    Creatinine 0.80 03/30/2022 05:50 AM    Calcium 8.9 03/30/2022 05:50 AM    Magnesium 2.1 03/30/2022 05:50 AM    Phosphorus 1.9 (L) 03/30/2022 05:50 AM       80 y.o. female s/p left uni compartmental knee arthroplasty on 3/16/22. Had a fall Sunday evening directly onto knee. Seen in clinic on 3/28/22 and found to have a left periprosthetic tibia fracture. Overall continues to do well. Pain controlled. Working with PT. TTWB on LLE with T ROM brace in place. Xray ordered today for repeat evaluation of fracture. DVT Prophylaxis: Lovenox 40mg SQ daily for DVT prophylaxis. Weight Bearing: TTWB LLE, gentle ROM as tolerated. Pain Control:  Tyleno,tramadol every 6 hours, oxy 5 mg for breakthrough pain  Disposition: SNF once cleared by PT and placement found. CM aware of plan and working on placement.

## 2022-03-30 NOTE — PROGRESS NOTES
Problem: Self Care Deficits Care Plan (Adult)  Goal: *Acute Goals and Plan of Care (Insert Text)  Description: Occupational Therapy Goals  Initiated: 3/29/2022   1. Patient will perform grooming with supervision/set-up sitting in chair within 7 day(s). 2.  Patient will perform bathing with mod A from chair within 7 day(s). 3.  Patient will perform upper body dressing and lower body dressing with mod A within 7 day(s). 4.  Patient will perform toilet transfers with mod A within 7 day(s). 5.  Patient will perform all aspects of toileting with min A within 7 day(s). FUNCTIONAL STATUS PRIOR TO ADMISSION: Pt is mod I at home. She reports the last 2 weeks at home was going well at home prior to admission. She reports showering 1 time following discharge after unicondylar knee replacement. HOME SUPPORT: lives alone. Outcome: Progressing Towards Goal     OCCUPATIONAL THERAPY TREATMENT  Patient: Dora Matthews (80 y.o. female)  Date: 3/30/2022  Diagnosis: Periprosthetic fracture of proximal end of tibia [M97. Alek Sanchez <principal problem not specified>       Precautions: Fall,TTWB  Chart, occupational therapy assessment, plan of care, and goals were reviewed. ASSESSMENT  Patient continues with skilled OT services and is progressing towards goals. Pt continues to demonstrate decreased STM, frequently asking what was the plan for today's therapy; however, noted with good Min A sit to stand, as well as, good compliance with TTWB with Min verbal cues. Pt continues to benefit from skilled OT to address functional deficits during acute hospitalization, with reporting therapist believing pt would benefit from SNF rehab upon discharge. Current Level of Function Impacting Discharge (ADLs): Max A LE ADLs    Other factors to consider for discharge: confusion, TTWB         PLAN :  Patient continues to benefit from skilled intervention to address the above impairments.   Continue treatment per established plan of care to address goals. Recommend with staff: OOB meals, Active ADL engagement    Recommend next OT session: POC progression    Recommendation for discharge: (in order for the patient to meet his/her long term goals)  Therapy up to 5 days/week in SNF setting    This discharge recommendation:  Has been made in collaboration with the attending provider and/or case management    IF patient discharges home will need the following DME: TBD       SUBJECTIVE:   Patient stated Knowles Setter do people keep asking me if I'm confused? What are we doing again?     OBJECTIVE DATA SUMMARY:   Cognitive/Behavioral Status:  Neurologic State: Alert;Confused  Orientation Level: Oriented to person;Oriented to place;Oriented to situation;Oriented to time  Cognition: Decreased attention/concentration; Follows commands  Perception: Appears intact  Perseveration: No perseveration noted  Safety/Judgement: Decreased insight into deficits; Decreased awareness of need for safety;Decreased awareness of need for assistance    Functional Mobility and Transfers for ADLs:  Bed Mobility:  Rolling: Minimum assistance;Assist x1  Supine to Sit: Moderate assistance  Sit to Supine: Moderate assistance  Scooting: Contact guard assistance;Assist x1    Transfers:  Sit to Stand: Minimum assistance- completed 5 sit to stand transfers    Pt educated on safe transfer techniques, with specific emphasis on proper hand placement to push up from seated surface rather than attempt to pull self up, fully positioning self in-front of desired seated location, feeling chair on back of legs and reaching back with 1-2 UE to slowly lower self to seated position. Balance:  Sitting: Intact; With support  Sitting - Static: Good (unsupported)  Sitting - Dynamic: Good (unsupported); Fair (occasional)  Standing: Impaired; With support  Standing - Static: Fair;Constant support  Standing - Dynamic : Fair;Constant support    ADL Intervention:  Cognitive Retraining  Safety/Judgement: Decreased insight into deficits; Decreased awareness of need for safety;Decreased awareness of need for assistance    Pain:  No c/o pain    Activity Tolerance:   Fair, Poor, and requires frequent rest breaks    After treatment patient left in no apparent distress:   Supine in bed, Call bell within reach, and Side rails x 3    COMMUNICATION/COLLABORATION:   The patients plan of care was discussed with: Physical therapist, Registered nurse, and Case management.      Cynthia Martinez OT  Time Calculation: 24 mins

## 2022-03-31 ENCOUNTER — ANESTHESIA EVENT (OUTPATIENT)
Dept: SURGERY | Age: 82
DRG: 467 | End: 2022-03-31
Payer: MEDICARE

## 2022-03-31 LAB
ANION GAP SERPL CALC-SCNC: 6 MMOL/L (ref 5–15)
BUN SERPL-MCNC: 13 MG/DL (ref 6–20)
BUN/CREAT SERPL: 15 (ref 12–20)
CALCIUM SERPL-MCNC: 9.7 MG/DL (ref 8.5–10.1)
CHLORIDE SERPL-SCNC: 94 MMOL/L (ref 97–108)
CO2 SERPL-SCNC: 28 MMOL/L (ref 21–32)
CREAT SERPL-MCNC: 0.85 MG/DL (ref 0.55–1.02)
GLUCOSE SERPL-MCNC: 146 MG/DL (ref 65–100)
POTASSIUM SERPL-SCNC: 3.8 MMOL/L (ref 3.5–5.1)
SODIUM SERPL-SCNC: 128 MMOL/L (ref 136–145)

## 2022-03-31 PROCEDURE — 74011250637 HC RX REV CODE- 250/637

## 2022-03-31 PROCEDURE — 80048 BASIC METABOLIC PNL TOTAL CA: CPT

## 2022-03-31 PROCEDURE — 65270000029 HC RM PRIVATE

## 2022-03-31 PROCEDURE — 74011250637 HC RX REV CODE- 250/637: Performed by: INTERNAL MEDICINE

## 2022-03-31 PROCEDURE — 97530 THERAPEUTIC ACTIVITIES: CPT

## 2022-03-31 PROCEDURE — 97535 SELF CARE MNGMENT TRAINING: CPT

## 2022-03-31 PROCEDURE — 36415 COLL VENOUS BLD VENIPUNCTURE: CPT

## 2022-03-31 PROCEDURE — 74011250637 HC RX REV CODE- 250/637: Performed by: ORTHOPAEDIC SURGERY

## 2022-03-31 PROCEDURE — 74011250636 HC RX REV CODE- 250/636

## 2022-03-31 RX ORDER — LOSARTAN POTASSIUM 50 MG/1
100 TABLET ORAL DAILY
Status: DISCONTINUED | OUTPATIENT
Start: 2022-04-01 | End: 2022-04-04 | Stop reason: HOSPADM

## 2022-03-31 RX ADMIN — SENNOSIDES AND DOCUSATE SODIUM 1 TABLET: 50; 8.6 TABLET ORAL at 19:50

## 2022-03-31 RX ADMIN — ATORVASTATIN CALCIUM 20 MG: 20 TABLET, FILM COATED ORAL at 21:21

## 2022-03-31 RX ADMIN — THERA TABS 1 TABLET: TAB at 10:48

## 2022-03-31 RX ADMIN — LEVOTHYROXINE SODIUM 112 MCG: 0.11 TABLET ORAL at 07:52

## 2022-03-31 RX ADMIN — ENOXAPARIN SODIUM 40 MG: 100 INJECTION SUBCUTANEOUS at 21:22

## 2022-03-31 RX ADMIN — Medication 400 MG: at 10:46

## 2022-03-31 RX ADMIN — HYDROCHLOROTHIAZIDE: 25 TABLET ORAL at 10:46

## 2022-03-31 NOTE — PROGRESS NOTES
Resting comfortably. Pain controlled while resting. Minimal improvement when working with PT       GEN:  NAD.  AOx3   ABD:  S/NT/ND   LLE:  Incision C/D/I , 5/5 motor, Calf nttp (Bilat), Sensation rossly intact to light touch throughout, 1+ dp/pt pulses, foot perfused    Patient Vitals for the past 24 hrs:   Temp Pulse Resp BP SpO2   03/31/22 0244 98.2 °F (36.8 °C) 83 16 (!) 155/82 95 %   03/30/22 2217 99.6 °F (37.6 °C) 89 16 (!) 157/78 94 %   03/30/22 1519 98.1 °F (36.7 °C) 83 16 (!) 175/79 98 %   03/30/22 1323 -- -- -- (!) 155/79 --   03/30/22 1240 -- -- -- (!) 162/87 --   03/30/22 0900 -- (!) 101 -- (!) 165/78 --   03/30/22 0857 -- -- -- (!) 141/105 --   03/30/22 0851 -- 85 -- (!) 150/79 --   03/30/22 0814 98.3 °F (36.8 °C) 81 18 (!) 151/77 94 %       Current Facility-Administered Medications   Medication Dose Route Frequency    hydrALAZINE (APRESOLINE) 20 mg/mL injection 10 mg  10 mg IntraVENous Q6H PRN    senna-docusate (PERICOLACE) 8.6-50 mg per tablet 1 Tablet  1 Tablet Oral BID    magnesium oxide (MAG-OX) tablet 400 mg  400 mg Oral DAILY    calcium carbonate (TUMS) chewable tablet 400 mg [elemental]  400 mg Oral PRN    levothyroxine (SYNTHROID) tablet 112 mcg  112 mcg Oral ACB    therapeutic multivitamin (THERAGRAN) tablet 1 Tablet  1 Tablet Oral DAILY    polyethylene glycol (MIRALAX) packet 17 g  17 g Oral DAILY    atorvastatin (LIPITOR) tablet 20 mg  20 mg Oral QHS    enoxaparin (LOVENOX) injection 40 mg  40 mg SubCUTAneous Q24H    traMADoL (ULTRAM) tablet 50 mg  50 mg Oral Q6H PRN    HYDROmorphone (DILAUDID) injection 0.5 mg  0.5 mg IntraVENous Q6H PRN    losartan/hydroCHLOROthiazide (HYZAAR) 100/25 mg   Oral DAILY       Lab Results   Component Value Date/Time    HGB 10.8 (L) 03/30/2022 05:50 AM    INR 1.0 03/03/2022 03:30 PM       Lab Results   Component Value Date/Time    Sodium 128 (L) 03/30/2022 05:50 AM    Potassium 3.7 03/30/2022 05:50 AM    Chloride 95 (L) 03/30/2022 05:50 AM    CO2 29 03/30/2022 05:50 AM    BUN 13 03/30/2022 05:50 AM    Creatinine 0.80 03/30/2022 05:50 AM    Calcium 8.9 03/30/2022 05:50 AM    Magnesium 2.1 03/30/2022 05:50 AM    Phosphorus 1.9 (L) 03/30/2022 05:50 AM       80 y.o. female s/p left uni compartmental knee arthroplasty on 3/16/22. Had a fall Sunday evening directly onto knee. Seen in clinic on 3/28/22 and found to have a left periprosthetic tibia fracture. . Pain controlled while resting. Pain with movement. Minimal improvement with PT. TTWB on LLE with T ROM brace in place. Xray reviewed. Patient seen with Dr Gray Holbrook today. Plan for Dr Gray Holbrook to discuss surgical options with patients family and patient later today. Possible plan for OR tomorrow for revision. NPO at midnight. Hold Lovenox at midnight. DVT Prophylaxis: Lovenox 40mg SQ daily for DVT prophylaxis. Weight Bearing: TTWB LLE, gentle ROM as tolerated. Pain Control:  Tyleno,tramadol every 6 hours, oxy 5 mg for breakthrough pain  Disposition: Possible revision vs SNF. Dr Gray Holbrook to discuss plan further with patient and patient family today.

## 2022-03-31 NOTE — PROGRESS NOTES
Spiritual Care Partner Volunteer visited patient at [facility] in [address] [address] on 3/31/2022   Documented by:  [place]ascorro[place] MS., [address] [facility] Manisha ([id number])

## 2022-03-31 NOTE — PROGRESS NOTES
Ortho NP Note       Readmit with L periprosthetic tibia fracture s/p L uni compartmental knee arthroplasty on 3/16/22.      Pt seen with Dr. Melyssa Miguel    Pt resting in bed. Denies pain at rest but continues to endorse pain with walking to bathroom/room mobility. VSS Afebrile. Visit Vitals  BP (!) 155/82   Pulse 83   Temp 98.2 °F (36.8 °C)   Resp 16   SpO2 95%       Voiding status: spontaneous void/ Pure Wick  Output (mL)  Last Bowel Movement Date: 03/30/22 (03/30/22 2212)  Unmeasurable Output  Urine Occurrence(s): 1 (03/31/22 0703)  Stool Occurrence(s): 1 (03/31/22 0703)      Labs    Lab Results   Component Value Date/Time    HGB 10.8 (L) 03/30/2022 05:50 AM      Lab Results   Component Value Date/Time    INR 1.0 03/03/2022 03:30 PM      Lab Results   Component Value Date/Time    Sodium 128 (L) 03/30/2022 05:50 AM    Potassium 3.7 03/30/2022 05:50 AM    Chloride 95 (L) 03/30/2022 05:50 AM    CO2 29 03/30/2022 05:50 AM    Glucose 88 03/30/2022 05:50 AM    BUN 13 03/30/2022 05:50 AM    Creatinine 0.80 03/30/2022 05:50 AM    Calcium 8.9 03/30/2022 05:50 AM       Incision to L knee healing well, T ROM brace to LLE.    Calves soft and supple; No pain with passive stretch  Bilateral LEs warm, dry. 1+ DP pulses.    Sensation and motor intact - PF/DF/EHL intact 5/5  Foot Pumps for mechanical DVT proph while in bed     PLAN:  1) Periprostetic tibia fracture s/p L uni knee (3/16): Dr. Melyssa Miguel to discuss surgery with family today. Possibly to OR tomorrow for revision, L total knee. NPO after midnight. No lovenox after midnight--resume post op. Pre op labs ordered. Order for consent. 2) PT: BID TTWB  3) Anticoagulation:  Lovenox 40 mg SQ daily--hold after midnight tonight for surgery tomorrow. Encouraged early mobilization, bed exercises, and SCD use. 4) Pain - Multimodal approach including cryotherapy with PRN  tramadol, IV dilaudid depending on pain severity.     5) Hx of HTN:  Current BP: 155/82, HR 83. Continue home Hyzaar. Added hydralazine for SBP > 180. Continue routine VS.   6) Hx of hypothyroidism: Continue home synthroid 112 mcg.    7) Readniess for discharge: Patient will require SNF placement regardless of surgical vs non surgical management. Likely to OR tomorrow for revision to total knee.       Isabel Marino NP  Available via [employer]

## 2022-03-31 NOTE — PROGRESS NOTES
SIMON: Noted, pending decision on possible surgery. [facility] SNF has accepted. BLS to transport when stable for discharge. RUR: 7%    CM spoke to liaison, Maryana Messer at [facility] and she has accepted the patient. CM following for discharge needs.     Lenard Birch RN/CRM

## 2022-03-31 NOTE — PROGRESS NOTES
[address] [facility]  Hospitalist Group                                                                                          Hospitalist Progress Note  Eli Reed NP  Answering service: [phone] OR [phone] from in house phone        Date of Service:  3/31/2022  NAME:  Mona Pereyra  :  1940  MRN:  [id number]      Admission Summary:   Per the H&P, \" Marylee Cumber a 80 y.o. 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  She was status post left unicompartmental knee arthroplasty that was done on 2022.  She was ambulating down the stairs at her home, when she fell on the affected knee.  She was unable to bear weight so advised  By her orthopedic surgeon Dr. Zoie Vidal come to the hospital for admission.     In the ED, SBP range between 158-169.  Other VSS.  Left knee x-ray showed displaced fracture inferior to the implant.     She denies any headaches or dizziness. Denies any cough, chest pain, shortness of breath. Denies fever or chills. Denies nausea, vomiting, diarrhea, constipation. \"  Hospitalist service was consulted for assistance with medical management    Interval history / Subjective:   I saw the patient today on rounds. Sodium remains mildly low, patient without complaint     Assessment & Plan:     Left periprosthetic displaced tibial fracture  Orthopedics following  Is a class II risk according to the revised cardiac risk index  The rest per Ortho    Hyponatremia  Patient with hyponatremia, had low sodium, which trended down with IV fluids.   She has been continued on hydrochlorothiazide which I will discontinue and monitor for improvements     Hypertension  Blood pressure currently controlled  Based on the above, will discontinue the hydrochlorothiazide portion of her medications and make other medication adjustments as appropriate     Hyperlipidemia  Stable  Continuing atorvastatin    Hypothyroid  Stable  TSH was 2.5 on 3/10  Continuing levothyroxine    Remote history of histoplasmosis  Patient asymptomatic  No oxygen requirement    Code status: Full  DVT prophylaxis: LMWH    Care Plan discussed with: Patient/Family, Nurse and   Anticipated Disposition: TBD  Anticipated Discharge: Greater than 48 hours       Thank you for the opportunity to participate in the care of this patient. Hospitalist service will continue to follow along with you       Hospital Problems  Date Reviewed: 3/28/2022          Codes Class Noted POA    Periprosthetic fracture of proximal end of tibia ICD-10-CM: M97. Talya Wright  ICD-9-CM: 996.44, V43.65  3/28/2022 Unknown                Review of Systems:   A comprehensive review of systems was negative except for that written in the HPI. Vital Signs:    Last 24hrs VS reviewed since prior progress note. Most recent are:  Visit Vitals  BP (!) 168/81   Pulse 83   Temp 97.9 °F (36.6 °C)   Resp 16   SpO2 94%       No intake or output data in the 24 hours ending 03/31/22 1222     Physical Examination:     I had a face to face encounter with this patient and independently examined them on 3/31/2022 as outlined below:          Constitutional:  No acute distress, cooperative, pleasant    ENT:  Oral mucosa moist, oropharynx benign. Resp:  CTA bilaterally. No wheezing/rhonchi/rales. No accessory muscle use   CV:  Regular rhythm, normal rate, no murmurs, gallops, rubs    GI:  Soft, non distended, non tender. normoactive bowel sounds, no hepatosplenomegaly     Musculoskeletal:  No edema, warm, 2+ pulses throughout    Neurologic:  Moves all extremities.   AAOx3, CN II-XII reviewed            Data Review:    Review and/or order of clinical lab test  Review and/or order of tests in the radiology section of CPT      Labs:     Recent Labs     03/30/22  0550   HGB 10.8*     Recent Labs     03/31/22  1058 03/30/22  0550 03/29/22  1114   * 128* 127*   K 3.8 3.7 2.9*   CL 94* 95* 88*   CO2 28 29 33*   BUN 13 13 14   CREA 0.85 0.80 1.07*   * 88 136*   CA 9.7 8.9 9.9   MG  --  2.1 1.9   PHOS  --  1.9*  --      No results for input(s): ALT, AP, TBIL, TBILI, TP, ALB, GLOB, GGT, AML, LPSE in the last 72 hours. No lab exists for component: SGOT, GPT, AMYP, HLPSE  No results for input(s): INR, PTP, APTT, INREXT in the last 72 hours. No results for input(s): FE, TIBC, PSAT, FERR in the last 72 hours. Lab Results   Component Value Date/Time    Folate >20.0 05/15/2018 08:12 AM      No results for input(s): PH, PCO2, PO2 in the last 72 hours. No results for input(s): CPK, CKNDX, TROIQ in the last 72 hours.     No lab exists for component: CPKMB  Lab Results   Component Value Date/Time    Cholesterol, total 218 (H) 03/10/2022 09:09 AM    HDL Cholesterol 63 03/10/2022 09:09 AM    LDL, calculated 136.4 (H) 03/10/2022 09:09 AM    Triglyceride 93 03/10/2022 09:09 AM    CHOL/HDL Ratio 3.5 03/10/2022 09:09 AM     Lab Results   Component Value Date/Time    Glucose (POC) 89 03/16/2022 07:29 AM    Glucose (POC) 165 (H) 01/28/2010 12:15 PM     Lab Results   Component Value Date/Time    Color YELLOW/STRAW 03/03/2022 03:20 PM    Appearance CLEAR 03/03/2022 03:20 PM    Specific gravity 1.009 03/03/2022 03:20 PM    pH (UA) 6.5 03/03/2022 03:20 PM    Protein Negative 03/03/2022 03:20 PM    Glucose Negative 03/03/2022 03:20 PM    Ketone Negative 03/03/2022 03:20 PM    Bilirubin Negative 03/03/2022 03:20 PM    Urobilinogen 0.2 03/03/2022 03:20 PM    Nitrites Negative 03/03/2022 03:20 PM    Leukocyte Esterase MODERATE (A) 03/03/2022 03:20 PM    Epithelial cells FEW 03/03/2022 03:20 PM    Bacteria Negative 03/03/2022 03:20 PM    WBC 20-50 03/03/2022 03:20 PM    RBC 0-5 03/03/2022 03:20 PM         Medications Reviewed:     Current Facility-Administered Medications   Medication Dose Route Frequency    hydrALAZINE (APRESOLINE) 20 mg/mL injection 10 mg  10 mg IntraVENous Q6H PRN    senna-docusate (PERICOLACE) 8.6-50 mg per tablet 1 Tablet  1 Tablet Oral BID    magnesium oxide (MAG-OX) tablet 400 mg  400 mg Oral DAILY    calcium carbonate (TUMS) chewable tablet 400 mg [elemental]  400 mg Oral PRN    levothyroxine (SYNTHROID) tablet 112 mcg  112 mcg Oral ACB    therapeutic multivitamin (THERAGRAN) tablet 1 Tablet  1 Tablet Oral DAILY    polyethylene glycol (MIRALAX) packet 17 g  17 g Oral DAILY    atorvastatin (LIPITOR) tablet 20 mg  20 mg Oral QHS    enoxaparin (LOVENOX) injection 40 mg  40 mg SubCUTAneous Q24H    traMADoL (ULTRAM) tablet 50 mg  50 mg Oral Q6H PRN    HYDROmorphone (DILAUDID) injection 0.5 mg  0.5 mg IntraVENous Q6H PRN    losartan/hydroCHLOROthiazide (HYZAAR) 100/25 mg   Oral DAILY     ______________________________________________________________________  EXPECTED LENGTH OF STAY: 3d 16h  ACTUAL LENGTH OF STAY:          3                 Christine Latham, JACOB

## 2022-03-31 NOTE — PROGRESS NOTES
Bedside and Verbal shift change report given to Paty Anand RN (oncoming nurse) by Perla Kwon RN (offgoing nurse). Report included the following information SBAR and MAR.

## 2022-03-31 NOTE — PROGRESS NOTES
Problem: Mobility Impaired (Adult and Pediatric)  Goal: *Acute Goals and Plan of Care (Insert Text)  Description: FUNCTIONAL STATUS PRIOR TO ADMISSION: Patient was modified independent using a rolling walker for functional mobility. HOME SUPPORT PRIOR TO ADMISSION: The patient lived alone with no local support. Physical Therapy Goals  Initiated 3/29/2022  1. Patient will move from supine to sit and sit to supine , scoot up and down, and roll side to side in bed with modified independence within 7 day(s). 2.  Patient will transfer from bed to chair and chair to bed with modified independence using the least restrictive device within 7 day(s). 3.  Patient will perform sit to stand with modified independence within 7 day(s). 4.  Patient will ambulate with supervision/set-up for 100 feet with the least restrictive device within 7 day(s). 5.  Patient will ascend/descend 6 stairs with bilateral handrail(s) with supervision/set-up within 7 day(s). Outcome: Progressing Towards Goal   PHYSICAL THERAPY TREATMENT  Patient: Oneil López (80 y.o. female)  Date: 3/31/2022  Diagnosis: Periprosthetic fracture of proximal end of tibia [M97. Sameera Large <principal problem not specified>  Procedure(s) (LRB):  LEFT TOTAL KNEE ARTHROPLASTY REVISION (Left)    Precautions: Fall,TTWB  Chart, physical therapy assessment, plan of care and goals were reviewed. ASSESSMENT  Patient continues with skilled PT services and is progressing towards goals. Patient performed bed mobility, transfers, a few steps to recliner at bed side while maintaining TTWB'ing left. Current Level of Function Impacting Discharge (mobility/balance): Minimal assistance for bed mobility and sit-stand, contact guard for stand-sit and minimal assistance for gait, transfer to chair.      Other factors to consider for discharge: Lives alone/Far from modified independent PLOF/TTWB'ing status         PLAN :  Patient continues to benefit from skilled intervention to address the above impairments. Continue treatment per established plan of care. to address goals. Recommendation for discharge: (in order for the patient to meet his/her long term goals)  Therapy 3 hours per day 5-7 days per week    This discharge recommendation:  Has been made in collaboration with the attending provider and/or case management    IF patient discharges home will need the following DME: to be determined (TBD)       SUBJECTIVE:   Patient stated I am having surgery tomorrow.     OBJECTIVE DATA SUMMARY:   Critical Behavior:  Neurologic State: Alert,Confused  Orientation Level: Oriented to person,Oriented to place,Oriented to situation  Cognition: Impaired decision making  Safety/Judgement: Decreased awareness of need for assistance,Decreased awareness of need for safety,Decreased insight into deficits  Functional Mobility Training:  Bed Mobility:  Rolling: Minimum assistance  Supine to Sit: Minimum assistance;Assist x1  Sit to Supine:  (remained up in chair)  Scooting: Minimum assistance        Transfers:  Sit to Stand: Minimum assistance  Stand to Sit: Contact guard assistance        Bed to Chair: Minimum assistance;Contact guard assistance                    Balance:  Sitting: Intact  Sitting - Static: Good (unsupported)  Sitting - Dynamic: Good (unsupported); Fair (occasional)  Standing: Impaired; With support  Standing - Static: Good;Fair;Constant support  Standing - Dynamic : Fair;Constant support  Ambulation/Gait Training:  Distance (ft): 5 Feet (ft)  Assistive Device: Walker, rolling;Gait belt  Ambulation - Level of Assistance: Minimal assistance        Gait Abnormalities: Antalgic (scooting on right foot rather than taking actual steps)     Left Side Weight Bearing: Toe touch  Base of Support: Narrowed; Shift to right  Stance: Left decreased (TTWB)  Speed/Adri: Shuffled  Step Length: Right shortened  Swing Pattern: Left asymmetrical         Activity Tolerance: Good    After treatment patient left in no apparent distress:   Sitting in chair, Call bell within reach, Bed / chair alarm activated, and nurse notified. COMMUNICATION/COLLABORATION:   The patients plan of care was discussed with: Occupational therapist and Registered nurse.      Ajay Melgoza   Time Calculation: 30 mins

## 2022-03-31 NOTE — PROGRESS NOTES
Problem: Self Care Deficits Care Plan (Adult)  Goal: *Acute Goals and Plan of Care (Insert Text)  Description: Occupational Therapy Goals  Initiated: 3/29/2022   1. Patient will perform grooming with supervision/set-up sitting in chair within 7 day(s). 2.  Patient will perform bathing with mod A from chair within 7 day(s). 3.  Patient will perform upper body dressing and lower body dressing with mod A within 7 day(s). 4.  Patient will perform toilet transfers with mod A within 7 day(s). 5.  Patient will perform all aspects of toileting with min A within 7 day(s). FUNCTIONAL STATUS PRIOR TO ADMISSION: Pt is mod I at home. She reports the last 2 weeks at home was going well at home prior to admission. She reports showering 1 time following discharge after unicondylar knee replacement. HOME SUPPORT: lives alone. Outcome: Progressing Towards Goal    OCCUPATIONAL THERAPY TREATMENT  Patient: Oneil López (80 y.o. female)  Date: 3/31/2022  Diagnosis: Periprosthetic fracture of proximal end of tibia [M97. Sameera Large <principal problem not specified>  Procedure(s) (LRB):  LEFT TOTAL KNEE ARTHROPLASTY REVISION (Left)    Precautions: Fall,TTWB  Chart, occupational therapy assessment, plan of care, and goals were reviewed. ASSESSMENT  Patient continues with skilled OT services and is progressing towards goals. Pt noted to be incontinent of bowel upon entry to room, with Max A for toileting tasks, standing RW at EOB, with pt benefiting from [address] A for transfer from EOB to armchair, with pt noted to laterally scoot on RLE to pivot to chair. Pt noted with high c/o pain in LLE; however, did not quantify; RN aware and following. Pt reports she plans for surgery tomorrow. Pt continues to benefit from skilled OT during acute hospitalization, with discharge recommending TBD pending POC. Current Level of Function Impacting Discharge (ADLs):  Max A LE ADLs    Other factors to consider for discharge: lives alone, TTWB, Max A LE ADLs         PLAN :  Patient continues to benefit from skilled intervention to address the above impairments. Continue treatment per established plan of care to address goals. Recommend with staff: Active ADL engagement at bed level    Recommend next OT session: POC progression    Recommendation for discharge: (in order for the patient to meet his/her long term goals)  To be determined: TBD, pending pt's POC    This discharge recommendation:  Has been made in collaboration with the attending provider and/or case management    IF patient discharges home will need the following DME: TBD       SUBJECTIVE:   Patient stated oh I having my surgery tomorrow.     OBJECTIVE DATA SUMMARY:   Cognitive/Behavioral Status:  Neurologic State: Alert;Confused  Orientation Level: Oriented to person;Oriented to place;Oriented to situation  Cognition: Impaired decision making  Perception: Appears intact  Perseveration: No perseveration noted  Safety/Judgement: Decreased awareness of need for assistance;Decreased awareness of need for safety;Decreased insight into deficits    Functional Mobility and Transfers for ADLs:  Bed Mobility:  Rolling: Minimum assistance  Supine to Sit: Minimum assistance;Assist x1  Sit to Supine: Moderate assistance;Assist x1  Scooting: Minimum assistance    Transfers:  Sit to Stand: Minimum assistance (Min A>CGA)     Bed to Chair: Minimum assistance (Pt noted to laterally scoot on RLE to pivot to chair)    Pt educated on safe transfer techniques, with specific emphasis on proper hand placement to push up from seated surface rather than attempt to pull self up, fully positioning self in-front of desired seated location, feeling chair on back of legs and reaching back with 1-2 UE to slowly lower self to seated position. Balance:  Sitting: Intact   Sitting - Static: Good (unsupported)   Sitting - Dynamic: Good (unsupported); Fair (occasional)   Standing: Impaired; With support   Standing - Static: Good;Fair;Constant support   Standing - Dynamic : Fair;Constant support    ADL Intervention:  Toileting  Toileting Assistance: Maximum assistance  Bowel Hygiene: Maximum assistance  Clothing Management: Maximum assistance  Cues: Verbal cues provided (for TTWB to LLE and full standing trunk elongation)  Adaptive Equipment: Walker    Cognitive Retraining  Safety/Judgement: Decreased awareness of need for assistance;Decreased awareness of need for safety;Decreased insight into deficits    Pain:  High c/o LLE pain, did not quantify; RN aware and following    Activity Tolerance:   Fair and requires rest breaks    After treatment patient left in no apparent distress:   Sitting in chair and Call bell within reach    COMMUNICATION/COLLABORATION:   The patients plan of care was discussed with: Physical therapist, Registered nurse, and Case management.      Kaylin Milan OT  Time Calculation: 18 mins

## 2022-04-01 ENCOUNTER — ANESTHESIA (OUTPATIENT)
Dept: SURGERY | Age: 82
DRG: 467 | End: 2022-04-01
Payer: MEDICARE

## 2022-04-01 LAB
ABO + RH BLD: NORMAL
ALBUMIN SERPL-MCNC: 3 G/DL (ref 3.5–5)
ALBUMIN/GLOB SERPL: 1 {RATIO} (ref 1.1–2.2)
ALP SERPL-CCNC: 88 U/L (ref 45–117)
ALT SERPL-CCNC: 18 U/L (ref 12–78)
ANION GAP SERPL CALC-SCNC: 6 MMOL/L (ref 5–15)
AST SERPL-CCNC: 15 U/L (ref 15–37)
BASOPHILS # BLD: 0.1 K/UL (ref 0–0.1)
BASOPHILS NFR BLD: 1 % (ref 0–1)
BILIRUB SERPL-MCNC: 0.6 MG/DL (ref 0.2–1)
BLOOD GROUP ANTIBODIES SERPL: NORMAL
BUN SERPL-MCNC: 15 MG/DL (ref 6–20)
BUN/CREAT SERPL: 19 (ref 12–20)
CALCIUM SERPL-MCNC: 9.3 MG/DL (ref 8.5–10.1)
CHLORIDE SERPL-SCNC: 98 MMOL/L (ref 97–108)
CO2 SERPL-SCNC: 26 MMOL/L (ref 21–32)
CREAT SERPL-MCNC: 0.78 MG/DL (ref 0.55–1.02)
DIFFERENTIAL METHOD BLD: ABNORMAL
EOSINOPHIL # BLD: 0.1 K/UL (ref 0–0.4)
EOSINOPHIL NFR BLD: 1 % (ref 0–7)
ERYTHROCYTE [DISTWIDTH] IN BLOOD BY AUTOMATED COUNT: 13.4 % (ref 11.5–14.5)
GLOBULIN SER CALC-MCNC: 3.1 G/DL (ref 2–4)
GLUCOSE SERPL-MCNC: 93 MG/DL (ref 65–100)
HCT VFR BLD AUTO: 34.2 % (ref 35–47)
HGB BLD-MCNC: 10.9 G/DL (ref 11.5–16)
IMM GRANULOCYTES # BLD AUTO: 0.1 K/UL (ref 0–0.04)
IMM GRANULOCYTES NFR BLD AUTO: 1 % (ref 0–0.5)
INR PPP: 1.1 (ref 0.9–1.1)
LYMPHOCYTES # BLD: 1.3 K/UL (ref 0.8–3.5)
LYMPHOCYTES NFR BLD: 13 % (ref 12–49)
MCH RBC QN AUTO: 28.5 PG (ref 26–34)
MCHC RBC AUTO-ENTMCNC: 31.9 G/DL (ref 30–36.5)
MCV RBC AUTO: 89.5 FL (ref 80–99)
MONOCYTES # BLD: 1 K/UL (ref 0–1)
MONOCYTES NFR BLD: 10 % (ref 5–13)
NEUTS SEG # BLD: 7.5 K/UL (ref 1.8–8)
NEUTS SEG NFR BLD: 74 % (ref 32–75)
NRBC # BLD: 0 K/UL (ref 0–0.01)
NRBC BLD-RTO: 0 PER 100 WBC
PLATELET # BLD AUTO: 404 K/UL (ref 150–400)
PMV BLD AUTO: 8.7 FL (ref 8.9–12.9)
POTASSIUM SERPL-SCNC: 4 MMOL/L (ref 3.5–5.1)
PROT SERPL-MCNC: 6.1 G/DL (ref 6.4–8.2)
PROTHROMBIN TIME: 11.1 SEC (ref 9–11.1)
RBC # BLD AUTO: 3.82 M/UL (ref 3.8–5.2)
SODIUM SERPL-SCNC: 130 MMOL/L (ref 136–145)
SPECIMEN EXP DATE BLD: NORMAL
WBC # BLD AUTO: 10 K/UL (ref 3.6–11)

## 2022-04-01 PROCEDURE — 76010000174 HC OR TIME 3.5 TO 4 HR INTENSV-TIER 1: Performed by: ORTHOPAEDIC SURGERY

## 2022-04-01 PROCEDURE — 77030031139 HC SUT VCRL2 J&J -A: Performed by: ORTHOPAEDIC SURGERY

## 2022-04-01 PROCEDURE — 77030035236 HC SUT PDS STRATFX BARB J&J -B: Performed by: ORTHOPAEDIC SURGERY

## 2022-04-01 PROCEDURE — 36415 COLL VENOUS BLD VENIPUNCTURE: CPT

## 2022-04-01 PROCEDURE — 74011250636 HC RX REV CODE- 250/636: Performed by: NURSE ANESTHETIST, CERTIFIED REGISTERED

## 2022-04-01 PROCEDURE — 77030000032 HC CUF TRNQT ZIMM -B: Performed by: ORTHOPAEDIC SURGERY

## 2022-04-01 PROCEDURE — 74011250637 HC RX REV CODE- 250/637: Performed by: PHYSICIAN ASSISTANT

## 2022-04-01 PROCEDURE — 74011250636 HC RX REV CODE- 250/636: Performed by: PHYSICIAN ASSISTANT

## 2022-04-01 PROCEDURE — C1713 ANCHOR/SCREW BN/BN,TIS/BN: HCPCS | Performed by: ORTHOPAEDIC SURGERY

## 2022-04-01 PROCEDURE — C1776 JOINT DEVICE (IMPLANTABLE): HCPCS | Performed by: ORTHOPAEDIC SURGERY

## 2022-04-01 PROCEDURE — 74011000258 HC RX REV CODE- 258: Performed by: NURSE ANESTHETIST, CERTIFIED REGISTERED

## 2022-04-01 PROCEDURE — 77030010507 HC ADH SKN DERMBND J&J -B: Performed by: ORTHOPAEDIC SURGERY

## 2022-04-01 PROCEDURE — 80053 COMPREHEN METABOLIC PANEL: CPT

## 2022-04-01 PROCEDURE — 65270000029 HC RM PRIVATE

## 2022-04-01 PROCEDURE — 0SPD0JZ REMOVAL OF SYNTHETIC SUBSTITUTE FROM LEFT KNEE JOINT, OPEN APPROACH: ICD-10-PCS | Performed by: ORTHOPAEDIC SURGERY

## 2022-04-01 PROCEDURE — 74011250636 HC RX REV CODE- 250/636: Performed by: ORTHOPAEDIC SURGERY

## 2022-04-01 PROCEDURE — 76060000038 HC ANESTHESIA 3.5 TO 4 HR: Performed by: ORTHOPAEDIC SURGERY

## 2022-04-01 PROCEDURE — 74011000250 HC RX REV CODE- 250: Performed by: ORTHOPAEDIC SURGERY

## 2022-04-01 PROCEDURE — 77030019905 HC CATH URETH INTMIT MDII -A: Performed by: ORTHOPAEDIC SURGERY

## 2022-04-01 PROCEDURE — 77030007866 HC KT SPN ANES BBMI -B: Performed by: ANESTHESIOLOGY

## 2022-04-01 PROCEDURE — 77030028907 HC WRP KNEE WO BGS SOLM -B

## 2022-04-01 PROCEDURE — 77030006822 HC BLD SAW SAG BRSM -B: Performed by: ORTHOPAEDIC SURGERY

## 2022-04-01 PROCEDURE — 2709999900 HC NON-CHARGEABLE SUPPLY: Performed by: ORTHOPAEDIC SURGERY

## 2022-04-01 PROCEDURE — 0SRD0J9 REPLACEMENT OF LEFT KNEE JOINT WITH SYNTHETIC SUBSTITUTE, CEMENTED, OPEN APPROACH: ICD-10-PCS | Performed by: ORTHOPAEDIC SURGERY

## 2022-04-01 PROCEDURE — 85025 COMPLETE CBC W/AUTO DIFF WBC: CPT

## 2022-04-01 PROCEDURE — 76210000063 HC OR PH I REC FIRST 0.5 HR: Performed by: ORTHOPAEDIC SURGERY

## 2022-04-01 PROCEDURE — 77030006788 HC BLD SAW OSC STRY -B: Performed by: ORTHOPAEDIC SURGERY

## 2022-04-01 PROCEDURE — 74011000250 HC RX REV CODE- 250: Performed by: PHYSICIAN ASSISTANT

## 2022-04-01 PROCEDURE — 77030041279 HC DRSG PRMSL AG MDII -B: Performed by: ORTHOPAEDIC SURGERY

## 2022-04-01 PROCEDURE — C9290 INJ, BUPIVACAINE LIPOSOME: HCPCS | Performed by: ORTHOPAEDIC SURGERY

## 2022-04-01 PROCEDURE — 74011000250 HC RX REV CODE- 250: Performed by: NURSE ANESTHETIST, CERTIFIED REGISTERED

## 2022-04-01 PROCEDURE — 85610 PROTHROMBIN TIME: CPT

## 2022-04-01 PROCEDURE — 74011250637 HC RX REV CODE- 250/637: Performed by: ORTHOPAEDIC SURGERY

## 2022-04-01 PROCEDURE — 74011250637 HC RX REV CODE- 250/637: Performed by: NURSE PRACTITIONER

## 2022-04-01 PROCEDURE — 74011000258 HC RX REV CODE- 258: Performed by: ORTHOPAEDIC SURGERY

## 2022-04-01 PROCEDURE — 77030010783 HC BOWL MX BN CEM J&J -B: Performed by: ORTHOPAEDIC SURGERY

## 2022-04-01 PROCEDURE — 2709999900 HC NON-CHARGEABLE SUPPLY

## 2022-04-01 PROCEDURE — 77030016675 HC BUR RND4 STRY -B: Performed by: ORTHOPAEDIC SURGERY

## 2022-04-01 PROCEDURE — 86900 BLOOD TYPING SEROLOGIC ABO: CPT

## 2022-04-01 DEVICE — CEMENTED STEM
Type: IMPLANTABLE DEVICE | Site: KNEE | Status: FUNCTIONAL
Brand: TRIATHLON

## 2022-04-01 DEVICE — TIBIAL BEARING INSERT - PS
Type: IMPLANTABLE DEVICE | Site: KNEE | Status: FUNCTIONAL
Brand: TRIATHLON

## 2022-04-01 DEVICE — POSTERIOR STABILIZED FEMORAL
Type: IMPLANTABLE DEVICE | Site: KNEE | Status: FUNCTIONAL
Brand: TRIATHLON

## 2022-04-01 DEVICE — SMARTSET GHV GENTAMICIN HIGH VISCOSITY BONE CEMENT 40G
Type: IMPLANTABLE DEVICE | Site: KNEE | Status: FUNCTIONAL
Brand: SMARTSET

## 2022-04-01 DEVICE — FEMORAL DISTAL FIXATION PEG
Type: IMPLANTABLE DEVICE | Site: KNEE | Status: FUNCTIONAL
Brand: TRIATHLON

## 2022-04-01 DEVICE — UNIVERSAL TIBIAL BASEPLATE
Type: IMPLANTABLE DEVICE | Site: KNEE | Status: FUNCTIONAL
Brand: TRIATHLON

## 2022-04-01 DEVICE — TRITANIUM TIBIAL SYMMETRIC CONE AUGMENT
Type: IMPLANTABLE DEVICE | Site: KNEE | Status: FUNCTIONAL
Brand: TRIATHLON

## 2022-04-01 DEVICE — ASYMMETRIC PATELLA
Type: IMPLANTABLE DEVICE | Site: KNEE | Status: FUNCTIONAL
Brand: TRIATHLON

## 2022-04-01 RX ORDER — POLYETHYLENE GLYCOL 3350 17 G/17G
17 POWDER, FOR SOLUTION ORAL DAILY
Status: DISCONTINUED | OUTPATIENT
Start: 2022-04-02 | End: 2022-04-04 | Stop reason: HOSPADM

## 2022-04-01 RX ORDER — SODIUM CHLORIDE 0.9 % (FLUSH) 0.9 %
5-40 SYRINGE (ML) INJECTION EVERY 8 HOURS
Status: DISCONTINUED | OUTPATIENT
Start: 2022-04-01 | End: 2022-04-04 | Stop reason: HOSPADM

## 2022-04-01 RX ORDER — SODIUM CHLORIDE 0.9 % (FLUSH) 0.9 %
5-40 SYRINGE (ML) INJECTION EVERY 8 HOURS
Status: DISCONTINUED | OUTPATIENT
Start: 2022-04-01 | End: 2022-04-01 | Stop reason: HOSPADM

## 2022-04-01 RX ORDER — BUPIVACAINE HYDROCHLORIDE 5 MG/ML
INJECTION, SOLUTION EPIDURAL; INTRACAUDAL AS NEEDED
Status: DISCONTINUED | OUTPATIENT
Start: 2022-04-01 | End: 2022-04-01 | Stop reason: HOSPADM

## 2022-04-01 RX ORDER — SODIUM CHLORIDE 0.9 % (FLUSH) 0.9 %
5-40 SYRINGE (ML) INJECTION AS NEEDED
Status: DISCONTINUED | OUTPATIENT
Start: 2022-04-01 | End: 2022-04-01 | Stop reason: HOSPADM

## 2022-04-01 RX ORDER — DEXMEDETOMIDINE HYDROCHLORIDE 100 UG/ML
INJECTION, SOLUTION INTRAVENOUS AS NEEDED
Status: DISCONTINUED | OUTPATIENT
Start: 2022-04-01 | End: 2022-04-01 | Stop reason: HOSPADM

## 2022-04-01 RX ORDER — SODIUM CHLORIDE 9 MG/ML
INJECTION, SOLUTION INTRAVENOUS
Status: DISCONTINUED | OUTPATIENT
Start: 2022-04-01 | End: 2022-04-01 | Stop reason: HOSPADM

## 2022-04-01 RX ORDER — PROPOFOL 10 MG/ML
INJECTION, EMULSION INTRAVENOUS
Status: DISCONTINUED | OUTPATIENT
Start: 2022-04-01 | End: 2022-04-01 | Stop reason: HOSPADM

## 2022-04-01 RX ORDER — DEXAMETHASONE SODIUM PHOSPHATE 4 MG/ML
INJECTION, SOLUTION INTRA-ARTICULAR; INTRALESIONAL; INTRAMUSCULAR; INTRAVENOUS; SOFT TISSUE AS NEEDED
Status: DISCONTINUED | OUTPATIENT
Start: 2022-04-01 | End: 2022-04-01 | Stop reason: HOSPADM

## 2022-04-01 RX ORDER — FENTANYL CITRATE 50 UG/ML
25 INJECTION, SOLUTION INTRAMUSCULAR; INTRAVENOUS
Status: DISCONTINUED | OUTPATIENT
Start: 2022-04-01 | End: 2022-04-01 | Stop reason: HOSPADM

## 2022-04-01 RX ORDER — ACETAMINOPHEN 325 MG/1
650 TABLET ORAL EVERY 6 HOURS
Status: DISCONTINUED | OUTPATIENT
Start: 2022-04-01 | End: 2022-04-04 | Stop reason: HOSPADM

## 2022-04-01 RX ORDER — ONDANSETRON 2 MG/ML
INJECTION INTRAMUSCULAR; INTRAVENOUS AS NEEDED
Status: DISCONTINUED | OUTPATIENT
Start: 2022-04-01 | End: 2022-04-01 | Stop reason: HOSPADM

## 2022-04-01 RX ORDER — ONDANSETRON 2 MG/ML
4 INJECTION INTRAMUSCULAR; INTRAVENOUS
Status: ACTIVE | OUTPATIENT
Start: 2022-04-01 | End: 2022-04-02

## 2022-04-01 RX ORDER — PROPOFOL 10 MG/ML
INJECTION, EMULSION INTRAVENOUS AS NEEDED
Status: DISCONTINUED | OUTPATIENT
Start: 2022-04-01 | End: 2022-04-01 | Stop reason: HOSPADM

## 2022-04-01 RX ORDER — MIDAZOLAM HYDROCHLORIDE 1 MG/ML
1 INJECTION, SOLUTION INTRAMUSCULAR; INTRAVENOUS AS NEEDED
Status: DISCONTINUED | OUTPATIENT
Start: 2022-04-01 | End: 2022-04-01 | Stop reason: HOSPADM

## 2022-04-01 RX ORDER — NALOXONE HYDROCHLORIDE 0.4 MG/ML
0.4 INJECTION, SOLUTION INTRAMUSCULAR; INTRAVENOUS; SUBCUTANEOUS AS NEEDED
Status: DISCONTINUED | OUTPATIENT
Start: 2022-04-01 | End: 2022-04-04 | Stop reason: HOSPADM

## 2022-04-01 RX ORDER — FACIAL-BODY WIPES
10 EACH TOPICAL DAILY PRN
Status: DISCONTINUED | OUTPATIENT
Start: 2022-04-03 | End: 2022-04-04 | Stop reason: HOSPADM

## 2022-04-01 RX ORDER — MORPHINE SULFATE 4 MG/ML
INJECTION INTRAVENOUS AS NEEDED
Status: DISCONTINUED | OUTPATIENT
Start: 2022-04-01 | End: 2022-04-01 | Stop reason: HOSPADM

## 2022-04-01 RX ORDER — SODIUM CHLORIDE 9 MG/ML
25 INJECTION, SOLUTION INTRAVENOUS CONTINUOUS
Status: DISCONTINUED | OUTPATIENT
Start: 2022-04-01 | End: 2022-04-01 | Stop reason: HOSPADM

## 2022-04-01 RX ORDER — TRAMADOL HYDROCHLORIDE 50 MG/1
50 TABLET ORAL
Status: DISCONTINUED | OUTPATIENT
Start: 2022-04-01 | End: 2022-04-04 | Stop reason: HOSPADM

## 2022-04-01 RX ORDER — OXYCODONE HYDROCHLORIDE 5 MG/1
5 TABLET ORAL
Status: DISCONTINUED | OUTPATIENT
Start: 2022-04-01 | End: 2022-04-04 | Stop reason: HOSPADM

## 2022-04-01 RX ORDER — MIDAZOLAM HYDROCHLORIDE 1 MG/ML
0.5 INJECTION, SOLUTION INTRAMUSCULAR; INTRAVENOUS
Status: DISCONTINUED | OUTPATIENT
Start: 2022-04-01 | End: 2022-04-01 | Stop reason: HOSPADM

## 2022-04-01 RX ORDER — LIDOCAINE HYDROCHLORIDE 20 MG/ML
INJECTION, SOLUTION EPIDURAL; INFILTRATION; INTRACAUDAL; PERINEURAL AS NEEDED
Status: DISCONTINUED | OUTPATIENT
Start: 2022-04-01 | End: 2022-04-01 | Stop reason: HOSPADM

## 2022-04-01 RX ORDER — SODIUM CHLORIDE, SODIUM LACTATE, POTASSIUM CHLORIDE, CALCIUM CHLORIDE 600; 310; 30; 20 MG/100ML; MG/100ML; MG/100ML; MG/100ML
INJECTION, SOLUTION INTRAVENOUS
Status: DISCONTINUED | OUTPATIENT
Start: 2022-04-01 | End: 2022-04-01 | Stop reason: HOSPADM

## 2022-04-01 RX ORDER — SODIUM CHLORIDE, SODIUM LACTATE, POTASSIUM CHLORIDE, CALCIUM CHLORIDE 600; 310; 30; 20 MG/100ML; MG/100ML; MG/100ML; MG/100ML
25 INJECTION, SOLUTION INTRAVENOUS CONTINUOUS
Status: DISCONTINUED | OUTPATIENT
Start: 2022-04-01 | End: 2022-04-01 | Stop reason: HOSPADM

## 2022-04-01 RX ORDER — HYDROMORPHONE HYDROCHLORIDE 1 MG/ML
0.2 INJECTION, SOLUTION INTRAMUSCULAR; INTRAVENOUS; SUBCUTANEOUS
Status: DISCONTINUED | OUTPATIENT
Start: 2022-04-01 | End: 2022-04-01 | Stop reason: HOSPADM

## 2022-04-01 RX ORDER — ONDANSETRON 2 MG/ML
4 INJECTION INTRAMUSCULAR; INTRAVENOUS AS NEEDED
Status: DISCONTINUED | OUTPATIENT
Start: 2022-04-01 | End: 2022-04-01 | Stop reason: HOSPADM

## 2022-04-01 RX ORDER — DIPHENHYDRAMINE HYDROCHLORIDE 50 MG/ML
12.5 INJECTION, SOLUTION INTRAMUSCULAR; INTRAVENOUS AS NEEDED
Status: DISCONTINUED | OUTPATIENT
Start: 2022-04-01 | End: 2022-04-01 | Stop reason: HOSPADM

## 2022-04-01 RX ORDER — SODIUM CHLORIDE 9 MG/ML
125 INJECTION, SOLUTION INTRAVENOUS CONTINUOUS
Status: DISCONTINUED | OUTPATIENT
Start: 2022-04-01 | End: 2022-04-02

## 2022-04-01 RX ORDER — PHENYLEPHRINE HCL IN 0.9% NACL 0.4MG/10ML
SYRINGE (ML) INTRAVENOUS AS NEEDED
Status: DISCONTINUED | OUTPATIENT
Start: 2022-04-01 | End: 2022-04-01 | Stop reason: HOSPADM

## 2022-04-01 RX ORDER — MORPHINE SULFATE 2 MG/ML
2 INJECTION, SOLUTION INTRAMUSCULAR; INTRAVENOUS
Status: DISCONTINUED | OUTPATIENT
Start: 2022-04-01 | End: 2022-04-01 | Stop reason: HOSPADM

## 2022-04-01 RX ORDER — FAMOTIDINE 20 MG/1
20 TABLET, FILM COATED ORAL 2 TIMES DAILY
Status: DISCONTINUED | OUTPATIENT
Start: 2022-04-01 | End: 2022-04-04 | Stop reason: HOSPADM

## 2022-04-01 RX ORDER — HYDROXYZINE HYDROCHLORIDE 10 MG/1
10 TABLET, FILM COATED ORAL
Status: DISCONTINUED | OUTPATIENT
Start: 2022-04-01 | End: 2022-04-04 | Stop reason: HOSPADM

## 2022-04-01 RX ORDER — SODIUM CHLORIDE 0.9 % (FLUSH) 0.9 %
5-40 SYRINGE (ML) INJECTION AS NEEDED
Status: DISCONTINUED | OUTPATIENT
Start: 2022-04-01 | End: 2022-04-04 | Stop reason: HOSPADM

## 2022-04-01 RX ORDER — CEFAZOLIN SODIUM 1 G/3ML
INJECTION, POWDER, FOR SOLUTION INTRAMUSCULAR; INTRAVENOUS AS NEEDED
Status: DISCONTINUED | OUTPATIENT
Start: 2022-04-01 | End: 2022-04-01 | Stop reason: HOSPADM

## 2022-04-01 RX ORDER — SODIUM CHLORIDE, SODIUM LACTATE, POTASSIUM CHLORIDE, CALCIUM CHLORIDE 600; 310; 30; 20 MG/100ML; MG/100ML; MG/100ML; MG/100ML
100 INJECTION, SOLUTION INTRAVENOUS CONTINUOUS
Status: DISCONTINUED | OUTPATIENT
Start: 2022-04-01 | End: 2022-04-01 | Stop reason: HOSPADM

## 2022-04-01 RX ORDER — OXYCODONE HYDROCHLORIDE 5 MG/1
5 TABLET ORAL AS NEEDED
Status: DISCONTINUED | OUTPATIENT
Start: 2022-04-01 | End: 2022-04-01 | Stop reason: HOSPADM

## 2022-04-01 RX ORDER — HYDROMORPHONE HYDROCHLORIDE 1 MG/ML
0.5 INJECTION, SOLUTION INTRAMUSCULAR; INTRAVENOUS; SUBCUTANEOUS
Status: DISPENSED | OUTPATIENT
Start: 2022-04-01 | End: 2022-04-02

## 2022-04-01 RX ORDER — ENOXAPARIN SODIUM 100 MG/ML
40 INJECTION SUBCUTANEOUS DAILY
Status: DISCONTINUED | OUTPATIENT
Start: 2022-04-02 | End: 2022-04-04 | Stop reason: HOSPADM

## 2022-04-01 RX ORDER — ACETAMINOPHEN 325 MG/1
650 TABLET ORAL ONCE
Status: DISCONTINUED | OUTPATIENT
Start: 2022-04-01 | End: 2022-04-01 | Stop reason: HOSPADM

## 2022-04-01 RX ORDER — FENTANYL CITRATE 50 UG/ML
50 INJECTION, SOLUTION INTRAMUSCULAR; INTRAVENOUS AS NEEDED
Status: DISCONTINUED | OUTPATIENT
Start: 2022-04-01 | End: 2022-04-01 | Stop reason: HOSPADM

## 2022-04-01 RX ADMIN — ACETAMINOPHEN 650 MG: 325 TABLET ORAL at 23:43

## 2022-04-01 RX ADMIN — PROPOFOL 50 MG: 10 INJECTION, EMULSION INTRAVENOUS at 12:21

## 2022-04-01 RX ADMIN — SODIUM CHLORIDE: 900 INJECTION, SOLUTION INTRAVENOUS at 14:02

## 2022-04-01 RX ADMIN — CEFAZOLIN 2 G: 330 INJECTION, POWDER, FOR SOLUTION INTRAMUSCULAR; INTRAVENOUS at 12:46

## 2022-04-01 RX ADMIN — SODIUM CHLORIDE 125 ML/HR: 9 INJECTION, SOLUTION INTRAVENOUS at 19:24

## 2022-04-01 RX ADMIN — CEFAZOLIN SODIUM 2 G: 1 INJECTION, POWDER, FOR SOLUTION INTRAMUSCULAR; INTRAVENOUS at 20:22

## 2022-04-01 RX ADMIN — SODIUM CHLORIDE, PRESERVATIVE FREE 10 ML: 5 INJECTION INTRAVENOUS at 18:00

## 2022-04-01 RX ADMIN — HYDROMORPHONE HYDROCHLORIDE 0.5 MG: 1 INJECTION, SOLUTION INTRAMUSCULAR; INTRAVENOUS; SUBCUTANEOUS at 19:24

## 2022-04-01 RX ADMIN — SENNOSIDES AND DOCUSATE SODIUM 1 TABLET: 50; 8.6 TABLET ORAL at 18:19

## 2022-04-01 RX ADMIN — DEXAMETHASONE SODIUM PHOSPHATE 4 MG: 4 INJECTION, SOLUTION INTRAMUSCULAR; INTRAVENOUS at 12:58

## 2022-04-01 RX ADMIN — ONDANSETRON HYDROCHLORIDE 4 MG: 2 INJECTION, SOLUTION INTRAMUSCULAR; INTRAVENOUS at 12:58

## 2022-04-01 RX ADMIN — PROPOFOL 50 MCG/KG/MIN: 10 INJECTION, EMULSION INTRAVENOUS at 12:21

## 2022-04-01 RX ADMIN — OXYCODONE 5 MG: 5 TABLET ORAL at 18:19

## 2022-04-01 RX ADMIN — FAMOTIDINE 20 MG: 20 TABLET, FILM COATED ORAL at 18:19

## 2022-04-01 RX ADMIN — ATORVASTATIN CALCIUM 20 MG: 20 TABLET, FILM COATED ORAL at 20:27

## 2022-04-01 RX ADMIN — BUPIVACAINE HYDROCHLORIDE 12 MG: 5 INJECTION, SOLUTION EPIDURAL; INTRACAUDAL; PERINEURAL at 12:28

## 2022-04-01 RX ADMIN — LEVOTHYROXINE SODIUM 112 MCG: 0.11 TABLET ORAL at 07:34

## 2022-04-01 RX ADMIN — ACETAMINOPHEN 650 MG: 325 TABLET ORAL at 18:19

## 2022-04-01 RX ADMIN — SODIUM CHLORIDE, POTASSIUM CHLORIDE, SODIUM LACTATE AND CALCIUM CHLORIDE: 600; 310; 30; 20 INJECTION, SOLUTION INTRAVENOUS at 11:53

## 2022-04-01 RX ADMIN — TRANEXAMIC ACID 1 G: 100 INJECTION, SOLUTION INTRAVENOUS at 12:46

## 2022-04-01 RX ADMIN — OXYCODONE 5 MG: 5 TABLET ORAL at 21:39

## 2022-04-01 RX ADMIN — LIDOCAINE HYDROCHLORIDE 50 MG: 20 INJECTION, SOLUTION EPIDURAL; INFILTRATION; INTRACAUDAL; PERINEURAL at 12:21

## 2022-04-01 RX ADMIN — DEXMEDETOMIDINE HYDROCHLORIDE 10 MCG: 100 INJECTION, SOLUTION, CONCENTRATE INTRAVENOUS at 13:31

## 2022-04-01 RX ADMIN — Medication 80 MCG: at 14:18

## 2022-04-01 RX ADMIN — TRAMADOL HYDROCHLORIDE 50 MG: 50 TABLET, COATED ORAL at 22:19

## 2022-04-01 RX ADMIN — SODIUM CHLORIDE, PRESERVATIVE FREE 10 ML: 5 INJECTION INTRAVENOUS at 20:27

## 2022-04-01 RX ADMIN — MORPHINE SULFATE 2 MG: 4 INJECTION INTRAVENOUS at 15:20

## 2022-04-01 RX ADMIN — LOSARTAN POTASSIUM 100 MG: 50 TABLET, FILM COATED ORAL at 08:50

## 2022-04-01 NOTE — ANESTHESIA POSTPROCEDURE EVALUATION
Post-Anesthesia Evaluation and Assessment    Patient: Андрей Day MRN: [id number]  SSN: [id number]    YOB: 1940  Age: 80 y.o. Sex: female      I have evaluated the patient and they are stable and ready for discharge from the PACU. Cardiovascular Function/Vital Signs  Visit Vitals  BP (!) 161/72   Pulse 73   Temp 36.9 °C (98.5 °F)   Resp 14   SpO2 99%       Patient is status post Spinal anesthesia for Procedure(s):  LEFT TOTAL KNEE ARTHROPLASTY REVISION. Nausea/Vomiting: None    Postoperative hydration reviewed and adequate. Pain:  Pain Scale 1: Numeric (0 - 10) (04/01/22 1633)  Pain Intensity 1: 0 (04/01/22 1633)   Managed    Neurological Status:   Neuro  Neurologic State: Drowsy (04/01/22 1633)  Orientation Level: Oriented X4 (04/01/22 1633)  Cognition: Follows commands (04/01/22 1633)  LUE Motor Response: Purposeful (04/01/22 1633)  LLE Motor Response: Purposeful (04/01/22 1633)  RUE Motor Response: Purposeful (04/01/22 1633)  RLE Motor Response: Purposeful (04/01/22 1633)   Block resolving    Mental Status, Level of Consciousness: Arousable    Pulmonary Status:   O2 Device: Nasal cannula (04/01/22 1633)   Adequate oxygenation and airway patent    Complications related to anesthesia: None    Post-anesthesia assessment completed. No concerns    Signed By: Brent Cramer MD     April 1, 2022              Procedure(s):  LEFT TOTAL KNEE ARTHROPLASTY REVISION. spinal    <BSHSIANPOST>    INITIAL Post-op Vital signs:   Vitals Value Taken Time   /72 04/01/22 1630   Temp 36.9 °C (98.5 °F) 04/01/22 1616   Pulse 72 04/01/22 1637   Resp 15 04/01/22 1637   SpO2 97 % 04/01/22 1637   Vitals shown include unvalidated device data.

## 2022-04-01 NOTE — H&P
Date of Surgery Update:  Jaimee Servin was seen and examined. History and physical has been reviewed. The patient has been examined. There have been  significant clinical changes since the completion of the originally dated History and Physical. Xrays revealed mild further displacement with ambulation. Patient identified by surgeon; surgical site was confirmed by patient and surgeon.     Signed By: Jaylon Phillips MD     April 1, 2022 12:11 PM

## 2022-04-01 NOTE — PROGRESS NOTES
[facility]                                                                                          Hospitalist Progress Note  Benson Slaughter NP  Answering service: [phone] OR [phone] from in house phone        Date of Service:  2022  NAME:  Temo Stanley  :  1940  MRN:  [id number]      Admission Summary:   Per the H&P, \" Mariella Whitetop a 80 y.o. 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  She was status post left unicompartmental knee arthroplasty that was done on 2022.  She was ambulating down the stairs at her home, when she fell on the affected knee.  She was unable to bear weight so advised  By her orthopedic surgeon Dr. Caridad Mathis come to the hospital for admission.     In the ED, SBP range between 158-169.  Other VSS.  Left knee x-ray showed displaced fracture inferior to the implant.     She denies any headaches or dizziness. Denies any cough, chest pain, shortness of breath. Denies fever or chills. Denies nausea, vomiting, diarrhea, constipation. \"  Hospitalist service was consulted for assistance with medical management    Interval history / Subjective:   I saw the patient today on rounds. Sodium remains mildly low, patient without complaint     Assessment & Plan:     Left periprosthetic displaced tibial fracture  Orthopedics following  The OR today,  The rest per Ortho    Hyponatremia  Patient with hyponatremia, had low sodium, which trended down with IV fluids.     Sodium improved, 130 today  Hydrochlorothiazide was likely culprit of her hyponatremia        Hypertension  Blood pressure currently controlled  Continuing losartan    Hyperlipidemia  Stable  Continuing atorvastatin    Hypothyroid  Stable  TSH was 2.5 on 3/10  Continuing levothyroxine    Remote history of histoplasmosis  Patient asymptomatic  No oxygen requirement    Code status: Full  DVT prophylaxis: LMWH    Care Plan discussed with: Patient/Family, Nurse and      Anticipated Disposition: SNF/LTC     Anticipated Discharge: Greater than 48 hours       Thank you for the opportunity to participate in the care of this patient. Hospitalist service will continue to follow along with you       Hospital Problems  Date Reviewed: 3/28/2022          Codes Class Noted POA    Periprosthetic fracture of proximal end of tibia ICD-10-CM: M97Tamiko Cox  ICD-9-CM: 996.44, V43.65  3/28/2022 Unknown                Review of Systems:   A comprehensive review of systems was negative except for that written in the HPI. Vital Signs:    Last 24hrs VS reviewed since prior progress note. Most recent are:  Visit Vitals  BP (!) 158/82   Pulse 76   Temp 98.2 °F (36.8 °C)   Resp 16   SpO2 94%         Intake/Output Summary (Last 24 hours) at 4/1/2022 1408  Last data filed at 4/1/2022 1402  Gross per 24 hour   Intake 1200 ml   Output --   Net 1200 ml        Physical Examination:     I had a face to face encounter with this patient and independently examined them on 4/1/2022 as outlined below:          Constitutional:  No acute distress, cooperative, pleasant    ENT:  Oral mucosa moist, oropharynx benign. Resp:  CTA bilaterally. No wheezing/rhonchi/rales. No accessory muscle use   CV:  Regular rhythm, normal rate, no murmurs, gallops, rubs    GI:  Soft, non distended, non tender. normoactive bowel sounds, no hepatosplenomegaly     Musculoskeletal:  No edema, warm, 2+ pulses throughout    Neurologic:  Moves all extremities.   AAOx3, CN II-XII reviewed            Data Review:    Review and/or order of clinical lab test  Review and/or order of tests in the radiology section of CPT      Labs:     Recent Labs     04/01/22  0754 03/30/22  0550   WBC 10.0  --    HGB 10.9* 10.8*   HCT 34.2*  -- *  --      Recent Labs     04/01/22  0754 03/31/22  1058 03/30/22  0550   * 128* 128*   K 4.0 3.8 3.7   CL 98 94* 95*   CO2 26 28 29   BUN 15 13 13   CREA 0.78 0.85 0.80   GLU 93 146* 88   CA 9.3 9.7 8.9   MG  --   --  2.1   PHOS  --   --  1.9*     Recent Labs     04/01/22  0754   ALT 18   AP 88   TBILI 0.6   TP 6.1*   ALB 3.0*   GLOB 3.1     No results for input(s): INR, PTP, APTT, INREXT, INREXT in the last 72 hours. No results for input(s): FE, TIBC, PSAT, FERR in the last 72 hours. Lab Results   Component Value Date/Time    Folate >20.0 05/15/2018 08:12 AM      No results for input(s): PH, PCO2, PO2 in the last 72 hours. No results for input(s): CPK, CKNDX, TROIQ in the last 72 hours.     No lab exists for component: CPKMB  Lab Results   Component Value Date/Time    Cholesterol, total 218 (H) 03/10/2022 09:09 AM    HDL Cholesterol 63 03/10/2022 09:09 AM    LDL, calculated 136.4 (H) 03/10/2022 09:09 AM    Triglyceride 93 03/10/2022 09:09 AM    CHOL/HDL Ratio 3.5 03/10/2022 09:09 AM     Lab Results   Component Value Date/Time    Glucose (POC) 89 03/16/2022 07:29 AM    Glucose (POC) 165 (H) 01/28/2010 12:15 PM     Lab Results   Component Value Date/Time    Color YELLOW/STRAW 03/03/2022 03:20 PM    Appearance CLEAR 03/03/2022 03:20 PM    Specific gravity 1.009 03/03/2022 03:20 PM    pH (UA) 6.5 03/03/2022 03:20 PM    Protein Negative 03/03/2022 03:20 PM    Glucose Negative 03/03/2022 03:20 PM    Ketone Negative 03/03/2022 03:20 PM    Bilirubin Negative 03/03/2022 03:20 PM    Urobilinogen 0.2 03/03/2022 03:20 PM    Nitrites Negative 03/03/2022 03:20 PM    Leukocyte Esterase MODERATE (A) 03/03/2022 03:20 PM    Epithelial cells FEW 03/03/2022 03:20 PM    Bacteria Negative 03/03/2022 03:20 PM    WBC 20-50 03/03/2022 03:20 PM    RBC 0-5 03/03/2022 03:20 PM         Medications Reviewed:     Current Facility-Administered Medications   Medication Dose Route Frequency    losartan (COZAAR) tablet 100 mg 100 mg Oral DAILY    hydrALAZINE (APRESOLINE) 20 mg/mL injection 10 mg  10 mg IntraVENous Q6H PRN    senna-docusate (PERICOLACE) 8.6-50 mg per tablet 1 Tablet  1 Tablet Oral BID    magnesium oxide (MAG-OX) tablet 400 mg  400 mg Oral DAILY    calcium carbonate (TUMS) chewable tablet 400 mg [elemental]  400 mg Oral PRN    levothyroxine (SYNTHROID) tablet 112 mcg  112 mcg Oral ACB    therapeutic multivitamin (THERAGRAN) tablet 1 Tablet  1 Tablet Oral DAILY    polyethylene glycol (MIRALAX) packet 17 g  17 g Oral DAILY    atorvastatin (LIPITOR) tablet 20 mg  20 mg Oral QHS    traMADoL (ULTRAM) tablet 50 mg  50 mg Oral Q6H PRN    HYDROmorphone (DILAUDID) injection 0.5 mg  0.5 mg IntraVENous Q6H PRN     Facility-Administered Medications Ordered in Other Encounters   Medication Dose Route Frequency    bupivacaine (PF) (MARCAINE) 0.5 % (5 mg/mL) injection   Intrathecal PRN    propofoL (DIPRIVAN) 10 mg/mL injection   IntraVENous CONTINUOUS    propofoL (DIPRIVAN) 10 mg/mL injection   IntraVENous PRN    lidocaine (PF) (XYLOCAINE) 20 mg/mL (2 %) injection   IntraVENous PRN    lactated Ringers infusion   IntraVENous CONTINUOUS    tranexamic acid (CYKLOKAPRON) 1,000 mg in 0.9% sodium chloride 100 mL IVPB   IntraVENous CONTINUOUS    ceFAZolin (ANCEF) injection   IntraVENous PRN    ondansetron (ZOFRAN) injection   IntraVENous PRN    dexamethasone (DECADRON) 4 mg/mL injection   IntraVENous PRN    dexmedeTOMidine (PRECEDEX) 100 mcg/mL iv solution   IntraVENous PRN    0.9% sodium chloride infusion   IntraVENous CONTINUOUS     ______________________________________________________________________  EXPECTED LENGTH OF STAY: 3d 16h  ACTUAL LENGTH OF STAY:          [address], NP

## 2022-04-01 NOTE — PROGRESS NOTES
Chart reviewed. Pt having surgery today at 12noon. Will hold OT at this time and f/u tomorrow post surgery. Thanks!

## 2022-04-01 NOTE — PERIOP NOTES
TRANSFER - OUT REPORT:    Verbal report given to Sharifa Edmonds (name) on [place]  being transferred to [facility](unit) for routine post - op       Report consisted of patients Situation, Background, Assessment and   Recommendations(SBAR). Time Pre op antibiotic given:1246  Anesthesia Stop time: 7936  [place] Present on Transfer to floor:n  Order for [place] on Chart:n  Discharge Prescriptions with Chart:n    Information from the following report(s) SBAR, OR Summary, Intake/Output, MAR and Accordion was reviewed with the receiving nurse. Opportunity for questions and clarification was provided. Is the patient on 02? YES       L/Min 2       Other     Is the patient on a monitor? NO    Is the nurse transporting with the patient? NO    Surgical Waiting Area notified of patient's transfer from PACU?  YES      The following personal items collected during your admission accompanied patient upon transfer:   Dental Appliance:    Vision:    Hearing Aid:    Jewelry:    Clothing:    Other Valuables:    Valuables sent to safe:

## 2022-04-01 NOTE — PROGRESS NOTES
Physical Therapy    Chart reviewed up to date, indicating surgery today. RN confirmed around 12noon, PT will defer at this time and reassess post surgery as appropriate.      Professionally,     Xochilt Sanchez, PTA

## 2022-04-02 LAB
ALBUMIN SERPL-MCNC: 2.7 G/DL (ref 3.5–5)
ALBUMIN/GLOB SERPL: 0.7 {RATIO} (ref 1.1–2.2)
ALP SERPL-CCNC: 102 U/L (ref 45–117)
ALT SERPL-CCNC: 20 U/L (ref 12–78)
ANION GAP SERPL CALC-SCNC: 6 MMOL/L (ref 5–15)
AST SERPL-CCNC: 22 U/L (ref 15–37)
BASOPHILS # BLD: 0 K/UL (ref 0–0.1)
BASOPHILS NFR BLD: 0 % (ref 0–1)
BILIRUB SERPL-MCNC: 0.3 MG/DL (ref 0.2–1)
BUN SERPL-MCNC: 15 MG/DL (ref 6–20)
BUN/CREAT SERPL: 19 (ref 12–20)
CALCIUM SERPL-MCNC: 8.7 MG/DL (ref 8.5–10.1)
CHLORIDE SERPL-SCNC: 99 MMOL/L (ref 97–108)
CO2 SERPL-SCNC: 23 MMOL/L (ref 21–32)
CREAT SERPL-MCNC: 0.81 MG/DL (ref 0.55–1.02)
DIFFERENTIAL METHOD BLD: ABNORMAL
EOSINOPHIL # BLD: 0 K/UL (ref 0–0.4)
EOSINOPHIL NFR BLD: 0 % (ref 0–7)
ERYTHROCYTE [DISTWIDTH] IN BLOOD BY AUTOMATED COUNT: 13.1 % (ref 11.5–14.5)
GLOBULIN SER CALC-MCNC: 3.9 G/DL (ref 2–4)
GLUCOSE SERPL-MCNC: 114 MG/DL (ref 65–100)
HCT VFR BLD AUTO: 35.6 % (ref 35–47)
HGB BLD-MCNC: 11.4 G/DL (ref 11.5–16)
IMM GRANULOCYTES # BLD AUTO: 0.1 K/UL (ref 0–0.04)
IMM GRANULOCYTES NFR BLD AUTO: 1 % (ref 0–0.5)
LYMPHOCYTES # BLD: 0.9 K/UL (ref 0.8–3.5)
LYMPHOCYTES NFR BLD: 6 % (ref 12–49)
MCH RBC QN AUTO: 28.5 PG (ref 26–34)
MCHC RBC AUTO-ENTMCNC: 32 G/DL (ref 30–36.5)
MCV RBC AUTO: 89 FL (ref 80–99)
MONOCYTES # BLD: 1.3 K/UL (ref 0–1)
MONOCYTES NFR BLD: 8 % (ref 5–13)
NEUTS SEG # BLD: 12.8 K/UL (ref 1.8–8)
NEUTS SEG NFR BLD: 85 % (ref 32–75)
NRBC # BLD: 0 K/UL (ref 0–0.01)
NRBC BLD-RTO: 0 PER 100 WBC
PLATELET # BLD AUTO: 417 K/UL (ref 150–400)
PMV BLD AUTO: 8.9 FL (ref 8.9–12.9)
POTASSIUM SERPL-SCNC: 4.1 MMOL/L (ref 3.5–5.1)
PROT SERPL-MCNC: 6.6 G/DL (ref 6.4–8.2)
RBC # BLD AUTO: 4 M/UL (ref 3.8–5.2)
SODIUM SERPL-SCNC: 128 MMOL/L (ref 136–145)
WBC # BLD AUTO: 15.1 K/UL (ref 3.6–11)

## 2022-04-02 PROCEDURE — 65270000029 HC RM PRIVATE

## 2022-04-02 PROCEDURE — 74011250637 HC RX REV CODE- 250/637: Performed by: NURSE PRACTITIONER

## 2022-04-02 PROCEDURE — 36415 COLL VENOUS BLD VENIPUNCTURE: CPT

## 2022-04-02 PROCEDURE — 74011000250 HC RX REV CODE- 250: Performed by: PHYSICIAN ASSISTANT

## 2022-04-02 PROCEDURE — 74011250637 HC RX REV CODE- 250/637: Performed by: PHYSICIAN ASSISTANT

## 2022-04-02 PROCEDURE — 97530 THERAPEUTIC ACTIVITIES: CPT

## 2022-04-02 PROCEDURE — 74011250636 HC RX REV CODE- 250/636: Performed by: PHYSICIAN ASSISTANT

## 2022-04-02 PROCEDURE — 97116 GAIT TRAINING THERAPY: CPT

## 2022-04-02 PROCEDURE — 85025 COMPLETE CBC W/AUTO DIFF WBC: CPT

## 2022-04-02 PROCEDURE — 97164 PT RE-EVAL EST PLAN CARE: CPT

## 2022-04-02 PROCEDURE — 80053 COMPREHEN METABOLIC PANEL: CPT

## 2022-04-02 PROCEDURE — 97168 OT RE-EVAL EST PLAN CARE: CPT

## 2022-04-02 PROCEDURE — 27487 REVISE/REPLACE KNEE JOINT: CPT | Performed by: ORTHOPAEDIC SURGERY

## 2022-04-02 PROCEDURE — 27487 REVISE/REPLACE KNEE JOINT: CPT | Performed by: PHYSICIAN ASSISTANT

## 2022-04-02 RX ORDER — AMLODIPINE BESYLATE 5 MG/1
5 TABLET ORAL DAILY
Status: DISCONTINUED | OUTPATIENT
Start: 2022-04-02 | End: 2022-04-03

## 2022-04-02 RX ADMIN — SENNOSIDES AND DOCUSATE SODIUM 1 TABLET: 50; 8.6 TABLET ORAL at 17:57

## 2022-04-02 RX ADMIN — SODIUM CHLORIDE, PRESERVATIVE FREE 10 ML: 5 INJECTION INTRAVENOUS at 21:57

## 2022-04-02 RX ADMIN — CEFAZOLIN SODIUM 2 G: 1 INJECTION, POWDER, FOR SOLUTION INTRAMUSCULAR; INTRAVENOUS at 05:33

## 2022-04-02 RX ADMIN — OXYCODONE 5 MG: 5 TABLET ORAL at 03:01

## 2022-04-02 RX ADMIN — LOSARTAN POTASSIUM 100 MG: 50 TABLET, FILM COATED ORAL at 09:56

## 2022-04-02 RX ADMIN — LEVOTHYROXINE SODIUM 112 MCG: 0.11 TABLET ORAL at 05:39

## 2022-04-02 RX ADMIN — ATORVASTATIN CALCIUM 20 MG: 20 TABLET, FILM COATED ORAL at 21:55

## 2022-04-02 RX ADMIN — AMLODIPINE BESYLATE 5 MG: 5 TABLET ORAL at 13:32

## 2022-04-02 RX ADMIN — SENNOSIDES AND DOCUSATE SODIUM 1 TABLET: 50; 8.6 TABLET ORAL at 09:56

## 2022-04-02 RX ADMIN — TRAMADOL HYDROCHLORIDE 50 MG: 50 TABLET, COATED ORAL at 06:14

## 2022-04-02 RX ADMIN — POLYETHYLENE GLYCOL 3350 17 G: 17 POWDER, FOR SOLUTION ORAL at 09:00

## 2022-04-02 RX ADMIN — SODIUM CHLORIDE, PRESERVATIVE FREE 10 ML: 5 INJECTION INTRAVENOUS at 13:32

## 2022-04-02 RX ADMIN — SODIUM CHLORIDE 125 ML/HR: 9 INJECTION, SOLUTION INTRAVENOUS at 02:39

## 2022-04-02 RX ADMIN — FAMOTIDINE 20 MG: 20 TABLET, FILM COATED ORAL at 09:56

## 2022-04-02 RX ADMIN — ACETAMINOPHEN 650 MG: 325 TABLET ORAL at 17:57

## 2022-04-02 RX ADMIN — ACETAMINOPHEN 650 MG: 325 TABLET ORAL at 11:53

## 2022-04-02 RX ADMIN — SODIUM CHLORIDE 125 ML/HR: 9 INJECTION, SOLUTION INTRAVENOUS at 12:01

## 2022-04-02 RX ADMIN — THERA TABS 1 TABLET: TAB at 09:56

## 2022-04-02 RX ADMIN — OXYCODONE 5 MG: 5 TABLET ORAL at 10:00

## 2022-04-02 RX ADMIN — ENOXAPARIN SODIUM 40 MG: 100 INJECTION SUBCUTANEOUS at 09:56

## 2022-04-02 RX ADMIN — FAMOTIDINE 20 MG: 20 TABLET, FILM COATED ORAL at 17:57

## 2022-04-02 RX ADMIN — HYDROMORPHONE HYDROCHLORIDE 0.5 MG: 1 INJECTION, SOLUTION INTRAMUSCULAR; INTRAVENOUS; SUBCUTANEOUS at 01:16

## 2022-04-02 RX ADMIN — SODIUM CHLORIDE, PRESERVATIVE FREE 10 ML: 5 INJECTION INTRAVENOUS at 05:38

## 2022-04-02 NOTE — PROGRESS NOTES
Problem: Self Care Deficits Care Plan (Adult)  Goal: *Acute Goals and Plan of Care (Insert Text)  Description: Occupational Therapy Goals  Reviewed and updated upon re-evaluation 4/2/2022  1. Patient will perform grooming with supervision/set-up sitting in chair within 7 day(s). 2.  Patient will perform anterior upper and lower body bathing with mod A from chair within 7 day(s). 3.  Patient will perform upper body dressing and lower body dressing with mod A within 7 day(s). 4.  Patient will perform toilet transfers with mod A within 7 day(s). 5.  Patient will perform all aspects of toileting with mod A within 7 day(s). Initiated: 3/29/2022   1. Patient will perform grooming with supervision/set-up sitting in chair within 7 day(s). 2.  Patient will perform bathing with mod A from chair within 7 day(s). 3.  Patient will perform upper body dressing and lower body dressing with mod A within 7 day(s). 4.  Patient will perform toilet transfers with mod A within 7 day(s). 5.  Patient will perform all aspects of toileting with min A within 7 day(s). FUNCTIONAL STATUS PRIOR TO ADMISSION: Pt is mod I at home. She reports the last 2 weeks at home was going well at home prior to admission. She reports showering 1 time following discharge after unicondylar knee replacement. HOME SUPPORT: lives alone. Outcome: Progressing Towards Goal   OCCUPATIONAL THERAPY RE-EVALUATION  Patient: Jamal Tejeda (80 y.o. female)  Date: 4/2/2022  Diagnosis: Periprosthetic fracture of proximal end of tibia [M97. Clide Helio <principal problem not specified>  Procedure(s) (LRB):  LEFT TOTAL KNEE ARTHROPLASTY REVISION (Left) 1 Day Post-Op  Precautions: Fall,WBAT (knee brace locked at 0 during transfers and ambulation)  Chart, occupational therapy assessment, plan of care, and goals were reviewed.     ASSESSMENT  Based on the objective data described below, patient presents with impaired balance, significant pain, generalized weakness, impaired activity tolerance/endurance, and impaired lower body reach s/p admission for L TKA revision POD1. Pt reiceved supine and amenable to participation in therapy session. She performed bed mobility with mod A x2 and functional transfers with min A x2, however with quick fatigue requiring return to supine. As pt remains well below her functional baseline of modified independent, continue to recommend d/c to SNF. Current Level of Function Impacting Discharge (ADLs): max to total A LB ADLs, supervision-set-up seated UB ADLs    Other factors to consider for discharge: PLOF         PLAN :  Recommendations and Planned Interventions: self care training, functional mobility training, therapeutic exercise, balance training, therapeutic activities, endurance activities, patient education and home safety training    Frequency/Duration: Patient will be followed by occupational therapy 5 times a week to address goals. Recommendation for discharge: (in order for the patient to meet his/her long term goals)  Therapy up to 5 days/week in SNF setting    This discharge recommendation:  Has been made in collaboration with the attending provider and/or case management    Equipment recommendations for successful discharge (if) home: TBD       SUBJECTIVE:   Patient stated I'm so tired, I need to lay back down.     OBJECTIVE DATA SUMMARY:   Hospital course since last seen and reason for reevaluation: S/p L TKA revision POD1    Cognitive/Behavioral Status:  Neurologic State: Alert  Orientation Level: Oriented X4  Cognition: Follows commands  Perception: Appears intact  Perseveration: No perseveration noted  Safety/Judgement: Decreased insight into deficits; Decreased awareness of need for safety;Decreased awareness of need for assistance    Skin: visually intact     Edema: none noted     Hearing:   Auditory  Auditory Impairment: None    Vision/Perceptual:                           Acuity: Within Defined Limits         Range of Motion:    AROM: Generally decreased, functional  PROM: Generally decreased, functional                      Strength:    Strength: Generally decreased, functional                Coordination:  Coordination: Generally decreased, functional  Fine Motor Skills-Upper: Left Intact; Right Intact    Gross Motor Skills-Upper: Left Intact; Right Intact    Tone & Sensation:    Tone: Normal  Sensation: Intact                        Functional Mobility and Transfers for ADLs:  Bed Mobility:  Supine to Sit: Moderate assistance;Assist x2  Sit to Supine: Moderate assistance;Assist x2    Transfers:  Sit to Stand: Moderate assistance;Assist x2;Minimum assistance          Balance:  Sitting: Intact  Sitting - Static: Good (unsupported)  Sitting - Dynamic: Good (unsupported)  Standing: Impaired; With support  Standing - Static: Good;Constant support  Standing - Dynamic : Fair;Constant support    ADL Assessment:  Feeding: Independent    Oral Facial Hygiene/Grooming: Setup    Bathing: Maximum assistance (inferred, for LB bathing)    Upper Body Dressing: Supervision    Lower Body Dressing: Maximum assistance    Toileting: Moderate assistance (inferred, for t/f to [facility] and balance during hygiene)                ADL Intervention and task modifications:                           Lower Body Dressing Assistance  Socks: Maximum assistance  Cues: Verbal cues provided         Cognitive Retraining  Safety/Judgement: Decreased insight into deficits; Decreased awareness of need for safety;Decreased awareness of need for assistance        Functional Measure:    Barthel Index:  Bathin  Bladder: 0  Bowels: 10  Groomin  Dressin  Feeding: 10  Mobility: 0  Stairs: 0  Toilet Use: 0  Transfer (Bed to Chair and Back): 5  Total: 35/100      The Barthel ADL Index: Guidelines  1. The index should be used as a record of what a patient does, not as a record of what a patient could do.   2. The main aim is to establish degree of independence from any help, physical or verbal, however minor and for whatever reason. 3. The need for supervision renders the patient not independent. 4. A patient's performance should be established using the best available evidence. Asking the patient, friends/relatives and nurses are the usual sources, but direct observation and common sense are also important. However direct testing is not needed. 5. Usually the patient's performance over the preceding 24-48 hours is important, but occasionally longer periods will be relevant. 6. Middle categories imply that the patient supplies over 50 per cent of the effort. 7. Use of aids to be independent is allowed. Score Interpretation (from [address] [facility] 83)    Independent   60-79 Minimally independent   40-59 Partially dependent   20-39 Very dependent   <20 Totally dependent     -Andie Vallecillo., Barthel, DTamikoW. (1965). Functional evaluation: the Barthel Index. [address] [facility] ([address] (1997). The Barthel activities of daily living index: self-reporting versus actual performance in the old (> or = 75 years). Journal [address] 45(7), 14 [facility], JRONELF, Dayron Rivera., Eugenio Carter. (1999). Measuring the change in disability after inpatient rehabilitation; comparison of the responsiveness of the Barthel Index and Functional [place] Measure. Journal of Neurology, Neurosurgery, and Psychiatry, 66(4), 006-237. Andre Bartlett, N.J.A, DAVE Ulrich, & Cecille Kirby, MTamikoA. (2004) Assessment of post-stroke quality of life in cost-effectiveness studies: The usefulness of the Barthel Index and the EuroQoL-5D.  Quality of Life Research, 13, 427-43         Pain:  6/10 pain L knee    Activity Tolerance:   Fair, Poor and requires rest breaks    After treatment patient left in no apparent distress:   Supine in bed, Call bell within reach, Bed / chair alarm activated and Side rails x 3    COMMUNICATION/COLLABORATION:   The patients plan of care was discussed with: Physical therapist and Registered nurse.      William Pulido OT  Time Calculation: 32 mins

## 2022-04-02 NOTE — OP NOTES
[address] [facility]  OPERATIVE REPORT    Name:  Ely Ching  MR#:  [id number]  :  1940  ACCOUNT #:  [de-identified]  DATE OF SERVICE:  2022    PREOPERATIVE DIAGNOSES:  Periprosthetic fracture, left previous partial knee arthroplasty. POSTOPERATIVE DIAGNOSES:  Periprosthetic fracture, left previous partial knee arthroplasty. PROCEDURE PERFORMED:  Revision left knee arthroplasty, both components. SURGEON:  Erlin Zavala MD    ASSISTANT:  Charlynn Meckel, PA-C    ANESTHESIA:  Spinal.    COMPLICATIONS:  None. SPECIMENS REMOVED:  None. IMPLANTS:  Include a size 3 Odell PS femur with a size 3 tibial plate placed and a 111-KN stem with a size A cone and a 9-mm PS polyethylene component. ESTIMATED BLOOD LOSS:  200. INDICATIONS FOR PROCEDURE:  This is an 60-year-old female who underwent a partial knee replacement approximately 2-1/2 weeks ago. Unfortunately, she fell 2 days ago off a step upstairs and had a comminuted medial plateau fracture underneath the tibial portion of the partial knee replacement. We initially admitted her and attempted to mobilize her with toe touch weightbearing to see if we could avoid revision surgery. There did appear to be a small amount of movement after mobilization, considering her age and the poor position of the prosthesis because of the fracture, we discussed revision surgery with the patient as well as her son. She understood this procedure carries significant risk because of her age, bone quality, and nature of the fracture involved. She is at high risk for complications both surgically and medically, which again she understands and consent was obtained. PROCEDURE:  The patient was identified in the preoperative holding area and the correct site was marked and confirmed. She was taken to the operating room and placed supine after spinal was induced. She was prepped and draped in the standard sterile fashion.   She received 2 g of cefazolin and 1 g of tranexamic acid prior to incision. Time-out was held and the correct site was confirmed. I exsanguinated the leg and inflated the tourniquet to 275. Total tourniquet time was 110 minutes. I began by ellipsing out her previous incision. I elevated medial and lateral flaps. There was some significant ecchymosis throughout the soft tissue from her fall. I carried out a medial parapatellar arthrotomy, suctioned clear fluid with associated blood. I then thoroughly irrigated for visualization. I began by removing the polyethylene component. I removed the femoral component with no bone loss. The tibial component was very difficult to remove considering there was fracture and it was mobile. I very carefully used a combination of a TPS saw and bur and flexible osteotomes to remove the component from the comminuted fractured bone. Eventually, I was able to remove this component. There was fairly significant comminution throughout the medial tibial plateau. At this point, I carried out the distal femoral cut at 5 degrees of valgus. I turned attention to the tibia. I used the bur to start entry point into the canal.  I reamed up to an 11 for a 9-mm stem measuring 100-mm. At this point, I used the bur to begin circumferential bony removal in the center of the tibial plateau for cone. I was able to ream down to an A cone, bypass the fracture site by half of the cone in the superior and inferior direction. This achieved good fit in the metadiaphysis distally. At this point, I prepared the tibia for a 3 and tried to achieve this laterally due to the significant comminuted medial bone. I then prepared the femur for a 3. I evaluated the patella and I elected to rsurface this. It measured 23-mm. I removed 9-mm of the bone. I prepared it for a 32. At this point, I placed trials. I was very happy with a 9-mm polyethylene component.   Surprisingly, there was medial stability which I believe was because the fracture site was just proximal to the distal portion of the MCL attachment. At this point, I removed all trials. I thoroughly irrigated with a liter of dilute sterile Betadine followed by 2 more liters of normal saline. I dropped the tourniquet to ensure we had hemostasis. I then washed it out again and then exsanguinated the leg and reinflated the tourniquet. At this point, I mixed 2 doses of cement on the back table and placed the A cone. I took some of the remainder of the bone from the box cut and used the rongeur to make small bone chips. I was able to pack some of these in the medial plateau deep in the defects from where the prosthesis was. I then packed the cement through the canal and placed our tibial construct. This was allowed to fully harden. Once this was fully hardened, I then turned attention to the femur. I mixed one more batch and cemented the femoral component and the patellar button. I again allowed this to harden with a trial.  I removed the trial and removed the excess cement and placed a 9-mm PS poly. I thoroughly irrigated again. The knee achieved primary stability of both flexion and extension. I then washed out the knee again and closed the arthrotomy with a #1 Vicryl and Stratafix. I closed the subcutaneous tissue with 2-0 Vicryl, followed by 2-0 nylon. Sterile dressing was placed. The patient was placed in a knee immobilizer and taken to the PACU. All counts were correct x2. Ezra Crowder was critical for mesa portions of the case including wound closure, dressing application, and retractor management. There was no one else to perform these specific duties at that time.       Johanny Becker MD CM/BROCK_TERRA_[email or web address]/BC_RIOSZ  D:  04/01/2022 15:16  T:  04/02/2022 3:27  JOB #:  [id number]

## 2022-04-02 NOTE — PROGRESS NOTES
Problem: Mobility Impaired (Adult and Pediatric)  Goal: *Acute Goals and Plan of Care (Insert Text)  Description: FUNCTIONAL STATUS PRIOR TO ADMISSION: Patient was modified independent using a rolling walker for functional mobility. HOME SUPPORT PRIOR TO ADMISSION: The patient lived alone with no local support. Physical Therapy Goals  Initiated 4/2/2022    1. Patient will move from supine to sit and sit to supine , scoot up and down, and roll side to side in bed with supervision/set-up within 4 days. 2. Patient will perform sit to stand with supervision/set-up within 4 days. 3. Patient will ambulate with supervision/set-up for 75 feet with the least restrictive device within 4 days. 4. Patient will ascend/descend 6 stairs with B handrail(s) with supervision/set-up within 4 days. 5. Patient will perform home exercise program per protocol with modified independence within 4 days. FUNCTIONAL STATUS PRIOR TO ADMISSION: Patient was modified independent using a rolling walker for functional mobility. HOME SUPPORT PRIOR TO ADMISSION: The patient lived alone with no local support. Physical Therapy Goals  Initiated 3/29/2022  1. Patient will move from supine to sit and sit to supine , scoot up and down, and roll side to side in bed with modified independence within 7 day(s). 2.  Patient will transfer from bed to chair and chair to bed with modified independence using the least restrictive device within 7 day(s). 3.  Patient will perform sit to stand with modified independence within 7 day(s). 4.  Patient will ambulate with supervision/set-up for 100 feet with the least restrictive device within 7 day(s). 5.  Patient will ascend/descend 6 stairs with bilateral handrail(s) with supervision/set-up within 7 day(s).    Outcome: Progressing Towards Goal    PHYSICAL THERAPY REEVALUATION  Patient: Chris Baker (68 y.o. female)  Date: 4/2/2022  Primary Diagnosis: Periprosthetic fracture of proximal end of tibia [M97. 8XXA, Z96.659]  Procedure(s) (LRB):  LEFT TOTAL KNEE ARTHROPLASTY REVISION (Left) 1 Day Post-Op   Precautions:   Fall,WBAT (knee brace locked at 0 during transfers and ambulation)      ASSESSMENT  Based on the objective data described below, the patient presents with increased pain, generalized weakness, impaired standing balance, and overall decreased independence following admission for L TKR REVISION POD 1. Patient overall mod A x 2 for supine to sit, increased time required. Able to stand with min A x 2 with RW. Took 2 steps toward [facility] by pivoting R foot. Patient returned to supine with mod A x 2. Recommend SNF at discharge. Current Level of Function Impacting Discharge (mobility/balance): mod A x 2    Functional Outcome Measure: The patient scored 35/100 on the Barthel Index outcome measure which is indicative of disability for ADL's and mobility. Other factors to consider for discharge:      Patient will benefit from skilled therapy intervention to address the above noted impairments. PLAN :  Recommendations and Planned Interventions: bed mobility training, transfer training, gait training, therapeutic exercises, neuromuscular re-education, patient and family training/education, and therapeutic activities      Frequency/Duration: Patient will be followed by physical therapy:  twice daily to address goals. Recommendation for discharge: (in order for the patient to meet his/her long term goals)  Therapy up to 5 days/week in SNF setting    This discharge recommendation:  Has not yet been discussed the attending provider and/or case management    Equipment recommendations for successful discharge (if) home: patient owns DME required for discharge         SUBJECTIVE:   Patient stated I'm going to rehab from here.     OBJECTIVE DATA SUMMARY:   HISTORY:    Past Medical History:   Diagnosis Date    Chronic pain     LEFT KNEE    DDD (degenerative disc disease), lumbar 3/15/2016    Diverticulosis of colon 1/12    McGroarty/gi    DJD (degenerative joint disease)     Esophageal stricture 2/03    Fibrosis of lung ([facility] [address] 2004    Scarring @ the apices bilaterally Done @[facility]/histoplasmosis    History of basal cell cancer     HTN, goal below 150/90     Hypercholesterolemia     IBS (irritable bowel syndrome) 8/30/2016    Menopause     LMP-48years old? Osteoporosis     previously on Fosamax many years ago; does not want further treatment    Overactive bladder     Skin cancer 01/06/2020    scc-right shin    Unspecified hypothyroidism           Past Surgical History:   Procedure Laterality Date    COLONOSCOPY  1/12    McGroarty/gi    HX APPENDECTOMY  1953    HX CATARACT REMOVAL  3/12    L    HX CATARACT REMOVAL  04/2012    Bilateral     HX KNEE ARTHROSCOPY Right 2006 and 2008    Dr Elias Pepe  1/10    right  Darnell/o/s    HX OTHER SURGICAL  2020, 2021    EXC. SKIN CA TENISHA. [address] course since last seen and reason for reevaluation: s/p L knee revision    Personal factors and/or comorbidities impacting plan of care:     Home Situation  Home Environment: Private residence [place])  # Steps to Enter: 6  Rails to Enter: Yes  Hand Rails : Bilateral  Wheelchair Ramp: No  One/Two Story Residence: Two story  # of Interior Steps: 12  Living Alone: Yes  Support Systems: Other Family Member(s)  Patient Expects to be Discharged to[de-identified] Skilled nursing facility  Current DME Used/Available at Home: Walker, rolling  Tub or Shower Type: Shower    EXAMINATION/PRESENTATION/DECISION MAKING:   Critical Behavior:  Neurologic State: Alert  Orientation Level: Oriented X4  Cognition: Follows commands  Safety/Judgement: Decreased awareness of need for assistance,Decreased awareness of need for safety,Decreased insight into deficits  Hearing:   Auditory  Auditory Impairment: None  Skin:    Edema:   Range Of Motion:  AROM: Generally decreased, functional           PROM: Generally decreased, functional           Strength:    Strength: Generally decreased, functional                    Tone & Sensation:   Tone: Normal              Sensation: Intact               Coordination:  Coordination: Generally decreased, functional  Vision:      Functional Mobility:  Bed Mobility:     Supine to Sit: Moderate assistance;Assist x2  Sit to Supine: Moderate assistance;Assist x2     Transfers:  Sit to Stand: Moderate assistance;Assist x2;Minimum assistance  Stand to Sit: Minimum assistance;Assist x2                       Balance:   Sitting: Intact  Standing: Impaired; With support  Standing - Static: Good;Constant support  Standing - Dynamic : Fair;Constant support  Ambulation/Gait Training:  Distance (ft): 2 Feet (ft)  Assistive Device: Gait belt;Walker, rolling  Ambulation - Level of Assistance: Minimal assistance;Assist x2        Gait Abnormalities: Antalgic;Decreased step clearance;Shuffling gait     Left Side Weight Bearing: As tolerated  Base of Support: Narrowed  Stance: Left decreased  Speed/Adri: Pace decreased (<100 feet/min)  Step Length: Right shortened;Left shortened                     Stairs: Therapeutic Exercises:       Functional Measure:  Barthel Index:    Bathin  Bladder: 0  Bowels: 10  Groomin  Dressin  Feeding: 10  Mobility: 0  Stairs: 0  Toilet Use: 0  Transfer (Bed to Chair and Back): 5  Total: 35/100       The Barthel ADL Index: Guidelines  1. The index should be used as a record of what a patient does, not as a record of what a patient could do. 2. The main aim is to establish degree of independence from any help, physical or verbal, however minor and for whatever reason. 3. The need for supervision renders the patient not independent. 4. A patient's performance should be established using the best available evidence. Asking the patient, friends/relatives and nurses are the usual sources, but direct observation and common sense are also important. However direct testing is not needed. 5. Usually the patient's performance over the preceding 24-48 hours is important, but occasionally longer periods will be relevant. 6. Middle categories imply that the patient supplies over 50 per cent of the effort. 7. Use of aids to be independent is allowed. Score Interpretation (from [address] [facility] 83)    Independent   60-79 Minimally independent   40-59 Partially dependent   20-39 Very dependent   <20 Totally dependent     -Andie Vallecillo., Barthel, DJANETH. (1965). Functional evaluation: the Barthel Index. [address] ([address] (1997). The Barthel activities of daily living index: self-reporting versus actual performance in the old (> or = 75 years). Journal [address] 45(7), 14 [facility], RODERICK.ULYSSES, Remy Schneider., Lizz Patel. (1999). Measuring the change in disability after inpatient rehabilitation; comparison of the responsiveness of the Barthel Index and Functional [place] Measure. Journal of Neurology, Neurosurgery, and Psychiatry, 66(4), 515-171. Mesha Rodriguez, N.J.A, DAVE Ulrich, & Leeann Quevedo, MTamikoA. (2004) Assessment of post-stroke quality of life in cost-effectiveness studies: The usefulness of the Barthel Index and the EuroQoL-5D. Quality of Life Research, 15, 388-35         Activity Tolerance:   Fair    After treatment patient left in no apparent distress:   Supine in bed, Call bell within reach, Bed / chair alarm activated, and Side rails x 3    COMMUNICATION/EDUCATION:   The patients plan of care was discussed with: Occupational therapist and Registered nurse. Fall prevention education was provided and the patient/caregiver indicated understanding., Patient/family have participated as able in goal setting and plan of care. , and Patient/family agree to work toward stated goals and plan of care.     Thank you for this referral.  Marilee Meeks, PT   Time Calculation: 28 mins

## 2022-04-02 NOTE — PROGRESS NOTES
Nursing Mobility Assessment    Malini's Egress Test:    Activity: Activity: In bed  Weight Bearing Status: Weight Bearing Status: PWB (Partial Weight Bearing %)  Level of Assistance:  Activity Assistance: Partial (two people)  Ambulation:    Distance Ambulated:    Assistive Device: Assistive Device: Fall prevention device  Activity Response:      Comments pt sit on side of bed and gangled both of legs      PT Recommendations:

## 2022-04-02 NOTE — PROGRESS NOTES
[facility]                                                                                          Hospitalist Progress Note  Amy Bustillos NP  Answering service: [phone] OR [phone] from in house phone        Date of Service:  2022  NAME:  Willian Borges  :  1940  MRN:  [id number]      Admission Summary:   Per the H&P, \" Alisa Proffer a 80 y.o. 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  She was status post left unicompartmental knee arthroplasty that was done on 2022.  She was ambulating down the stairs at her home, when she fell on the affected knee.  She was unable to bear weight so advised  By her orthopedic surgeon Dr. Escoto Friday come to the hospital for admission.     In the ED, SBP range between 158-169.  Other VSS.  Left knee x-ray showed displaced fracture inferior to the implant.     She denies any headaches or dizziness. Denies any cough, chest pain, shortness of breath. Denies fever or chills. Denies nausea, vomiting, diarrhea, constipation. \"  Hospitalist service was consulted for assistance with medical management    Interval history / Subjective:     Patient seen on morning rounds. With complaints of pain, nurse at the bedside administering analgesia. Assessment & Plan:     Left periprosthetic displaced tibial fracture  Orthopedics following  Postop day 1 left knee arthroplasty revision  Continuing pain control  The rest per Ortho        Hyponatremia  Patient with hyponatremia, had low sodium, which trended down with IV fluids.     Patient had been receiving IV hydration postop, will discontinue this  Hydrochlorothiazide has been discontinued  We will continue to monitor            Hypertension  Blood pressure remains elevated  Continuing losartan  Starting amlodipine        Hyperlipidemia  Stable  Continuing atorvastatin    Hypothyroid  Stable  TSH was 2.5 on 3/10  Continuing levothyroxine    Remote history of histoplasmosis  Patient asymptomatic  No oxygen requirement    Code status: Full    DVT prophylaxis: LMWH    Care Plan discussed with: Patient/Family and Nurse     Anticipated Disposition: SNF/LTC     Anticipated Discharge: Greater than 48 hours       Thank you for the opportunity to participate in the care of this patient. Hospitalist service will continue to follow along with you       Hospital Problems  Date Reviewed: 3/28/2022          Codes Class Noted POA    Periprosthetic fracture of proximal end of tibia ICD-10-CM: M97. Tiffany Stewart  ICD-9-CM: 996.44, V43.65  3/28/2022 Unknown                Review of Systems:   A comprehensive review of systems was negative except for that written in the HPI. Vital Signs:    Last 24hrs VS reviewed since prior progress note. Most recent are:  Visit Vitals  BP (!) 165/79   Pulse 83   Temp 97.6 °F (36.4 °C)   Resp 18   SpO2 95%         Intake/Output Summary (Last 24 hours) at 4/2/2022 1217  Last data filed at 4/2/2022 0354  Gross per 24 hour   Intake 1810 ml   Output 1600 ml   Net 210 ml        Physical Examination:     I had a face to face encounter with this patient and independently examined them on 4/2/2022 as outlined below:          Constitutional:  No acute distress, cooperative, pleasant    ENT:  Oral mucosa moist, oropharynx benign. Resp:  CTA bilaterally. No wheezing/rhonchi/rales. No accessory muscle use   CV:  Regular rhythm, normal rate, no murmurs, gallops, rubs    GI:  Soft, non distended, non tender. normoactive bowel sounds, no hepatosplenomegaly     Musculoskeletal:  No edema, warm, 2+ pulses throughout    Neurologic:  Moves all extremities.   AAOx3, CN II-XII reviewed            Data Review:    Review and/or order of clinical lab test  Review and/or order of tests in the radiology section of [facility]      Labs:     Recent Labs     04/02/22 0242 04/01/22  0754   WBC 15.1* 10.0   HGB 11.4* 10.9*   HCT 35.6 34.2*   * 404*     Recent Labs     04/02/22  0242 04/01/22  0754 03/31/22  1058   * 130* 128*   K 4.1 4.0 3.8   CL 99 98 94*   CO2 23 26 28   BUN 15 15 13   CREA 0.81 0.78 0.85   * 93 146*   CA 8.7 9.3 9.7     Recent Labs     04/02/22 0242 04/01/22  0754   ALT 20 18    88   TBILI 0.3 0.6   TP 6.6 6.1*   ALB 2.7* 3.0*   GLOB 3.9 3.1     Recent Labs     04/01/22  1019   INR 1.1   PTP 11.1      No results for input(s): FE, TIBC, PSAT, FERR in the last 72 hours. Lab Results   Component Value Date/Time    Folate >20.0 05/15/2018 08:12 AM      No results for input(s): PH, PCO2, PO2 in the last 72 hours. No results for input(s): CPK, CKNDX, TROIQ in the last 72 hours.     No lab exists for component: CPKMB  Lab Results   Component Value Date/Time    Cholesterol, total 218 (H) 03/10/2022 09:09 AM    HDL Cholesterol 63 03/10/2022 09:09 AM    LDL, calculated 136.4 (H) 03/10/2022 09:09 AM    Triglyceride 93 03/10/2022 09:09 AM    CHOL/HDL Ratio 3.5 03/10/2022 09:09 AM     Lab Results   Component Value Date/Time    Glucose (POC) 89 03/16/2022 07:29 AM    Glucose (POC) 165 (H) 01/28/2010 12:15 PM     Lab Results   Component Value Date/Time    Color YELLOW/STRAW 03/03/2022 03:20 PM    Appearance CLEAR 03/03/2022 03:20 PM    Specific gravity 1.009 03/03/2022 03:20 PM    pH (UA) 6.5 03/03/2022 03:20 PM    Protein Negative 03/03/2022 03:20 PM    Glucose Negative 03/03/2022 03:20 PM    Ketone Negative 03/03/2022 03:20 PM    Bilirubin Negative 03/03/2022 03:20 PM    Urobilinogen 0.2 03/03/2022 03:20 PM    Nitrites Negative 03/03/2022 03:20 PM    Leukocyte Esterase MODERATE (A) 03/03/2022 03:20 PM    Epithelial cells FEW 03/03/2022 03:20 PM    Bacteria Negative 03/03/2022 03:20 PM    WBC 20-50 03/03/2022 03:20 PM    RBC 0-5 03/03/2022 03:20 PM Medications Reviewed:     Current Facility-Administered Medications   Medication Dose Route Frequency    0.9% sodium chloride infusion  125 mL/hr IntraVENous CONTINUOUS    sodium chloride 0.9 % bolus infusion 500 mL  500 mL IntraVENous ONCE PRN    sodium chloride (NS) flush 5-40 mL  5-40 mL IntraVENous Q8H    sodium chloride (NS) flush 5-40 mL  5-40 mL IntraVENous PRN    acetaminophen (TYLENOL) tablet 650 mg  650 mg Oral Q6H    oxyCODONE IR (ROXICODONE) tablet 5 mg  5 mg Oral Q3H PRN    HYDROmorphone (DILAUDID) injection 0.5 mg  0.5 mg IntraVENous Q4H PRN    naloxone (NARCAN) injection 0.4 mg  0.4 mg IntraVENous PRN    ondansetron (ZOFRAN) injection 4 mg  4 mg IntraVENous Q4H PRN    hydrOXYzine HCL (ATARAX) tablet 10 mg  10 mg Oral Q8H PRN    famotidine (PEPCID) tablet 20 mg  20 mg Oral BID    polyethylene glycol (MIRALAX) packet 17 g  17 g Oral DAILY    [START ON 4/3/2022] bisacodyL (DULCOLAX) suppository 10 mg  10 mg Rectal DAILY PRN    enoxaparin (LOVENOX) injection 40 mg  40 mg SubCUTAneous DAILY    traMADoL (ULTRAM) tablet 50 mg  50 mg Oral Q6H PRN    losartan (COZAAR) tablet 100 mg  100 mg Oral DAILY    hydrALAZINE (APRESOLINE) 20 mg/mL injection 10 mg  10 mg IntraVENous Q6H PRN    senna-docusate (PERICOLACE) 8.6-50 mg per tablet 1 Tablet  1 Tablet Oral BID    calcium carbonate (TUMS) chewable tablet 400 mg [elemental]  400 mg Oral PRN    levothyroxine (SYNTHROID) tablet 112 mcg  112 mcg Oral ACB    therapeutic multivitamin (THERAGRAN) tablet 1 Tablet  1 Tablet Oral DAILY    atorvastatin (LIPITOR) tablet 20 mg  20 mg Oral QHS    traMADoL (ULTRAM) tablet 50 mg  50 mg Oral Q6H PRN    HYDROmorphone (DILAUDID) injection 0.5 mg  0.5 mg IntraVENous Q6H PRN     ______________________________________________________________________  EXPECTED LENGTH OF STAY: 3d 16h  ACTUAL LENGTH OF STAY:          5                 Viridiana Meth, NP

## 2022-04-02 NOTE — PROGRESS NOTES
Resting comfortably. Some pain overnight. GEN:  NAD.  AOx3   ABD:  S/NT/ND   LLE:  Dressing scant drainage noted to distal end, 5/5 motor, Calf nttp (Bilat), Sensation rossly intact to light touch throughout, 1+ dp/pt pulses, foot perfused    Patient Vitals for the past 24 hrs:   Temp Pulse Resp BP SpO2   04/02/22 0314 97.6 °F (36.4 °C) 70 16 (!) 171/80 94 %   04/01/22 2016 -- 78 16 (!) 167/98 100 %   04/01/22 1925 98.1 °F (36.7 °C) 85 17 (!) 167/94 96 %   04/01/22 1914 98.1 °F (36.7 °C) 87 17 (!) 159/83 94 %   04/01/22 1830 98.1 °F (36.7 °C) 70 18 (!) 159/83 96 %   04/01/22 1727 98.1 °F (36.7 °C) 70 18 (!) 157/84 99 %   04/01/22 1645 -- 72 17 (!) 156/83 99 %   04/01/22 1640 -- 72 18 -- 100 %   04/01/22 1635 -- 72 13 -- 99 %   04/01/22 1630 -- 73 14 (!) 161/72 99 %   04/01/22 1625 -- 71 10 (!) 147/72 100 %   04/01/22 1620 -- 76 16 (!) 150/80 99 %   04/01/22 1616 98.5 °F (36.9 °C) 79 14 (!) 149/83 98 %   04/01/22 1615 98.5 °F (36.9 °C) 81 15 (!) 162/80 96 %   04/01/22 1614 -- -- -- (!) 149/83 94 %       Current Facility-Administered Medications   Medication Dose Route Frequency    0.9% sodium chloride infusion  125 mL/hr IntraVENous CONTINUOUS    sodium chloride 0.9 % bolus infusion 500 mL  500 mL IntraVENous ONCE PRN    sodium chloride (NS) flush 5-40 mL  5-40 mL IntraVENous Q8H    sodium chloride (NS) flush 5-40 mL  5-40 mL IntraVENous PRN    acetaminophen (TYLENOL) tablet 650 mg  650 mg Oral Q6H    oxyCODONE IR (ROXICODONE) tablet 5 mg  5 mg Oral Q3H PRN    HYDROmorphone (DILAUDID) injection 0.5 mg  0.5 mg IntraVENous Q4H PRN    naloxone (NARCAN) injection 0.4 mg  0.4 mg IntraVENous PRN    ondansetron (ZOFRAN) injection 4 mg  4 mg IntraVENous Q4H PRN    hydrOXYzine HCL (ATARAX) tablet 10 mg  10 mg Oral Q8H PRN    famotidine (PEPCID) tablet 20 mg  20 mg Oral BID    polyethylene glycol (MIRALAX) packet 17 g  17 g Oral DAILY    [START ON 4/3/2022] bisacodyL (DULCOLAX) suppository 10 mg  10 mg Rectal DAILY PRN    enoxaparin (LOVENOX) injection 40 mg  40 mg SubCUTAneous DAILY    traMADoL (ULTRAM) tablet 50 mg  50 mg Oral Q6H PRN    losartan (COZAAR) tablet 100 mg  100 mg Oral DAILY    hydrALAZINE (APRESOLINE) 20 mg/mL injection 10 mg  10 mg IntraVENous Q6H PRN    senna-docusate (PERICOLACE) 8.6-50 mg per tablet 1 Tablet  1 Tablet Oral BID    calcium carbonate (TUMS) chewable tablet 400 mg [elemental]  400 mg Oral PRN    levothyroxine (SYNTHROID) tablet 112 mcg  112 mcg Oral ACB    therapeutic multivitamin (THERAGRAN) tablet 1 Tablet  1 Tablet Oral DAILY    polyethylene glycol (MIRALAX) packet 17 g  17 g Oral DAILY    atorvastatin (LIPITOR) tablet 20 mg  20 mg Oral QHS    traMADoL (ULTRAM) tablet 50 mg  50 mg Oral Q6H PRN    HYDROmorphone (DILAUDID) injection 0.5 mg  0.5 mg IntraVENous Q6H PRN       Lab Results   Component Value Date/Time    HGB 11.4 (L) 04/02/2022 02:42 AM    INR 1.1 04/01/2022 10:19 AM       Lab Results   Component Value Date/Time    Sodium 128 (L) 04/02/2022 02:42 AM    Potassium 4.1 04/02/2022 02:42 AM    Chloride 99 04/02/2022 02:42 AM    CO2 23 04/02/2022 02:42 AM    BUN 15 04/02/2022 02:42 AM    Creatinine 0.81 04/02/2022 02:42 AM    Calcium 8.7 04/02/2022 02:42 AM    Magnesium 2.1 03/30/2022 05:50 AM    Phosphorus 1.9 (L) 03/30/2022 05:50 AM       80 y.o. female s/p left uni compartmental knee arthroplasty on 3/16/22. Had a fall Sunday evening directly onto knee. Seen in clinic on 3/28/22 and found to have a left periprosthetic tibia fracture. Taken back to OR 4/1/22 for revision left knee arthroplasty, both components. Pain overall controlled. Work with PT, okay to WBAT, No ROM x 7 days. Post op knee xray pending. DVT Prophylaxis: Lovenox 40mg SQ daily for DVT prophylaxis. Weight Bearing: WBAT LLE, No ROM x 7 days, remain in TROM brace with ambulation locked at zero.     Pain Control:  Tyleno,tramadol every 6 hours, oxy 5 mg for breakthrough pain  Disposition: Plans to dispo to SNF once cleared by PT and remains medically stable. Placement referrals sent out 4/1/22. CM Lenny.

## 2022-04-03 ENCOUNTER — APPOINTMENT (OUTPATIENT)
Dept: GENERAL RADIOLOGY | Age: 82
DRG: 467 | End: 2022-04-03
Attending: PHYSICIAN ASSISTANT
Payer: MEDICARE

## 2022-04-03 LAB
ALBUMIN SERPL-MCNC: 2.6 G/DL (ref 3.5–5)
ALBUMIN/GLOB SERPL: 0.7 {RATIO} (ref 1.1–2.2)
ALP SERPL-CCNC: 97 U/L (ref 45–117)
ALT SERPL-CCNC: 14 U/L (ref 12–78)
ANION GAP SERPL CALC-SCNC: 3 MMOL/L (ref 5–15)
AST SERPL-CCNC: 13 U/L (ref 15–37)
BASOPHILS # BLD: 0.1 K/UL (ref 0–0.1)
BASOPHILS NFR BLD: 1 % (ref 0–1)
BILIRUB SERPL-MCNC: 0.5 MG/DL (ref 0.2–1)
BUN SERPL-MCNC: 14 MG/DL (ref 6–20)
BUN/CREAT SERPL: 19 (ref 12–20)
CALCIUM SERPL-MCNC: 8.5 MG/DL (ref 8.5–10.1)
CHLORIDE SERPL-SCNC: 98 MMOL/L (ref 97–108)
CO2 SERPL-SCNC: 26 MMOL/L (ref 21–32)
COVID-19 RAPID TEST, COVR: NOT DETECTED
CREAT SERPL-MCNC: 0.74 MG/DL (ref 0.55–1.02)
DIFFERENTIAL METHOD BLD: ABNORMAL
EOSINOPHIL # BLD: 0.1 K/UL (ref 0–0.4)
EOSINOPHIL NFR BLD: 1 % (ref 0–7)
ERYTHROCYTE [DISTWIDTH] IN BLOOD BY AUTOMATED COUNT: 12.9 % (ref 11.5–14.5)
GLOBULIN SER CALC-MCNC: 3.6 G/DL (ref 2–4)
GLUCOSE SERPL-MCNC: 122 MG/DL (ref 65–100)
HCT VFR BLD AUTO: 30.4 % (ref 35–47)
HGB BLD-MCNC: 9.9 G/DL (ref 11.5–16)
IMM GRANULOCYTES # BLD AUTO: 0.1 K/UL (ref 0–0.04)
IMM GRANULOCYTES NFR BLD AUTO: 1 % (ref 0–0.5)
LYMPHOCYTES # BLD: 1.2 K/UL (ref 0.8–3.5)
LYMPHOCYTES NFR BLD: 9 % (ref 12–49)
MCH RBC QN AUTO: 28.2 PG (ref 26–34)
MCHC RBC AUTO-ENTMCNC: 32.6 G/DL (ref 30–36.5)
MCV RBC AUTO: 86.6 FL (ref 80–99)
MONOCYTES # BLD: 1.3 K/UL (ref 0–1)
MONOCYTES NFR BLD: 10 % (ref 5–13)
NEUTS SEG # BLD: 11 K/UL (ref 1.8–8)
NEUTS SEG NFR BLD: 78 % (ref 32–75)
NRBC # BLD: 0 K/UL (ref 0–0.01)
NRBC BLD-RTO: 0 PER 100 WBC
PLATELET # BLD AUTO: 392 K/UL (ref 150–400)
PMV BLD AUTO: 8.5 FL (ref 8.9–12.9)
POTASSIUM SERPL-SCNC: 3.5 MMOL/L (ref 3.5–5.1)
PROT SERPL-MCNC: 6.2 G/DL (ref 6.4–8.2)
RBC # BLD AUTO: 3.51 M/UL (ref 3.8–5.2)
SARS-COV-2, COV2: NORMAL
SODIUM SERPL-SCNC: 127 MMOL/L (ref 136–145)
SOURCE, COVRS: NORMAL
WBC # BLD AUTO: 13.8 K/UL (ref 3.6–11)

## 2022-04-03 PROCEDURE — 74011250637 HC RX REV CODE- 250/637: Performed by: PHYSICIAN ASSISTANT

## 2022-04-03 PROCEDURE — 85025 COMPLETE CBC W/AUTO DIFF WBC: CPT

## 2022-04-03 PROCEDURE — 74011250637 HC RX REV CODE- 250/637: Performed by: NURSE PRACTITIONER

## 2022-04-03 PROCEDURE — 80053 COMPREHEN METABOLIC PANEL: CPT

## 2022-04-03 PROCEDURE — 74011250636 HC RX REV CODE- 250/636: Performed by: PHYSICIAN ASSISTANT

## 2022-04-03 PROCEDURE — 36415 COLL VENOUS BLD VENIPUNCTURE: CPT

## 2022-04-03 PROCEDURE — 65270000029 HC RM PRIVATE

## 2022-04-03 PROCEDURE — 73560 X-RAY EXAM OF KNEE 1 OR 2: CPT

## 2022-04-03 PROCEDURE — 74011000250 HC RX REV CODE- 250: Performed by: PHYSICIAN ASSISTANT

## 2022-04-03 PROCEDURE — 87635 SARS-COV-2 COVID-19 AMP PRB: CPT

## 2022-04-03 RX ORDER — ENOXAPARIN SODIUM 100 MG/ML
40 INJECTION SUBCUTANEOUS DAILY
Qty: 12 ML | Refills: 0 | Status: SHIPPED
Start: 2022-04-03 | End: 2022-05-03

## 2022-04-03 RX ORDER — CARVEDILOL 6.25 MG/1
6.25 TABLET ORAL 2 TIMES DAILY WITH MEALS
Status: DISCONTINUED | OUTPATIENT
Start: 2022-04-03 | End: 2022-04-03

## 2022-04-03 RX ORDER — OXYCODONE HYDROCHLORIDE 5 MG/1
5 TABLET ORAL
Qty: 40 TABLET | Refills: 0 | Status: SHIPPED | OUTPATIENT
Start: 2022-04-03 | End: 2022-04-10

## 2022-04-03 RX ORDER — TRAMADOL HYDROCHLORIDE 50 MG/1
50 TABLET ORAL
Qty: 28 TABLET | Refills: 0 | Status: SHIPPED | OUTPATIENT
Start: 2022-04-03 | End: 2022-04-10

## 2022-04-03 RX ORDER — AMLODIPINE BESYLATE 5 MG/1
10 TABLET ORAL DAILY
Status: DISCONTINUED | OUTPATIENT
Start: 2022-04-04 | End: 2022-04-04 | Stop reason: HOSPADM

## 2022-04-03 RX ORDER — AMLODIPINE BESYLATE 10 MG/1
10 TABLET ORAL DAILY
Qty: 30 TABLET | Refills: 0 | Status: SHIPPED | OUTPATIENT
Start: 2022-04-04 | End: 2022-05-04

## 2022-04-03 RX ORDER — LOSARTAN POTASSIUM 100 MG/1
100 TABLET ORAL DAILY
Qty: 30 TABLET | Refills: 0 | Status: SHIPPED | OUTPATIENT
Start: 2022-04-04 | End: 2022-05-04

## 2022-04-03 RX ADMIN — FAMOTIDINE 20 MG: 20 TABLET, FILM COATED ORAL at 17:52

## 2022-04-03 RX ADMIN — THERA TABS 1 TABLET: TAB at 10:11

## 2022-04-03 RX ADMIN — LOSARTAN POTASSIUM 100 MG: 50 TABLET, FILM COATED ORAL at 10:11

## 2022-04-03 RX ADMIN — LEVOTHYROXINE SODIUM 112 MCG: 0.11 TABLET ORAL at 05:55

## 2022-04-03 RX ADMIN — FAMOTIDINE 20 MG: 20 TABLET, FILM COATED ORAL at 10:11

## 2022-04-03 RX ADMIN — SENNOSIDES AND DOCUSATE SODIUM 1 TABLET: 50; 8.6 TABLET ORAL at 17:52

## 2022-04-03 RX ADMIN — SODIUM CHLORIDE, PRESERVATIVE FREE 10 ML: 5 INJECTION INTRAVENOUS at 21:12

## 2022-04-03 RX ADMIN — SODIUM CHLORIDE, PRESERVATIVE FREE 10 ML: 5 INJECTION INTRAVENOUS at 14:00

## 2022-04-03 RX ADMIN — POLYETHYLENE GLYCOL 3350 17 G: 17 POWDER, FOR SOLUTION ORAL at 10:11

## 2022-04-03 RX ADMIN — ACETAMINOPHEN 650 MG: 325 TABLET ORAL at 17:52

## 2022-04-03 RX ADMIN — OXYCODONE 5 MG: 5 TABLET ORAL at 14:04

## 2022-04-03 RX ADMIN — SENNOSIDES AND DOCUSATE SODIUM 1 TABLET: 50; 8.6 TABLET ORAL at 10:11

## 2022-04-03 RX ADMIN — SODIUM CHLORIDE, PRESERVATIVE FREE 10 ML: 5 INJECTION INTRAVENOUS at 06:00

## 2022-04-03 RX ADMIN — ACETAMINOPHEN 650 MG: 325 TABLET ORAL at 12:00

## 2022-04-03 RX ADMIN — ATORVASTATIN CALCIUM 20 MG: 20 TABLET, FILM COATED ORAL at 21:10

## 2022-04-03 RX ADMIN — AMLODIPINE BESYLATE 5 MG: 5 TABLET ORAL at 10:11

## 2022-04-03 RX ADMIN — ACETAMINOPHEN 650 MG: 325 TABLET ORAL at 05:53

## 2022-04-03 RX ADMIN — ENOXAPARIN SODIUM 40 MG: 100 INJECTION SUBCUTANEOUS at 10:12

## 2022-04-03 NOTE — PROGRESS NOTES
Transition of Care    RUR: 10%    A message was left for [facility] to see if they could accept Ms. Tj De León today. Will continue to follow for discharge planning. Ms. Tj De León was seen. She was informed that I had left message with [facility]. There are not been any response. A message was left for [facility]. Will continue to follow for dishcarge planning.   Signed By: Albaro Miller LCSW     April 3, 2022

## 2022-04-03 NOTE — PROGRESS NOTES
[facility]                                                                                          Hospitalist Progress Note  Rose Easley NP  Answering service: [phone] OR [phone] from in house phone        Date of Service:  4/3/2022  NAME:  Imtiaz Salazar  :  1940  MRN:  [id number]      Admission Summary:   Per the H&P, \" Jacque Pham a 80 y.o. 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  She was status post left unicompartmental knee arthroplasty that was done on 2022.  She was ambulating down the stairs at her home, when she fell on the affected knee.  She was unable to bear weight so advised  By her orthopedic surgeon Dr. Latasha Bangura come to the hospital for admission.     In the ED, SBP range between 158-169.  Other VSS.  Left knee x-ray showed displaced fracture inferior to the implant.     She denies any headaches or dizziness. Denies any cough, chest pain, shortness of breath. Denies fever or chills. Denies nausea, vomiting, diarrhea, constipation. \"  Hospitalist service was consulted for assistance with medical management    Interval history / Subjective:     I saw the patient this morning on rounds. Patient with pain however has yet to receive analgesia. Assessment & Plan:     Left periprosthetic displaced tibial fracture  Orthopedics following  Postop day 2 left knee arthroplasty revision  Continuing multimodal pain control  The rest per Ortho            Hyponatremia  Patient with hyponatremia, had low sodium, which trended down with IV fluids.     Patient had been receiving IV hydration postop, will discontinue this  Hydrochlorothiazide has been discontinued  Sodium took a dip this morning, fluid restriction has been reinstituted, 1200/day            Hypertension  Blood pressure remains elevated  Continue losartan 100 mg  Continue amlodipine, increasing to 10 mg            Hyperlipidemia  Stable  Continuing atorvastatin    Hypothyroid  Stable  TSH was 2.5 on 3/10  Continuing levothyroxine    Remote history of histoplasmosis  Patient asymptomatic  No oxygen requirement    Code status: Full    DVT prophylaxis: LMWH    Care Plan discussed with: Patient/Family and Nurse     Anticipated Disposition: SNF/LTC     Anticipated Discharge: 24 hours to 48 hours       Thank you for the opportunity to participate in the care of this patient. Hospitalist service will continue to follow along with you       Hospital Problems  Date Reviewed: 3/28/2022          Codes Class Noted POA    Periprosthetic fracture of proximal end of tibia ICD-10-CM: M97Tamiko Salmeron  ICD-9-CM: 996.44, V43.65  3/28/2022 Unknown                Review of Systems:   A comprehensive review of systems was negative except for that written in the HPI. Vital Signs:    Last 24hrs VS reviewed since prior progress note. Most recent are:  Visit Vitals  BP (!) 149/71 (BP Patient Position: Lying)   Pulse 92   Temp 98.2 °F (36.8 °C)   Resp 16   SpO2 (!) 5%         Intake/Output Summary (Last 24 hours) at 4/3/2022 1037  Last data filed at 4/3/2022 0308  Gross per 24 hour   Intake --   Output 400 ml   Net -400 ml        Physical Examination:     I had a face to face encounter with this patient and independently examined them on 4/3/2022 as outlined below:          Constitutional:  No acute distress, cooperative, pleasant    ENT:  Oral mucosa moist, oropharynx benign. Resp:  CTA bilaterally. No wheezing/rhonchi/rales. No accessory muscle use   CV:  Regular rhythm, normal rate, no murmurs, gallops, rubs    GI:  Soft, non distended, non tender. normoactive bowel sounds, no hepatosplenomegaly     Musculoskeletal:  No edema, warm, 2+ pulses throughout    Neurologic:  Moves all extremities. LILLIANAOx3, CN II-XII reviewed            Data Review:    Review and/or order of clinical lab test  Review and/or order of tests in the radiology section of [facility]      Labs:     Recent Labs     04/03/22 0152 04/02/22 0242   WBC 13.8* 15.1*   HGB 9.9* 11.4*   HCT 30.4* 35.6    417*     Recent Labs     04/03/22  0152 04/02/22 0242 04/01/22  0754   * 128* 130*   K 3.5 4.1 4.0   CL 98 99 98   CO2 26 23 26   BUN 14 15 15   CREA 0.74 0.81 0.78   * 114* 93   CA 8.5 8.7 9.3     Recent Labs     04/03/22 0152 04/02/22 0242 04/01/22  0754   ALT 14 20 18   AP 97 102 88   TBILI 0.5 0.3 0.6   TP 6.2* 6.6 6.1*   ALB 2.6* 2.7* 3.0*   GLOB 3.6 3.9 3.1     Recent Labs     04/01/22  1019   INR 1.1   PTP 11.1      No results for input(s): FE, TIBC, PSAT, FERR in the last 72 hours. Lab Results   Component Value Date/Time    Folate >20.0 05/15/2018 08:12 AM      No results for input(s): PH, PCO2, PO2 in the last 72 hours. No results for input(s): CPK, CKNDX, TROIQ in the last 72 hours.     No lab exists for component: CPKMB  Lab Results   Component Value Date/Time    Cholesterol, total 218 (H) 03/10/2022 09:09 AM    HDL Cholesterol 63 03/10/2022 09:09 AM    LDL, calculated 136.4 (H) 03/10/2022 09:09 AM    Triglyceride 93 03/10/2022 09:09 AM    CHOL/HDL Ratio 3.5 03/10/2022 09:09 AM     Lab Results   Component Value Date/Time    Glucose (POC) 89 03/16/2022 07:29 AM    Glucose (POC) 165 (H) 01/28/2010 12:15 PM     Lab Results   Component Value Date/Time    Color YELLOW/STRAW 03/03/2022 03:20 PM    Appearance CLEAR 03/03/2022 03:20 PM    Specific gravity 1.009 03/03/2022 03:20 PM    pH (UA) 6.5 03/03/2022 03:20 PM    Protein Negative 03/03/2022 03:20 PM    Glucose Negative 03/03/2022 03:20 PM    Ketone Negative 03/03/2022 03:20 PM    Bilirubin Negative 03/03/2022 03:20 PM    Urobilinogen 0.2 03/03/2022 03:20 PM    Nitrites Negative 03/03/2022 03:20 PM    Leukocyte Esterase MODERATE (A) 03/03/2022 03:20 PM    Epithelial cells FEW 03/03/2022 03:20 PM    Bacteria Negative 03/03/2022 03:20 PM    WBC 20-50 03/03/2022 03:20 PM    RBC 0-5 03/03/2022 03:20 PM         Medications Reviewed:     Current Facility-Administered Medications   Medication Dose Route Frequency    [START ON 4/4/2022] amLODIPine (NORVASC) tablet 10 mg  10 mg Oral DAILY    sodium chloride (NS) flush 5-40 mL  5-40 mL IntraVENous Q8H    sodium chloride (NS) flush 5-40 mL  5-40 mL IntraVENous PRN    acetaminophen (TYLENOL) tablet 650 mg  650 mg Oral Q6H    oxyCODONE IR (ROXICODONE) tablet 5 mg  5 mg Oral Q3H PRN    naloxone (NARCAN) injection 0.4 mg  0.4 mg IntraVENous PRN    hydrOXYzine HCL (ATARAX) tablet 10 mg  10 mg Oral Q8H PRN    famotidine (PEPCID) tablet 20 mg  20 mg Oral BID    polyethylene glycol (MIRALAX) packet 17 g  17 g Oral DAILY    bisacodyL (DULCOLAX) suppository 10 mg  10 mg Rectal DAILY PRN    enoxaparin (LOVENOX) injection 40 mg  40 mg SubCUTAneous DAILY    traMADoL (ULTRAM) tablet 50 mg  50 mg Oral Q6H PRN    losartan (COZAAR) tablet 100 mg  100 mg Oral DAILY    hydrALAZINE (APRESOLINE) 20 mg/mL injection 10 mg  10 mg IntraVENous Q6H PRN    senna-docusate (PERICOLACE) 8.6-50 mg per tablet 1 Tablet  1 Tablet Oral BID    calcium carbonate (TUMS) chewable tablet 400 mg [elemental]  400 mg Oral PRN    levothyroxine (SYNTHROID) tablet 112 mcg  112 mcg Oral ACB    therapeutic multivitamin (THERAGRAN) tablet 1 Tablet  1 Tablet Oral DAILY    atorvastatin (LIPITOR) tablet 20 mg  20 mg Oral QHS    traMADoL (ULTRAM) tablet 50 mg  50 mg Oral Q6H PRN    HYDROmorphone (DILAUDID) injection 0.5 mg  0.5 mg IntraVENous Q6H PRN     ______________________________________________________________________  EXPECTED LENGTH OF STAY: 3d 16h  ACTUAL LENGTH OF STAY:          6                 Benson Slaughter NP

## 2022-04-04 VITALS
HEART RATE: 76 BPM | RESPIRATION RATE: 17 BRPM | TEMPERATURE: 98 F | DIASTOLIC BLOOD PRESSURE: 63 MMHG | OXYGEN SATURATION: 97 % | SYSTOLIC BLOOD PRESSURE: 133 MMHG

## 2022-04-04 LAB
ALBUMIN SERPL-MCNC: 2.5 G/DL (ref 3.5–5)
ALBUMIN/GLOB SERPL: 0.7 {RATIO} (ref 1.1–2.2)
ALP SERPL-CCNC: 93 U/L (ref 45–117)
ALT SERPL-CCNC: 14 U/L (ref 12–78)
ANION GAP SERPL CALC-SCNC: 6 MMOL/L (ref 5–15)
AST SERPL-CCNC: 14 U/L (ref 15–37)
BASOPHILS # BLD: 0.1 K/UL (ref 0–0.1)
BASOPHILS NFR BLD: 1 % (ref 0–1)
BILIRUB SERPL-MCNC: 0.4 MG/DL (ref 0.2–1)
BUN SERPL-MCNC: 11 MG/DL (ref 6–20)
BUN/CREAT SERPL: 16 (ref 12–20)
CALCIUM SERPL-MCNC: 8.8 MG/DL (ref 8.5–10.1)
CHLORIDE SERPL-SCNC: 99 MMOL/L (ref 97–108)
CO2 SERPL-SCNC: 25 MMOL/L (ref 21–32)
CREAT SERPL-MCNC: 0.67 MG/DL (ref 0.55–1.02)
DIFFERENTIAL METHOD BLD: ABNORMAL
EOSINOPHIL # BLD: 0.2 K/UL (ref 0–0.4)
EOSINOPHIL NFR BLD: 2 % (ref 0–7)
ERYTHROCYTE [DISTWIDTH] IN BLOOD BY AUTOMATED COUNT: 13 % (ref 11.5–14.5)
GLOBULIN SER CALC-MCNC: 3.5 G/DL (ref 2–4)
GLUCOSE SERPL-MCNC: 105 MG/DL (ref 65–100)
HCT VFR BLD AUTO: 29.2 % (ref 35–47)
HGB BLD-MCNC: 9.7 G/DL (ref 11.5–16)
IMM GRANULOCYTES # BLD AUTO: 0.1 K/UL (ref 0–0.04)
IMM GRANULOCYTES NFR BLD AUTO: 1 % (ref 0–0.5)
LYMPHOCYTES # BLD: 1.4 K/UL (ref 0.8–3.5)
LYMPHOCYTES NFR BLD: 14 % (ref 12–49)
MCH RBC QN AUTO: 28.2 PG (ref 26–34)
MCHC RBC AUTO-ENTMCNC: 33.2 G/DL (ref 30–36.5)
MCV RBC AUTO: 84.9 FL (ref 80–99)
MONOCYTES # BLD: 1 K/UL (ref 0–1)
MONOCYTES NFR BLD: 11 % (ref 5–13)
NEUTS SEG # BLD: 6.9 K/UL (ref 1.8–8)
NEUTS SEG NFR BLD: 71 % (ref 32–75)
NRBC # BLD: 0 K/UL (ref 0–0.01)
NRBC BLD-RTO: 0 PER 100 WBC
PLATELET # BLD AUTO: 398 K/UL (ref 150–400)
PMV BLD AUTO: 8.6 FL (ref 8.9–12.9)
POTASSIUM SERPL-SCNC: 3.4 MMOL/L (ref 3.5–5.1)
PROT SERPL-MCNC: 6 G/DL (ref 6.4–8.2)
RBC # BLD AUTO: 3.44 M/UL (ref 3.8–5.2)
SODIUM SERPL-SCNC: 130 MMOL/L (ref 136–145)
WBC # BLD AUTO: 9.7 K/UL (ref 3.6–11)

## 2022-04-04 PROCEDURE — 74011250637 HC RX REV CODE- 250/637: Performed by: PHYSICIAN ASSISTANT

## 2022-04-04 PROCEDURE — 74011250636 HC RX REV CODE- 250/636: Performed by: PHYSICIAN ASSISTANT

## 2022-04-04 PROCEDURE — 77030028907 HC WRP KNEE WO BGS SOLM -B

## 2022-04-04 PROCEDURE — 36415 COLL VENOUS BLD VENIPUNCTURE: CPT

## 2022-04-04 PROCEDURE — 74011000250 HC RX REV CODE- 250: Performed by: PHYSICIAN ASSISTANT

## 2022-04-04 PROCEDURE — 80053 COMPREHEN METABOLIC PANEL: CPT

## 2022-04-04 PROCEDURE — 74011250637 HC RX REV CODE- 250/637: Performed by: NURSE PRACTITIONER

## 2022-04-04 PROCEDURE — 85025 COMPLETE CBC W/AUTO DIFF WBC: CPT

## 2022-04-04 RX ORDER — POTASSIUM CHLORIDE 750 MG/1
20 TABLET, FILM COATED, EXTENDED RELEASE ORAL
Status: COMPLETED | OUTPATIENT
Start: 2022-04-04 | End: 2022-04-04

## 2022-04-04 RX ADMIN — LOSARTAN POTASSIUM 100 MG: 50 TABLET, FILM COATED ORAL at 09:15

## 2022-04-04 RX ADMIN — OXYCODONE 5 MG: 5 TABLET ORAL at 12:21

## 2022-04-04 RX ADMIN — POTASSIUM CHLORIDE 20 MEQ: 750 TABLET, FILM COATED, EXTENDED RELEASE ORAL at 07:36

## 2022-04-04 RX ADMIN — ACETAMINOPHEN 650 MG: 325 TABLET ORAL at 06:25

## 2022-04-04 RX ADMIN — POLYETHYLENE GLYCOL 3350 17 G: 17 POWDER, FOR SOLUTION ORAL at 09:13

## 2022-04-04 RX ADMIN — THERA TABS 1 TABLET: TAB at 09:14

## 2022-04-04 RX ADMIN — LEVOTHYROXINE SODIUM 112 MCG: 0.11 TABLET ORAL at 06:25

## 2022-04-04 RX ADMIN — SENNOSIDES AND DOCUSATE SODIUM 1 TABLET: 50; 8.6 TABLET ORAL at 09:14

## 2022-04-04 RX ADMIN — FAMOTIDINE 20 MG: 20 TABLET, FILM COATED ORAL at 09:14

## 2022-04-04 RX ADMIN — SODIUM CHLORIDE, PRESERVATIVE FREE 10 ML: 5 INJECTION INTRAVENOUS at 06:25

## 2022-04-04 RX ADMIN — AMLODIPINE BESYLATE 10 MG: 5 TABLET ORAL at 09:14

## 2022-04-04 RX ADMIN — ENOXAPARIN SODIUM 40 MG: 100 INJECTION SUBCUTANEOUS at 09:17

## 2022-04-04 RX ADMIN — TRAMADOL HYDROCHLORIDE 50 MG: 50 TABLET, COATED ORAL at 09:14

## 2022-04-04 RX ADMIN — ACETAMINOPHEN 650 MG: 325 TABLET ORAL at 00:00

## 2022-04-04 NOTE — PROGRESS NOTES
Resting comfortably. No acute complaints      GEN:  NAD.  AOx3   ABD:  S/NT/ND   LLE:  Dressing changed at bedside, Incision C/D/I, 5/5 motor, Calf nttp (Bilat), Sensation rossly intact to light touch throughout, 1+ dp/pt pulses, foot perfused    Patient Vitals for the past 24 hrs:   Temp Pulse Resp BP SpO2   04/04/22 0825 98 °F (36.7 °C) 82 17 (!) 190/77 97 %   04/04/22 0146 97.9 °F (36.6 °C) 82 16 (!) 167/76 95 %   04/03/22 2102 97.9 °F (36.6 °C) 87 16 (!) 153/76 96 %   04/03/22 1431 99.8 °F (37.7 °C) 89 16 (!) 158/75 96 %   04/03/22 1016 98.2 °F (36.8 °C) 92 16 (!) 149/71 95 %       Current Facility-Administered Medications   Medication Dose Route Frequency    amLODIPine (NORVASC) tablet 10 mg  10 mg Oral DAILY    sodium chloride (NS) flush 5-40 mL  5-40 mL IntraVENous Q8H    sodium chloride (NS) flush 5-40 mL  5-40 mL IntraVENous PRN    acetaminophen (TYLENOL) tablet 650 mg  650 mg Oral Q6H    oxyCODONE IR (ROXICODONE) tablet 5 mg  5 mg Oral Q3H PRN    naloxone (NARCAN) injection 0.4 mg  0.4 mg IntraVENous PRN    hydrOXYzine HCL (ATARAX) tablet 10 mg  10 mg Oral Q8H PRN    famotidine (PEPCID) tablet 20 mg  20 mg Oral BID    polyethylene glycol (MIRALAX) packet 17 g  17 g Oral DAILY    bisacodyL (DULCOLAX) suppository 10 mg  10 mg Rectal DAILY PRN    enoxaparin (LOVENOX) injection 40 mg  40 mg SubCUTAneous DAILY    traMADoL (ULTRAM) tablet 50 mg  50 mg Oral Q6H PRN    losartan (COZAAR) tablet 100 mg  100 mg Oral DAILY    hydrALAZINE (APRESOLINE) 20 mg/mL injection 10 mg  10 mg IntraVENous Q6H PRN    senna-docusate (PERICOLACE) 8.6-50 mg per tablet 1 Tablet  1 Tablet Oral BID    calcium carbonate (TUMS) chewable tablet 400 mg [elemental]  400 mg Oral PRN    levothyroxine (SYNTHROID) tablet 112 mcg  112 mcg Oral ACB    therapeutic multivitamin (THERAGRAN) tablet 1 Tablet  1 Tablet Oral DAILY    atorvastatin (LIPITOR) tablet 20 mg  20 mg Oral QHS    traMADoL (ULTRAM) tablet 50 mg  50 mg Oral Q6H PRN    HYDROmorphone (DILAUDID) injection 0.5 mg  0.5 mg IntraVENous Q6H PRN       Lab Results   Component Value Date/Time    HGB 9.7 (L) 04/04/2022 01:55 AM    INR 1.1 04/01/2022 10:19 AM       Lab Results   Component Value Date/Time    Sodium 130 (L) 04/04/2022 01:55 AM    Potassium 3.4 (L) 04/04/2022 01:55 AM    Chloride 99 04/04/2022 01:55 AM    CO2 25 04/04/2022 01:55 AM    BUN 11 04/04/2022 01:55 AM    Creatinine 0.67 04/04/2022 01:55 AM    Calcium 8.8 04/04/2022 01:55 AM    Magnesium 2.1 03/30/2022 05:50 AM    Phosphorus 1.9 (L) 03/30/2022 05:50 AM       80 y.o. female s/p left uni compartmental knee arthroplasty on 3/16/22. Had a fall Sunday evening directly onto knee. Seen in clinic on 3/28/22 and found to have a left periprosthetic tibia fracture. Taken back to OR 4/1/22 for revision left knee arthroplasty, both components. Pain overall controlled. Work with PT, okay to WBAT, No ROM x 7 days. DVT Prophylaxis: Lovenox 40mg SQ daily for DVT prophylaxis. Weight Bearing: WBAT LLE, No ROM x 7 days, remain in TROM brace with ambulation locked at zero. Pain Control:  Tyleno,tramadol every 6 hours, oxy 5 mg for breakthrough pain  Disposition: Plans to dispo to SNF once cleared by PT and remains medically stable. Accepted to [facility]. CM Aware - discussed with them today for possible transfer if cleared by PT. PT aware of plan.

## 2022-04-04 NOTE — PROGRESS NOTES
SIMON: The patient plans to discharge to [facility] today with BLS transport at 1pm. RN to call report to: [phone]. RUR: 10%    Medicare pt has received, reviewed, and signed 2nd IM letter informing them of their right to appeal the discharge. Signed copied has been placed on pt bedside chart. Transition of Care Plan to SNF/Rehab    SNF/Rehab Transition:  Patient has been accepted to [facility] and meets criteria for admission. Patient will transported by [facility] and expected to leave at 1pm.    Communication to Patient/Family:  Met with patient (identified care giver) and they are agreeable to the transition plan. Communication to SNF/Rehab:  Bedside RN, Lorin Baker, has been notified to update the transition plan to the facility and call report (phone number [phone]  Discharge information has been updated on the AVS.     Discharge instructions to be fax'd to facility at [facility] # [phone]). Nursing Please include all hard scripts for controlled substances, med rec and dc summary, and AVS in packet. Reviewed and confirmed with facility, [facility], can manage the patient care needs for the following:     SNF/Rehab Transition:  Patient to follow-up with Home Health: [facility]  Reviewed and confirmed with facility, [facility] they can manage the patient care needs for the following:     Rohit Baldwin with (X) only those applicable:    Medication:  [x]  Medications will be available at the facility  []  IV Antibiotics   []  Controlled Substance - hard copy to be sent with patient   []  Weekly Labs   Documents:  [x] Hard RX  [x] MAR  [x] Kardex  [x] AVS  [x]Transfer Summary  [x]Discharge   Equipment:  []  CPAP/BiPAP  []  Wound Vacuum  []  [place] or Urinary Device  []  PICC/Central Line  []  Nebulizer  []  Ventilator   Treatment:  []Isolation (for MRSA, VRE, etc.)  []Surgical Drain Management  []Tracheostomy Care  []Dressing Changes  []Dialysis with transportation and chair time.   []PEG Care  []Oxygen  []Daily Weights for Heart Failure   Dietary:  []Any diet limitations  []Tube Feedings   []Total Parenteral Management (TPN)   Eligible for Medicaid Long Term Services and Supports  Yes:  [] Eligible for medical assistance or will become eligible within 180 days and UAI completed. [] Provider/Patient and/or support system has requested screening. [] UAI copy provided to patient or responsible party. [] UAI unavailable at discharge will send once processed to SNF provider. [] UAI unavailable at discharged mailed to patient  No:   [] Private pay and is not financially eligible for Medicaid within the next 180 days. [] Reside out-of-state. [] A residents of a state owned/operated facility that is licensed  by [address] [facility] or [facility]  [] Enrollment in [facility] hospice services  [] [address] [place] [address] [address]  [] Patient /Family declines to have screening completed or provide financial information for screening     Financial Resources:  Medicaid    [] Initiated and application pending   [] Full coverage     Advanced Care Plan:  []Surrogate Decision Maker of Care  []POA  []Communicated Code Status Full (DDNR\", \"Full\")    Other     Financial Resources:  Medicaid    [] Initiated and application pending   [] Full coverage     Advanced Care Plan:  []Surrogate Decision Maker of Care  []POA  []Communicated Code Status Full (DDNR\", \"Full\")    Other     CM following for discharge needs.     Perlita Maxwell RN/CRM

## 2022-04-04 NOTE — DISCHARGE INSTRUCTIONS
Discharge Instructions Knee Replacement  Dr. Lucy Peck  Patient Name  Taylor Prudent  Date of procedure  4/1/2022    Procedure  Procedure(s):  LEFT TOTAL KNEE ARTHROPLASTY REVISION  Surgeon  Surgeon(s) and Role:     * Javi Dover MD - Primary  Date of discharge: No discharge date for patient encounter. PCP: Thi Sol MD    Follow up care   Follow up visit with Dr. Lucy Peck in 4 weeks. Call [phone] Eddi Powell) to make an appointment.  If Home Health has been arranged for you, they will call you to arrange dates/times for visits. Call them if you do not hear from them within 24 hours after you go home. Activity: Weight Bearing: weight bearing as tolerated to LLE, No ROM x 7 days, remain in TROM brace with ambulation locked at zero   Take a short walk every hour; except at night when sleeping.  Do your Home Exercise Program 3 times every day.  After exercising lie down and elevate your leg on pillows for 15-30 minutes to decrease swelling.  Refer to your patient notebook for more information. Bathing and caring for your incision   You may take a shower with your waterproof dressing on your knee.  The waterproof dressing is to stay on your knee for 7 days.  On the 7th day have someone gently peel the dressing off by lifting the edge and stretching it to break the seal.   You may then leave your incision open to air unless you see drainage from your knee. Preventing blood clots   Take Lovenox daily as prescribed    Call Dr. Lucy Peck for signs of a blood clot in your leg: calf pain, tenderness, redness, swelling of lower leg   Preventing lung congestion   Use your incentive spirometer 4 times a day; do 10 repetitions each time   Remember to keep the small blue ball between the two arrows when taking a slow, deep breath   Pain Management   Get up and walk a short distance to relieve pain and stiffness.  Place ice wrap on your knee except when you are walking.  The gel ice packs should be changed about every 4 hours.  Elevate your leg on pillows for 15-30 minutes. Pain Medications   Take Tylenol 650mg (take two 325mg tablets) every 6 hours for the next 4 weeks.  Take Famotidine (Pepcid) 20mg twice a day to prevent an upset stomach (if taking Aspirin and/or Meloxicam).  If needed, take Tramadol (narcotic pain pill) every 6 hours as prescribed.  If Tramadol has not relieved your pain within 1 hour, take Oxycodone 5mg (narcotic pain pill). Take Oxycodone only if needed and stop once your pain is tolerable.  Take all medications with a small amount of food.  As your pain decreases, take the narcotics less often or take ½ of a pill.  Call Dr. Lucy Peck if you have side effects from your narcotic pain medication: itching, drowsiness, dizziness, upset stomach, dry mouth, constipation or if you medication is not relieving your pain. Diet after surgery   You may resume your normal diet. Include vegetables, fruit, whole grains, lean meats, and low-fat dairy products. Eat food high in fiber.  Drink plenty of fluids, including 8 cups of water daily.  Take a stool softener (Senokot-s or Colace) to prevent constipation. If constipation occurs you may take a laxative (Milk of Magnesia, Dulcolax tablets). Avoid after surgery   Do not take any over-the-counter medication for pain except Tylenol   Do not take more than 3000mg (3 Grams) of Tylenol in 24 hours   Do not drink alcoholic beverages   Do not smoke   Do not drive until seen for follow up appointment   Do not place frozen gel pack directly on your skin. It can cause frostbite.  Do not take a tub bath, swim or get in a hot tub for 8 weeks  Prevention of falls and safety at home   Set up an area where you can rest comfortably leaving space around furniture to allow you to walk with your walker.  Keep stairs, hallways and bathrooms well lit; especially at night.    Arrange for care for your pets   Keep your home free of clutter. Call Dr. Natividad Hoang at [phone] for:   Pain that is not relieved by pain medication, ice and activity   Side effects of medications   Increased/spread of bruising   Warning signs of infection:  ? persistent fever greater than 100 degrees  ? shaking or chills  ? increased redness, tenderness, swelling or drainage from incision  ? increased pain during activity or rest   Warning signs of a blood clot in your leg:  ? increased pain in your calf  ? tenderness or redness  ? increased swelling or knee, calf, ankle or foot    Call [phone] after 5pm or on a weekend.  The on call physician will return your phone call  Call your Primary Care Doctor for:    Concerns about your medical conditions such as diabetes, high blood pressure, asthma, congestive heart failure   Blood sugars greater than 180   Persistent headache or dizziness   Coughing or congestion   Constipation or diarrhea   Burning when you go to the bathroom   Abnormal heart rate (fast or  slow)      Call 911 and go to the nearest hospital for:    Sudden increased shortness of breath   Sudden onset of chest pain   Difficulty breathing   Localized chest pain with coughing or taking a deep breath

## 2022-04-04 NOTE — PROGRESS NOTES
TRANSFER - OUT REPORT:    Verbal report given to Mayra (name) on [place]  being transferred to [facility] SNF(unit) for routine progression of care       Report consisted of patients Situation, Background, Assessment and   Recommendations(SBAR). Information from the following report(s) SBAR, Kardex, Intake/Output and MAR was reviewed with the receiving nurse. Lines:   Peripheral IV 03/29/22 Left Antecubital (Active)   Site Assessment Clean, dry, & intact 04/04/22 0825   Phlebitis Assessment 0 04/04/22 0825   Infiltration Assessment 0 04/03/22 2000   Dressing Status Clean, dry, & intact 04/04/22 0825   Dressing Type Transparent 04/04/22 0825   Hub Color/Line Status Capped 04/04/22 0825   Action Taken Open ports on tubing capped 03/30/22 0814   Alcohol Cap Used Yes 04/04/22 0825       Peripheral IV 04/01/22 Left Hand (Active)   Site Assessment Clean, dry, & intact 04/04/22 0825   Phlebitis Assessment 0 04/04/22 0825   Infiltration Assessment 0 04/04/22 0825   Dressing Status Clean, dry, & intact 04/04/22 0825   Dressing Type Transparent 04/04/22 0825   Hub Color/Line Status Capped 04/04/22 0825   Alcohol Cap Used Yes 04/04/22 0825        Opportunity for questions and clarification was provided.       Patient transported with:

## 2022-04-04 NOTE — PROGRESS NOTES
[facility]                                                                                          Hospitalist Progress Note  Rose Easley NP  Answering service: [phone] OR [phone] from in house phone        Date of Service:  2022  NAME:  Imtiaz Salazar  :  1940  MRN:  [id number]      Admission Summary:   Per the H&P, \" Jacque Pham a 80 y.o. 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  She was status post left unicompartmental knee arthroplasty that was done on 2022.  She was ambulating down the stairs at her home, when she fell on the affected knee.  She was unable to bear weight so advised  By her orthopedic surgeon Dr. Latasha Bangura come to the hospital for admission.     In the ED, SBP range between 158-169.  Other VSS.  Left knee x-ray showed displaced fracture inferior to the implant.     She denies any headaches or dizziness. Denies any cough, chest pain, shortness of breath. Denies fever or chills. Denies nausea, vomiting, diarrhea, constipation. \"  Hospitalist service was consulted for assistance with medical management    Interval history / Subjective:     I saw the patient this morning on rounds, no acute events overnight         Assessment & Plan:     Left periprosthetic displaced tibial fracture  Orthopedics following  Postop day 3 left knee arthroplasty revision  Continuing with pain control, the rest per primary team, orthopedics            Hyponatremia  Patient with hyponatremia, had low sodium, which trended down with IV fluids.     Patient had been receiving IV hydration postop, will discontinue this  Hydrochlorothiazide has been discontinued  Sodium improved, 130 today  Continue fluid restriction                Hypertension  Blood pressure was elevated this morning however that was prior to antihypertensives. Continue amlodipine 10 mg  Continue losartan 100 mg  Blood pressure at goal after antihypertensives, 133/67                Hyperlipidemia  Stable  Continuing atorvastatin    Hypothyroid  Stable  TSH was 2.5 on 3/10  Continuing levothyroxine    Remote history of histoplasmosis  Patient asymptomatic  No oxygen requirement    Code status: Full    DVT prophylaxis: LMWH    Care Plan discussed with: Patient/Family, Nurse,  and Consultant ortho     Anticipated Disposition: SNF/LTC     Anticipated Discharge: 24 hours to 48 hours       Thank you for the opportunity to participate in the care of this patient. Blood pressure stable on current regimen. Will also need to continue with fluid restriction 1200/day. Hospitalist service will sign off. Hospital Problems  Date Reviewed: 3/28/2022          Codes Class Noted POA    Periprosthetic fracture of proximal end of tibia ICD-10-CM: M97. Tasha Call  ICD-9-CM: 996.44, V43.65  3/28/2022 Unknown                Review of Systems:   A comprehensive review of systems was negative except for that written in the HPI. Vital Signs:    Last 24hrs VS reviewed since prior progress note. Most recent are:  Visit Vitals  BP (!) 190/77 (BP 1 Location: Right upper arm, BP Patient Position: At rest)   Pulse 82   Temp 98 °F (36.7 °C)   Resp 17   SpO2 97%         Intake/Output Summary (Last 24 hours) at 4/4/2022 1047  Last data filed at 4/4/2022 0825  Gross per 24 hour   Intake 200 ml   Output 900 ml   Net -700 ml        Physical Examination:     I had a face to face encounter with this patient and independently examined them on 4/4/2022 as outlined below:          Constitutional:  No acute distress, cooperative, pleasant    ENT:  Oral mucosa moist, oropharynx benign. Resp:  CTA bilaterally. No wheezing/rhonchi/rales.  No accessory muscle use   CV:  Regular rhythm, normal rate, no murmurs, gallops, rubs GI:  Soft, non distended, non tender. normoactive bowel sounds, no hepatosplenomegaly     Musculoskeletal:  No edema, warm, 2+ pulses throughout    Neurologic:  Moves all extremities. AAOx3, CN II-XII reviewed            Data Review:    Review and/or order of clinical lab test  Review and/or order of tests in the radiology section of [facility]      Labs:     Recent Labs     04/04/22 0155 04/03/22 0152   WBC 9.7 13.8*   HGB 9.7* 9.9*   HCT 29.2* 30.4*    392     Recent Labs     04/04/22 0155 04/03/22 0152 04/02/22  0242   * 127* 128*   K 3.4* 3.5 4.1   CL 99 98 99   CO2 25 26 23   BUN 11 14 15   CREA 0.67 0.74 0.81   * 122* 114*   CA 8.8 8.5 8.7     Recent Labs     04/04/22 0155 04/03/22 0152 04/02/22  0242   ALT 14 14 20   AP 93 97 102   TBILI 0.4 0.5 0.3   TP 6.0* 6.2* 6.6   ALB 2.5* 2.6* 2.7*   GLOB 3.5 3.6 3.9     No results for input(s): INR, PTP, APTT, INREXT, INREXT in the last 72 hours. No results for input(s): FE, TIBC, PSAT, FERR in the last 72 hours. Lab Results   Component Value Date/Time    Folate >20.0 05/15/2018 08:12 AM      No results for input(s): PH, PCO2, PO2 in the last 72 hours. No results for input(s): CPK, CKNDX, TROIQ in the last 72 hours.     No lab exists for component: CPKMB  Lab Results   Component Value Date/Time    Cholesterol, total 218 (H) 03/10/2022 09:09 AM    HDL Cholesterol 63 03/10/2022 09:09 AM    LDL, calculated 136.4 (H) 03/10/2022 09:09 AM    Triglyceride 93 03/10/2022 09:09 AM    CHOL/HDL Ratio 3.5 03/10/2022 09:09 AM     Lab Results   Component Value Date/Time    Glucose (POC) 89 03/16/2022 07:29 AM    Glucose (POC) 165 (H) 01/28/2010 12:15 PM     Lab Results   Component Value Date/Time    Color YELLOW/STRAW 03/03/2022 03:20 PM    Appearance CLEAR 03/03/2022 03:20 PM    Specific gravity 1.009 03/03/2022 03:20 PM    pH (UA) 6.5 03/03/2022 03:20 PM    Protein Negative 03/03/2022 03:20 PM    Glucose Negative 03/03/2022 03:20 PM    Ketone Negative 03/03/2022 03:20 PM    Bilirubin Negative 03/03/2022 03:20 PM    Urobilinogen 0.2 03/03/2022 03:20 PM    Nitrites Negative 03/03/2022 03:20 PM    Leukocyte Esterase MODERATE (A) 03/03/2022 03:20 PM    Epithelial cells FEW 03/03/2022 03:20 PM    Bacteria Negative 03/03/2022 03:20 PM    WBC 20-50 03/03/2022 03:20 PM    RBC 0-5 03/03/2022 03:20 PM         Medications Reviewed:     Current Facility-Administered Medications   Medication Dose Route Frequency    amLODIPine (NORVASC) tablet 10 mg  10 mg Oral DAILY    sodium chloride (NS) flush 5-40 mL  5-40 mL IntraVENous Q8H    sodium chloride (NS) flush 5-40 mL  5-40 mL IntraVENous PRN    acetaminophen (TYLENOL) tablet 650 mg  650 mg Oral Q6H    oxyCODONE IR (ROXICODONE) tablet 5 mg  5 mg Oral Q3H PRN    naloxone (NARCAN) injection 0.4 mg  0.4 mg IntraVENous PRN    hydrOXYzine HCL (ATARAX) tablet 10 mg  10 mg Oral Q8H PRN    famotidine (PEPCID) tablet 20 mg  20 mg Oral BID    polyethylene glycol (MIRALAX) packet 17 g  17 g Oral DAILY    bisacodyL (DULCOLAX) suppository 10 mg  10 mg Rectal DAILY PRN    enoxaparin (LOVENOX) injection 40 mg  40 mg SubCUTAneous DAILY    traMADoL (ULTRAM) tablet 50 mg  50 mg Oral Q6H PRN    losartan (COZAAR) tablet 100 mg  100 mg Oral DAILY    hydrALAZINE (APRESOLINE) 20 mg/mL injection 10 mg  10 mg IntraVENous Q6H PRN    senna-docusate (PERICOLACE) 8.6-50 mg per tablet 1 Tablet  1 Tablet Oral BID    calcium carbonate (TUMS) chewable tablet 400 mg [elemental]  400 mg Oral PRN    levothyroxine (SYNTHROID) tablet 112 mcg  112 mcg Oral ACB    therapeutic multivitamin (THERAGRAN) tablet 1 Tablet  1 Tablet Oral DAILY    atorvastatin (LIPITOR) tablet 20 mg  20 mg Oral QHS    traMADoL (ULTRAM) tablet 50 mg  50 mg Oral Q6H PRN    HYDROmorphone (DILAUDID) injection 0.5 mg  0.5 mg IntraVENous Q6H PRN     ______________________________________________________________________  EXPECTED LENGTH OF STAY: 3d 16h  ACTUAL LENGTH OF STAY: [facility]

## 2022-04-04 NOTE — PROGRESS NOTES
Ortho NP Note    POD# 3  s/p LEFT TOTAL KNEE ARTHROPLASTY REVISION   Pt seen with tech at bedside    Pt seated in chair. Reports that she has had episode of incontinence--continues to state she is unable to use BSC. Denies pain at present, no CP, SOB.       VSS Afebrile. Visit Vitals  BP (!) 190/77 (BP 1 Location: Right upper arm, BP Patient Position: At rest) Comment: medicated with morning meds   Pulse 82   Temp 98 °F (36.7 °C)   Resp 17   SpO2 97%     Blood pressure recheck after medication administration:  133/67. Voiding status: spontaneous void, incontinent of bowel at times. Output (mL)  Urine Voided: 900 ml (04/03/22 1547)  Last Bowel Movement Date: 03/31/22 (03/31/22 2107)  Unmeasurable Output  Urine Occurrence(s): 1 (04/04/22 0408)  Stool Occurrence(s): 1 (03/31/22 0703)  Straight Cath  Straight Cath: Nurse performed cath;Sterile technique used (04/01/22 1555)  Number of Attempts: 1 (04/01/22 1555)  Catheter Size: 16 FR (04/01/22 1555)      Labs    Lab Results   Component Value Date/Time    HGB 9.7 (L) 04/04/2022 01:55 AM      Lab Results   Component Value Date/Time    INR 1.1 04/01/2022 10:19 AM      Lab Results   Component Value Date/Time    Sodium 130 (L) 04/04/2022 01:55 AM    Potassium 3.4 (L) 04/04/2022 01:55 AM    Chloride 99 04/04/2022 01:55 AM    CO2 25 04/04/2022 01:55 AM    Glucose 105 (H) 04/04/2022 01:55 AM    BUN 11 04/04/2022 01:55 AM    Creatinine 0.67 04/04/2022 01:55 AM    Calcium 8.8 04/04/2022 01:55 AM     Recent Glucose Results:   Lab Results   Component Value Date/Time     (H) 04/04/2022 01:55 AM           There is no height or weight on file to calculate BMI. : A BMI > 30 is classified as obesity and > 40 is classified as morbid obesity. Aquacel dressing to L knee c.d.i, brace in place. Cryotherapy in place over incision  Calves soft and supple; No pain with passive stretch  Bilateral LEs warm, dry. 2+ DP pulses.     Sensation and motor intact - PF/DF/EHL intact 5/5  Foot Pumps for mechanical DVT proph while in bed     PLAN:  1) PT: BID WBAT. Weight bearing as tolerated to left lower extremity. No ROM x 7 days, remain in TROM brace with ambulation locked at zero. 2) Anticoagulation:  Lovenox 40 mg sq daily for DVT Prophylaxis. Encouraged early mobilization, bed exercises, and SCD use. 3) GI Prophylaxis - Pepcid  4) Pain - Multimodal approach including cryotherapy, scheduled Tylenol with  PRN tramadol, oxycodone  5) Hyponatremia:  Na trending up, 130 today. Plan for amlodipine 10 mg daily at discharge as opposed to HCTZ. Continue fluid restriction of 1.2 L daily. Hospitalist following--appreciate input. 6) Hx of HTN:  Most recent /67. Continue losartan, amlodipine. 7) Readniess for discharge: To SNF today at 1:30. Discharge to SNF [facility]) today.       Rickie Burdick NP  Available via [employer]

## 2022-04-04 NOTE — DISCHARGE SUMMARY
Ortho Discharge Summary    Patient ID:  Madiha Roberson  [id number]  female  80 y.o.  1940    Admit date: 3/28/2022    Discharge date: 4/4/2022    Admitting Physician: Yumiko Alvarado MD     Consulting Physician(s):   Treatment Team: Attending Provider: Ryann Valdez MD; Consulting Provider: Dwaine Gray MD; Utilization Review: Andreas Miller; Care Manager: Linda De Anda RN; Consulting Provider: Severo Bianchi NP; Staff Nurse: Houston Echevarria RN; Occupational Therapist: Autumn Flynn OT; Physical Therapy Assistant: Nelsy Bustamante    Date of Surgery:   4/1/2022     Preoperative Diagnosis:  TIBIA FRACTURE    Postoperative Diagnosis:   TIBIA FRACTURE    Procedure(s):   LEFT TOTAL KNEE ARTHROPLASTY REVISION     Anesthesia Type:   Spinal     Surgeon: Ryann Valdez MD                            HPI:  Crow Webster a 80 y.o. 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  She was status post left unicompartmental knee arthroplasty that was done on March 16, 2022.  She was ambulating down the stairs at her home, when she fell on the affected knee.  She was unable to bear weight so advised by her orthopedic surgeon Dr. Jessica Guzman come to the hospital for admission.   Admitted on 3/28/22 with Left knee x-ray showing displaced fracture inferior to the implant.     PMH:   Past Medical History:   Diagnosis Date    Chronic pain     LEFT KNEE    DDD (degenerative disc disease), lumbar 3/15/2016    Diverticulosis of colon 1/12    McGroarty/gi    DJD (degenerative joint disease)     Esophageal stricture 2/03    Fibrosis of lung ([facility] [address] 2004    Scarring @ the apices bilaterally Done @VPI/histoplasmosis    History of basal cell cancer     HTN, goal below 150/90     Hypercholesterolemia     IBS (irritable bowel syndrome) 8/30/2016    Menopause     LMP-48years old?  Osteoporosis     previously on Fosamax many years ago; does not want further treatment    Overactive bladder     Skin cancer 01/06/2020    scc-right shin    Unspecified hypothyroidism             There is no height or weight on file to calculate BMI. : A BMI > 30 is classified as obesity and > 40 is classified as morbid obesity. Medications upon admission :   Prior to Admission Medications   Prescriptions Last Dose Informant Patient Reported? Taking? MULTIVITAMINS (MULTI-VITAMIN PO) 3/28/2022  Yes Yes   Sig: Take 1 Tab by mouth daily. Takes one po daily. acetaminophen (Tylenol Arthritis Pain) 650 mg TbER 3/28/2022  Yes Yes   Sig: Take 1,300 mg by mouth every eight (8) hours as needed for Pain. aspirin delayed-release 81 mg tablet 3/28/2022  No Yes   Sig: Take 1 Tablet by mouth every twelve (12) hours for 30 days. calcium carbonate (Tums Ultra) 400 mg calcium (1,000 mg) chew 3/28/2022  Yes Yes   Sig: Take 1 Tablet by mouth as needed. famotidine (PEPCID) 20 mg tablet 3/28/2022  No Yes   Sig: Take 1 Tablet by mouth two (2) times a day for 30 days. levothyroxine (SYNTHROID) 112 mcg tablet 3/28/2022  No Yes   Sig: TAKE 1 TABLET EVERY DAY BEFORE BREAKFAST   losartan-hydroCHLOROthiazide (HYZAAR) 100-25 mg per tablet 3/28/2022  No Yes   Sig: TAKE 1 TABLET EVERY DAY   meloxicam (MOBIC) 7.5 mg tablet 3/28/2022  No Yes   Sig: Take 1 Tablet by mouth daily for 14 days. mineral oil/petrolatum,white (LUBRICANT EYE OP) 3/28/2022  Yes Yes   Sig: Administer  to both eyes as needed. polyethylene glycol (MIRALAX) 17 gram packet 3/28/2022  No Yes   Sig: Take 1 Packet by mouth daily for 14 days. sennosides-docusate sodium (Stool Softener-Stimulant Laxat) 8.6-50 mg cap 3/28/2022  Yes Yes   Sig: Take  by mouth as needed.    simvastatin (ZOCOR) 40 mg tablet 3/28/2022  No Yes   Sig: Take 1 Tablet by mouth nightly. Facility-Administered Medications: None        Allergies: Allergies   Allergen Reactions    Benazepril Cough    Sulfa (Sulfonamide Antibiotics) [place] [facility] Course:  Patient transferred from ED to orthopedic floor with therapy initiated using TROM brace. Patient with significant restrictions in mobility and complaint of persistent pain. During hospital course, hospitalist consulted for comanagement of chronic conditions. Concern for chronic hyponatremia, noted to be 127 at admission. HCTZ stopped and started on amlodipine 10 mg daily. Fluid restriction of 1.2 L/day started in hospital and to be continued at discharge. Ultimately, patient returned to the operating room on 4/1 for revision of L total knee. Complications:  None; patient tolerated the procedure well. Was taken to the PACU in stable condition and then transferred to the ortho floor. Following surgery, patient resumed physical therapy. Placement at SNF obtained with plan for transfer on POD 3. Perioperative Antibiotics:  Ancef     Postoperative Pain Management:  Oxycodone & Tylenol, Tramadol    DVT Prophylaxis: Lovenox 40 mg SQ daily    Postoperative transfusions:    Number of units banked PRBCs =   none     Post Op complications: none    Hemoglobin at discharge:    Lab Results   Component Value Date/Time    HGB 9.7 (L) 04/04/2022 01:55 AM    INR 1.1 04/01/2022 10:19 AM       Aquacel dressing remained in place - clean, dry and intact. No significant erythema or swelling. Wound appears to be healing without any evidence of infection. Neurovascular exam found to be within normal limits. Physical Therapy started following surgery and participated in bed mobility, transfers and ambulation.         Gait:  Gait  Base of Support: Narrowed  Speed/Adri: Pace decreased (<100 feet/min)  Step Length: Right shortened,Left shortened  Swing Pattern: Left asymmetrical  Stance: Left decreased  Gait Abnormalities: Antalgic,Decreased step clearance,Shuffling gait  Ambulation - Level of Assistance: Minimal assistance,Assist x2  Distance (ft): 2 Feet (ft)  Assistive Device: Gait belt,Walker, rolling                   Discharged to: SNF. Condition on Discharge:   good    Discharge instructions:  - Anticoagulate with Lovenox 40 mg SQ daily  - Take pain medications as prescribed  - Resume pre hospital diet   Fluid restriction of 1.2 L daily  - Discharge activity: activity as tolerated  - Ambulate with assistive device as needed. - Weight bearing status: weight bearing as tolerated to left lower extremity. No ROM x 7 days, remain in TROM brace with ambulation locked at zero. - Wound Care Keep wound clean and dry. See discharge instruction sheet. -DISCHARGE MEDICATION LIST     Current Discharge Medication List      START taking these medications    Details   enoxaparin (LOVENOX) 40 mg/0.4 mL 0.4 mL by SubCUTAneous route daily for 30 days. Qty: 12 mL, Refills: 0  Start date: 4/3/2022, End date: 5/3/2022      oxyCODONE IR (ROXICODONE) 5 mg immediate release tablet Take 1 Tablet by mouth every four (4) hours as needed for Pain for up to 7 days. Max Daily Amount: 30 mg.  Qty: 40 Tablet, Refills: 0  Start date: 4/3/2022, End date: 4/10/2022    Associated Diagnoses: Periprosthetic fracture of proximal end of tibia, initial encounter      traMADoL (ULTRAM) 50 mg tablet Take 1 Tablet by mouth every six (6) hours as needed for Pain for up to 7 days. Max Daily Amount: 200 mg. Qty: 28 Tablet, Refills: 0  Start date: 4/3/2022, End date: 4/10/2022    Associated Diagnoses: Periprosthetic fracture of proximal end of tibia, initial encounter      amLODIPine (NORVASC) 10 mg tablet Take 1 Tablet by mouth daily for 30 days. Qty: 30 Tablet, Refills: 0  Start date: 4/4/2022, End date: 5/4/2022      losartan (COZAAR) 100 mg tablet Take 1 Tablet by mouth daily for 30 days.   Qty: 30 Tablet, Refills: 0  Start date: 4/4/2022, End date: 5/4/2022         CONTINUE these medications which have NOT CHANGED    Details   famotidine (PEPCID) 20 mg tablet Take 1 Tablet by mouth two (2) times a day for 30 days. Qty: 60 Tablet, Refills: 0      simvastatin (ZOCOR) 40 mg tablet Take 1 Tablet by mouth nightly. Qty: 90 Tablet, Refills: 1    Associated Diagnoses: Hyperlipidemia, unspecified hyperlipidemia type      levothyroxine (SYNTHROID) 112 mcg tablet TAKE 1 TABLET EVERY DAY BEFORE BREAKFAST  Qty: 90 Tablet, Refills: 0    Associated Diagnoses: Acquired hypothyroidism      acetaminophen (Tylenol Arthritis Pain) 650 mg TbER Take 1,300 mg by mouth every eight (8) hours as needed for Pain. calcium carbonate (Tums Ultra) 400 mg calcium (1,000 mg) chew Take 1 Tablet by mouth as needed. sennosides-docusate sodium (Stool Softener-Stimulant Laxat) 8.6-50 mg cap Take  by mouth as needed. mineral oil/petrolatum,white (LUBRICANT EYE OP) Administer  to both eyes as needed. MULTIVITAMINS (MULTI-VITAMIN PO) Take 1 Tab by mouth daily. Takes one po daily.          STOP taking these medications       aspirin delayed-release 81 mg tablet Comments:   Reason for Stopping:         polyethylene glycol (MIRALAX) 17 gram packet Comments:   Reason for Stopping:         meloxicam (MOBIC) 7.5 mg tablet Comments:   Reason for Stopping:         losartan-hydroCHLOROthiazide (HYZAAR) 100-25 mg per tablet Comments:   Reason for Stopping:            per medical continuation form      -Follow up in office in 3-4 weeks      Signed:  Beverly Rowley NP  Orthopaedic Nurse Practitioner    4/4/2022  11:16 AM

## 2022-04-15 ENCOUNTER — OFFICE VISIT (OUTPATIENT)
Dept: ORTHOPEDIC SURGERY | Age: 82
End: 2022-04-15
Payer: MEDICARE

## 2022-04-15 VITALS — BODY MASS INDEX: 28.71 KG/M2 | HEIGHT: 62 IN | WEIGHT: 156 LBS

## 2022-04-15 DIAGNOSIS — Z96.652 STATUS POST REVISION OF TOTAL KNEE, LEFT: Primary | ICD-10-CM

## 2022-04-15 PROCEDURE — 99024 POSTOP FOLLOW-UP VISIT: CPT | Performed by: ORTHOPAEDIC SURGERY

## 2022-04-15 NOTE — PROGRESS NOTES
[place] (: 1940) is a 80 y.o. female patient, here for evaluation of the following chief complaint(s):  Surgical Follow-up (left knee follow up)       ASSESSMENT/PLAN:  Below is the assessment and plan developed based on review of pertinent history, physical exam, labs, studies, and medications. 44-year-old female comes in today for follow-up. She initially had a left partial knee replacement she was doing well and she had a fall directly onto her knee x-rays revealed a fractured medial tibial plateau with mild subsidence. She then underwent a left total knee revision on 2022. Here for follow-up. Postoperatively she has been doing fair. She has been cleared to fully weight-bear as tolerated, has been putting minimal weight on her knee at this time. Encourage patient to weight-bear as tolerated range of motion as tolerated. Nylon sutures removed today, Steri-Strips applied. Plans to follow-up in 3 to 4 weeks for standard postop check we will repeat imaging at this time or sooner as needed. 1. Status post revision of total knee, left  -     XR KNEE LT 3 V; Future      Encounter Diagnosis   Name Primary?  Status post revision of total knee, left Yes        No follow-ups on file. SUBJECTIVE/OBJECTIVE:  [place] (: 1940) is a 80 y.o. female who presents today for the following:  Chief Complaint   Patient presents with    Surgical Follow-up     left knee follow up       44-year-old female comes in today for follow-up. She initially had a left partial knee replacement she was doing well and she had a fall directly onto her knee x-rays revealed a fractured medial tibial plateau with mild subsidence. She then underwent a left total knee revision on 2022. Pain overall doing fair. Incision well-healed. Hesitant to bear full weight on her leg, encourage patient to do so as tolerated.     IMAGING:  XR Results (most recent):  Results from Appointment encounter on 04/15/22    XR KNEE LT 3 V    Narrative  Two views of the left knee demonstrate left total knee arthroplasty with placement of longstem tibial component. No acute changes. Well aligned. Allergies   Allergen Reactions    Benazepril Cough    Sulfa (Sulfonamide Antibiotics) Hives       Current Outpatient Medications   Medication Sig    enoxaparin (LOVENOX) 40 mg/0.4 mL 0.4 mL by SubCUTAneous route daily for 30 days.  amLODIPine (NORVASC) 10 mg tablet Take 1 Tablet by mouth daily for 30 days.  losartan (COZAAR) 100 mg tablet Take 1 Tablet by mouth daily for 30 days.  famotidine (PEPCID) 20 mg tablet Take 1 Tablet by mouth two (2) times a day for 30 days.  simvastatin (ZOCOR) 40 mg tablet Take 1 Tablet by mouth nightly.  levothyroxine (SYNTHROID) 112 mcg tablet TAKE 1 TABLET EVERY DAY BEFORE BREAKFAST    acetaminophen (Tylenol Arthritis Pain) 650 mg TbER Take 1,300 mg by mouth every eight (8) hours as needed for Pain.  calcium carbonate (Tums Ultra) 400 mg calcium (1,000 mg) chew Take 1 Tablet by mouth as needed.  sennosides-docusate sodium (Stool Softener-Stimulant Laxat) 8.6-50 mg cap Take  by mouth as needed.  mineral oil/petrolatum,white (LUBRICANT EYE OP) Administer  to both eyes as needed.  MULTIVITAMINS (MULTI-VITAMIN PO) Take 1 Tab by mouth daily. Takes one po daily. No current facility-administered medications for this visit. Past Medical History:   Diagnosis Date    Chronic pain     LEFT KNEE    DDD (degenerative disc disease), lumbar 3/15/2016    Diverticulosis of colon 1/12    McGroarty/gi    DJD (degenerative joint disease)     Esophageal stricture 2/03    Fibrosis of lung ([facility] [address] 2004    Scarring @ the apices bilaterally Done @VPI/histoplasmosis    History of basal cell cancer     HTN, goal below 150/90     Hypercholesterolemia     IBS (irritable bowel syndrome) 8/30/2016    Menopause     LMP-48years old?     Osteoporosis     previously on Fosamax many years ago; does not want further treatment    Overactive bladder     Skin cancer 2020    scc-right shin    Unspecified hypothyroidism              Past Surgical History:   Procedure Laterality Date    COLONOSCOPY      McGroarty/gi    HX APPENDECTOMY      HX CATARACT REMOVAL  3/12    L    HX CATARACT REMOVAL  2012    Bilateral     HX KNEE ARTHROSCOPY Right  and     Dr Green Lay  1/10    right  Darnell/o/s    HX OTHER SURGICAL  2021    EXC. SKIN CA TENISHA. LOWER LEGS       Family History   Problem Relation Age of Onset    Heart Disease Mother     Diabetes Father     Heart Disease Father     Cancer Son         metastatic prostate ca    Diabetes Son     Heart Disease Son     Cancer Brother         STOMACH    No Known Problems Brother     Anesth Problems Neg Hx         Social History     Tobacco Use    Smoking status: Former Smoker     Packs/day: 1.00     Years: 25.00     Pack years: 25.00     Types: Cigarettes     Quit date: 3/3/2002     Years since quittin.1    Smokeless tobacco: Never Used    Tobacco comment: quit in the '90's/cigarettes   Substance Use Topics    Alcohol use: Not Currently        All systems reviewed x 12 and were negative with the exception of None      No flowsheet data found. Vitals:  Ht 5' 2\" (1.575 m)   Wt 156 lb (70.8 kg)   BMI 28.53 kg/m²    Body mass index is 28.53 kg/m². Physical Exam    Gen: NAD    Resp: Non-labored    LLE: Midline incision is healing well with no evidence of infection. No erythema. Did not range today. Sutures removed, Steri-Strips applied. No extensor lag. Grossly stable in the coronal and sagittal planes. No calf tenderness. No evidence of a DVT. Motor grossly intact. Sensation intact to light touch throughout. Palpable pedal pulses. Eddie Castillo M.D. was available for immediate consultation as the supervising physician.         An electronic signature was used to authenticate this note.   -- Cheryl Blair PA-C Statement Selected

## 2022-05-20 ENCOUNTER — OFFICE VISIT (OUTPATIENT)
Dept: ORTHOPEDIC SURGERY | Age: 82
End: 2022-05-20
Payer: MEDICARE

## 2022-05-20 VITALS — WEIGHT: 156 LBS | BODY MASS INDEX: 28.71 KG/M2 | HEIGHT: 62 IN

## 2022-05-20 DIAGNOSIS — Z96.652 STATUS POST REVISION OF TOTAL KNEE, LEFT: Primary | ICD-10-CM

## 2022-05-20 PROCEDURE — 99024 POSTOP FOLLOW-UP VISIT: CPT | Performed by: PHYSICIAN ASSISTANT

## 2022-05-20 NOTE — PROGRESS NOTES
[place] (: 1940) is a 80 y.o. female patient, here for evaluation of the following chief complaint(s):  Surgical Follow-up (left knee follow up)       ASSESSMENT/PLAN:  Below is the assessment and plan developed based on review of pertinent history, physical exam, labs, studies, and medications. 25-year-old female comes in today for follow-up. She initially had a left partial knee replacement she was doing well and then had a fall directly onto her knee, x-ray revealed a fracture of the medial tibial plateau with mild subsidence. She then underwent a left total knee revision on 2022. Here for follow-up. Postoperatively she is doing well. Denies any pain. Incision well-healed. She has good range of motion. She has been cleared to fully weight-bear as tolerated however has been putting minimal weight on her knee. States she is standing with physical therapy but doing minimal walking. Encouraged patient to weight-bear as tolerated, range of motion as tolerated. States she knows she needs to work more with physical therapy, going through a current loss of her son but is going to work harder with physical therapy. Plans to follow-up in 6 to 8 weeks for standard postop check or sooner as needed. 1. Status post revision of total knee, left  -     XR KNEE LT MAX 2 VWS; Future      Encounter Diagnosis   Name Primary?  Status post revision of total knee, left Yes        No follow-ups on file. SUBJECTIVE/OBJECTIVE:  [place] (: 1940) is a 80 y.o. female who presents today for the following:  Chief Complaint   Patient presents with    Surgical Follow-up     left knee follow up       25-year-old female comes in today for follow-up. She initially had a left partial knee replacement she was doing well and then had a fall directly onto her knee, x-ray revealed a fracture of the medial tibial plateau with mild subsidence.   She then underwent a left total knee revision on 4/1/2022. Here for follow-up. Postoperatively she is doing well. Denies any pain. Incision well-healed. She has good range of motion. She has been cleared to fully weight-bear as tolerated however has been putting minimal weight on her knee. States she is standing with physical therapy but doing minimal walking. IMAGING:  XR Results (most recent):  Results from Appointment encounter on 05/20/22    XR KNEE LT MAX 2 VWS    Narrative  Two views of the left knee demonstrate left total knee arthroplasty with placement of longstem tibial component. No acute changes. Well aligned. No changes from previous x-rays. Allergies   Allergen Reactions    Benazepril Cough    Sulfa (Sulfonamide Antibiotics) Hives       Current Outpatient Medications   Medication Sig    simvastatin (ZOCOR) 40 mg tablet Take 1 Tablet by mouth nightly.  levothyroxine (SYNTHROID) 112 mcg tablet TAKE 1 TABLET EVERY DAY BEFORE BREAKFAST    acetaminophen (Tylenol Arthritis Pain) 650 mg TbER Take 1,300 mg by mouth every eight (8) hours as needed for Pain.  calcium carbonate (Tums Ultra) 400 mg calcium (1,000 mg) chew Take 1 Tablet by mouth as needed.  sennosides-docusate sodium (Stool Softener-Stimulant Laxat) 8.6-50 mg cap Take  by mouth as needed.  mineral oil/petrolatum,white (LUBRICANT EYE OP) Administer  to both eyes as needed.  MULTIVITAMINS (MULTI-VITAMIN PO) Take 1 Tab by mouth daily. Takes one po daily. No current facility-administered medications for this visit.        Past Medical History:   Diagnosis Date    Chronic pain     LEFT KNEE    DDD (degenerative disc disease), lumbar 3/15/2016    Diverticulosis of colon 1/12    McGroarty/gi    DJD (degenerative joint disease)     Esophageal stricture 2/03    Fibrosis of lung ([facility] [address] 2004    Scarring @ the apices bilaterally Done @VPI/histoplasmosis    History of basal cell cancer     HTN, goal below 150/90     Hypercholesterolemia     IBS (irritable bowel syndrome) 2016    Menopause     LMP-48years old?  Osteoporosis     previously on Fosamax many years ago; does not want further treatment    Overactive bladder     Skin cancer 2020    scc-right shin    Unspecified hypothyroidism              Past Surgical History:   Procedure Laterality Date    COLONOSCOPY      McGroarty/gi    HX APPENDECTOMY      HX CATARACT REMOVAL  3/12    L    HX CATARACT REMOVAL  2012    Bilateral     HX KNEE ARTHROSCOPY Right  and     Dr Jony Baird  1/10    right  Darnell/o/s    HX OTHER SURGICAL  2021    EXC. SKIN CA TENISHA. LOWER LEGS       Family History   Problem Relation Age of Onset    Heart Disease Mother     Diabetes Father     Heart Disease Father     Cancer Son         metastatic prostate ca    Diabetes Son     Heart Disease Son     Cancer Brother         STOMACH    No Known Problems Brother     Anesth Problems Neg Hx         Social History     Tobacco Use    Smoking status: Former Smoker     Packs/day: 1.00     Years: 25.00     Pack years: 25.00     Types: Cigarettes     Quit date: 3/3/2002     Years since quittin.2    Smokeless tobacco: Never Used    Tobacco comment: quit in the /cigarettes   Substance Use Topics    Alcohol use: Not Currently        All systems reviewed x 12 and were negative with the exception of None      No flowsheet data found. Vitals:  Ht 5' 2\" (1.575 m)   Wt 156 lb (70.8 kg)   BMI 28.53 kg/m²    Body mass index is 28.53 kg/m². Physical Exam    Gen: NAD    Resp: Non-labored    LLE: Midline incision is healed. No erythema. Range of motion 0-110°. No extensor lag. Grossly stable in the coronal and sagittal planes. No calf tenderness. No evidence of a DVT. Motor grossly intact. Sensation intact to light touch throughout. Palpable pedal pulses.       Mary Dubose M.D. was available for immediate consultation as the supervising physician. An electronic signature was used to authenticate this note.   -- Emilia Melgoza PA-C

## 2022-07-01 ENCOUNTER — OFFICE VISIT (OUTPATIENT)
Dept: ORTHOPEDIC SURGERY | Age: 82
End: 2022-07-01
Payer: MEDICARE

## 2022-07-01 VITALS — WEIGHT: 156 LBS | HEIGHT: 62 IN | BODY MASS INDEX: 28.71 KG/M2

## 2022-07-01 DIAGNOSIS — Z96.652 STATUS POST REVISION OF TOTAL KNEE, LEFT: Primary | ICD-10-CM

## 2022-07-01 PROCEDURE — 99024 POSTOP FOLLOW-UP VISIT: CPT | Performed by: ORTHOPAEDIC SURGERY

## 2022-07-01 NOTE — PROGRESS NOTES
Ruchi Vance (: 1940) is a 80 y.o. female patient, here for evaluation of the following chief complaint(s):  Surgical Follow-up ([facility] 3/16/2022 follow up)       ASSESSMENT/PLAN:  Below is the assessment and plan developed based on review of pertinent history, physical exam, labs, studies, and medications. 77-year-old female comes in today for follow-up. She initially had a left partial knee replacement was doing well and then had a fall directly onto her knee, x-ray revealed a fracture of the medial tibial plateau with mild subsidence. She then underwent a left total knee revision on 2022. She is here for follow-up. Postoperatively she is doing fair. She has been walking with a walker and assistance at her assisted living facility. She continues to be hesitant with walking with fear of falling. Encouraged patient to continue ambulation as tolerated. Continue to work with physical therapy. She has recently lost her son which has added a layer of complexity with her healing and motivation. She is being followed by her primary care for this. Encourage patient to continue with weight-bear as tolerated and range of motion as tolerated. Plans to follow-up in the next 3 months for standard postop check or sooner as needed. 1. Status post revision of total knee, left  -     XR KNEE LT MIN 4 V; Future      Encounter Diagnosis   Name Primary?  Status post revision of total knee, left Yes        No follow-ups on file. SUBJECTIVE/OBJECTIVE:  Ruchi Vance (: 1940) is a 80 y.o. female who presents today for the following:  Chief Complaint   Patient presents with    Surgical Follow-up     [facility] 3/16/2022 follow up       77-year-old female comes in today for follow-up. She initially had a left partial knee replacement was doing well and then had a fall directly onto her knee, x-ray revealed a fracture of the medial tibial plateau with mild subsidence.   She then underwent a left total knee revision on 4/1/2022. She is here for follow-up. Postoperatively she is doing fair. She has been walking with a walker and assistance at her assisted living facility. Pain mild to moderate. Incision well-healed. IMAGING:  XR Results (most recent):  Results from Appointment encounter on 07/01/22    XR KNEE LT MIN 4 V    Narrative  4 views of the left knee demonstrate left total knee arthroplasty with placement of longstem tibial component.  No acute changes from previous xrays.  Well aligned. Allergies   Allergen Reactions    Benazepril Cough    Sulfa (Sulfonamide Antibiotics) Hives       Current Outpatient Medications   Medication Sig    simvastatin (ZOCOR) 40 mg tablet Take 1 Tablet by mouth nightly.  levothyroxine (SYNTHROID) 112 mcg tablet TAKE 1 TABLET EVERY DAY BEFORE BREAKFAST    acetaminophen (Tylenol Arthritis Pain) 650 mg TbER Take 1,300 mg by mouth every eight (8) hours as needed for Pain.  calcium carbonate (Tums Ultra) 400 mg calcium (1,000 mg) chew Take 1 Tablet by mouth as needed.  sennosides-docusate sodium (Stool Softener-Stimulant Laxat) 8.6-50 mg cap Take  by mouth as needed.  mineral oil/petrolatum,white (LUBRICANT EYE OP) Administer  to both eyes as needed.  MULTIVITAMINS (MULTI-VITAMIN PO) Take 1 Tab by mouth daily. Takes one po daily. No current facility-administered medications for this visit. Past Medical History:   Diagnosis Date    Chronic pain     LEFT KNEE    DDD (degenerative disc disease), lumbar 3/15/2016    Diverticulosis of colon 1/12    McGroarty/gi    DJD (degenerative joint disease)     Esophageal stricture 2/03    Fibrosis of lung ([facility] [address] 2004    Scarring @ the apices bilaterally Done @VPI/histoplasmosis    History of basal cell cancer     HTN, goal below 150/90     Hypercholesterolemia     IBS (irritable bowel syndrome) 8/30/2016    Menopause     LMP-48years old?     Osteoporosis     previously on Fosamax many years ago; does not want further treatment    Overactive bladder     Skin cancer 2020    scc-right shin    Unspecified hypothyroidism              Past Surgical History:   Procedure Laterality Date    COLONOSCOPY      McGroarty/gi    HX APPENDECTOMY      HX CATARACT REMOVAL  3/12    L    HX CATARACT REMOVAL  2012    Bilateral     HX KNEE ARTHROSCOPY Right  and     Dr Ryan Rodriguez  1/10    right  Darnell/o/s    HX OTHER SURGICAL  2021    EXC. SKIN CA TENISHA. LOWER LEGS       Family History   Problem Relation Age of Onset    Heart Disease Mother     Diabetes Father     Heart Disease Father     Cancer Son         metastatic prostate ca    Diabetes Son     Heart Disease Son     Cancer Brother         STOMACH    No Known Problems Brother     Anesth Problems Neg Hx         Social History     Tobacco Use    Smoking status: Former Smoker     Packs/day: 1.00     Years: 25.00     Pack years: 25.00     Types: Cigarettes     Quit date: 3/3/2002     Years since quittin.3    Smokeless tobacco: Never Used    Tobacco comment: quit in the '90's/cigarettes   Substance Use Topics    Alcohol use: Not Currently        All systems reviewed x 12 and were negative with the exception of None      No flowsheet data found. Vitals:  Ht 5' 2\" (1.575 m)   Wt 156 lb (70.8 kg)   BMI 28.53 kg/m²    Body mass index is 28.53 kg/m². Physical Exam    Gen: NAD    Resp: Non-labored    LLE: Midline incision is well healed. No erythema. Range of motion 0-95°. No extensor lag. Grossly stable in the coronal and sagittal planesMotor grossly intact. Sensation intact to light touch throughout. Palpable pedal pulses. Farzaneh Kern M.D. was available for immediate consultation as the supervising physician. An electronic signature was used to authenticate this note.   -- Cristopher Bauman PA-C

## 2022-07-15 ENCOUNTER — TELEPHONE (OUTPATIENT)
Dept: FAMILY MEDICINE CLINIC | Age: 82
End: 2022-07-15

## 2022-07-15 NOTE — TELEPHONE ENCOUNTER
Angela Villegas returned call and said she no longer needs PA for the eloquest they switched it to xarelto and the Pt paid for it.

## 2022-07-15 NOTE — TELEPHONE ENCOUNTER
Attempted to call [facility] to discuss PA for patient. Mailbox is full and could not leave voicemail.

## 2022-07-15 NOTE — TELEPHONE ENCOUNTER
----- Message from James Perez sent at 7/14/2022  2:51 PM EDT -----  Subject: Message to Provider    QUESTIONS  Information for Provider? Sasha with [facility] called,   they need a call back to discuss getting a PA for Eloquis. Patient was put   on the medication by [facility] for a DVT. Please give Lainey Manzano a call back. ---------------------------------------------------------------------------  --------------  Remigio ABDULLAHI  [phone]; OK to leave message on voicemail  ---------------------------------------------------------------------------  --------------  SCRIPT ANSWERS  Relationship to Patient? Third Party  Third Party Type? Home Health Care? Representative Name?  Ynes Bermudez

## 2022-07-26 ENCOUNTER — OFFICE VISIT (OUTPATIENT)
Dept: FAMILY MEDICINE CLINIC | Age: 82
End: 2022-07-26
Payer: MEDICARE

## 2022-07-26 VITALS
DIASTOLIC BLOOD PRESSURE: 64 MMHG | HEIGHT: 62 IN | RESPIRATION RATE: 16 BRPM | BODY MASS INDEX: 25.98 KG/M2 | HEART RATE: 97 BPM | SYSTOLIC BLOOD PRESSURE: 92 MMHG | WEIGHT: 141.2 LBS | TEMPERATURE: 98.7 F | OXYGEN SATURATION: 99 %

## 2022-07-26 DIAGNOSIS — I82.4Y2 ACUTE DEEP VEIN THROMBOSIS (DVT) OF PROXIMAL VEIN OF LEFT LOWER EXTREMITY (HCC): ICD-10-CM

## 2022-07-26 DIAGNOSIS — F43.21 GRIEF REACTION: ICD-10-CM

## 2022-07-26 DIAGNOSIS — Z00.00 ENCOUNTER FOR MEDICARE ANNUAL WELLNESS EXAM: Primary | ICD-10-CM

## 2022-07-26 DIAGNOSIS — E87.6 HYPOKALEMIA: ICD-10-CM

## 2022-07-26 DIAGNOSIS — E78.5 HYPERLIPIDEMIA, UNSPECIFIED HYPERLIPIDEMIA TYPE: ICD-10-CM

## 2022-07-26 PROCEDURE — G8754 DIAS BP LESS 90: HCPCS | Performed by: FAMILY MEDICINE

## 2022-07-26 PROCEDURE — 1090F PRES/ABSN URINE INCON ASSESS: CPT | Performed by: FAMILY MEDICINE

## 2022-07-26 PROCEDURE — G8432 DEP SCR NOT DOC, RNG: HCPCS | Performed by: FAMILY MEDICINE

## 2022-07-26 PROCEDURE — 99214 OFFICE O/P EST MOD 30 MIN: CPT | Performed by: FAMILY MEDICINE

## 2022-07-26 PROCEDURE — G8752 SYS BP LESS 140: HCPCS | Performed by: FAMILY MEDICINE

## 2022-07-26 PROCEDURE — G0463 HOSPITAL OUTPT CLINIC VISIT: HCPCS | Performed by: FAMILY MEDICINE

## 2022-07-26 PROCEDURE — 1101F PT FALLS ASSESS-DOCD LE1/YR: CPT | Performed by: FAMILY MEDICINE

## 2022-07-26 PROCEDURE — 1123F ACP DISCUSS/DSCN MKR DOCD: CPT | Performed by: FAMILY MEDICINE

## 2022-07-26 PROCEDURE — G0439 PPPS, SUBSEQ VISIT: HCPCS | Performed by: FAMILY MEDICINE

## 2022-07-26 PROCEDURE — G8417 CALC BMI ABV UP PARAM F/U: HCPCS | Performed by: FAMILY MEDICINE

## 2022-07-26 PROCEDURE — G8536 NO DOC ELDER MAL SCRN: HCPCS | Performed by: FAMILY MEDICINE

## 2022-07-26 PROCEDURE — G8427 DOCREV CUR MEDS BY ELIG CLIN: HCPCS | Performed by: FAMILY MEDICINE

## 2022-07-26 RX ORDER — MIRTAZAPINE 30 MG/1
30 TABLET, FILM COATED ORAL
COMMUNITY
Start: 2022-07-13 | End: 2022-07-26 | Stop reason: SDUPTHER

## 2022-07-26 RX ORDER — POTASSIUM CHLORIDE 750 MG/1
TABLET, EXTENDED RELEASE ORAL
COMMUNITY
Start: 2022-07-13 | End: 2022-08-15

## 2022-07-26 RX ORDER — LOSARTAN POTASSIUM 50 MG/1
50 TABLET ORAL DAILY
COMMUNITY
Start: 2022-07-13 | End: 2022-08-03 | Stop reason: SDUPTHER

## 2022-07-26 RX ORDER — SIMVASTATIN 40 MG/1
40 TABLET, FILM COATED ORAL
Qty: 90 TABLET | Refills: 1 | Status: SHIPPED | OUTPATIENT
Start: 2022-07-26

## 2022-07-26 RX ORDER — BUMETANIDE 1 MG/1
1 TABLET ORAL DAILY
COMMUNITY
Start: 2022-07-13 | End: 2022-08-15

## 2022-07-26 RX ORDER — ATORVASTATIN CALCIUM 20 MG/1
20 TABLET, FILM COATED ORAL DAILY
COMMUNITY
Start: 2022-07-13 | End: 2022-07-26

## 2022-07-26 RX ORDER — MIRTAZAPINE 15 MG/1
15 TABLET, FILM COATED ORAL
Qty: 90 TABLET | Refills: 1 | Status: SHIPPED | OUTPATIENT
Start: 2022-07-26 | End: 2022-08-15 | Stop reason: SDUPTHER

## 2022-07-26 RX ORDER — RIVAROXABAN 20 MG/1
20 TABLET, FILM COATED ORAL DAILY
COMMUNITY
Start: 2022-07-14 | End: 2022-09-20 | Stop reason: SDUPTHER

## 2022-07-26 NOTE — PROGRESS NOTES
This is the Subsequent Medicare Annual Wellness Exam, performed 12 months or more after the Initial AWV or the last Subsequent AWV    I have reviewed the patient's medical history in detail and updated the computerized patient record. Assessment/Plan   Education and counseling provided:  Are appropriate based on today's review and evaluation    1. Encounter for Medicare annual wellness exam  2. Acute deep vein thrombosis (DVT) of proximal vein of left lower extremity (HCC)  -     CBC W/O DIFF; Future  3. Hyperlipidemia, unspecified hyperlipidemia type  -     simvastatin (ZOCOR) 40 mg tablet; Take 1 Tablet by mouth nightly., Normal, Disp-90 Tablet, R-1  4. Hypokalemia  -     METABOLIC PANEL, BASIC; Future  -     MAGNESIUM; Future  -     PHOSPHORUS; Future  5. Grief reaction  -     mirtazapine (REMERON) 15 mg tablet;  Take 1 Tablet by mouth nightly., Normal, Disp-90 Tablet, R-1       Depression Risk Factor Screening     3 most recent PHQ Screens 3/8/2022   Little interest or pleasure in doing things Several days   Feeling down, depressed, irritable, or hopeless Several days   Total Score PHQ 2 2   Trouble falling or staying asleep, or sleeping too much -   Feeling tired or having little energy -   Poor appetite, weight loss, or overeating -   Feeling bad about yourself - or that you are a failure or have let yourself or your family down -   Trouble concentrating on things such as school, work, reading, or watching TV -   Moving or speaking so slowly that other people could have noticed; or the opposite being so fidgety that others notice -   Thoughts of being better off dead, or hurting yourself in some way -   How difficult have these problems made it for you to do your work, take care of your home and get along with others -       Alcohol & Drug Abuse Risk Screen    Do you average more than 1 drink per night or more than 7 drinks a week:  No    On any one occasion in the past three months have you have had more than 3 drinks containing alcohol:  No          Functional Ability and Level of Safety    Hearing: Hearing is good. Activities of Daily Living: The home contains: handrails and grab bars  Patient does total self care      Ambulation: with difficulty, uses a cane     Fall Risk:  Fall Risk Assessment, last 12 mths 7/26/2022   Able to walk? Yes   Fall in past 12 months? 0   Do you feel unsteady? 0   Are you worried about falling 0   Number of falls in past 12 months -   Fall with injury?  -      Abuse Screen:  Patient is not abused       Cognitive Screening    Has your family/caregiver stated any concerns about your memory: no       Health Maintenance Due     Health Maintenance Due   Topic Date Due    Shingrix Vaccine Age 49> (1 of 2) Never done    Pneumococcal 65+ years (2 - PCV) 11/14/2018    Medicare Yearly Exam  09/12/2020    COVID-19 Vaccine (4 - Booster for Pfizer series) 01/29/2022       Patient Care Team   Patient Care Team:  Nimco Massey MD as PCP - General (Family Medicine)  Nimco Massey MD as PCP - [address] [place] [address] Provider  Mira Williamson MD (Gastroenterology)  Ankit Soares MD (Obstetrics & Gynecology)  Mere Mckenzie MD (Orthopedic Surgery)  Bandra Glaser MD as Physician (Ophthalmology)  Jordyn Apple MD as Physician (Orthopedic Surgery)  Eduardo Desai PA-C as Physician Assistant (Physician Assistant)    History     Patient Active Problem List   Diagnosis Code    HTN, goal below 150/90 I10    Other and unspecified hyperlipidemia E78.5    Unspecified hypothyroidism E03.9    Localized osteoarthritis of left knee M17.12    DDD (degenerative disc disease), lumbar M51.36    Lumbar spinal stenosis M48.061    Advance care planning Z71.89    Diverticulosis of large intestine K57.30    IBS (irritable bowel syndrome) K58.9    Esophageal stricture K22.2    Overactive bladder N32.81    Osteoporosis M81.0    History of basal cell cancer Z85.828    Scarring of lung J98.4 Primary osteoarthritis of both hands M19.041, M19.042    Arthritis of left knee M17.12    Periprosthetic fracture of proximal end of tibia M97. 8XXA, [id number]    Acute deep vein thrombosis (DVT) of proximal vein of left lower extremity (HCC) I82.4Y2    Hypokalemia E87.6     Past Medical History:   Diagnosis Date    Chronic pain     LEFT KNEE    DDD (degenerative disc disease), lumbar 3/15/2016    Diverticulosis of colon 1/12    McGroarty/gi    DJD (degenerative joint disease)     Esophageal stricture 2/03    Fibrosis of lung ([facility] [address] 2004    Scarring @ the apices bilaterally Done @VPI/histoplasmosis    History of basal cell cancer     HTN, goal below 150/90     Hypercholesterolemia     IBS (irritable bowel syndrome) 8/30/2016    Menopause     LMP-48years old? Osteoporosis     previously on Fosamax many years ago; does not want further treatment    Overactive bladder     Skin cancer 01/06/2020    scc-right shin    Unspecified hypothyroidism            Past Surgical History:   Procedure Laterality Date    COLONOSCOPY  1/12    McGroarty/gi    HX APPENDECTOMY  1953    HX CATARACT REMOVAL  3/12    L    HX CATARACT REMOVAL  04/2012    Bilateral     HX KNEE ARTHROSCOPY Right 2006 and 2008    Dr Radha Cuenca  1/10    right  Darnell/o/s    HX OTHER SURGICAL  2020, 2021    EXC. SKIN CA TENISHA. LOWER LEGS     Current Outpatient Medications   Medication Sig Dispense Refill    losartan (COZAAR) 50 mg tablet Take 50 mg by mouth in the morning. bumetanide (BUMEX) 1 mg tablet Take 1 mg by mouth in the morning. potassium chloride (KLOR-CON M10) 10 mEq tablet TAKE 1 TABLET BY MOUTH THREE TIMES A WEEK ON MON, WED, AND FRI      Xarelto 20 mg tab tablet Take 20 mg by mouth daily. simvastatin (ZOCOR) 40 mg tablet Take 1 Tablet by mouth nightly. 90 Tablet 1    mirtazapine (REMERON) 15 mg tablet Take 1 Tablet by mouth nightly.  90 Tablet 1    levothyroxine (SYNTHROID) 112 mcg tablet TAKE 1 TABLET EVERY DAY BEFORE BREAKFAST 90 Tablet 0    acetaminophen (TYLENOL) 650 mg TbER Take 1,300 mg by mouth every eight (8) hours as needed for Pain. calcium carbonate 400 mg calcium (1,000 mg) chew Take 1 Tablet by mouth as needed. sennosides-docusate sodium (Stool Softener-Stimulant Laxat) 8.6-50 mg cap Take  by mouth as needed. mineral oil/petrolatum,white (LUBRICANT EYE OP) Administer  to both eyes as needed. MULTIVITAMINS (MULTI-VITAMIN PO) Take 1 Tab by mouth daily. Takes one po daily.        Allergies   Allergen Reactions    Benazepril Cough    Sulfa (Sulfonamide Antibiotics) Hives       Family History   Problem Relation Age of Onset    Heart Disease Mother     Diabetes Father     Heart Disease Father     Cancer Son         metastatic prostate ca    Diabetes Son     Heart Disease Son     Cancer Brother         STOMACH    No Known Problems Brother     Anesth Problems Neg Hx      Social History     Tobacco Use    Smoking status: Former     Packs/day: 1.00     Years: 25.00     Pack years: 25.00     Types: Cigarettes     Quit date: 3/3/2002     Years since quittin.4    Smokeless tobacco: Never    Tobacco comments:     quit in the 's/cigarettes   Substance Use Topics    Alcohol use: Not Currently         Kianna Short MD

## 2022-07-26 NOTE — PROGRESS NOTES
Patient Name: Chadd Feliciano   MRN: [id number]    Zach Day is a 80 y.o. female who presents with the following:     She underwent a L knee partial replacement on 3/16, subsequently fell, and had to undergo a total knee replacement on 4/2. She was then transferred to [facility] for rehab. Her stay was complicated by bereavement due to her son's passing (started on Remeron due to weight loss) as well as a left DVT. She was on Eliquis but then transitioned to Xarelto upon discharge, which is expensive (just filled a 90 day supply). She denies prior VTE. She now feels better mood-wise and wants to wean off of Remeron. Still has some left leg swelling. Is on daily Bumex with potassium supplements three times per week. She had previously stopped her simvastatin which had normalized her lipid panel in the past. Recent lipids were high. Lab Results   Component Value Date/Time    Cholesterol, total 218 (H) 03/10/2022 09:09 AM    HDL Cholesterol 63 03/10/2022 09:09 AM    LDL, calculated 136.4 (H) 03/10/2022 09:09 AM    VLDL, calculated 18.6 03/10/2022 09:09 AM    Triglyceride 93 03/10/2022 09:09 AM    CHOL/HDL Ratio 3.5 03/10/2022 09:09 AM       Wt Readings from Last 3 Encounters:   07/26/22 141 lb 3.2 oz (64 kg)   07/01/22 156 lb (70.8 kg)   05/20/22 156 lb (70.8 kg)     Review of Systems   Constitutional:  Negative for fever, malaise/fatigue and weight loss. Respiratory:  Negative for cough, hemoptysis, shortness of breath and wheezing. Cardiovascular:  Positive for leg swelling. Negative for chest pain, palpitations and PND. Gastrointestinal:  Negative for abdominal pain, constipation, diarrhea, nausea and vomiting. The patient's medications, allergies, past medical history, surgical history, family history and social history were reviewed and updated where appropriate.       Current Outpatient Medications:     losartan (COZAAR) 50 mg tablet, Take 50 mg by mouth in the morning., Disp: , Rfl:     bumetanide (BUMEX) 1 mg tablet, Take 1 mg by mouth in the morning., Disp: , Rfl:     potassium chloride (KLOR-CON M10) 10 mEq tablet, TAKE 1 TABLET BY MOUTH THREE TIMES A WEEK ON MON, WED, AND FRI, Disp: , Rfl:     Xarelto 20 mg tab tablet, Take 20 mg by mouth daily. , Disp: , Rfl:     simvastatin (ZOCOR) 40 mg tablet, Take 1 Tablet by mouth nightly., Disp: 90 Tablet, Rfl: 1    mirtazapine (REMERON) 15 mg tablet, Take 1 Tablet by mouth nightly., Disp: 90 Tablet, Rfl: 1    levothyroxine (SYNTHROID) 112 mcg tablet, TAKE 1 TABLET EVERY DAY BEFORE BREAKFAST, Disp: 90 Tablet, Rfl: 0    acetaminophen (TYLENOL) 650 mg TbER, Take 1,300 mg by mouth every eight (8) hours as needed for Pain., Disp: , Rfl:     calcium carbonate 400 mg calcium (1,000 mg) chew, Take 1 Tablet by mouth as needed. , Disp: , Rfl:     sennosides-docusate sodium (Stool Softener-Stimulant Laxat) 8.6-50 mg cap, Take  by mouth as needed. , Disp: , Rfl:     mineral oil/petrolatum,white (LUBRICANT EYE OP), Administer  to both eyes as needed. , Disp: , Rfl:     MULTIVITAMINS (MULTI-VITAMIN PO), Take 1 Tab by mouth daily. Takes one po daily. , Disp: , Rfl:     Allergies   Allergen Reactions    Benazepril Cough    Sulfa (Sulfonamide Antibiotics) Hives         OBJECTIVE    Visit Vitals  BP 92/64 (BP 1 Location: Left upper arm, BP Patient Position: Sitting, BP Cuff Size: Small adult)   Pulse 97   Temp 98.7 °F (37.1 °C) (Temporal)   Resp 16   Ht 5' 2\" (1.575 m)   Wt 141 lb 3.2 oz (64 kg)   SpO2 99%   BMI 25.83 kg/m²       Physical Exam  Vitals and nursing note reviewed. Constitutional:       General: She is not in acute distress. Appearance: Normal appearance. She is not toxic-appearing. HENT:      Head: Normocephalic and atraumatic. Eyes:      Pupils: Pupils are equal, round, and reactive to light. Pulmonary:      Effort: Pulmonary effort is normal.   Musculoskeletal:         General: Normal range of motion.       Left lower leg: Edema (1+ up to mid shin) present. Comments: Left calf circumference is 1.5 cm bigger than right. Skin:     General: Skin is warm and dry. Neurological:      Mental Status: She is alert. Mental status is at baseline. Psychiatric:         Mood and Affect: Mood normal.         Behavior: Behavior normal.     ASSESSMENT AND PLAN  Anna Dotson is a 80 y.o. female who presents today for:    1. Encounter for Medicare annual wellness exam    2. Acute deep vein thrombosis (DVT) of proximal vein of left lower extremity ([address]  Will obtain prior US records. Provoked DVT in setting of recent surgery. Will plan on repeating US in 3 months after initial dx before stopping Xarelto. - CBC W/O DIFF; Future  - CBC W/O DIFF    3. Hyperlipidemia, unspecified hyperlipidemia type  Restart statin. - simvastatin (ZOCOR) 40 mg tablet; Take 1 Tablet by mouth nightly. Dispense: 90 Tablet; Refill: 1    4. Hypokalemia  Stable, continue current treatment pending review of labs. Consider weaning off Bumex as swelling may be due to DVT. - METABOLIC PANEL, BASIC; Future  - MAGNESIUM; Future  - PHOSPHORUS; Future  - PHOSPHORUS  - MAGNESIUM  - METABOLIC PANEL, BASIC    5. Grief reaction  Decrease to 15 mg.  - mirtazapine (REMERON) 15 mg tablet; Take 1 Tablet by mouth nightly. Dispense: 90 Tablet; Refill: 1       Medications Discontinued During This Encounter   Medication Reason    atorvastatin (LIPITOR) 20 mg tablet LIST CLEANUP    simvastatin (ZOCOR) 40 mg tablet REORDER    mirtazapine (REMERON) 30 mg tablet REORDER           Treatment risks/benefits/costs/interactions/alternatives discussed with patient. Advised patient to call back or return to office if symptoms worsen/change/persist. If patient cannot reach us or should anything more severe/urgent arise he/she should proceed directly to the nearest emergency department. Discussed expected course/resolution/complications of diagnosis in detail with patient.   Patient expressed understanding with the diagnosis and plan. This dictation may have been completed with Dragon, the computer voice recognition software. Unanticipated grammatical, syntax, homophones, and other interpretive errors are sometimes inadvertently transcribed by the computer software. Please disregard any errors that have escaped final proofreading. Carine Olivia M.D.

## 2022-07-26 NOTE — PROGRESS NOTES
Chief Complaint   Patient presents with    Blood Clot       1. \"Have you been to the ER, urgent care clinic since your last visit? Hospitalized since your last visit? \" Yes When: 03/22 Where: [facility]  Reason for visit: TKA due to fall    2. \"Have you seen or consulted any other health care providers outside of the [address] [place] [address] since your last visit? \" Yes 3 months in [address] for PT       3. For patients aged 39-70: Has the patient had a colonoscopy / FIT/ Cologuard? NA - based on age      If the patient is female:    4. For patients aged 41-77: Has the patient had a mammogram within the past 2 years? NA - based on age or sex      11. For patients aged 21-65: Has the patient had a pap smear?  NA - based on age or sex    3 most recent PHQ Screens 3/8/2022   Little interest or pleasure in doing things Several days   Feeling down, depressed, irritable, or hopeless Several days   Total Score PHQ 2 2   Trouble falling or staying asleep, or sleeping too much -   Feeling tired or having little energy -   Poor appetite, weight loss, or overeating -   Feeling bad about yourself - or that you are a failure or have let yourself or your family down -   Trouble concentrating on things such as school, work, reading, or watching TV -   Moving or speaking so slowly that other people could have noticed; or the opposite being so fidgety that others notice -   Thoughts of being better off dead, or hurting yourself in some way -   How difficult have these problems made it for you to do your work, take care of your home and get along with others -

## 2022-07-27 ENCOUNTER — TELEPHONE (OUTPATIENT)
Dept: FAMILY MEDICINE CLINIC | Age: 82
End: 2022-07-27

## 2022-07-27 LAB
ANION GAP SERPL CALC-SCNC: 10 MMOL/L (ref 5–15)
BUN SERPL-MCNC: 16 MG/DL (ref 6–20)
BUN/CREAT SERPL: 14 (ref 12–20)
CALCIUM SERPL-MCNC: 9.6 MG/DL (ref 8.5–10.1)
CHLORIDE SERPL-SCNC: 100 MMOL/L (ref 97–108)
CO2 SERPL-SCNC: 31 MMOL/L (ref 21–32)
CREAT SERPL-MCNC: 1.17 MG/DL (ref 0.55–1.02)
ERYTHROCYTE [DISTWIDTH] IN BLOOD BY AUTOMATED COUNT: 15.8 % (ref 11.5–14.5)
GLUCOSE SERPL-MCNC: 100 MG/DL (ref 65–100)
HCT VFR BLD AUTO: 32.1 % (ref 35–47)
HGB BLD-MCNC: 10 G/DL (ref 11.5–16)
MAGNESIUM SERPL-MCNC: 1.6 MG/DL (ref 1.6–2.4)
MCH RBC QN AUTO: 27.2 PG (ref 26–34)
MCHC RBC AUTO-ENTMCNC: 31.2 G/DL (ref 30–36.5)
MCV RBC AUTO: 87.2 FL (ref 80–99)
NRBC # BLD: 0 K/UL (ref 0–0.01)
NRBC BLD-RTO: 0 PER 100 WBC
PHOSPHATE SERPL-MCNC: 3 MG/DL (ref 2.6–4.7)
PLATELET # BLD AUTO: 308 K/UL (ref 150–400)
PMV BLD AUTO: 10.2 FL (ref 8.9–12.9)
POTASSIUM SERPL-SCNC: 3.3 MMOL/L (ref 3.5–5.1)
RBC # BLD AUTO: 3.68 M/UL (ref 3.8–5.2)
SODIUM SERPL-SCNC: 141 MMOL/L (ref 136–145)
WBC # BLD AUTO: 6.4 K/UL (ref 3.6–11)

## 2022-07-27 NOTE — TELEPHONE ENCOUNTER
Nieves Hernandez at [facility] called  stated that patient was seen patient today blood pressure 112/60 heart rate irregular jumping from 42 to 82 and when standing up 90.     Requesting a call back    Best call back #[phone]

## 2022-07-28 ENCOUNTER — TELEPHONE (OUTPATIENT)
Dept: FAMILY MEDICINE CLINIC | Age: 82
End: 2022-07-28

## 2022-07-28 NOTE — PROGRESS NOTES
Please call patient:    Kidney function is abnormal and potassium remains low. This is due to taking Bumex. I believe the swelling of her leg is due to the clot so I recommend stopping both the Bumex and potassium supplements. Pt to call us back if she notices that her leg becomes more swollen after stopping the medications. Please request prior ultrasound report from [facility]. Needs 3 month follow up (or sooner if she has new symptoms).

## 2022-07-28 NOTE — PROGRESS NOTES
Called patient. Two patient identifiers verified. Discussed lab results per provider's note. Verbalized understanding. Patient is stopping bumex and potassium supplement. Patient would like to keep appt of 08/15 and 09/20 as follow ups for med changes.

## 2022-07-28 NOTE — TELEPHONE ENCOUNTER
MD Fina Quezada,    Clarification from [facility] of current therapy for patient. Rx for Simvastatin 40mg and atoravastatin 20mg  filled 7/13/22 per rx fill at [facility] (per [facility]). New therapy is simvastatin 40mg? Discontinue atorvastatin. Just let me know and I will contact [employer] to verify change.   Derrek Sicard    Ref ID: [id number]  Call [phone]

## 2022-07-29 NOTE — TELEPHONE ENCOUNTER
Contacted [facility] and advised the simvastatin is correct therapy. They stated they will send medication to patient.  Thanks, marie

## 2022-08-03 ENCOUNTER — TELEPHONE (OUTPATIENT)
Dept: FAMILY MEDICINE CLINIC | Age: 82
End: 2022-08-03

## 2022-08-03 NOTE — TELEPHONE ENCOUNTER
Aquiles Brice the home health nurse called stated she wants to know what blood pressure medication that the patient is on.     Requesting a call back    Best call back # [phone]

## 2022-08-03 NOTE — TELEPHONE ENCOUNTER
Called Lesli and confirmed two patient identifiers. Hunter Vasqueznena states that patient is taking losartan 50mg that was prescribed by provider at [facility]. Would like to know if provider can refill medication for patient since she was seen recently. Informed her that message would be sent to provider.

## 2022-08-04 RX ORDER — LOSARTAN POTASSIUM 50 MG/1
50 TABLET ORAL DAILY
Qty: 90 TABLET | Refills: 3 | Status: SHIPPED | OUTPATIENT
Start: 2022-08-04 | End: 2022-08-11 | Stop reason: SDUPTHER

## 2022-08-15 ENCOUNTER — OFFICE VISIT (OUTPATIENT)
Dept: FAMILY MEDICINE CLINIC | Age: 82
End: 2022-08-15
Payer: MEDICARE

## 2022-08-15 VITALS
OXYGEN SATURATION: 100 % | HEART RATE: 83 BPM | BODY MASS INDEX: 25.95 KG/M2 | HEIGHT: 62 IN | SYSTOLIC BLOOD PRESSURE: 122 MMHG | DIASTOLIC BLOOD PRESSURE: 64 MMHG | RESPIRATION RATE: 16 BRPM | TEMPERATURE: 98.9 F | WEIGHT: 141 LBS

## 2022-08-15 DIAGNOSIS — F43.21 GRIEF REACTION: ICD-10-CM

## 2022-08-15 DIAGNOSIS — D64.9 ANEMIA, UNSPECIFIED TYPE: ICD-10-CM

## 2022-08-15 DIAGNOSIS — R79.89 ELEVATED SERUM CREATININE: ICD-10-CM

## 2022-08-15 DIAGNOSIS — M79.89 LEFT LEG SWELLING: Primary | ICD-10-CM

## 2022-08-15 PROCEDURE — G8754 DIAS BP LESS 90: HCPCS | Performed by: FAMILY MEDICINE

## 2022-08-15 PROCEDURE — G8427 DOCREV CUR MEDS BY ELIG CLIN: HCPCS | Performed by: FAMILY MEDICINE

## 2022-08-15 PROCEDURE — G0463 HOSPITAL OUTPT CLINIC VISIT: HCPCS | Performed by: FAMILY MEDICINE

## 2022-08-15 PROCEDURE — 1123F ACP DISCUSS/DSCN MKR DOCD: CPT | Performed by: FAMILY MEDICINE

## 2022-08-15 PROCEDURE — G8417 CALC BMI ABV UP PARAM F/U: HCPCS | Performed by: FAMILY MEDICINE

## 2022-08-15 PROCEDURE — 1101F PT FALLS ASSESS-DOCD LE1/YR: CPT | Performed by: FAMILY MEDICINE

## 2022-08-15 PROCEDURE — 99214 OFFICE O/P EST MOD 30 MIN: CPT | Performed by: FAMILY MEDICINE

## 2022-08-15 PROCEDURE — G0328 FECAL BLOOD SCRN IMMUNOASSAY: HCPCS | Performed by: FAMILY MEDICINE

## 2022-08-15 PROCEDURE — G8752 SYS BP LESS 140: HCPCS | Performed by: FAMILY MEDICINE

## 2022-08-15 PROCEDURE — 1090F PRES/ABSN URINE INCON ASSESS: CPT | Performed by: FAMILY MEDICINE

## 2022-08-15 PROCEDURE — G8536 NO DOC ELDER MAL SCRN: HCPCS | Performed by: FAMILY MEDICINE

## 2022-08-15 PROCEDURE — G8510 SCR DEP NEG, NO PLAN REQD: HCPCS | Performed by: FAMILY MEDICINE

## 2022-08-15 RX ORDER — MIRTAZAPINE 15 MG/1
7.5 TABLET, FILM COATED ORAL
Qty: 90 TABLET | Refills: 1
Start: 2022-08-15

## 2022-08-15 NOTE — PROGRESS NOTES
Patient Name: Gail Garcia   MRN: [id number]    Mary Marcial is a 80 y.o. female who presents with the following:     Since last visit, we stopped her Bumex due to elevated creatinine and low potassium. Since then, her left leg did swell up again. [facility] evaluated her and started hydrochlorothiazide at 25 mg which did not help. I then switched her to Lasix which is helping. Has not use a compression stocking. Is compliant with her Xarelto for her left leg DVT which was diagnosed over a month ago after surgery for her left knee replacement. Last labs are also notable for borderline anemia. She denies any changes in her stools including blood. She would like to decrease her mirtazapine dose which was started for depression during her rehab. She is overall doing okay and does not want to be on these medications. Wt Readings from Last 3 Encounters:   08/15/22 141 lb (64 kg)   07/26/22 141 lb 3.2 oz (64 kg)   07/01/22 156 lb (70.8 kg)     Review of Systems   Constitutional:  Negative for fever, malaise/fatigue and weight loss. Respiratory:  Negative for cough, hemoptysis, shortness of breath and wheezing. Cardiovascular:  Positive for leg swelling. Negative for chest pain, palpitations and PND. Gastrointestinal:  Negative for abdominal pain, constipation, diarrhea, nausea and vomiting. The patient's medications, allergies, past medical history, surgical history, family history and social history were reviewed and updated where appropriate. Current Outpatient Medications:     losartan (COZAAR) 50 mg tablet, Take 1 Tablet by mouth in the morning., Disp: 90 Tablet, Rfl: 0    furosemide (LASIX) 20 mg tablet, Take 1 Tablet by mouth daily as needed (edema). , Disp: 30 Tablet, Rfl: 0    Xarelto 20 mg tab tablet, Take 20 mg by mouth daily. , Disp: , Rfl:     simvastatin (ZOCOR) 40 mg tablet, Take 1 Tablet by mouth nightly., Disp: 90 Tablet, Rfl: 1    mirtazapine (REMERON) 15 mg tablet, Take 1 Tablet by mouth nightly., Disp: 90 Tablet, Rfl: 1    levothyroxine (SYNTHROID) 112 mcg tablet, TAKE 1 TABLET EVERY DAY BEFORE BREAKFAST, Disp: 90 Tablet, Rfl: 0    acetaminophen (TYLENOL) 650 mg TbER, Take 1,300 mg by mouth every eight (8) hours as needed for Pain., Disp: , Rfl:     sennosides-docusate sodium (Stool Softener-Stimulant Laxat) 8.6-50 mg cap, Take  by mouth as needed. , Disp: , Rfl:     MULTIVITAMINS (MULTI-VITAMIN PO), Take 1 Tab by mouth daily. Takes one po daily. , Disp: , Rfl:     Allergies   Allergen Reactions    Benazepril Cough    Sulfa (Sulfonamide Antibiotics) Hives       OBJECTIVE    Visit Vitals  /64 (BP 1 Location: Right arm, BP Patient Position: Sitting, BP Cuff Size: Adult)   Pulse 83   Temp 98.9 °F (37.2 °C) (Temporal)   Resp 16   Ht 5' 2\" (1.575 m)   Wt 141 lb (64 kg)   SpO2 100%   BMI 25.79 kg/m²       Physical Exam  Vitals and nursing note reviewed. Constitutional:       General: She is not in acute distress. Appearance: Normal appearance. She is not toxic-appearing. HENT:      Head: Normocephalic and atraumatic. Eyes:      Pupils: Pupils are equal, round, and reactive to light. Pulmonary:      Effort: Pulmonary effort is normal.   Musculoskeletal:         General: Normal range of motion. Left lower leg: Edema (1+ in left calf) present. Skin:     General: Skin is warm and dry. Neurological:      Mental Status: She is alert. Mental status is at baseline. Psychiatric:         Mood and Affect: Mood normal.         Behavior: Behavior normal.         ASSESSMENT AND PLAN  Anna Casarez is a 80 y.o. female who presents today for:    1. Left leg swelling  Recommend pt to use compression stocking. Okay to use daily HCTZ as long as her labs are normal. Will plan to repeat US in 2 more months or sooner if swelling increases. 2. Elevated serum creatinine  Stable, continue current treatment pending review of labs.   - METABOLIC PANEL, BASIC; Future  - MAGNESIUM; Future  - PHOSPHORUS; Future  - PHOSPHORUS  - MAGNESIUM  - METABOLIC PANEL, BASIC    3. Anemia, unspecified type  If anemic again, recommend pt to complete FIT test.  - CBC WITH AUTOMATED DIFF; Future  - IRON PROFILE; Future  - FERRITIN; Future  - POCT FECAL IMMUNOCHEMICAL TEST (FIT) ()  - FERRITIN  - IRON PROFILE  - CBC WITH AUTOMATED DIFF    4. Grief reaction  Decrease to 7.5 mg daily. - mirtazapine (REMERON) 15 mg tablet; Take 0.5 Tablets by mouth nightly. Dispense: 90 Tablet; Refill: 1       Medications Discontinued During This Encounter   Medication Reason    potassium chloride (KLOR-CON M10) 10 mEq tablet LIST CLEANUP    mineral oil/petrolatum,white (LUBRICANT EYE OP) LIST CLEANUP    calcium carbonate 400 mg calcium (1,000 mg) chew LIST CLEANUP    bumetanide (BUMEX) 1 mg tablet LIST CLEANUP    mirtazapine (REMERON) 15 mg tablet REORDER       Follow-up and Dispositions    Return in about 2 months (around 10/15/2022) for Medication Check. Treatment risks/benefits/costs/interactions/alternatives discussed with patient. Advised patient to call back or return to office if symptoms worsen/change/persist. If patient cannot reach us or should anything more severe/urgent arise he/she should proceed directly to the nearest emergency department. Discussed expected course/resolution/complications of diagnosis in detail with patient. Patient expressed understanding with the diagnosis and plan. This dictation may have been completed with Dragon, the computer voice recognition software. Unanticipated grammatical, syntax, homophones, and other interpretive errors are sometimes inadvertently transcribed by the computer software. Please disregard any errors that have escaped final proofreading. Carine Cordova M.D.

## 2022-08-15 NOTE — PROGRESS NOTES
Chief Complaint   Patient presents with    Peripheral Edema       1. \"Have you been to the ER, urgent care clinic since your last visit? Hospitalized since your last visit? \" Yes When: 07/22 Where: [facility] Reason for visit: swelling of left leg    2. \"Have you seen or consulted any other health care providers outside of the [address] [place] [address] since your last visit? \" No     3. For patients aged 39-70: Has the patient had a colonoscopy / FIT/ Cologuard? NA - based on age      If the patient is female:    4. For patients aged 41-77: Has the patient had a mammogram within the past 2 years? NA - based on age or sex      11. For patients aged 21-65: Has the patient had a pap smear?  NA - based on age or sex    3 most recent PHQ Screens 8/15/2022   Little interest or pleasure in doing things Not at all   Feeling down, depressed, irritable, or hopeless Not at all   Total Score PHQ 2 0   Trouble falling or staying asleep, or sleeping too much -   Feeling tired or having little energy -   Poor appetite, weight loss, or overeating -   Feeling bad about yourself - or that you are a failure or have let yourself or your family down -   Trouble concentrating on things such as school, work, reading, or watching TV -   Moving or speaking so slowly that other people could have noticed; or the opposite being so fidgety that others notice -   Thoughts of being better off dead, or hurting yourself in some way -   How difficult have these problems made it for you to do your work, take care of your home and get along with others -

## 2022-08-16 LAB
ANION GAP SERPL CALC-SCNC: 7 MMOL/L (ref 5–15)
BASOPHILS # BLD: 0.1 K/UL (ref 0–0.1)
BASOPHILS NFR BLD: 1 % (ref 0–1)
BUN SERPL-MCNC: 18 MG/DL (ref 6–20)
BUN/CREAT SERPL: 17 (ref 12–20)
CALCIUM SERPL-MCNC: 9.3 MG/DL (ref 8.5–10.1)
CHLORIDE SERPL-SCNC: 102 MMOL/L (ref 97–108)
CO2 SERPL-SCNC: 30 MMOL/L (ref 21–32)
COMMENT, HOLDF: NORMAL
CREAT SERPL-MCNC: 1.09 MG/DL (ref 0.55–1.02)
DIFFERENTIAL METHOD BLD: ABNORMAL
EOSINOPHIL # BLD: 0.1 K/UL (ref 0–0.4)
EOSINOPHIL NFR BLD: 1 % (ref 0–7)
ERYTHROCYTE [DISTWIDTH] IN BLOOD BY AUTOMATED COUNT: 15.9 % (ref 11.5–14.5)
FERRITIN SERPL-MCNC: 125 NG/ML (ref 8–252)
GLUCOSE SERPL-MCNC: 92 MG/DL (ref 65–100)
HCT VFR BLD AUTO: 33.1 % (ref 35–47)
HGB BLD-MCNC: 10.5 G/DL (ref 11.5–16)
IMM GRANULOCYTES # BLD AUTO: 0 K/UL (ref 0–0.04)
IMM GRANULOCYTES NFR BLD AUTO: 0 % (ref 0–0.5)
IRON SATN MFR SERPL: 16 % (ref 20–50)
IRON SERPL-MCNC: 43 UG/DL (ref 35–150)
LYMPHOCYTES # BLD: 1.2 K/UL (ref 0.8–3.5)
LYMPHOCYTES NFR BLD: 16 % (ref 12–49)
MAGNESIUM SERPL-MCNC: 1.8 MG/DL (ref 1.6–2.4)
MCH RBC QN AUTO: 28.4 PG (ref 26–34)
MCHC RBC AUTO-ENTMCNC: 31.7 G/DL (ref 30–36.5)
MCV RBC AUTO: 89.5 FL (ref 80–99)
MONOCYTES # BLD: 0.7 K/UL (ref 0–1)
MONOCYTES NFR BLD: 9 % (ref 5–13)
NEUTS SEG # BLD: 5.7 K/UL (ref 1.8–8)
NEUTS SEG NFR BLD: 73 % (ref 32–75)
NRBC # BLD: 0 K/UL (ref 0–0.01)
NRBC BLD-RTO: 0 PER 100 WBC
PHOSPHATE SERPL-MCNC: 3.2 MG/DL (ref 2.6–4.7)
PLATELET # BLD AUTO: 318 K/UL (ref 150–400)
PMV BLD AUTO: 10.3 FL (ref 8.9–12.9)
POTASSIUM SERPL-SCNC: 3.9 MMOL/L (ref 3.5–5.1)
RBC # BLD AUTO: 3.7 M/UL (ref 3.8–5.2)
SAMPLES BEING HELD,HOLD: NORMAL
SODIUM SERPL-SCNC: 139 MMOL/L (ref 136–145)
TIBC SERPL-MCNC: 276 UG/DL (ref 250–450)
WBC # BLD AUTO: 7.8 K/UL (ref 3.6–11)

## 2022-08-16 RX ORDER — FUROSEMIDE 20 MG/1
20 TABLET ORAL
Qty: 60 TABLET | Refills: 0 | Status: SHIPPED | OUTPATIENT
Start: 2022-08-16

## 2022-08-16 NOTE — PROGRESS NOTES
Please call patient:    She is anemic but it is better than last time. Please have pt complete the FIT kit to rule out a GI bleed. Her potassium is normal. Kidney function is improving. Can continue with the Lasix but would encourage prn use rather than daily if possible to limit it affecting her kidney. Encourage compression stocking use to see if this can lessen her need for the Lasix.

## 2022-08-29 LAB
FECAL OCCULT BLOOD: NEGATIVE
VALID INTERNAL CONTROL?: YES

## 2022-08-29 NOTE — PROGRESS NOTES
Called patient. Two patient identifiers verified. Discussed lab results per provider's note. Verbalized understanding. Patient states that her left legs are still swollen. She is having trouble walking and still taking lasix PRN but is unable to wear the compression stocking because they \"do not stay up\".

## 2022-08-30 DIAGNOSIS — M79.89 LEFT LEG SWELLING: Primary | ICD-10-CM

## 2022-08-30 NOTE — PROGRESS NOTES
Please call patient:    I recommend a prescription strength compression stocking that is knee high; I sent it to [facility] so they can make sure it fits her. Let me know if she wants it sent to another DME place.

## 2022-08-31 DIAGNOSIS — M79.89 LEFT LEG SWELLING: Primary | ICD-10-CM

## 2022-08-31 NOTE — PROGRESS NOTES
Called patient and verified two patient identifiers. Patient would like to meet with lymphedema clinic.

## 2022-09-13 ENCOUNTER — TELEPHONE (OUTPATIENT)
Dept: FAMILY MEDICINE CLINIC | Age: 82
End: 2022-09-13

## 2022-09-13 DIAGNOSIS — M79.89 LEFT LEG SWELLING: ICD-10-CM

## 2022-09-13 DIAGNOSIS — I82.4Y2 ACUTE DEEP VEIN THROMBOSIS (DVT) OF PROXIMAL VEIN OF LEFT LOWER EXTREMITY (HCC): Primary | ICD-10-CM

## 2022-09-13 NOTE — TELEPHONE ENCOUNTER
I will refer her to vascular surgery to evaluate her swelling; she should be able to get in with them sooner. If she can't get in with them within the next 2 weeks, please make a sooner appt with me.

## 2022-09-13 NOTE — TELEPHONE ENCOUNTER
Patient called stated her legs in still swollen otherwise everything else is fine.     Best call back #[phone]

## 2022-09-13 NOTE — TELEPHONE ENCOUNTER
Called patient and confirmed two identifiers. Provided vascular surgeon's office number. Patient will call to schedule and call [facility] office back with update.

## 2022-09-20 ENCOUNTER — OFFICE VISIT (OUTPATIENT)
Dept: FAMILY MEDICINE CLINIC | Age: 82
End: 2022-09-20
Payer: MEDICARE

## 2022-09-20 VITALS
DIASTOLIC BLOOD PRESSURE: 84 MMHG | SYSTOLIC BLOOD PRESSURE: 152 MMHG | RESPIRATION RATE: 16 BRPM | BODY MASS INDEX: 28.52 KG/M2 | TEMPERATURE: 99.3 F | HEART RATE: 65 BPM | WEIGHT: 155 LBS | OXYGEN SATURATION: 99 % | HEIGHT: 62 IN

## 2022-09-20 DIAGNOSIS — M79.89 LEFT LEG SWELLING: ICD-10-CM

## 2022-09-20 DIAGNOSIS — I82.4Y2 ACUTE DEEP VEIN THROMBOSIS (DVT) OF PROXIMAL VEIN OF LEFT LOWER EXTREMITY (HCC): Primary | ICD-10-CM

## 2022-09-20 DIAGNOSIS — D64.9 ANEMIA, UNSPECIFIED TYPE: ICD-10-CM

## 2022-09-20 DIAGNOSIS — E03.9 ACQUIRED HYPOTHYROIDISM: ICD-10-CM

## 2022-09-20 DIAGNOSIS — I10 HTN, GOAL BELOW 150/90: ICD-10-CM

## 2022-09-20 DIAGNOSIS — L65.9 HAIR LOSS: ICD-10-CM

## 2022-09-20 DIAGNOSIS — R63.5 WEIGHT GAIN: ICD-10-CM

## 2022-09-20 PROCEDURE — G8510 SCR DEP NEG, NO PLAN REQD: HCPCS | Performed by: FAMILY MEDICINE

## 2022-09-20 PROCEDURE — 99214 OFFICE O/P EST MOD 30 MIN: CPT | Performed by: FAMILY MEDICINE

## 2022-09-20 PROCEDURE — 1101F PT FALLS ASSESS-DOCD LE1/YR: CPT | Performed by: FAMILY MEDICINE

## 2022-09-20 PROCEDURE — G8753 SYS BP > OR = 140: HCPCS | Performed by: FAMILY MEDICINE

## 2022-09-20 PROCEDURE — G8427 DOCREV CUR MEDS BY ELIG CLIN: HCPCS | Performed by: FAMILY MEDICINE

## 2022-09-20 PROCEDURE — 1090F PRES/ABSN URINE INCON ASSESS: CPT | Performed by: FAMILY MEDICINE

## 2022-09-20 PROCEDURE — G8417 CALC BMI ABV UP PARAM F/U: HCPCS | Performed by: FAMILY MEDICINE

## 2022-09-20 PROCEDURE — G8536 NO DOC ELDER MAL SCRN: HCPCS | Performed by: FAMILY MEDICINE

## 2022-09-20 PROCEDURE — G8754 DIAS BP LESS 90: HCPCS | Performed by: FAMILY MEDICINE

## 2022-09-20 PROCEDURE — G0463 HOSPITAL OUTPT CLINIC VISIT: HCPCS | Performed by: FAMILY MEDICINE

## 2022-09-20 PROCEDURE — 1123F ACP DISCUSS/DSCN MKR DOCD: CPT | Performed by: FAMILY MEDICINE

## 2022-09-20 NOTE — PROGRESS NOTES
Chief Complaint   Patient presents with    Peripheral Edema     Follow up; 10/04 appt with vascular surgeon       1. \"Have you been to the ER, urgent care clinic since your last visit? Hospitalized since your last visit? \" No    2. \"Have you seen or consulted any other health care providers outside of the [address] [place] [address] since your last visit? \" No     3. For patients aged 39-70: Has the patient had a colonoscopy / FIT/ Cologuard? NA - based on age      If the patient is female:    4. For patients aged 41-77: Has the patient had a mammogram within the past 2 years? NA - based on age or sex      11. For patients aged 21-65: Has the patient had a pap smear?  NA - based on age or sex    3 most recent PHQ Screens 9/20/2022   Little interest or pleasure in doing things Not at all   Feeling down, depressed, irritable, or hopeless Not at all   Total Score PHQ 2 0   Trouble falling or staying asleep, or sleeping too much -   Feeling tired or having little energy -   Poor appetite, weight loss, or overeating -   Feeling bad about yourself - or that you are a failure or have let yourself or your family down -   Trouble concentrating on things such as school, work, reading, or watching TV -   Moving or speaking so slowly that other people could have noticed; or the opposite being so fidgety that others notice -   Thoughts of being better off dead, or hurting yourself in some way -   How difficult have these problems made it for you to do your work, take care of your home and get along with others -

## 2022-09-20 NOTE — PROGRESS NOTES
Patient Name: Maryann Ferrari   MRN: [id number]    Izzy Shepard is a 80 y.o. female who presents with the following:     Has been eating more sweets and gained 14 lbs. BP is higher today. Noticed more hair loss. Has an upcoming appointment with vascular surgery to discuss her left leg swelling and DVT. Hasn't been taking the Lasix as she is unsure if it helps. Still taking Xarelto. Last labs notable for anemia with negative FIT test. She has noticed her stools are more black. BP Readings from Last 3 Encounters:   09/20/22 (!) 152/84   08/15/22 122/64   07/26/22 92/64     Wt Readings from Last 3 Encounters:   09/20/22 155 lb (70.3 kg)   08/15/22 141 lb (64 kg)   07/26/22 141 lb 3.2 oz (64 kg)     Review of Systems   Constitutional:  Negative for fever, malaise/fatigue and weight loss. Respiratory:  Negative for cough, hemoptysis, shortness of breath and wheezing. Cardiovascular:  Positive for leg swelling. Negative for chest pain, palpitations and PND. Gastrointestinal:  Negative for abdominal pain, constipation, diarrhea, nausea and vomiting. The patient's medications, allergies, past medical history, surgical history, family history and social history were reviewed and updated where appropriate. Current Outpatient Medications:     Comp Stocking,Knee,Regular,Med misc, One compression stocking for left leg swelling due to DVT; 20-30 mmHg, Disp: 1 Each, Rfl: 0    furosemide (LASIX) 20 mg tablet, Take 1 Tablet by mouth daily as needed (edema). , Disp: 60 Tablet, Rfl: 0    mirtazapine (REMERON) 15 mg tablet, Take 0.5 Tablets by mouth nightly., Disp: 90 Tablet, Rfl: 1    losartan (COZAAR) 50 mg tablet, Take 1 Tablet by mouth in the morning., Disp: 90 Tablet, Rfl: 0    Xarelto 20 mg tab tablet, Take 20 mg by mouth daily. , Disp: , Rfl:     simvastatin (ZOCOR) 40 mg tablet, Take 1 Tablet by mouth nightly., Disp: 90 Tablet, Rfl: 1    levothyroxine (SYNTHROID) 112 mcg tablet, TAKE 1 TABLET EVERY DAY BEFORE BREAKFAST, Disp: 90 Tablet, Rfl: 0    acetaminophen (TYLENOL) 650 mg TbER, Take 1,300 mg by mouth every eight (8) hours as needed for Pain., Disp: , Rfl:     sennosides-docusate sodium (Stool Softener-Stimulant Laxat) 8.6-50 mg cap, Take  by mouth as needed. , Disp: , Rfl:     MULTIVITAMINS (MULTI-VITAMIN PO), Take 1 Tab by mouth daily. Takes one po daily. , Disp: , Rfl:     Allergies   Allergen Reactions    Benazepril Cough    Sulfa (Sulfonamide Antibiotics) Hives         OBJECTIVE    Visit Vitals  BP (!) 152/84 (BP 1 Location: Left upper arm, BP Patient Position: Sitting, BP Cuff Size: Adult)   Pulse 65   Temp 99.3 °F (37.4 °C) (Temporal)   Resp 16   Ht 5' 2\" (1.575 m)   Wt 155 lb (70.3 kg)   SpO2 99%   BMI 28.35 kg/m²       Physical Exam      ASSESSMENT AND PLAN  Anna Modi is a 80 y.o. female who presents today for:    1. Acute deep vein thrombosis (DVT) of proximal vein of left lower extremity (HCC)  Pt to follow up with vascular surgery. Continue Xarelto for now. - rivaroxaban (Xarelto) 20 mg tab tablet; Take 1 Tablet by mouth daily. Dispense: 90 Tablet; Refill: 0    2. Left leg swelling  As above. Can hold off on Lasix if it hasn't been helping. Prior compression stockings don't seem to stay upright on her. Discuss with vascular surgery.  - METABOLIC PANEL, BASIC; Future  - METABOLIC PANEL, BASIC    3. Hair loss  Stable, continue current treatment pending review of labs. - TSH 3RD GENERATION; Future  - T4 (THYROXINE); Future  - T4 (THYROXINE)  - TSH 3RD GENERATION    4. Weight gain  Encouraged pt to increase exercise and diet.  - TSH 3RD GENERATION; Future  - T4 (THYROXINE); Future  - T4 (THYROXINE)  - TSH 3RD GENERATION    5. Acquired hypothyroidism  Stable, continue current treatment pending review of labs. - TSH 3RD GENERATION; Future  - T4 (THYROXINE); Future  - T4 (THYROXINE)  - TSH 3RD GENERATION    6.  Anemia, unspecified type  If low again, may need a GI evaluation.  - CBC W/O DIFF; Future  - CBC W/O DIFF    7. HTN, goal below 150/90  Pt to work on weight loss; no med changes today. Medications Discontinued During This Encounter   Medication Reason    Xarelto 20 mg tab tablet REORDER           Treatment risks/benefits/costs/interactions/alternatives discussed with patient. Advised patient to call back or return to office if symptoms worsen/change/persist. If patient cannot reach us or should anything more severe/urgent arise he/she should proceed directly to the nearest emergency department. Discussed expected course/resolution/complications of diagnosis in detail with patient. Patient expressed understanding with the diagnosis and plan. This dictation may have been completed with Dragon, the computer voice recognition software. Unanticipated grammatical, syntax, homophones, and other interpretive errors are sometimes inadvertently transcribed by the computer software. Please disregard any errors that have escaped final proofreading. Carine Rosenberg M.D.

## 2022-09-21 LAB
ANION GAP SERPL CALC-SCNC: 5 MMOL/L (ref 5–15)
BUN SERPL-MCNC: 15 MG/DL (ref 6–20)
BUN/CREAT SERPL: 15 (ref 12–20)
CALCIUM SERPL-MCNC: 9.7 MG/DL (ref 8.5–10.1)
CHLORIDE SERPL-SCNC: 103 MMOL/L (ref 97–108)
CO2 SERPL-SCNC: 28 MMOL/L (ref 21–32)
CREAT SERPL-MCNC: 0.98 MG/DL (ref 0.55–1.02)
ERYTHROCYTE [DISTWIDTH] IN BLOOD BY AUTOMATED COUNT: 15.2 % (ref 11.5–14.5)
GLUCOSE SERPL-MCNC: 91 MG/DL (ref 65–100)
HCT VFR BLD AUTO: 33.7 % (ref 35–47)
HGB BLD-MCNC: 10.6 G/DL (ref 11.5–16)
MCH RBC QN AUTO: 27.5 PG (ref 26–34)
MCHC RBC AUTO-ENTMCNC: 31.5 G/DL (ref 30–36.5)
MCV RBC AUTO: 87.3 FL (ref 80–99)
NRBC # BLD: 0 K/UL (ref 0–0.01)
NRBC BLD-RTO: 0 PER 100 WBC
PLATELET # BLD AUTO: 298 K/UL (ref 150–400)
PMV BLD AUTO: 10.2 FL (ref 8.9–12.9)
POTASSIUM SERPL-SCNC: 3.9 MMOL/L (ref 3.5–5.1)
RBC # BLD AUTO: 3.86 M/UL (ref 3.8–5.2)
SODIUM SERPL-SCNC: 136 MMOL/L (ref 136–145)
T4 SERPL-MCNC: 9.9 UG/DL (ref 4.8–13.9)
TSH SERPL DL<=0.05 MIU/L-ACNC: 5.37 UIU/ML (ref 0.36–3.74)
WBC # BLD AUTO: 6.2 K/UL (ref 3.6–11)

## 2022-09-23 NOTE — PROGRESS NOTES
Called, spoke to pt. Two pt identifiers confirmed. Writer informed patient of lab result and recommend. Patient stated that she has an appointment in October with Vascular MD and will follow up with provider with answer.

## 2022-09-23 NOTE — PROGRESS NOTES
Please call patient:    She still is anemic but her blood cell counts are stable. Would like to know the vascular surgeon's recommendation regarding the end date of her Xarelto so that we can recheck her labs when she is off of the medication. She can call us back once she sees the vascular doctor. One of the thyroid levels is mildly off; since thyroid levels can fluctuate, would recheck it later with her blood counts.   Kidney function is back to normal.

## 2022-10-13 ENCOUNTER — TELEPHONE (OUTPATIENT)
Dept: FAMILY MEDICINE CLINIC | Age: 82
End: 2022-10-13

## 2022-10-13 NOTE — TELEPHONE ENCOUNTER
----- Message from [facility] sent at 10/13/2022 11:15 AM EDT -----  Subject: Message to Provider    QUESTIONS  Information for Provider? This message from the patient is for Dr. Kj Live, Patient saw a vascular doctor and she was told to   relay the message about her condition that the swelling still exist and   that there was nothing else for the vascular doctor to do, Patient do not   no longer take the rivaroxaban (Xarelto) 20 mg tab tablet. Please give   patient a call back for more information and to advise further. ---------------------------------------------------------------------------  --------------  Aishwarya GARZA  [phone]; Do not leave any message, patient will call back for answer  ---------------------------------------------------------------------------  --------------  SCRIPT ANSWERS  Relationship to Patient?  Self

## 2022-10-13 NOTE — TELEPHONE ENCOUNTER
Called patient and confirmed two identifiers. Patient stated that Dr. Lesly Hairston repeated a doppler. Called her with results stating that there was nothing to worry about and she can stop Xarelto. Told her that is all they can do for her. Patient still c/o bilateral leg swelling (slight alleviation with walking) and right leg pain. Also c/o intermittent numbness in toes. Informed patient that message would be sent to provider. Verbalized understanding.

## 2022-10-24 DIAGNOSIS — Z96.652 STATUS POST REVISION OF TOTAL KNEE, LEFT: Primary | ICD-10-CM

## 2022-10-25 NOTE — TELEPHONE ENCOUNTER
Reviewed Dr. Alexander Lau note and recent ultrasound which did not show any signs of DVT of her left leg. Therefore she can stop her Xarelto. If she is still having left leg swelling, she could try to take 40 mg of Lasix (2 tabs of the 20 mg) for 3 days in a row. Her prior labs were notable for slightly abnormal thyroid levels so that may or may not be making her swelling worse. Once she has stopped the Xarelto for a full month, I recommend that she make a follow-up appointment with us so that we can recheck her blood counts and evaluate her leg.

## 2022-10-27 ENCOUNTER — OFFICE VISIT (OUTPATIENT)
Dept: ORTHOPEDIC SURGERY | Age: 82
End: 2022-10-27
Payer: MEDICARE

## 2022-10-27 DIAGNOSIS — R29.898 WEAKNESS OF BOTH LOWER EXTREMITIES: ICD-10-CM

## 2022-10-27 DIAGNOSIS — Z96.652 STATUS POST REVISION OF TOTAL KNEE, LEFT: ICD-10-CM

## 2022-10-27 DIAGNOSIS — R26.2 DIFFICULTY WALKING: Primary | ICD-10-CM

## 2022-10-27 PROCEDURE — 97110 THERAPEUTIC EXERCISES: CPT | Performed by: PHYSICAL THERAPIST

## 2022-10-27 PROCEDURE — 97161 PT EVAL LOW COMPLEX 20 MIN: CPT | Performed by: PHYSICAL THERAPIST

## 2022-10-27 NOTE — TELEPHONE ENCOUNTER
Called patient and confirmed two identifiers. Relayed provider's message. Patient stated that she has not been taking the furosemide, but will start. Complaining of pain in feet and thinks it is neuropathy. Legs are still swollen. 1 month follow up scheduled to see PCP.

## 2022-10-30 NOTE — PROGRESS NOTES
Patient Initial Evaluation    Patient Name: Sussy Starr  Date:10/30/2022  : 1940  [x]  Patient  Verified  [place]or: [place] MED[place] / Plan: [place] MEDICARE PART A & B / Product Type: Medicare /    Total Treatment Time (min): 36  Referring Physician: Lo Mariee MD    Treatment Area: balance    HPI    The patient is a 80year-old female referred to physical therapy by Dr. Otto Mouse status post left total knee replacement revision that was completed 2022. The patient states that stayed at [facility] following her knee replacement but elected not to participate in physical therapy. She states that she has no left knee pain and overall her knee is doing well but she does feel unsteady on her feet due to her balance. She notes that she has numbness in her toes bilaterally. She is currently using a single-point cane for walking. Patient states that she does lose her balance but she has not fallen since prior to her knee revision that led to her need for a revision. Patient's primary goal is to work on her balance and be more steady on her feet. OBJECTIVE    Gait: Patient ambulates with unequal step lengths and narrow base of support with use of SPC. Range of motion: BLE WNL    Strength: BLE grossly 4/5    Pain: Reported a visual analog scale 0/10 at rest    Swelling: moderate BLE swelling    Joint mobility assessment: WNL    Tinetti: 14/28 (High Fall Risk)    Treatment: Full evaluation of the patient was completed. Therapeutic exercise (minutes: 10): We discussed at length progressive plan of care and goals with the patient to develop current plan. We initiated BLE and core strengthening exercises for home. Home exercise program included posterior pelvic tilt, supine clams with red T band, and L AQ's. Written and pictorial exercises were provided to the patient. Total treatment time 40 minutes.     ASSESSMENT    Physical examination of patient reveals bilateral lower extremity weakness, antalgic gait pattern with use of SPC, high fall risk as determined by Tinetti, and decreased functional mobility. Patient will benefit from skilled therapy address these limitations. Long-term goal 4 weeks  1. Patient will ambulate with equal step lengths demonstrate heel strike at initial contact bilaterally without use of AD. 2.  BLE gross strength 5/5.  3.  The patient will score greater than 24 on the Tinetti scale. 4.  Patient will descend a flight of stairs with reciprocal gait pattern. 5.  15 point improvement in lower extremity functional scale. Short-term goal 1 weeks. 1.  Independent demonstration of a home exercise program to facilitate recovery. Total treatment time today 40 min. ICD-10-CM ICD-9-CM    1. Difficulty walking  R26.2 719.7       2. Weakness of both lower extremities  R29.898 729.89         PLAN OF CARE:    Treatment frequency 2X weekly. Duration 20 visits. Focus of therapy will be on progressive restoration of range of motion and strength, balance, and functional mobility. Therapeutic applications will include but are not limited to:  Home exercise program development and implementation with updating as needed. Intramuscular dry needling to the involved region. Manual therapy, joint mobilization, myofascial release, therapeutic exercises. Modalities including ultrasound and electric stimulation heat and ice. Kinesiotape and Gates taping for joint reeducation and approximation of tissue for neuromuscular reeducation.

## 2022-11-01 ENCOUNTER — OFFICE VISIT (OUTPATIENT)
Dept: ORTHOPEDIC SURGERY | Age: 82
End: 2022-11-01

## 2022-11-01 ENCOUNTER — TELEPHONE (OUTPATIENT)
Dept: FAMILY MEDICINE CLINIC | Age: 82
End: 2022-11-01

## 2022-11-01 DIAGNOSIS — R26.2 DIFFICULTY WALKING: ICD-10-CM

## 2022-11-01 DIAGNOSIS — Z96.652 STATUS POST REVISION OF TOTAL KNEE, LEFT: ICD-10-CM

## 2022-11-01 DIAGNOSIS — R29.898 WEAKNESS OF BOTH LOWER EXTREMITIES: Primary | ICD-10-CM

## 2022-11-01 NOTE — TELEPHONE ENCOUNTER
----- Message from Marely Winkler sent at 11/1/2022  1:13 PM EDT -----  Subject: Message to Provider    QUESTIONS  Information for Provider? Dr. Jazmin Santiago pt was in Physical Therapy and it   was 178/85. (FYI) . .. ---------------------------------------------------------------------------  --------------  Emy STILESZAMZAM  [phone]; OK to leave message on voicemail  ---------------------------------------------------------------------------  --------------  SCRIPT ANSWERS  Relationship to Patient?  Self

## 2022-11-01 NOTE — PROGRESS NOTES
PT DAILY TREATMENT NOTE    Patient Name: Cinthia Marte  Date:2022  : 1940  [x]  Patient  Verified  Payor: Germán Schneider / Plan: [place] MED[place] / Product Type: Medicare /    Total Treatment Time (min): 15  Referring Provider: Lisa Jane MD    Treatment Area: balance    SUBJECTIVE  Subjective functional status/changes:   [] No changes reported  Patient presented to therapy today stating that she was lightheaded and vision was not normal.  She felt that she was seeing double. A nurse escorted her to therapy today. OBJECTIVE    Patient presented to therapy lightheaded today. She was seated and blood pressure was taken by automatic cuff that read 185/85 mmHg. Blood pressure was then taken manually and the reading was 175/85 mmHg. Patient states seated for about 20 minutes. Blood pressure was then taken again and the reading was 175/85 mmHg. Patient stated that she is on blood pressure medication but does not take her blood pressure regularly. Patient states that she has not experienced this before. Patient was encouraged to get in touch with her PCP regarding her elevated blood pressure today. We decided to forego exercises due to elevated blood pressure. Patient elected to go home. I escorted the patient by wheelchair to her car and she stated that she felt fine and did not feel lightheaded. ICD-10-CM ICD-9-CM    1. Weakness of both lower extremities  R29.898 729.89       2. 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  Difficulty walking  R26.2 719.7         Gregory Maya, PT 2022  6:15 PM

## 2022-11-01 NOTE — PROGRESS NOTES
PT DAILY TREATMENT NOTE    Patient Name: Norma Sarkar  Date:2022  : 1940  [x]  Patient  Verified  Payor: [place] MEDICARE / Plan: [place] MEDICARE PART A & B / Product Type: Medicare /    Total Treatment Time (min): 15  Referring Provider: Joss Chaney MD    Treatment Area: balance    SUBJECTIVE  Subjective functional status/changes:   [] No changes reported  Patient presented to therapy today stating that she was lightheaded and vision was not normal.  She felt that she was seeing double.  A nurse escorted her to therapy today.    OBJECTIVE    Patient presented to therapy lightheaded today.  She was seated and blood pressure was taken by automatic cuff that read 185/85 mmHg.  Blood pressure was then taken manually and the reading was 175/85 mmHg.  Patient states seated for about 20 minutes.  Blood pressure was then taken again and the reading was 175/85 mmHg.  Patient stated that she is on blood pressure medication but does not take her blood pressure regularly.  Patient states that she has not experienced this before.  Patient was encouraged to get in touch with her PCP regarding her elevated blood pressure today.  We decided to forego exercises due to elevated blood pressure.  Patient elected to go home.  I escorted the patient by wheelchair to her car and she stated that she felt fine and did not feel lightheaded.       ICD-10-CM ICD-9-CM    1. Weakness of both lower extremities  R29.898 729.89       2. Status post revision of total knee, left [Z96.652 (ICD-10-CM)]  Z96.652 V43.65       3. Difficulty walking  R26.2 719.7         Nae Sarkar, PT 2022  6:15 PM

## 2022-11-07 ENCOUNTER — OFFICE VISIT (OUTPATIENT)
Dept: FAMILY MEDICINE CLINIC | Age: 82
End: 2022-11-07
Payer: MEDICARE

## 2022-11-07 VITALS
TEMPERATURE: 97.8 F | DIASTOLIC BLOOD PRESSURE: 90 MMHG | RESPIRATION RATE: 16 BRPM | WEIGHT: 151 LBS | HEART RATE: 72 BPM | HEIGHT: 62 IN | SYSTOLIC BLOOD PRESSURE: 150 MMHG | BODY MASS INDEX: 27.79 KG/M2 | OXYGEN SATURATION: 96 %

## 2022-11-07 DIAGNOSIS — D64.9 ANEMIA, UNSPECIFIED TYPE: ICD-10-CM

## 2022-11-07 DIAGNOSIS — I10 HTN, GOAL BELOW 150/90: Primary | ICD-10-CM

## 2022-11-07 DIAGNOSIS — E03.9 ACQUIRED HYPOTHYROIDISM: ICD-10-CM

## 2022-11-07 PROBLEM — Z86.718 HISTORY OF DEEP VENOUS THROMBOSIS (DVT) OF DISTAL VEIN OF LEFT LOWER EXTREMITY: Status: ACTIVE | Noted: 2022-07-26

## 2022-11-07 PROCEDURE — 1101F PT FALLS ASSESS-DOCD LE1/YR: CPT | Performed by: FAMILY MEDICINE

## 2022-11-07 PROCEDURE — 3074F SYST BP LT 130 MM HG: CPT | Performed by: FAMILY MEDICINE

## 2022-11-07 PROCEDURE — G8417 CALC BMI ABV UP PARAM F/U: HCPCS | Performed by: FAMILY MEDICINE

## 2022-11-07 PROCEDURE — 99214 OFFICE O/P EST MOD 30 MIN: CPT | Performed by: FAMILY MEDICINE

## 2022-11-07 PROCEDURE — 3078F DIAST BP <80 MM HG: CPT | Performed by: FAMILY MEDICINE

## 2022-11-07 PROCEDURE — G8536 NO DOC ELDER MAL SCRN: HCPCS | Performed by: FAMILY MEDICINE

## 2022-11-07 PROCEDURE — 1090F PRES/ABSN URINE INCON ASSESS: CPT | Performed by: FAMILY MEDICINE

## 2022-11-07 PROCEDURE — G8754 DIAS BP LESS 90: HCPCS | Performed by: FAMILY MEDICINE

## 2022-11-07 PROCEDURE — G8432 DEP SCR NOT DOC, RNG: HCPCS | Performed by: FAMILY MEDICINE

## 2022-11-07 PROCEDURE — G8427 DOCREV CUR MEDS BY ELIG CLIN: HCPCS | Performed by: FAMILY MEDICINE

## 2022-11-07 PROCEDURE — G8753 SYS BP > OR = 140: HCPCS | Performed by: FAMILY MEDICINE

## 2022-11-07 PROCEDURE — G0463 HOSPITAL OUTPT CLINIC VISIT: HCPCS | Performed by: FAMILY MEDICINE

## 2022-11-07 PROCEDURE — 1123F ACP DISCUSS/DSCN MKR DOCD: CPT | Performed by: FAMILY MEDICINE

## 2022-11-07 RX ORDER — LOSARTAN POTASSIUM 100 MG/1
100 TABLET ORAL DAILY
Qty: 90 TABLET | Refills: 3 | Status: SHIPPED | OUTPATIENT
Start: 2022-11-07 | End: 2022-11-07 | Stop reason: SDUPTHER

## 2022-11-07 RX ORDER — LOSARTAN POTASSIUM 100 MG/1
100 TABLET ORAL DAILY
Qty: 30 TABLET | Refills: 0 | Status: SHIPPED | OUTPATIENT
Start: 2022-11-07

## 2022-11-07 NOTE — PROGRESS NOTES
Patient Name: Beverley Guillen   MRN: [id number]    Quincy Whitfield is a 80 y.o. female who presents with the following:     Stopped her Xarelto a month ago as her vascular doctor did a repeat US and no clot in her left leg was seen. Her leg swelling is better, now wearing better fitted compression stockings. Last labs notable for abnormal TSH and anemia. BPs have been high at home. Denies CP,  SOB, or headache. BP Readings from Last 3 Encounters:   11/07/22 (!) 150/90   09/20/22 (!) 152/84   08/15/22 122/64     Wt Readings from Last 3 Encounters:   11/07/22 151 lb (68.5 kg)   09/20/22 155 lb (70.3 kg)   08/15/22 141 lb (64 kg)       Review of Systems   Constitutional:  Negative for fever, malaise/fatigue and weight loss. Respiratory:  Negative for cough, hemoptysis, shortness of breath and wheezing. Cardiovascular:  Negative for chest pain, palpitations, leg swelling and PND. Gastrointestinal:  Negative for abdominal pain, constipation, diarrhea, nausea and vomiting. The patient's medications, allergies, past medical history, surgical history, family history and social history were reviewed and updated where appropriate. Current Outpatient Medications:     Comp Stocking,Knee,Regular,Med misc, One compression stocking for left leg swelling due to DVT; 20-30 mmHg, Disp: 1 Each, Rfl: 0    losartan (COZAAR) 50 mg tablet, Take 1 Tablet by mouth in the morning., Disp: 90 Tablet, Rfl: 0    simvastatin (ZOCOR) 40 mg tablet, Take 1 Tablet by mouth nightly., Disp: 90 Tablet, Rfl: 1    levothyroxine (SYNTHROID) 112 mcg tablet, TAKE 1 TABLET EVERY DAY BEFORE BREAKFAST, Disp: 90 Tablet, Rfl: 0    acetaminophen (TYLENOL) 650 mg TbER, Take 1,300 mg by mouth every eight (8) hours as needed for Pain., Disp: , Rfl:     sennosides-docusate sodium (Stool Softener-Stimulant Laxat) 8.6-50 mg cap, Take  by mouth as needed. , Disp: , Rfl:     MULTIVITAMINS (MULTI-VITAMIN PO), Take 1 Tab by mouth daily. Takes one po daily. , Disp: , Rfl:     Allergies   Allergen Reactions    Benazepril Cough    Sulfa (Sulfonamide Antibiotics) Hives         OBJECTIVE    Visit Vitals  BP (!) 150/90 (BP 1 Location: Right arm, BP Patient Position: Sitting, BP Cuff Size: Adult)   Pulse 72   Temp 97.8 °F (36.6 °C) (Temporal)   Resp 16   Ht 5' 2\" (1.575 m)   Wt 151 lb (68.5 kg)   SpO2 96%   BMI 27.62 kg/m²       Physical Exam  Vitals and nursing note reviewed. Constitutional:       General: She is not in acute distress. Appearance: Normal appearance. She is not toxic-appearing. HENT:      Head: Normocephalic and atraumatic. Eyes:      Pupils: Pupils are equal, round, and reactive to light. Pulmonary:      Effort: Pulmonary effort is normal.   Musculoskeletal:         General: Normal range of motion. Skin:     General: Skin is warm and dry. Neurological:      Mental Status: She is alert. Mental status is at baseline. Psychiatric:         Mood and Affect: Mood normal.         Behavior: Behavior normal.         ASSESSMENT AND PLAN  [place] Arpit Trevizo is a 80 y.o. female who presents today for:    1. HTN, goal below 150/90  Increase losartan to 100 mg daily.  - METABOLIC PANEL, BASIC; Future  - losartan (COZAAR) 100 mg tablet; Take 1 Tablet by mouth daily. Dispense: 30 Tablet; Refill: 0  - METABOLIC PANEL, BASIC    2. Acquired hypothyroidism  Stable, continue current treatment pending review of labs. - TSH 3RD GENERATION; Future  - T4 (THYROXINE); Future  - T4 (THYROXINE)  - TSH 3RD GENERATION    3. Anemia, unspecified type  Now off of Xarelto. - CBC WITH AUTOMATED DIFF; Future  - FERRITIN; Future  - IRON PROFILE;  Future  - IRON PROFILE  - FERRITIN  - CBC WITH AUTOMATED DIFF       Medications Discontinued During This Encounter   Medication Reason    furosemide (LASIX) 20 mg tablet LIST CLEANUP    rivaroxaban (Xarelto) 20 mg tab tablet LIST CLEANUP    mirtazapine (REMERON) 15 mg tablet LIST CLEANUP    losartan (COZAAR) 50 mg tablet REORDER    losartan (COZAAR) 100 mg tablet REORDER       Follow-up and Dispositions    Return in about 4 weeks (around 12/5/2022) for HTN follow up. Treatment risks/benefits/costs/interactions/alternatives discussed with patient. Advised patient to call back or return to office if symptoms worsen/change/persist. If patient cannot reach us or should anything more severe/urgent arise he/she should proceed directly to the nearest emergency department. Discussed expected course/resolution/complications of diagnosis in detail with patient. Patient expressed understanding with the diagnosis and plan. This dictation may have been completed with Dragon, the computer voice recognition software. Unanticipated grammatical, syntax, homophones, and other interpretive errors are sometimes inadvertently transcribed by the computer software. Please disregard any errors that have escaped final proofreading. Carine Guthrie M.D.

## 2022-11-07 NOTE — PROGRESS NOTES
Chief Complaint   Patient presents with    Hypertension    Blood Clot     1 month follow up from being off of Xarelto. 1. \"Have you been to the ER, urgent care clinic since your last visit? Hospitalized since your last visit? \" No    2. \"Have you seen or consulted any other health care providers outside of the [address] [place] [address] since your last visit? \" No     3. For patients aged 39-70: Has the patient had a colonoscopy / FIT/ Cologuard? Yes - no Care Gap present      If the patient is female:    4. For patients aged 41-77: Has theYes - no Care Gap present patient had a mammogram within the past 2 years? Yes - no Care Gap present      5. For patients aged 21-65: Has the patient had a pap smear?  Yes - no Care Gap present    3 most recent PHQ Screens 9/20/2022   Little interest or pleasure in doing things Not at all   Feeling down, depressed, irritable, or hopeless Not at all   Total Score PHQ 2 0   Trouble falling or staying asleep, or sleeping too much -   Feeling tired or having little energy -   Poor appetite, weight loss, or overeating -   Feeling bad about yourself - or that you are a failure or have let yourself or your family down -   Trouble concentrating on things such as school, work, reading, or watching TV -   Moving or speaking so slowly that other people could have noticed; or the opposite being so fidgety that others notice -   Thoughts of being better off dead, or hurting yourself in some way -   How difficult have these problems made it for you to do your work, take care of your home and get along with others -

## 2022-11-08 LAB
ANION GAP SERPL CALC-SCNC: 5 MMOL/L (ref 5–15)
BASOPHILS # BLD: 0.1 K/UL (ref 0–0.1)
BASOPHILS NFR BLD: 1 % (ref 0–1)
BUN SERPL-MCNC: 16 MG/DL (ref 6–20)
BUN/CREAT SERPL: 16 (ref 12–20)
CALCIUM SERPL-MCNC: 9.7 MG/DL (ref 8.5–10.1)
CHLORIDE SERPL-SCNC: 103 MMOL/L (ref 97–108)
CO2 SERPL-SCNC: 30 MMOL/L (ref 21–32)
CREAT SERPL-MCNC: 0.97 MG/DL (ref 0.55–1.02)
DIFFERENTIAL METHOD BLD: ABNORMAL
EOSINOPHIL # BLD: 0.1 K/UL (ref 0–0.4)
EOSINOPHIL NFR BLD: 2 % (ref 0–7)
ERYTHROCYTE [DISTWIDTH] IN BLOOD BY AUTOMATED COUNT: 14.7 % (ref 11.5–14.5)
FERRITIN SERPL-MCNC: 66 NG/ML (ref 26–388)
GLUCOSE SERPL-MCNC: 94 MG/DL (ref 65–100)
HCT VFR BLD AUTO: 35.3 % (ref 35–47)
HGB BLD-MCNC: 11.2 G/DL (ref 11.5–16)
IMM GRANULOCYTES # BLD AUTO: 0 K/UL (ref 0–0.04)
IMM GRANULOCYTES NFR BLD AUTO: 0 % (ref 0–0.5)
IRON SATN MFR SERPL: 16 % (ref 20–50)
IRON SERPL-MCNC: 50 UG/DL (ref 35–150)
LYMPHOCYTES # BLD: 1.2 K/UL (ref 0.8–3.5)
LYMPHOCYTES NFR BLD: 19 % (ref 12–49)
MCH RBC QN AUTO: 28.1 PG (ref 26–34)
MCHC RBC AUTO-ENTMCNC: 31.7 G/DL (ref 30–36.5)
MCV RBC AUTO: 88.5 FL (ref 80–99)
MONOCYTES # BLD: 0.6 K/UL (ref 0–1)
MONOCYTES NFR BLD: 9 % (ref 5–13)
NEUTS SEG # BLD: 4.4 K/UL (ref 1.8–8)
NEUTS SEG NFR BLD: 69 % (ref 32–75)
NRBC # BLD: 0 K/UL (ref 0–0.01)
NRBC BLD-RTO: 0 PER 100 WBC
PLATELET # BLD AUTO: 265 K/UL (ref 150–400)
PMV BLD AUTO: 10.3 FL (ref 8.9–12.9)
POTASSIUM SERPL-SCNC: 4.2 MMOL/L (ref 3.5–5.1)
RBC # BLD AUTO: 3.99 M/UL (ref 3.8–5.2)
SODIUM SERPL-SCNC: 138 MMOL/L (ref 136–145)
T4 SERPL-MCNC: 10.7 UG/DL (ref 4.8–13.9)
TIBC SERPL-MCNC: 313 UG/DL (ref 250–450)
TSH SERPL DL<=0.05 MIU/L-ACNC: 4.83 UIU/ML (ref 0.36–3.74)
WBC # BLD AUTO: 6.3 K/UL (ref 3.6–11)

## 2022-11-08 NOTE — PROGRESS NOTES
Please call patient:    Anemia has improved. Thyroid levels are normalizing but not quite at goal. I would just keep her medications the same and follow up in December.

## 2022-12-15 ENCOUNTER — OFFICE VISIT (OUTPATIENT)
Dept: ORTHOPEDIC SURGERY | Age: 82
End: 2022-12-15
Payer: MEDICARE

## 2022-12-15 VITALS — BODY MASS INDEX: 27.79 KG/M2 | HEIGHT: 62 IN | WEIGHT: 151 LBS

## 2022-12-15 DIAGNOSIS — Z98.890 STATUS POST HIP SURGERY: Primary | ICD-10-CM

## 2022-12-15 PROCEDURE — 99024 POSTOP FOLLOW-UP VISIT: CPT | Performed by: ORTHOPAEDIC SURGERY

## 2022-12-20 NOTE — PROGRESS NOTES
[place] (: 1940) is a 80 y.o. female patient, here for evaluation of the following chief complaint(s):  Surgical Follow-up (Left hip follow up/)       ASSESSMENT/PLAN:  Below is the assessment and plan developed based on review of pertinent history, physical exam, labs, studies, and medications. 80-year-old female comes in today for follow-up. She status post a Doc hip arthroplasty on 2022 secondary to a left hip fracture. Postoperatively she is doing fair. Continues to progress. She is at [facility] and working well with PT OT. Incision is well-healed. Continue to weight-bear as tolerated. X-rays with good overall alignment. Continue with PT OT as able. Plans to follow-up in 6 weeks for standard postop check or sooner as needed. 1. Status post hip surgery  -     XR HIP LT W OR WO PELV 2-3 VWS; Future      Encounter Diagnosis   Name Primary? Status post hip surgery Yes        No follow-ups on file. SUBJECTIVE/OBJECTIVE:  [place] (: 1940) is a 80 y.o. female who presents today for the following:  Chief Complaint   Patient presents with    Surgical Follow-up     Left hip follow up         80-year-old female comes in today for follow-up. She status post a left Doc hip arthroplasty on 2022 secondary to fracture. Postoperatively doing well. Still some intermittent mild pain. Working with PT at our [facility]. Incision well-healed. Overall she is happy and pleased with her progress and outcomes thus far. IMAGING:  XR Results (most recent):  Results from Appointment encounter on 12/15/22    XR HIP LT W OR WO PELV 2-3 VWS    Narrative  Radiographs reviewed including 2 views of the left hip. Status post Doc hip arthroplasty. No evidence of aseptic loosening. Leg lengths and offset are appropriate.        Allergies   Allergen Reactions    Benazepril Cough    Sulfa (Sulfonamide Antibiotics) Hives       Current Outpatient Medications   Medication Sig    losartan (COZAAR) 100 mg tablet Take 1 Tablet by mouth daily. Comp Stocking,Knee,Regular,Med misc One compression stocking for left leg swelling due to DVT; 20-30 mmHg    simvastatin (ZOCOR) 40 mg tablet Take 1 Tablet by mouth nightly. levothyroxine (SYNTHROID) 112 mcg tablet TAKE 1 TABLET EVERY DAY BEFORE BREAKFAST    acetaminophen (TYLENOL) 650 mg TbER Take 1,300 mg by mouth every eight (8) hours as needed for Pain.    sennosides-docusate sodium (Stool Softener-Stimulant Laxat) 8.6-50 mg cap Take  by mouth as needed. MULTIVITAMINS (MULTI-VITAMIN PO) Take 1 Tab by mouth daily. Takes one po daily. No current facility-administered medications for this visit. Past Medical History:   Diagnosis Date    Chronic pain     LEFT KNEE    DDD (degenerative disc disease), lumbar 3/15/2016    Diverticulosis of colon 1/12    McGroarty/gi    DJD (degenerative joint disease)     Esophageal stricture 2/03    Fibrosis of lung ([facility] [address] 2004    Scarring @ the apices bilaterally Done @[facility]/histoplasmosis    History of basal cell cancer     History of deep venous thrombosis (DVT) of distal vein of left lower extremity 7/26/2022    HTN, goal below 150/90     Hypercholesterolemia     IBS (irritable bowel syndrome) 8/30/2016    Menopause     LMP-48years old? Osteoporosis     previously on Fosamax many years ago; does not want further treatment    Overactive bladder     Skin cancer 01/06/2020    scc-right shin    Unspecified hypothyroidism              Past Surgical History:   Procedure Laterality Date    COLONOSCOPY  1/12    McGroarty/gi    HX APPENDECTOMY  1953    HX CATARACT REMOVAL  3/12    L    HX CATARACT REMOVAL  04/2012    Bilateral     HX KNEE ARTHROSCOPY Right 2006 and 2008    Dr Johana Madrigal  1/10    right  Darnell/o/s    HX OTHER SURGICAL  2020, 2021    EXC. SKIN CA TENISHA.  LOWER LEGS       Family History   Problem Relation Age of Onset    Heart Disease Mother Diabetes Father     Heart Disease Father     Cancer Son         metastatic prostate ca    Diabetes Son     Heart Disease Son     Cancer Brother         STOMACH    No Known Problems Brother     Anesth Problems Neg Hx         Social History     Tobacco Use    Smoking status: Former     Packs/day: 1.00     Years: 25.00     Pack years: 25.00     Types: Cigarettes     Quit date: 3/3/2002     Years since quittin.8    Smokeless tobacco: Never    Tobacco comments:     quit in the 's/cigarettes   Substance Use Topics    Alcohol use: Not Currently        All systems reviewed x 12 and were negative with the exception of None      No flowsheet data found. Vitals:  Ht 5' 2\" (1.575 m)   Wt 151 lb (68.5 kg)   BMI 27.62 kg/m²    Body mass index is 27.62 kg/m². Physical Exam    General: NAD    Cardiac: Extremities well perfused. Respiratory: Nonlabored breathing. LLE: Incision clean dry and intact with no erythema. Minimal numbness over the LFCN. Normal gait and station. Negative stinchfield. No pain with gentle internal and external rotation. .  Motor strength is grossly intact. Skin warm well perfused. Capillary refill <2 sec. Leonard Talley M.D. was available for immediate consultation as the supervising physician. An electronic signature was used to authenticate this note.   -- Yadira Jain PA-C

## 2023-01-19 ENCOUNTER — OFFICE VISIT (OUTPATIENT)
Dept: FAMILY MEDICINE CLINIC | Age: 83
End: 2023-01-19
Payer: MEDICARE

## 2023-01-19 VITALS
WEIGHT: 148 LBS | OXYGEN SATURATION: 100 % | SYSTOLIC BLOOD PRESSURE: 120 MMHG | HEART RATE: 80 BPM | DIASTOLIC BLOOD PRESSURE: 66 MMHG | HEIGHT: 62 IN | RESPIRATION RATE: 16 BRPM | TEMPERATURE: 98.6 F | BODY MASS INDEX: 27.23 KG/M2

## 2023-01-19 DIAGNOSIS — D64.9 ANEMIA, UNSPECIFIED TYPE: ICD-10-CM

## 2023-01-19 DIAGNOSIS — E03.9 ACQUIRED HYPOTHYROIDISM: ICD-10-CM

## 2023-01-19 DIAGNOSIS — L98.9 SKIN LESION: ICD-10-CM

## 2023-01-19 DIAGNOSIS — I10 HTN, GOAL BELOW 150/90: Primary | ICD-10-CM

## 2023-01-19 PROBLEM — N18.30 CHRONIC RENAL DISEASE, STAGE III (HCC): Status: ACTIVE | Noted: 2023-01-19

## 2023-01-19 PROBLEM — I82.4Y2 ACUTE DEEP VEIN THROMBOSIS (DVT) OF PROXIMAL VEIN OF LEFT LOWER EXTREMITY (HCC): Status: ACTIVE | Noted: 2023-01-19

## 2023-01-19 PROBLEM — N18.30 CHRONIC RENAL DISEASE, STAGE III (HCC): Status: RESOLVED | Noted: 2023-01-19 | Resolved: 2023-01-19

## 2023-01-19 PROCEDURE — G0463 HOSPITAL OUTPT CLINIC VISIT: HCPCS | Performed by: FAMILY MEDICINE

## 2023-01-19 NOTE — PROGRESS NOTES
Chief Complaint   Patient presents with    Follow-up    Skin Problem     Mass on left leg X several months. No pain     Visit Vitals  BP (!) 144/76   Pulse 80   Temp 98.6 °F (37 °C)   Resp 16   Ht 5' 2\" (1.575 m)   Wt 148 lb (67.1 kg)   SpO2 100%   BMI 27.07 kg/m²     1. Have you been to the ER, urgent care clinic since your last visit? Hospitalized since your last visit? Yes Reason for visit: Fall    2. Have you seen or consulted any other health care providers outside of the [address] [place] [address] since your last visit? Include any pap smears or colon screening.  No

## 2023-01-19 NOTE — PROGRESS NOTES
Patient Name: Fish Cedillo   MRN: [id number]    Lili Trujillo is a 80 y.o. female who presents with the following:     Patient broke her left hip and underwent Doc hip arthroplasty on November 18. She went to our [facility] and recovered well. She has had an abnormal appearing blister on her left shin for many months which seems to be getting bigger, more red and developing across. 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 She does have a dermatologist.  Last labs notable for anemia with borderline low iron. Review of Systems   Constitutional:  Negative for fever, malaise/fatigue and weight loss. Respiratory:  Negative for cough, hemoptysis, shortness of breath and wheezing. Cardiovascular:  Negative for chest pain, palpitations, leg swelling and PND. Gastrointestinal:  Negative for abdominal pain, constipation, diarrhea, nausea and vomiting. The patient's medications, allergies, past medical history, surgical history, family history and social history were reviewed and updated where appropriate. Current Outpatient Medications:     losartan (COZAAR) 100 mg tablet, Take 1 Tablet by mouth daily. , Disp: 30 Tablet, Rfl: 0    Comp Stocking,Knee,Regular,Med misc, One compression stocking for left leg swelling due to DVT; 20-30 mmHg, Disp: 1 Each, Rfl: 0    simvastatin (ZOCOR) 40 mg tablet, Take 1 Tablet by mouth nightly., Disp: 90 Tablet, Rfl: 1    levothyroxine (SYNTHROID) 112 mcg tablet, TAKE 1 TABLET EVERY DAY BEFORE BREAKFAST, Disp: 90 Tablet, Rfl: 0    acetaminophen (TYLENOL) 650 mg TbER, Take 1,300 mg by mouth every eight (8) hours as needed for Pain., Disp: , Rfl:     sennosides-docusate sodium (Stool Softener-Stimulant Laxat) 8.6-50 mg cap, Take  by mouth as needed. , Disp: , Rfl:     MULTIVITAMINS (MULTI-VITAMIN PO), Take 1 Tab by mouth daily. Takes one po daily. , Disp: , Rfl:     Allergies   Allergen Reactions    Benazepril Cough Sulfa (Sulfonamide Antibiotics) Hives         OBJECTIVE    Visit Vitals  /66   Pulse 80   Temp 98.6 °F (37 °C)   Resp 16   Ht 5' 2\" (1.575 m)   Wt 148 lb (67.1 kg)   SpO2 100%   BMI 27.07 kg/m²       Physical Exam  Vitals and nursing note reviewed. Constitutional:       General: She is not in acute distress. Appearance: Normal appearance. She is not toxic-appearing. HENT:      Head: Normocephalic and atraumatic. Eyes:      Pupils: Pupils are equal, round, and reactive to light. Pulmonary:      Effort: Pulmonary effort is normal.   Musculoskeletal:         General: Normal range of motion. Skin:     General: Skin is warm and dry. Findings: Lesion (2 x 2 cm erythematous large papule on left shin with crusting) present. Neurological:      Mental Status: She is alert. Mental status is at baseline. Psychiatric:         Mood and Affect: Mood normal.         Behavior: Behavior normal.         ASSESSMENT AND PLAN  Anna Sharpe is a 80 y.o. female who presents today for:    1. HTN, goal below 150/90  Stable, continue current treatment.  - METABOLIC PANEL, BASIC; Future  - METABOLIC PANEL, BASIC    2. Anemia, unspecified type  Stable, continue current treatment pending review of labs. - CBC WITH AUTOMATED DIFF; Future  - FERRITIN; Future  - IRON PROFILE; Future  - IRON PROFILE  - FERRITIN  - CBC WITH AUTOMATED DIFF    3. Acquired hypothyroidism  - TSH 3RD GENERATION; Future  - T4 (THYROXINE); Future  - T4 (THYROXINE)  - TSH 3RD GENERATION    4. Skin lesion  Pt to see dermatology. There are no discontinued medications. Treatment risks/benefits/costs/interactions/alternatives discussed with patient. Advised patient to call back or return to office if symptoms worsen/change/persist. If patient cannot reach us or should anything more severe/urgent arise he/she should proceed directly to the nearest emergency department.   Discussed expected course/resolution/complications of diagnosis in detail with patient. Patient expressed understanding with the diagnosis and plan. This dictation may have been completed with Dragon, the computer voice recognition software. Unanticipated grammatical, syntax, homophones, and other interpretive errors are sometimes inadvertently transcribed by the computer software. Please disregard any errors that have escaped final proofreading. Carine Joy M.D.

## 2023-01-20 LAB
ANION GAP SERPL CALC-SCNC: 6 MMOL/L (ref 5–15)
BASOPHILS # BLD: 0.1 K/UL (ref 0–0.1)
BASOPHILS NFR BLD: 1 % (ref 0–1)
BUN SERPL-MCNC: 18 MG/DL (ref 6–20)
BUN/CREAT SERPL: 19 (ref 12–20)
CALCIUM SERPL-MCNC: 9.8 MG/DL (ref 8.5–10.1)
CHLORIDE SERPL-SCNC: 105 MMOL/L (ref 97–108)
CO2 SERPL-SCNC: 27 MMOL/L (ref 21–32)
CREAT SERPL-MCNC: 0.96 MG/DL (ref 0.55–1.02)
DIFFERENTIAL METHOD BLD: ABNORMAL
EOSINOPHIL # BLD: 0.1 K/UL (ref 0–0.4)
EOSINOPHIL NFR BLD: 2 % (ref 0–7)
ERYTHROCYTE [DISTWIDTH] IN BLOOD BY AUTOMATED COUNT: 14.7 % (ref 11.5–14.5)
FERRITIN SERPL-MCNC: 81 NG/ML (ref 8–252)
GLUCOSE SERPL-MCNC: 116 MG/DL (ref 65–100)
HCT VFR BLD AUTO: 34.4 % (ref 35–47)
HGB BLD-MCNC: 10.3 G/DL (ref 11.5–16)
IMM GRANULOCYTES # BLD AUTO: 0 K/UL (ref 0–0.04)
IMM GRANULOCYTES NFR BLD AUTO: 0 % (ref 0–0.5)
IRON SATN MFR SERPL: 9 % (ref 20–50)
IRON SERPL-MCNC: 29 UG/DL (ref 35–150)
LYMPHOCYTES # BLD: 1.5 K/UL (ref 0.8–3.5)
LYMPHOCYTES NFR BLD: 23 % (ref 12–49)
MCH RBC QN AUTO: 26.5 PG (ref 26–34)
MCHC RBC AUTO-ENTMCNC: 29.9 G/DL (ref 30–36.5)
MCV RBC AUTO: 88.4 FL (ref 80–99)
MONOCYTES # BLD: 0.5 K/UL (ref 0–1)
MONOCYTES NFR BLD: 8 % (ref 5–13)
NEUTS SEG # BLD: 4.1 K/UL (ref 1.8–8)
NEUTS SEG NFR BLD: 66 % (ref 32–75)
NRBC # BLD: 0 K/UL (ref 0–0.01)
NRBC BLD-RTO: 0 PER 100 WBC
PLATELET # BLD AUTO: 308 K/UL (ref 150–400)
PMV BLD AUTO: 10 FL (ref 8.9–12.9)
POTASSIUM SERPL-SCNC: 4.3 MMOL/L (ref 3.5–5.1)
RBC # BLD AUTO: 3.89 M/UL (ref 3.8–5.2)
SODIUM SERPL-SCNC: 138 MMOL/L (ref 136–145)
T4 SERPL-MCNC: 9.1 UG/DL (ref 4.8–13.9)
TIBC SERPL-MCNC: 318 UG/DL (ref 250–450)
TSH SERPL DL<=0.05 MIU/L-ACNC: 0.9 UIU/ML (ref 0.36–3.74)
WBC # BLD AUTO: 6.3 K/UL (ref 3.6–11)

## 2023-01-20 NOTE — PROGRESS NOTES
Please call patient:    Anemia is present but stable. Would recommend pt to start taking OTC iron supplement daily (brand name is Vitron C). Thyroid levels are stable. Follow up 6 months.

## 2023-01-21 NOTE — PROGRESS NOTES
Called, spoke to pt. Two pt identifiers confirmed. Writer informed patient of lab result and recommendation. Pt verbalized understanding of information discussed w/ no further questions at this time.

## 2023-02-06 ENCOUNTER — OFFICE VISIT (OUTPATIENT)
Dept: ORTHOPEDIC SURGERY | Age: 83
End: 2023-02-06
Payer: MEDICARE

## 2023-02-06 VITALS — WEIGHT: 148 LBS | BODY MASS INDEX: 27.23 KG/M2 | HEIGHT: 62 IN

## 2023-02-06 DIAGNOSIS — Z98.890 STATUS POST HIP SURGERY: Primary | ICD-10-CM

## 2023-02-06 PROCEDURE — 99024 POSTOP FOLLOW-UP VISIT: CPT | Performed by: ORTHOPAEDIC SURGERY

## 2023-02-06 NOTE — PROGRESS NOTES
[place] (: 1940) is a 80 y.o. female patient, here for evaluation of the following chief complaint(s):  Surgical Follow-up (Left hip follow up/)       ASSESSMENT/PLAN:  Below is the assessment and plan developed based on review of pertinent history, physical exam, labs, studies, and medications. 80-year-old female comes in today for follow-up. Status post a left partial hip replacement secondary to fracture. Postoperatively she is doing very well. She is home from rehab and working with in-home physical therapy. She is ambulating well with a cane. Incision well-healed. She is continuing to progress well. Minimal to no pain. Overall she is happy and pleased with her progress and outcome so far. Plans to follow-up in 9 months for standard postop check or sooner as needed. 1. Status post hip surgery  -     XR HIP LT W OR WO PELV 2-3 VWS; Future      Encounter Diagnosis   Name Primary? Status post hip surgery Yes        No follow-ups on file. SUBJECTIVE/OBJECTIVE:  [place] (: 1940) is a 80 y.o. female who presents today for the following:  Chief Complaint   Patient presents with    Surgical Follow-up     Left hip follow up         80-year-old female comes in today for follow-up. Status post a left partial hip replacement secondary to fracture. Postoperatively she is doing very well. She is home from rehab and working with in-home physical therapy. She is ambulating well with a cane. Incision well-healed. She is continuing to progress well. Minimal to no pain. IMAGING:  XR Results (most recent):  Results from Appointment encounter on 23    XR HIP LT W OR WO PELV 2-3 VWS    Narrative  Radiographs reviewed including 2 views of the left hip. Status post partial hip arthroplasty secondary to fracture. No evidence of aseptic loosening. Leg lengths and offset are appropriate. No acute changes from previous x-rays.        Allergies   Allergen Reactions    Benazepril Cough    Sulfa (Sulfonamide Antibiotics) Hives       Current Outpatient Medications   Medication Sig    losartan (COZAAR) 100 mg tablet Take 1 Tablet by mouth daily. Comp Stocking,Knee,Regular,Med misc One compression stocking for left leg swelling due to DVT; 20-30 mmHg    simvastatin (ZOCOR) 40 mg tablet Take 1 Tablet by mouth nightly. levothyroxine (SYNTHROID) 112 mcg tablet TAKE 1 TABLET EVERY DAY BEFORE BREAKFAST    acetaminophen (TYLENOL) 650 mg TbER Take 1,300 mg by mouth every eight (8) hours as needed for Pain.    sennosides-docusate sodium (Stool Softener-Stimulant Laxat) 8.6-50 mg cap Take  by mouth as needed. MULTIVITAMINS (MULTI-VITAMIN PO) Take 1 Tab by mouth daily. Takes one po daily. No current facility-administered medications for this visit. Past Medical History:   Diagnosis Date    Chronic pain     LEFT KNEE    DDD (degenerative disc disease), lumbar 3/15/2016    Diverticulosis of colon 1/12    McGroarty/gi    DJD (degenerative joint disease)     Esophageal stricture 2/03    Fibrosis of lung ([facility] [address] 2004    Scarring @ the apices bilaterally Done @[facility]/histoplasmosis    History of basal cell cancer     History of deep venous thrombosis (DVT) of distal vein of left lower extremity 7/26/2022    HTN, goal below 150/90     Hypercholesterolemia     IBS (irritable bowel syndrome) 8/30/2016    Menopause     LMP-48years old? Osteoporosis     previously on Fosamax many years ago; does not want further treatment    Overactive bladder     Skin cancer 01/06/2020    scc-right shin    Unspecified hypothyroidism              Past Surgical History:   Procedure Laterality Date    COLONOSCOPY  1/12    McGroarty/gi    HX APPENDECTOMY  1953    HX CATARACT REMOVAL  3/12    L    HX CATARACT REMOVAL  04/2012    Bilateral     HX KNEE ARTHROSCOPY Right 2006 and 2008    Dr Charyl Nyhan  1/10    right  Darnell/o/s    HX OTHER SURGICAL  2020, 2021    EXC.  SKIN CA TENISHA. LOWER LEGS       Family History   Problem Relation Age of Onset    Heart Disease Mother     Diabetes Father     Heart Disease Father     Cancer Son         metastatic prostate ca    Diabetes Son     Heart Disease Son     Cancer Brother         STOMACH    No Known Problems Brother     Anesth Problems Neg Hx         Social History     Tobacco Use    Smoking status: Former     Packs/day: 1.00     Years: 25.00     Pack years: 25.00     Types: Cigarettes     Quit date: 3/3/2002     Years since quittin.9    Smokeless tobacco: Never    Tobacco comments:     quit in the /cigarettes   Substance Use Topics    Alcohol use: Not Currently        All systems reviewed x 12 and were negative with the exception of None      No flowsheet data found. Vitals:  Ht 5' 2\" (1.575 m)   Wt 148 lb (67.1 kg)   BMI 27.07 kg/m²    Body mass index is 27.07 kg/m². Physical Exam  General: NAD    Cardiac: Extremities well perfused. Respiratory: Nonlabored breathing. LLE: Incision clean dry and intact with no erythema. Minimal numbness over the LFCN. Normal gait and station. Negative stinchfield. No pain with gentle internal and external rotation. .  Motor strength is grossly intact. Skin warm well perfused. Capillary refill <2 sec. Hadley Rudolph M.D. was available for immediate consultation as the supervising physician. An electronic signature was used to authenticate this note.   -- Serena Carbajal PA-C

## 2023-03-21 ENCOUNTER — OFFICE VISIT (OUTPATIENT)
Dept: FAMILY MEDICINE CLINIC | Age: 83
End: 2023-03-21
Payer: MEDICARE

## 2023-03-21 VITALS
RESPIRATION RATE: 16 BRPM | DIASTOLIC BLOOD PRESSURE: 70 MMHG | SYSTOLIC BLOOD PRESSURE: 130 MMHG | OXYGEN SATURATION: 100 % | TEMPERATURE: 98.2 F | HEIGHT: 62 IN | WEIGHT: 152.4 LBS | HEART RATE: 78 BPM | BODY MASS INDEX: 28.05 KG/M2

## 2023-03-21 DIAGNOSIS — N89.8 VAGINAL DISCHARGE: Primary | ICD-10-CM

## 2023-03-21 PROCEDURE — G8417 CALC BMI ABV UP PARAM F/U: HCPCS | Performed by: FAMILY MEDICINE

## 2023-03-21 PROCEDURE — 3078F DIAST BP <80 MM HG: CPT | Performed by: FAMILY MEDICINE

## 2023-03-21 PROCEDURE — 99213 OFFICE O/P EST LOW 20 MIN: CPT | Performed by: FAMILY MEDICINE

## 2023-03-21 PROCEDURE — G8536 NO DOC ELDER MAL SCRN: HCPCS | Performed by: FAMILY MEDICINE

## 2023-03-21 PROCEDURE — 1090F PRES/ABSN URINE INCON ASSESS: CPT | Performed by: FAMILY MEDICINE

## 2023-03-21 PROCEDURE — 1101F PT FALLS ASSESS-DOCD LE1/YR: CPT | Performed by: FAMILY MEDICINE

## 2023-03-21 PROCEDURE — 3075F SYST BP GE 130 - 139MM HG: CPT | Performed by: FAMILY MEDICINE

## 2023-03-21 PROCEDURE — 1123F ACP DISCUSS/DSCN MKR DOCD: CPT | Performed by: FAMILY MEDICINE

## 2023-03-21 PROCEDURE — G0463 HOSPITAL OUTPT CLINIC VISIT: HCPCS | Performed by: FAMILY MEDICINE

## 2023-03-21 PROCEDURE — G8427 DOCREV CUR MEDS BY ELIG CLIN: HCPCS | Performed by: FAMILY MEDICINE

## 2023-03-21 PROCEDURE — G8432 DEP SCR NOT DOC, RNG: HCPCS | Performed by: FAMILY MEDICINE

## 2023-03-21 NOTE — PROGRESS NOTES
Chief Complaint   Patient presents with    Vaginal Itching     Irritation when walking, no odor, a little bit of discharge since Friday         1. \"Have you been to the ER, urgent care clinic since your last visit? Hospitalized since your last visit? \" No    2. \"Have you seen or consulted any other health care providers outside of the [address] [place] [address] since your last visit? \" No     3. For patients over 45: Has the patient had a colonoscopy? Yes - no Care Gap present     If the patient is female:    4. For patients over 40: Has the patient had a mammogram? Yes - no Care Gap present    5. For patients over 21: Has the patient had a pap smear?  NA - based on age     1 most recent PHQ Screens 9/20/2022   Little interest or pleasure in doing things Not at all   Feeling down, depressed, irritable, or hopeless Not at all   Total Score PHQ 2 0   Trouble falling or staying asleep, or sleeping too much -   Feeling tired or having little energy -   Poor appetite, weight loss, or overeating -   Feeling bad about yourself - or that you are a failure or have let yourself or your family down -   Trouble concentrating on things such as school, work, reading, or watching TV -   Moving or speaking so slowly that other people could have noticed; or the opposite being so fidgety that others notice -   Thoughts of being better off dead, or hurting yourself in some way -   How difficult have these problems made it for you to do your work, take care of your home and get along with others -       Health Maintenance Due   Topic Date Due    Shingles Vaccine (1 of 2) Never done    Bone Densitometry  09/25/2022    COVID-19 Vaccine (5 - Booster for [employer] series) 12/06/2022    Lipid Screen  03/10/2023

## 2023-03-21 NOTE — PROGRESS NOTES
Patient Name: Daniel Reilly   MRN: [id number]    Qiana Pereira is a 80 y.o. female who presents with the following:     Patient reports some vaginal discomfort and slight discharge since Friday. Feels well today without symptoms. Believes she may have accidentally used a make-up remover wipe instead of a feminine hygiene wipe the other day. Denies any recent sexual intercourse, dysuria, fevers, rashes. Review of Systems   Constitutional:  Negative for fever, malaise/fatigue and weight loss. Respiratory:  Negative for cough, hemoptysis, shortness of breath and wheezing. Cardiovascular:  Negative for chest pain, palpitations, leg swelling and PND. Gastrointestinal:  Negative for abdominal pain, constipation, diarrhea, nausea and vomiting. Skin:  Positive for itching. Negative for rash. The patient's medications, allergies, past medical history, surgical history, family history and social history were reviewed and updated where appropriate. Current Outpatient Medications:     losartan (COZAAR) 100 mg tablet, Take 1 Tablet by mouth daily. , Disp: 30 Tablet, Rfl: 0    Comp Stocking,Knee,Regular,Med misc, One compression stocking for left leg swelling due to DVT; 20-30 mmHg, Disp: 1 Each, Rfl: 0    simvastatin (ZOCOR) 40 mg tablet, Take 1 Tablet by mouth nightly., Disp: 90 Tablet, Rfl: 1    levothyroxine (SYNTHROID) 112 mcg tablet, TAKE 1 TABLET EVERY DAY BEFORE BREAKFAST, Disp: 90 Tablet, Rfl: 0    acetaminophen (TYLENOL) 650 mg TbER, Take 1,300 mg by mouth every eight (8) hours as needed for Pain., Disp: , Rfl:     sennosides-docusate sodium (Stool Softener-Stimulant Laxat) 8.6-50 mg cap, Take  by mouth as needed. , Disp: , Rfl:     MULTIVITAMINS (MULTI-VITAMIN PO), Take 1 Tab by mouth daily. Takes one po daily. , Disp: , Rfl:     Allergies   Allergen Reactions    Benazepril Cough    Sulfa (Sulfonamide Antibiotics) Hives         OBJECTIVE    Visit Vitals  /70   Pulse 78   Temp 98.2 °F (36.8 °C) (Temporal)   Resp 16   Ht 5' 2\" (1.575 m)   Wt 152 lb 6.4 oz (69.1 kg)   SpO2 100%   BMI 27.87 kg/m²       Physical Exam  Vitals and nursing note reviewed. Constitutional:       General: She is not in acute distress. Appearance: Normal appearance. She is not toxic-appearing. HENT:      Head: Normocephalic and atraumatic. Eyes:      Pupils: Pupils are equal, round, and reactive to light. Pulmonary:      Effort: Pulmonary effort is normal.   Musculoskeletal:         General: Normal range of motion. Skin:     General: Skin is warm and dry. Neurological:      Mental Status: She is alert. Mental status is at baseline. Psychiatric:         Mood and Affect: Mood normal.         Behavior: Behavior normal.   Pelvic exam: VULVA: normal appearing vulva with no masses, tenderness or lesions. ASSESSMENT  e Saint-Antoincali Chatman is a 80 y.o. female who presents today for:    1. Vaginal discharge  Possible contact irritation from incorrect wipe used the other day. No abnormalities on exam. Recommend avoiding any wipe and just use soap/water. Will send for Nuswab. - NUSWAB VAGINITIS PLUS; Future       There are no discontinued medications. Treatment risks/benefits/costs/interactions/alternatives discussed with patient. Advised patient to call back or return to office if symptoms worsen/change/persist. If patient cannot reach us or should anything more severe/urgent arise he/she should proceed directly to the nearest emergency department. Discussed expected course/resolution/complications of diagnosis in detail with patient. Patient expressed understanding with the diagnosis and plan. This dictation may have been completed with Dragon, the [employer] voice recognition software. Unanticipated grammatical, syntax, homophones, and other interpretive errors are sometimes inadvertently transcribed by the computer software.   Please disregard any errors that have escaped final proofreading. Carine Villavicencio M.D.

## 2023-03-24 LAB
A VAGINAE DNA VAG QL NAA+PROBE: NORMAL SCORE
BVAB2 DNA VAG QL NAA+PROBE: NORMAL SCORE
C ALBICANS DNA VAG QL NAA+PROBE: NEGATIVE
C GLABRATA DNA VAG QL NAA+PROBE: NEGATIVE
C TRACH RRNA SPEC QL NAA+PROBE: NEGATIVE
MEGA1 DNA VAG QL NAA+PROBE: NORMAL SCORE
N GONORRHOEA RRNA SPEC QL NAA+PROBE: NEGATIVE
SPECIMEN SOURCE: NORMAL
T VAGINALIS RRNA SPEC QL NAA+PROBE: NEGATIVE

## 2023-06-29 RX ORDER — SIMVASTATIN 40 MG
TABLET ORAL
Qty: 90 TABLET | Refills: 1 | Status: SHIPPED | OUTPATIENT
Start: 2023-06-29

## 2023-10-19 ENCOUNTER — OFFICE VISIT (OUTPATIENT)
Age: 83
End: 2023-10-19
Payer: MEDICARE

## 2023-10-19 VITALS
TEMPERATURE: 97.1 F | DIASTOLIC BLOOD PRESSURE: 78 MMHG | HEART RATE: 58 BPM | HEIGHT: 62 IN | SYSTOLIC BLOOD PRESSURE: 146 MMHG | OXYGEN SATURATION: 98 % | BODY MASS INDEX: 30.14 KG/M2 | WEIGHT: 163.8 LBS | RESPIRATION RATE: 14 BRPM

## 2023-10-19 DIAGNOSIS — R20.2 NUMBNESS AND TINGLING OF BOTH FEET: ICD-10-CM

## 2023-10-19 DIAGNOSIS — Z91.81 AT RISK FOR FALLS: Primary | ICD-10-CM

## 2023-10-19 DIAGNOSIS — R26.9 GAIT DIFFICULTY: ICD-10-CM

## 2023-10-19 DIAGNOSIS — D64.9 ANEMIA, UNSPECIFIED TYPE: ICD-10-CM

## 2023-10-19 DIAGNOSIS — E78.5 HYPERLIPIDEMIA, UNSPECIFIED HYPERLIPIDEMIA TYPE: ICD-10-CM

## 2023-10-19 DIAGNOSIS — Z23 ENCOUNTER FOR IMMUNIZATION: ICD-10-CM

## 2023-10-19 DIAGNOSIS — R20.0 NUMBNESS AND TINGLING OF BOTH FEET: ICD-10-CM

## 2023-10-19 DIAGNOSIS — R73.03 PREDIABETES: ICD-10-CM

## 2023-10-19 DIAGNOSIS — E03.9 HYPOTHYROIDISM, UNSPECIFIED TYPE: ICD-10-CM

## 2023-10-19 LAB
ALBUMIN SERPL-MCNC: 3.7 G/DL (ref 3.5–5)
ALBUMIN/GLOB SERPL: 1.1 (ref 1.1–2.2)
ALP SERPL-CCNC: 121 U/L (ref 45–117)
ALT SERPL-CCNC: 22 U/L (ref 12–78)
ANION GAP SERPL CALC-SCNC: 4 MMOL/L (ref 5–15)
AST SERPL-CCNC: 12 U/L (ref 15–37)
BILIRUB SERPL-MCNC: 0.5 MG/DL (ref 0.2–1)
BUN SERPL-MCNC: 16 MG/DL (ref 6–20)
BUN/CREAT SERPL: 17 (ref 12–20)
CALCIUM SERPL-MCNC: 10.2 MG/DL (ref 8.5–10.1)
CHLORIDE SERPL-SCNC: 107 MMOL/L (ref 97–108)
CHOLEST SERPL-MCNC: 202 MG/DL
CO2 SERPL-SCNC: 30 MMOL/L (ref 21–32)
CREAT SERPL-MCNC: 0.94 MG/DL (ref 0.55–1.02)
GLOBULIN SER CALC-MCNC: 3.3 G/DL (ref 2–4)
GLUCOSE SERPL-MCNC: 88 MG/DL (ref 65–100)
HDLC SERPL-MCNC: 55 MG/DL
HDLC SERPL: 3.7 (ref 0–5)
IRON SATN MFR SERPL: 16 % (ref 20–50)
IRON SERPL-MCNC: 57 UG/DL (ref 35–150)
LDLC SERPL CALC-MCNC: 120.6 MG/DL (ref 0–100)
POTASSIUM SERPL-SCNC: 3.8 MMOL/L (ref 3.5–5.1)
PROT SERPL-MCNC: 7 G/DL (ref 6.4–8.2)
SODIUM SERPL-SCNC: 141 MMOL/L (ref 136–145)
TIBC SERPL-MCNC: 346 UG/DL (ref 250–450)
TRIGL SERPL-MCNC: 132 MG/DL
VLDLC SERPL CALC-MCNC: 26.4 MG/DL

## 2023-10-19 PROCEDURE — 1123F ACP DISCUSS/DSCN MKR DOCD: CPT | Performed by: FAMILY MEDICINE

## 2023-10-19 PROCEDURE — 90694 VACC AIIV4 NO PRSRV 0.5ML IM: CPT | Performed by: FAMILY MEDICINE

## 2023-10-19 PROCEDURE — PBSHW PBB SHADOW CHARGE: Performed by: FAMILY MEDICINE

## 2023-10-19 PROCEDURE — 99214 OFFICE O/P EST MOD 30 MIN: CPT | Performed by: FAMILY MEDICINE

## 2023-10-19 PROCEDURE — 1090F PRES/ABSN URINE INCON ASSESS: CPT | Performed by: FAMILY MEDICINE

## 2023-10-19 PROCEDURE — G8427 DOCREV CUR MEDS BY ELIG CLIN: HCPCS | Performed by: FAMILY MEDICINE

## 2023-10-19 PROCEDURE — 4004F PT TOBACCO SCREEN RCVD TLK: CPT | Performed by: FAMILY MEDICINE

## 2023-10-19 PROCEDURE — PBSHW INFLUENZA, FLUAD, (AGE 65 Y+), IM, PF, 0.5 ML: Performed by: FAMILY MEDICINE

## 2023-10-19 PROCEDURE — G8419 CALC BMI OUT NRM PARAM NOF/U: HCPCS | Performed by: FAMILY MEDICINE

## 2023-10-19 PROCEDURE — G8484 FLU IMMUNIZE NO ADMIN: HCPCS | Performed by: FAMILY MEDICINE

## 2023-10-19 PROCEDURE — G8399 PT W/DXA RESULTS DOCUMENT: HCPCS | Performed by: FAMILY MEDICINE

## 2023-10-19 PROCEDURE — 3077F SYST BP >= 140 MM HG: CPT | Performed by: FAMILY MEDICINE

## 2023-10-19 PROCEDURE — 3078F DIAST BP <80 MM HG: CPT | Performed by: FAMILY MEDICINE

## 2023-10-19 SDOH — ECONOMIC STABILITY: INCOME INSECURITY: HOW HARD IS IT FOR YOU TO PAY FOR THE VERY BASICS LIKE FOOD, HOUSING, MEDICAL CARE, AND HEATING?: NOT HARD AT ALL

## 2023-10-19 SDOH — ECONOMIC STABILITY: HOUSING INSECURITY
IN THE LAST 12 MONTHS, WAS THERE A TIME WHEN YOU DID NOT HAVE A STEADY PLACE TO SLEEP OR SLEPT IN A SHELTER (INCLUDING NOW)?: NO

## 2023-10-19 SDOH — ECONOMIC STABILITY: FOOD INSECURITY: WITHIN THE PAST 12 MONTHS, YOU WORRIED THAT YOUR FOOD WOULD RUN OUT BEFORE YOU GOT MONEY TO BUY MORE.: NEVER TRUE

## 2023-10-19 SDOH — ECONOMIC STABILITY: FOOD INSECURITY: WITHIN THE PAST 12 MONTHS, THE FOOD YOU BOUGHT JUST DIDN'T LAST AND YOU DIDN'T HAVE MONEY TO GET MORE.: NEVER TRUE

## 2023-10-19 ASSESSMENT — ENCOUNTER SYMPTOMS
ABDOMINAL PAIN: 0
COUGH: 0
NAUSEA: 0
DIARRHEA: 0
WHEEZING: 0
CONSTIPATION: 0
CHEST TIGHTNESS: 0
VOMITING: 0
SHORTNESS OF BREATH: 0

## 2023-10-19 ASSESSMENT — PATIENT HEALTH QUESTIONNAIRE - PHQ9
2. FEELING DOWN, DEPRESSED OR HOPELESS: 0
1. LITTLE INTEREST OR PLEASURE IN DOING THINGS: 0
SUM OF ALL RESPONSES TO PHQ QUESTIONS 1-9: 0
SUM OF ALL RESPONSES TO PHQ9 QUESTIONS 1 & 2: 0
SUM OF ALL RESPONSES TO PHQ QUESTIONS 1-9: 0

## 2023-10-20 LAB
ERYTHROCYTE [DISTWIDTH] IN BLOOD BY AUTOMATED COUNT: 13.5 % (ref 11.5–14.5)
EST. AVERAGE GLUCOSE BLD GHB EST-MCNC: 108 MG/DL
FERRITIN SERPL-MCNC: 45 NG/ML (ref 26–388)
FOLATE SERPL-MCNC: 13.8 NG/ML (ref 5–21)
HBA1C MFR BLD: 5.4 % (ref 4–5.6)
HCT VFR BLD AUTO: 40.7 % (ref 35–47)
HGB BLD-MCNC: 12.7 G/DL (ref 11.5–16)
MCH RBC QN AUTO: 27.1 PG (ref 26–34)
MCHC RBC AUTO-ENTMCNC: 31.2 G/DL (ref 30–36.5)
MCV RBC AUTO: 87 FL (ref 80–99)
NRBC # BLD: 0 K/UL (ref 0–0.01)
NRBC BLD-RTO: 0 PER 100 WBC
PLATELET # BLD AUTO: 279 K/UL (ref 150–400)
PMV BLD AUTO: 10.7 FL (ref 8.9–12.9)
RBC # BLD AUTO: 4.68 M/UL (ref 3.8–5.2)
TSH SERPL DL<=0.05 MIU/L-ACNC: 2.59 UIU/ML (ref 0.36–3.74)
VIT B12 SERPL-MCNC: 346 PG/ML (ref 193–986)
WBC # BLD AUTO: 7 K/UL (ref 3.6–11)

## 2023-10-25 ENCOUNTER — HOSPITAL ENCOUNTER (OUTPATIENT)
Facility: HOSPITAL | Age: 83
Setting detail: RECURRING SERIES
Discharge: HOME | End: 2023-10-28
Payer: MEDICARE

## 2023-10-25 PROCEDURE — 97110 THERAPEUTIC EXERCISES: CPT

## 2023-10-25 PROCEDURE — 97161 PT EVAL LOW COMPLEX 20 MIN: CPT

## 2023-10-25 NOTE — THERAPY EVALUATION
Physical Therapy at [facility],   a part of [address] [place] [address], [facility]  POWTX:[phone] VNV:[phone]                                                                            PHYSICAL THERAPY - MEDICARE EVALUATION/PLAN OF CARE NOTE (updated 3/23)      Date: 10/25/2023          Patient Name:  Ruba Rodriguez :  1940   Medical   Diagnosis: At risk for falls [Z91.81]  Gait difficulty [R26.9] Treatment Diagnosis:  R26.81   Unsteadiness on feet    Referral Source:  Gayathri Hansen MD Provider #:  [id number]                  Insurance: Payor: MEDICARE / Plan: MEDICARE PART A AND B / Product Type: *No Product type* /      Patient  verified yes     Visit #   Current  / Total 1 MN   Time   In / Out 11:10 am 11:55 am   Total Treatment Time 45   Total Timed Codes 15   1:1 Treatment Time 15      [facility] BC Totals Reminder:  bill using total billable   min of TIMED therapeutic procedures and modalities. 8-22 min = 1 unit; 23-37 min = 2 units; 38-52 min = 3 units;  53-67 min = 4 units; 68-82 min = 5 units           SUBJECTIVE  Pain Level (0-10 scale): 0  []constant []intermittent []improving []worsening []no change since onset    Any medication changes, allergies to medications, adverse drug reactions, diagnosis change, or new procedure performed?: [x] No    [] Yes (see summary sheet for update)  Medications: Verified on Patient Summary List    Subjective functional status/changes:     Pt complains of poor gait/balance/L leg weakness that started after having partial knee replacement in . Pt fell that night after the partial knee replacement and had to have a total knee replacement. Pt then fell a few months later and fractured her L hip. Pt was in rehab facility for a few months. Pt now has weakness in her leg and difficulty walking. Pt lives alone and bedroom is on second floor.      Start of Care: 10/25/2023  Onset Date: 2 years ago  Current

## 2023-10-31 ENCOUNTER — HOSPITAL ENCOUNTER (OUTPATIENT)
Facility: HOSPITAL | Age: 83
Setting detail: RECURRING SERIES
Discharge: HOME | End: 2023-11-03
Payer: MEDICARE

## 2023-10-31 PROCEDURE — 97112 NEUROMUSCULAR REEDUCATION: CPT

## 2023-10-31 PROCEDURE — 97110 THERAPEUTIC EXERCISES: CPT

## 2023-10-31 NOTE — PROGRESS NOTES
Details if applicable:  see chart   35     Total Total         Modalities Rationale:     decrease edema, decrease inflammation, and decrease pain to improve patient's ability to progress to PLOF and address remaining functional goals. min [] Estim Unattended,             type/location:       []  w/ice    []  w/heat        min [] Estim Attended,             type/location:       []  w/ice   []  w/heat         []  w/US   []  TENS insruct            min []  Mechanical Traction,        type/lbs:        []  pro      []  sup           []  int       []  cont            []  before manual           []  after manual     min []  Ultrasound,         settings/location:      min  unbilled []  Ice     []  Heat            location/position:         min []  Vasopneumatic Device,      press/temp:   pre-treatment girth :    post-treatment girth :    measured at (landmark       location) : If using vaso (only need to measure limb vaso being performed on)        min []  Other:      Skin assessment post-treatment (if applicable):    [x]  intact    []  redness- no adverse reaction                 []redness - adverse reaction:          [x]  Patient Education billed concurrently with other procedures   [x] Review HEP    [] Progressed/Changed HEP, detail:    [] Other detail:         Other Objective/Functional Measures  NT    Pain Level at end of session (0-10 scale): 5      Assessment   Pt tolerated there-ex ok today. Pt has difficulty w/ static balance exercises. Patient will continue to benefit from skilled PT / OT services to modify and progress therapeutic interventions, analyze and address functional mobility deficits, analyze and address ROM deficits, analyze and address strength deficits, analyze and address soft tissue restrictions, and analyze and cue for proper movement patterns to address functional deficits and attain remaining goals.     Progress toward goals / Updated goals:  []  See Progress

## 2023-11-02 ENCOUNTER — HOSPITAL ENCOUNTER (OUTPATIENT)
Facility: HOSPITAL | Age: 83
Setting detail: RECURRING SERIES
Discharge: HOME | End: 2023-11-05
Payer: MEDICARE

## 2023-11-02 PROCEDURE — 97112 NEUROMUSCULAR REEDUCATION: CPT

## 2023-11-02 PROCEDURE — 97110 THERAPEUTIC EXERCISES: CPT

## 2023-11-02 NOTE — PROGRESS NOTES
to PLOF and address remaining functional goals. (see flow sheet as applicable)     Details if applicable:  see chart   40 30    Total Total         Modalities Rationale:     decrease edema, decrease inflammation, and decrease pain to improve patient's ability to progress to PLOF and address remaining functional goals. min [] Estim Unattended,             type/location:       []  w/ice    []  w/heat        min [] Estim Attended,             type/location:       []  w/ice   []  w/heat         []  w/US   []  TENS insruct            min []  Mechanical Traction,        type/lbs:        []  pro      []  sup           []  int       []  cont            []  before manual           []  after manual     min []  Ultrasound,         settings/location:      min  unbilled []  Ice     []  Heat            location/position:         min []  Vasopneumatic Device,      press/temp:   pre-treatment girth :    post-treatment girth :    measured at (landmark       location) : If using vaso (only need to measure limb vaso being performed on)        min []  Other:      Skin assessment post-treatment (if applicable):    [x]  intact    []  redness- no adverse reaction                 []redness - adverse reaction:          [x]  Patient Education billed concurrently with other procedures   [x] Review HEP    [] Progressed/Changed HEP, detail:    [] Other detail:         Other Objective/Functional Measures  NT    Pain Level at end of session (0-10 scale): no pain      Assessment   Pt did well with dynamic balance exs today and was tired by the end of the therapy session.      Patient will continue to benefit from skilled PT / OT services to modify and progress therapeutic interventions, analyze and address functional mobility deficits, analyze and address ROM deficits, analyze and address strength deficits, analyze and address soft tissue restrictions, and analyze and cue for proper movement patterns to address functional deficits and

## 2023-11-07 ENCOUNTER — HOSPITAL ENCOUNTER (OUTPATIENT)
Facility: HOSPITAL | Age: 83
Setting detail: RECURRING SERIES
Discharge: HOME | End: 2023-11-10
Payer: MEDICARE

## 2023-11-07 PROCEDURE — 97110 THERAPEUTIC EXERCISES: CPT

## 2023-11-07 PROCEDURE — 97112 NEUROMUSCULAR REEDUCATION: CPT

## 2023-11-07 NOTE — PROGRESS NOTES
strength deficits, analyze and address soft tissue restrictions, and analyze and cue for proper movement patterns to address functional deficits and attain remaining goals.     Progress toward goals / Updated goals:  []  See Progress Note/Recertification    NT      PLAN  Yes  Continue plan of care  Re-Cert Due: 6/37/37  []  Upgrade activities as tolerated  []  Discharge due to :  []  Other:      Merced Thompson PTA       11/7/2023       11:42 AM

## 2023-11-09 ENCOUNTER — HOSPITAL ENCOUNTER (OUTPATIENT)
Facility: HOSPITAL | Age: 83
Setting detail: RECURRING SERIES
Discharge: HOME | End: 2023-11-12
Payer: MEDICARE

## 2023-11-09 PROCEDURE — 97530 THERAPEUTIC ACTIVITIES: CPT

## 2023-11-09 PROCEDURE — 97112 NEUROMUSCULAR REEDUCATION: CPT

## 2023-11-09 NOTE — PROGRESS NOTES
PHYSICAL THERAPY - MEDICARE DAILY TREATMENT NOTE (updated 3/23)      Date: 2023          Patient Name:  Brandie Bob :  1940   Medical   Diagnosis: At risk for falls [Z91.81]  Gait difficulty [R26.9] Treatment Diagnosis:  R26.89   Abnormalities of gait and mobility    Referral Source:  Amy Slater MD Insurance:   Payor: Magalis Angela / Plan: MEDICARE PART A AND B / Product Type: *No Product type* /                     Patient  verified yes     Visit #   Current  / Total 4 MN   Time   In / Out 10:35 am 11:20 am   Total Treatment Time 45   Total Timed Codes 40   1:1 Treatment Time 30      [facility] BC Totals Reminder:  bill using total billable   min of TIMED therapeutic procedures and modalities. 8-22 min = 1 unit; 23-37 min = 2 units; 38-52 min = 3 units; 53-67 min = 4 units; 68-82 min = 5 units            SUBJECTIVE    Pain Level (0-10 scale): sore      Any medication changes, allergies to medications, adverse drug reactions, diagnosis change, or new procedure performed?: [x] No    [] Yes (see summary sheet for update)  Medications: Verified on Patient Summary List    Subjective functional status/changes:     Pt stated that she feels like her balance is getting better and that this time of year is not a great time but she is trying to make sure that she is doing well in therapy. Pt stated that she is working towards the goal of getting into her good shower which requires her to step over edge of the tub. OBJECTIVE      Therapeutic Procedures: Tx Min Billable or 1:1 Min (if diff from Tx Min) Procedure, Rationale, Specifics   25 25 14749 Therapeutic Exercise (timed):  increase ROM, strength, coordination, balance, and proprioception to improve patient's ability to progress to PLOF and address remaining functional goals.  (see flow sheet as applicable)     Details if applicable:  see chart   20 20 44678 Neuromuscular Re-Education (timed):  improve balance, coordination, kinesthetic sense,

## 2023-11-13 RX ORDER — LOSARTAN POTASSIUM 100 MG/1
TABLET ORAL
Qty: 90 TABLET | Refills: 2 | Status: SHIPPED | OUTPATIENT
Start: 2023-11-13

## 2023-11-14 ENCOUNTER — HOSPITAL ENCOUNTER (OUTPATIENT)
Facility: HOSPITAL | Age: 83
Setting detail: RECURRING SERIES
Discharge: HOME | End: 2023-11-17
Payer: MEDICARE

## 2023-11-14 PROCEDURE — 97112 NEUROMUSCULAR REEDUCATION: CPT

## 2023-11-14 PROCEDURE — 97110 THERAPEUTIC EXERCISES: CPT

## 2023-11-14 NOTE — PROGRESS NOTES
PHYSICAL THERAPY - MEDICARE DAILY TREATMENT NOTE (updated 3/23)      Date: 2023          Patient Name:  Dereje Blanco :  1940   Medical   Diagnosis: At risk for falls [Z91.81]  Gait difficulty [R26.9] Treatment Diagnosis:  R26.89   Abnormalities of gait and mobility    Referral Source:  Artie Montes De Oca MD Insurance:   Payor: Myla Postal / Plan: MEDICARE PART A AND B / Product Type: *No Product type* /                     Patient  verified yes     Visit #   Current  / Total 5 MN   Time   In / Out 10:30 am 11:10 am   Total Treatment Time 40   Total Timed Codes 40   1:1 Treatment Time 40      MC BC Totals Reminder:  bill using total billable   min of TIMED therapeutic procedures and modalities. 8-22 min = 1 unit; 23-37 min = 2 units; 38-52 min = 3 units; 53-67 min = 4 units; 68-82 min = 5 units            SUBJECTIVE    Pain Level (0-10 scale): sore      Any medication changes, allergies to medications, adverse drug reactions, diagnosis change, or new procedure performed?: [x] No    [] Yes (see summary sheet for update)  Medications: Verified on Patient Summary List    Subjective functional status/changes:     Pt stated that she feels ok today but the front of her hips are sore when she wakes up. OBJECTIVE      Therapeutic Procedures: Tx Min Billable or 1:1 Min (if diff from Tx Min) Procedure, Rationale, Specifics   25  24360 Therapeutic Exercise (timed):  increase ROM, strength, coordination, balance, and proprioception to improve patient's ability to progress to PLOF and address remaining functional goals.  (see flow sheet as applicable)     Details if applicable:  see chart   15  52133 Neuromuscular Re-Education (timed):  improve balance, coordination, kinesthetic sense, posture, core stability and proprioception to improve patient's ability to develop conscious control of individual muscles and awareness of position of extremities in order to progress to PLOF and address remaining

## 2023-11-16 ENCOUNTER — HOSPITAL ENCOUNTER (OUTPATIENT)
Facility: HOSPITAL | Age: 83
Setting detail: RECURRING SERIES
Discharge: HOME | End: 2023-11-19
Payer: MEDICARE

## 2023-11-16 PROCEDURE — 97112 NEUROMUSCULAR REEDUCATION: CPT

## 2023-11-16 PROCEDURE — 97110 THERAPEUTIC EXERCISES: CPT

## 2023-11-21 ENCOUNTER — APPOINTMENT (OUTPATIENT)
Facility: HOSPITAL | Age: 83
End: 2023-11-21
Payer: MEDICARE

## 2023-11-22 ENCOUNTER — HOSPITAL ENCOUNTER (OUTPATIENT)
Facility: HOSPITAL | Age: 83
Setting detail: RECURRING SERIES
Discharge: HOME | End: 2023-11-25
Payer: MEDICARE

## 2023-11-22 PROCEDURE — 97112 NEUROMUSCULAR REEDUCATION: CPT

## 2023-11-22 PROCEDURE — 97110 THERAPEUTIC EXERCISES: CPT

## 2023-11-22 NOTE — PROGRESS NOTES
PHYSICAL THERAPY - MEDICARE DAILY TREATMENT NOTE (updated 3/23)      Date: 2023          Patient Name:  Wilfred Le :  1940   Medical   Diagnosis: At risk for falls [Z91.81]  Gait difficulty [R26.9] Treatment Diagnosis:  R26.89   Abnormalities of gait and mobility    Referral Source:  Norma Griffin MD Insurance:   Payor: [employer] / Plan: MEDICARE PART A AND B / Product Type: *No Product type* /                     Patient  verified yes     Visit #   Current  / Total 8 MN   Time   In / Out 11:00 am 11:45am   Total Treatment Time 45   Total Timed Codes 45   1:1 Treatment Time 39      MC BC Totals Reminder:  bill using total billable   min of TIMED therapeutic procedures and modalities. 8-22 min = 1 unit; 23-37 min = 2 units; 38-52 min = 3 units; 53-67 min = 4 units; 68-82 min = 5 units            SUBJECTIVE    Pain Level (0-10 scale): sore      Any medication changes, allergies to medications, adverse drug reactions, diagnosis change, or new procedure performed?: [x] No    [] Yes (see summary sheet for update)  Medications: Verified on Patient Summary List    Subjective functional status/changes:     Pt stated \"I have zero balance and zero strength. \"     OBJECTIVE      Therapeutic Procedures: Tx Min Billable or 1:1 Min (if diff from Tx Min) Procedure, Rationale, Specifics   30 30 17472 Therapeutic Exercise (timed):  increase ROM, strength, coordination, balance, and proprioception to improve patient's ability to progress to PLOF and address remaining functional goals. (see flow sheet as applicable)     Details if applicable:  see chart   15 15 [id number] Neuromuscular Re-Education (timed):  improve balance, coordination, kinesthetic sense, posture, core stability and proprioception to improve patient's ability to develop conscious control of individual muscles and awareness of position of extremities in order to progress to PLOF and address remaining functional goals.  (see flow sheet as

## 2023-11-30 ENCOUNTER — APPOINTMENT (OUTPATIENT)
Facility: HOSPITAL | Age: 83
End: 2023-11-30
Payer: MEDICARE

## 2024-01-18 ENCOUNTER — OFFICE VISIT (OUTPATIENT)
Age: 84
End: 2024-01-18
Payer: MEDICARE

## 2024-01-18 VITALS
HEART RATE: 82 BPM | BODY MASS INDEX: 30.84 KG/M2 | OXYGEN SATURATION: 98 % | SYSTOLIC BLOOD PRESSURE: 128 MMHG | RESPIRATION RATE: 14 BRPM | TEMPERATURE: 97.7 F | HEIGHT: 62 IN | WEIGHT: 167.6 LBS | DIASTOLIC BLOOD PRESSURE: 70 MMHG

## 2024-01-18 DIAGNOSIS — L98.9 FINGER LESION: ICD-10-CM

## 2024-01-18 DIAGNOSIS — Z53.20 OSTEOPOROSIS SCREENING DECLINED: ICD-10-CM

## 2024-01-18 DIAGNOSIS — Z00.00 ENCOUNTER FOR MEDICARE ANNUAL WELLNESS EXAM: Primary | ICD-10-CM

## 2024-01-18 PROCEDURE — G8484 FLU IMMUNIZE NO ADMIN: HCPCS | Performed by: FAMILY MEDICINE

## 2024-01-18 PROCEDURE — G8417 CALC BMI ABV UP PARAM F/U: HCPCS | Performed by: FAMILY MEDICINE

## 2024-01-18 PROCEDURE — 4004F PT TOBACCO SCREEN RCVD TLK: CPT | Performed by: FAMILY MEDICINE

## 2024-01-18 PROCEDURE — G8427 DOCREV CUR MEDS BY ELIG CLIN: HCPCS | Performed by: FAMILY MEDICINE

## 2024-01-18 PROCEDURE — 3078F DIAST BP <80 MM HG: CPT | Performed by: FAMILY MEDICINE

## 2024-01-18 PROCEDURE — 1090F PRES/ABSN URINE INCON ASSESS: CPT | Performed by: FAMILY MEDICINE

## 2024-01-18 PROCEDURE — 99213 OFFICE O/P EST LOW 20 MIN: CPT | Performed by: FAMILY MEDICINE

## 2024-01-18 PROCEDURE — 3074F SYST BP LT 130 MM HG: CPT | Performed by: FAMILY MEDICINE

## 2024-01-18 PROCEDURE — G8399 PT W/DXA RESULTS DOCUMENT: HCPCS | Performed by: FAMILY MEDICINE

## 2024-01-18 PROCEDURE — 1123F ACP DISCUSS/DSCN MKR DOCD: CPT | Performed by: FAMILY MEDICINE

## 2024-01-18 PROCEDURE — G0439 PPPS, SUBSEQ VISIT: HCPCS | Performed by: FAMILY MEDICINE

## 2024-01-18 RX ORDER — SIMVASTATIN 40 MG
40 TABLET ORAL NIGHTLY
COMMUNITY

## 2024-01-18 ASSESSMENT — LIFESTYLE VARIABLES
HOW OFTEN DO YOU HAVE A DRINK CONTAINING ALCOHOL: NEVER
HOW MANY STANDARD DRINKS CONTAINING ALCOHOL DO YOU HAVE ON A TYPICAL DAY: PATIENT DOES NOT DRINK

## 2024-01-18 ASSESSMENT — ENCOUNTER SYMPTOMS
VOMITING: 0
ABDOMINAL PAIN: 0
CHEST TIGHTNESS: 0
COUGH: 0
CONSTIPATION: 0
NAUSEA: 0
WHEEZING: 0
SHORTNESS OF BREATH: 0
DIARRHEA: 0

## 2024-01-18 ASSESSMENT — PATIENT HEALTH QUESTIONNAIRE - PHQ9
SUM OF ALL RESPONSES TO PHQ QUESTIONS 1-9: 5
4. FEELING TIRED OR HAVING LITTLE ENERGY: 1
9. THOUGHTS THAT YOU WOULD BE BETTER OFF DEAD, OR OF HURTING YOURSELF: 0
1. LITTLE INTEREST OR PLEASURE IN DOING THINGS: 1
SUM OF ALL RESPONSES TO PHQ QUESTIONS 1-9: 5
SUM OF ALL RESPONSES TO PHQ QUESTIONS 1-9: 5
8. MOVING OR SPEAKING SO SLOWLY THAT OTHER PEOPLE COULD HAVE NOTICED. OR THE OPPOSITE, BEING SO FIGETY OR RESTLESS THAT YOU HAVE BEEN MOVING AROUND A LOT MORE THAN USUAL: 0
10. IF YOU CHECKED OFF ANY PROBLEMS, HOW DIFFICULT HAVE THESE PROBLEMS MADE IT FOR YOU TO DO YOUR WORK, TAKE CARE OF THINGS AT HOME, OR GET ALONG WITH OTHER PEOPLE: 0
3. TROUBLE FALLING OR STAYING ASLEEP: 0
SUM OF ALL RESPONSES TO PHQ QUESTIONS 1-9: 5
7. TROUBLE CONCENTRATING ON THINGS, SUCH AS READING THE NEWSPAPER OR WATCHING TELEVISION: 0
SUM OF ALL RESPONSES TO PHQ9 QUESTIONS 1 & 2: 4
6. FEELING BAD ABOUT YOURSELF - OR THAT YOU ARE A FAILURE OR HAVE LET YOURSELF OR YOUR FAMILY DOWN: 0
5. POOR APPETITE OR OVEREATING: 0
2. FEELING DOWN, DEPRESSED OR HOPELESS: 3

## 2024-01-18 NOTE — PROGRESS NOTES
Chief Complaint   Patient presents with    Medicare AWV     \"Have you been to the ER, urgent care clinic since your last visit?  Hospitalized since your last visit?\"    NO    “Have you seen or consulted any other health care providers outside of [facility] since your last visit?”    NO           Financial Resource Strain: Low Risk  (10/19/2023)    Overall Financial Resource Strain (CARDIA)     Difficulty of Paying Living Expenses: Not hard at all      Food Insecurity: Not on file (10/19/2023)            1/18/2024    10:06 AM   PHQ-9    Little interest or pleasure in doing things 1   Feeling down, depressed, or hopeless 3   Trouble falling or staying asleep, or sleeping too much 0   Feeling tired or having little energy 1   Poor appetite or overeating 0   Feeling bad about yourself - or that you are a failure or have let yourself or your family down 0   Trouble concentrating on things, such as reading the newspaper or watching television 0   Moving or speaking so slowly that other people could have noticed. Or the opposite - being so fidgety or restless that you have been moving around a lot more than usual 0   Thoughts that you would be better off dead, or of hurting yourself in some way 0   PHQ-2 Score 4   PHQ-9 Total Score 5   If you checked off any problems, how difficult have these problems made it for you to do your work, take care of things at home, or get along with other people? 0       Health Maintenance Due   Topic Date Due    Respiratory Syncytial Virus (RSV) Pregnant or age 60 yrs+ (1 - 1-dose 60+ series) Never done    Shingles vaccine (1 of 2) 08/27/2009    Pneumococcal 65+ years Vaccine (2 - PCV) 11/14/2018    DEXA (modify frequency per FRAX score)  09/25/2022    COVID-19 Vaccine (5 - 2023-24 season) 09/01/2023    Annual Wellness Visit (Medicare)  11/27/2023             
Medicare Annual Wellness Visit    Saima Celaya is here for Medicare AWV    Assessment & Plan   Encounter for Medicare annual wellness exam  Finger lesion  -     [facility] - Miguelangel Solano MD, Orthopedic Surgery (elbow, hand, wrist), [place]  Osteoporosis screening declined     Recommendations for Preventive Services Due: see orders and patient instructions/AVS.  Recommended screening schedule for the next 5-10 years is provided to the patient in written form: see Patient Instructions/AVS.     Return in about 8 months (around 10/1/2024) for HTN.     Subjective     Patient's complete Health Risk Assessment and screening values have been reviewed and are found in Flowsheets. The following problems were reviewed today and where indicated follow up appointments were made and/or referrals ordered.    Positive Risk Factor Screenings with Interventions:       Cognitive:      Words recalled: 2 Words Recalled     Total Score Interpretation: Abnormal Mini-Cog  Interventions:  Patient declines any further evaluation or treatment    Depression:  PHQ-2 Score: 4  PHQ-9 Total Score: 5    Interpretation:  5-9 mild   10-14 moderate   15-19 moderately severe   20-27 severe   Interventions:  Patient declines any further evaluation or treatment           Activity, Diet, and Weight:  On average, how many days per week do you engage in moderate to strenuous exercise (like a brisk walk)?: 0 days  On average, how many minutes do you engage in exercise at this level?: 0 min    Do you eat balanced/healthy meals regularly?: Yes    Body mass index is 30.65 kg/m². (!) Abnormal      Inactivity Interventions:  Patient declined any further interventions or treatment  Obesity Interventions:  Patient declines any further evaluation or treatment          Objective   Vitals:    01/18/24 1021   BP: 128/70   Site: Right Upper Arm   Position: Sitting   Cuff Size: Medium Adult   Pulse: 82   Resp: 14   Temp: 97.7 °F (36.5 °C)   TempSrc: Temporal 
wellness exam    2. Finger lesion  Refer to ortho. She also had an upcoming dermatology appt where she can address it there as well.  - [facility] - Miguelangel Solano MD, Orthopedic Surgery (elbow, hand, wrist), [place]    3. Osteoporosis screening declined      No orders of the defined types were placed in this encounter.       There are no discontinued medications.    Return in about 8 months (around 10/1/2024) for HTN.    Treatment risks/benefits/costs/interactions/alternatives discussed with patient.  Advised patient to call back or return to office if symptoms worsen/change/persist. If patient cannot reach us or should anything more severe/urgent arise he/she should proceed directly to the nearest emergency department.  Discussed expected course/resolution/complications of diagnosis in detail with patient.  Patient expressed understanding with the diagnosis and plan.     This dictation may have been completed with Dragon, the computer voice recognition software.  Unanticipated grammatical, syntax, homophones, and other interpretive errors are sometimes inadvertently transcribed by the computer software.  Please disregard any errors that have escaped final proofreading.      Priscila Osman M.D.

## 2024-05-03 RX ORDER — LEVOTHYROXINE SODIUM 112 UG/1
TABLET ORAL
Qty: 90 TABLET | Refills: 3 | Status: SHIPPED | OUTPATIENT
Start: 2024-05-03

## 2024-05-03 NOTE — TELEPHONE ENCOUNTER
PCP: Priscila sOman MD    Last appt: 1/18/2024       No future appointments.    Requested Prescriptions     Pending Prescriptions Disp Refills    levothyroxine (SYNTHROID) 112 MCG tablet [Pharmacy Med Name: LEVOTHYROXINE SODIUM 112 MCG Tablet] 90 tablet 3     Sig: TAKE 1 TABLET EVERY DAY BEFORE BREAKFAST       Prior labs and Blood pressures:  BP Readings from Last 3 Encounters:   01/18/24 128/70   10/19/23 (!) 146/78   03/21/23 130/70     Lab Results   Component Value Date/Time     10/19/2023 09:57 AM    K 3.8 10/19/2023 09:57 AM     10/19/2023 09:57 AM    CO2 30 10/19/2023 09:57 AM    BUN 16 10/19/2023 09:57 AM    GFRAA >60 09/20/2022 10:14 AM     No results found for: \"HBA1C\", \"[id number]\"  Lab Results   Component Value Date/Time    CHOL 202 10/19/2023 09:57 AM    HDL 55 10/19/2023 09:57 AM    .6 10/19/2023 09:57 AM    VLDL 26.4 10/19/2023 09:57 AM     No results found for: \"VITD3\", \"VD3RIA\"    Lab Results   Component Value Date/Time    TSH 0.90 01/19/2023 02:58 PM

## 2024-08-26 ENCOUNTER — TELEPHONE (OUTPATIENT)
Age: 84
End: 2024-08-26

## 2024-08-26 NOTE — TELEPHONE ENCOUNTER
Patient called in very upset that she is forgetting things. She said she went to go somewhere on Sunday and she forgot how to get there and she had to come back home. She stated that she is concerned that something has happened to her. I have her scheduled for 9/5 she want something earlier.    Call back at [phone]

## 2024-09-03 ENCOUNTER — OFFICE VISIT (OUTPATIENT)
Age: 84
End: 2024-09-03
Payer: MEDICARE

## 2024-09-03 VITALS
DIASTOLIC BLOOD PRESSURE: 70 MMHG | BODY MASS INDEX: 30.47 KG/M2 | RESPIRATION RATE: 12 BRPM | WEIGHT: 165.6 LBS | HEIGHT: 62 IN | SYSTOLIC BLOOD PRESSURE: 120 MMHG | HEART RATE: 72 BPM | OXYGEN SATURATION: 98 % | TEMPERATURE: 96.4 F

## 2024-09-03 DIAGNOSIS — F33.9 EPISODE OF RECURRENT MAJOR DEPRESSIVE DISORDER, UNSPECIFIED DEPRESSION EPISODE SEVERITY (HCC): ICD-10-CM

## 2024-09-03 DIAGNOSIS — R41.3 MEMORY LOSS: Primary | ICD-10-CM

## 2024-09-03 DIAGNOSIS — E78.5 HYPERLIPIDEMIA, UNSPECIFIED HYPERLIPIDEMIA TYPE: ICD-10-CM

## 2024-09-03 DIAGNOSIS — I10 ESSENTIAL (PRIMARY) HYPERTENSION: ICD-10-CM

## 2024-09-03 LAB
ALBUMIN SERPL-MCNC: 3.8 G/DL (ref 3.5–5)
ALBUMIN/GLOB SERPL: 1.1 (ref 1.1–2.2)
ALP SERPL-CCNC: 126 U/L (ref 45–117)
ALT SERPL-CCNC: 20 U/L (ref 12–78)
ANION GAP SERPL CALC-SCNC: 4 MMOL/L (ref 5–15)
AST SERPL-CCNC: 20 U/L (ref 15–37)
BILIRUB SERPL-MCNC: 0.4 MG/DL (ref 0.2–1)
BUN SERPL-MCNC: 13 MG/DL (ref 6–20)
BUN/CREAT SERPL: 12 (ref 12–20)
CALCIUM SERPL-MCNC: 10 MG/DL (ref 8.5–10.1)
CHLORIDE SERPL-SCNC: 104 MMOL/L (ref 97–108)
CHOLEST SERPL-MCNC: 208 MG/DL
CO2 SERPL-SCNC: 30 MMOL/L (ref 21–32)
CREAT SERPL-MCNC: 1.08 MG/DL (ref 0.55–1.02)
ERYTHROCYTE [DISTWIDTH] IN BLOOD BY AUTOMATED COUNT: 13.4 % (ref 11.5–14.5)
FOLATE SERPL-MCNC: 11.5 NG/ML (ref 5–21)
GLOBULIN SER CALC-MCNC: 3.4 G/DL (ref 2–4)
GLUCOSE SERPL-MCNC: 122 MG/DL (ref 65–100)
HCT VFR BLD AUTO: 42.5 % (ref 35–47)
HDLC SERPL-MCNC: 59 MG/DL
HDLC SERPL: 3.5 (ref 0–5)
HGB BLD-MCNC: 13.6 G/DL (ref 11.5–16)
LDLC SERPL CALC-MCNC: 125.8 MG/DL (ref 0–100)
MCH RBC QN AUTO: 27.8 PG (ref 26–34)
MCHC RBC AUTO-ENTMCNC: 32 G/DL (ref 30–36.5)
MCV RBC AUTO: 86.9 FL (ref 80–99)
NRBC # BLD: 0 K/UL (ref 0–0.01)
NRBC BLD-RTO: 0 PER 100 WBC
PLATELET # BLD AUTO: 288 K/UL (ref 150–400)
PMV BLD AUTO: 10.6 FL (ref 8.9–12.9)
POTASSIUM SERPL-SCNC: 3.5 MMOL/L (ref 3.5–5.1)
PROT SERPL-MCNC: 7.2 G/DL (ref 6.4–8.2)
RBC # BLD AUTO: 4.89 M/UL (ref 3.8–5.2)
SODIUM SERPL-SCNC: 138 MMOL/L (ref 136–145)
T4 FREE SERPL-MCNC: 1.3 NG/DL (ref 0.8–1.5)
TRIGL SERPL-MCNC: 116 MG/DL
TSH SERPL DL<=0.05 MIU/L-ACNC: 2.14 UIU/ML (ref 0.36–3.74)
VIT B12 SERPL-MCNC: 329 PG/ML (ref 193–986)
VLDLC SERPL CALC-MCNC: 23.2 MG/DL
WBC # BLD AUTO: 7.2 K/UL (ref 3.6–11)

## 2024-09-03 PROCEDURE — 1090F PRES/ABSN URINE INCON ASSESS: CPT | Performed by: FAMILY MEDICINE

## 2024-09-03 PROCEDURE — 1123F ACP DISCUSS/DSCN MKR DOCD: CPT | Performed by: FAMILY MEDICINE

## 2024-09-03 PROCEDURE — 1036F TOBACCO NON-USER: CPT | Performed by: FAMILY MEDICINE

## 2024-09-03 PROCEDURE — 99214 OFFICE O/P EST MOD 30 MIN: CPT | Performed by: FAMILY MEDICINE

## 2024-09-03 PROCEDURE — 3078F DIAST BP <80 MM HG: CPT | Performed by: FAMILY MEDICINE

## 2024-09-03 PROCEDURE — G8427 DOCREV CUR MEDS BY ELIG CLIN: HCPCS | Performed by: FAMILY MEDICINE

## 2024-09-03 PROCEDURE — G8417 CALC BMI ABV UP PARAM F/U: HCPCS | Performed by: FAMILY MEDICINE

## 2024-09-03 PROCEDURE — 3074F SYST BP LT 130 MM HG: CPT | Performed by: FAMILY MEDICINE

## 2024-09-03 PROCEDURE — G8399 PT W/DXA RESULTS DOCUMENT: HCPCS | Performed by: FAMILY MEDICINE

## 2024-09-03 RX ORDER — AMLODIPINE BESYLATE 5 MG/1
5 TABLET ORAL DAILY
Qty: 30 TABLET | Refills: 0 | Status: CANCELLED | OUTPATIENT
Start: 2024-09-03

## 2024-09-03 ASSESSMENT — ENCOUNTER SYMPTOMS
NAUSEA: 0
DIARRHEA: 0
ABDOMINAL PAIN: 0
COUGH: 0
SHORTNESS OF BREATH: 0
VOMITING: 0
CHEST TIGHTNESS: 0
CONSTIPATION: 0
WHEEZING: 0

## 2024-09-03 NOTE — PROGRESS NOTES
Chief Complaint   Patient presents with    Other     Worried that pt had stroke or memory loss         \"Have you been to the ER, urgent care clinic since your last visit?  Hospitalized since your last visit?\"    no    “Have you seen or consulted any other health care providers outside of [facility] since your last visit?”    no            Click Here for Release of Records Request           1/18/2024    10:06 AM   PHQ-9    Little interest or pleasure in doing things 1   Feeling down, depressed, or hopeless 3   Trouble falling or staying asleep, or sleeping too much 0   Feeling tired or having little energy 1   Poor appetite or overeating 0   Feeling bad about yourself - or that you are a failure or have let yourself or your family down 0   Trouble concentrating on things, such as reading the newspaper or watching television 0   Moving or speaking so slowly that other people could have noticed. Or the opposite - being so fidgety or restless that you have been moving around a lot more than usual 0   Thoughts that you would be better off dead, or of hurting yourself in some way 0   PHQ-2 Score 4   PHQ-9 Total Score 5   If you checked off any problems, how difficult have these problems made it for you to do your work, take care of things at home, or get along with other people? 0           Financial Resource Strain: Low Risk  (10/19/2023)    Overall Financial Resource Strain (CARDIA)     Difficulty of Paying Living Expenses: Not hard at all      Food Insecurity: Not on file (10/19/2023)          Health Maintenance Due   Topic Date Due    Respiratory Syncytial Virus (RSV) Pregnant or age 60 yrs+ (1 - 1-dose 60+ series) Never done    DEXA (modify frequency per FRAX score)  09/25/2022    Flu vaccine (1) 08/01/2024    COVID-19 Vaccine (5 - 2023-24 season) 09/01/2024

## 2024-09-03 NOTE — PROGRESS NOTES
Patient Name: Saima Celaya   MRN: [id number]    ASSESSMENT AND PLAN  Saima Celaya is a 84 y.o. female who presents today for:    1. Memory loss  MMSE today was 20 out of 30.  Will obtain labs as below as well as a brain MRI.  Differential diagnosis includes Alzheimer's dementia versus vascular dementia versus severe depression.    - MRI BRAIN W WO CONTRAST; Future  - TSH + Free T4 Panel; Future  - Vitamin B12 & Folate; Future  - Vitamin B12 & Folate  - TSH + Free T4 Panel    2. Essential (primary) hypertension  Stable, continue current treatment.    3. Hyperlipidemia, unspecified hyperlipidemia type  Not on statin. Previously was on simvastatin but stopped taking it at some point; open to restarting it. Will check lipid panel first.  - CBC; Future  - Comprehensive Metabolic Panel; Future  - Lipid Panel; Future  - Lipid Panel  - Comprehensive Metabolic Panel  - CBC    4. Episode of recurrent major depressive disorder, unspecified depression episode severity (HCC)  Worsening. Consider restarting her on mirtazapine for depression.        No orders of the defined types were placed in this encounter.       There are no discontinued medications.    Return in about 4 weeks (around 10/1/2024) for memory follow up.      SUBJECTIVE  Saima Celaya is a 84 y.o. female who presents with the following:     Patient believes that her memory is getting worse over time.  She was driving to her regular hairdresser's appointment last week when she suddenly forgot how to get there so she returned home.  Denies any strokelike symptoms such as vision changes, headache, hearing loss, extremity weakness, or slurred speech.  No family history of known dementia.  She does report feeling severely depressed due to not having any local family support.  She previously was on mirtazapine before for depression but wanted to get off medications.    BP Readings from Last 3 Encounters:   09/03/24 120/70   01/18/24 128/70

## 2024-09-04 ENCOUNTER — TELEPHONE (OUTPATIENT)
Age: 84
End: 2024-09-04

## 2024-09-04 RX ORDER — SIMVASTATIN 40 MG
40 TABLET ORAL NIGHTLY
Qty: 90 TABLET | Refills: 3 | Status: SHIPPED | OUTPATIENT
Start: 2024-09-04

## 2024-10-28 NOTE — PROGRESS NOTES
Called and results reviewed . Informed her of recommendations . Will hold off for now on rheum. Consult. No

## 2025-02-09 ENCOUNTER — APPOINTMENT (OUTPATIENT)
Facility: HOSPITAL | Age: 85
DRG: 884 | End: 2025-02-09
Payer: MEDICARE

## 2025-02-09 ENCOUNTER — HOSPITAL ENCOUNTER (INPATIENT)
Facility: HOSPITAL | Age: 85
LOS: 3 days | Discharge: NURSING, REHAB OR LONG-TERM CARE | DRG: 884 | End: 2025-02-12
Attending: EMERGENCY MEDICINE | Admitting: FAMILY MEDICINE
Payer: MEDICARE

## 2025-02-09 DIAGNOSIS — R41.82 ALTERED MENTAL STATUS, UNSPECIFIED ALTERED MENTAL STATUS TYPE: Primary | ICD-10-CM

## 2025-02-09 DIAGNOSIS — E86.0 DEHYDRATION: ICD-10-CM

## 2025-02-09 LAB
ALBUMIN SERPL-MCNC: 3.8 G/DL (ref 3.5–5)
ALBUMIN/GLOB SERPL: 1 (ref 1.1–2.2)
ALP SERPL-CCNC: 90 U/L (ref 45–117)
ALT SERPL-CCNC: 40 U/L (ref 12–78)
ANION GAP SERPL CALC-SCNC: 9 MMOL/L (ref 2–12)
APPEARANCE UR: CLEAR
AST SERPL-CCNC: 55 U/L (ref 15–37)
BACTERIA URNS QL MICRO: ABNORMAL /HPF
BASOPHILS # BLD: 0.04 K/UL (ref 0–0.1)
BASOPHILS NFR BLD: 0.2 % (ref 0–1)
BILIRUB SERPL-MCNC: 0.7 MG/DL (ref 0.2–1)
BILIRUB UR QL: NEGATIVE
BUN SERPL-MCNC: 58 MG/DL (ref 6–20)
BUN/CREAT SERPL: 29 (ref 12–20)
CALCIUM SERPL-MCNC: 10.3 MG/DL (ref 8.5–10.1)
CHLORIDE SERPL-SCNC: 106 MMOL/L (ref 97–108)
CK SERPL-CCNC: 672 U/L (ref 26–192)
CO2 SERPL-SCNC: 25 MMOL/L (ref 21–32)
COLOR UR: ABNORMAL
CREAT SERPL-MCNC: 1.99 MG/DL (ref 0.55–1.02)
DIFFERENTIAL METHOD BLD: ABNORMAL
EKG ATRIAL RATE: 101 BPM
EKG DIAGNOSIS: NORMAL
EKG P AXIS: 76 DEGREES
EKG P-R INTERVAL: 164 MS
EKG Q-T INTERVAL: 360 MS
EKG QRS DURATION: 58 MS
EKG QTC CALCULATION (BAZETT): 466 MS
EKG R AXIS: -1 DEGREES
EKG T AXIS: 59 DEGREES
EKG VENTRICULAR RATE: 101 BPM
EOSINOPHIL # BLD: 0 K/UL (ref 0–0.4)
EOSINOPHIL NFR BLD: 0 % (ref 0–7)
EPITH CASTS URNS QL MICRO: ABNORMAL /LPF
ERYTHROCYTE [DISTWIDTH] IN BLOOD BY AUTOMATED COUNT: 13.3 % (ref 11.5–14.5)
GLOBULIN SER CALC-MCNC: 3.8 G/DL (ref 2–4)
GLUCOSE SERPL-MCNC: 129 MG/DL (ref 65–100)
GLUCOSE UR STRIP.AUTO-MCNC: NEGATIVE MG/DL
HCT VFR BLD AUTO: 44.6 % (ref 35–47)
HGB BLD-MCNC: 14.6 G/DL (ref 11.5–16)
HGB UR QL STRIP: ABNORMAL
HYALINE CASTS URNS QL MICRO: ABNORMAL /LPF (ref 0–5)
IMM GRANULOCYTES # BLD AUTO: 0.13 K/UL (ref 0–0.04)
IMM GRANULOCYTES NFR BLD AUTO: 0.7 % (ref 0–0.5)
INR PPP: 1 (ref 0.9–1.1)
KETONES UR QL STRIP.AUTO: ABNORMAL MG/DL
LACTATE SERPL-SCNC: 1.6 MMOL/L (ref 0.4–2)
LACTATE SERPL-SCNC: 2.1 MMOL/L (ref 0.4–2)
LEUKOCYTE ESTERASE UR QL STRIP.AUTO: NEGATIVE
LYMPHOCYTES # BLD: 1.29 K/UL (ref 0.8–3.5)
LYMPHOCYTES NFR BLD: 6.5 % (ref 12–49)
MCH RBC QN AUTO: 27.9 PG (ref 26–34)
MCHC RBC AUTO-ENTMCNC: 32.7 G/DL (ref 30–36.5)
MCV RBC AUTO: 85.3 FL (ref 80–99)
MONOCYTES # BLD: 1.23 K/UL (ref 0–1)
MONOCYTES NFR BLD: 6.2 % (ref 5–13)
MUCOUS THREADS URNS QL MICRO: ABNORMAL /LPF
NEUTS SEG # BLD: 17.1 K/UL (ref 1.8–8)
NEUTS SEG NFR BLD: 86.4 % (ref 32–75)
NITRITE UR QL STRIP.AUTO: NEGATIVE
NRBC # BLD: 0 K/UL (ref 0–0.01)
NRBC BLD-RTO: 0 PER 100 WBC
PH UR STRIP: 5 (ref 5–8)
PLATELET # BLD AUTO: 337 K/UL (ref 150–400)
PMV BLD AUTO: 10.6 FL (ref 8.9–12.9)
POTASSIUM SERPL-SCNC: 4.2 MMOL/L (ref 3.5–5.1)
PROT SERPL-MCNC: 7.6 G/DL (ref 6.4–8.2)
PROT UR STRIP-MCNC: 30 MG/DL
PROTHROMBIN TIME: 11.1 SEC (ref 9.2–11.2)
RBC # BLD AUTO: 5.23 M/UL (ref 3.8–5.2)
RBC #/AREA URNS HPF: ABNORMAL /HPF (ref 0–5)
SODIUM SERPL-SCNC: 140 MMOL/L (ref 136–145)
SP GR UR REFRACTOMETRY: 1.03 (ref 1–1.03)
TROPONIN I SERPL HS-MCNC: 36 NG/L (ref 0–51)
TROPONIN I SERPL HS-MCNC: 50 NG/L (ref 0–51)
UROBILINOGEN UR QL STRIP.AUTO: 0.2 EU/DL (ref 0.2–1)
WBC # BLD AUTO: 19.8 K/UL (ref 3.6–11)
WBC URNS QL MICRO: ABNORMAL /HPF (ref 0–4)

## 2025-02-09 PROCEDURE — 85610 PROTHROMBIN TIME: CPT

## 2025-02-09 PROCEDURE — 2060000000 HC ICU INTERMEDIATE R&B

## 2025-02-09 PROCEDURE — 87040 BLOOD CULTURE FOR BACTERIA: CPT

## 2025-02-09 PROCEDURE — 6360000004 HC RX CONTRAST MEDICATION: Performed by: EMERGENCY MEDICINE

## 2025-02-09 PROCEDURE — 87086 URINE CULTURE/COLONY COUNT: CPT

## 2025-02-09 PROCEDURE — 71045 X-RAY EXAM CHEST 1 VIEW: CPT

## 2025-02-09 PROCEDURE — 85025 COMPLETE CBC W/AUTO DIFF WBC: CPT

## 2025-02-09 PROCEDURE — 70450 CT HEAD/BRAIN W/O DYE: CPT

## 2025-02-09 PROCEDURE — 83605 ASSAY OF LACTIC ACID: CPT

## 2025-02-09 PROCEDURE — 80053 COMPREHEN METABOLIC PANEL: CPT

## 2025-02-09 PROCEDURE — 0042T CT BRAIN PERFUSION: CPT

## 2025-02-09 PROCEDURE — 82550 ASSAY OF CK (CPK): CPT

## 2025-02-09 PROCEDURE — 99285 EMERGENCY DEPT VISIT HI MDM: CPT

## 2025-02-09 PROCEDURE — 70496 CT ANGIOGRAPHY HEAD: CPT

## 2025-02-09 PROCEDURE — 84484 ASSAY OF TROPONIN QUANT: CPT

## 2025-02-09 PROCEDURE — 2580000003 HC RX 258: Performed by: EMERGENCY MEDICINE

## 2025-02-09 PROCEDURE — 2500000003 HC RX 250 WO HCPCS: Performed by: EMERGENCY MEDICINE

## 2025-02-09 PROCEDURE — 36415 COLL VENOUS BLD VENIPUNCTURE: CPT

## 2025-02-09 PROCEDURE — 6360000002 HC RX W HCPCS: Performed by: EMERGENCY MEDICINE

## 2025-02-09 PROCEDURE — 2500000003 HC RX 250 WO HCPCS: Performed by: FAMILY MEDICINE

## 2025-02-09 PROCEDURE — 93005 ELECTROCARDIOGRAM TRACING: CPT | Performed by: EMERGENCY MEDICINE

## 2025-02-09 PROCEDURE — 93010 ELECTROCARDIOGRAM REPORT: CPT | Performed by: SPECIALIST

## 2025-02-09 PROCEDURE — 81001 URINALYSIS AUTO W/SCOPE: CPT

## 2025-02-09 RX ORDER — 0.9 % SODIUM CHLORIDE 0.9 %
1000 INTRAVENOUS SOLUTION INTRAVENOUS ONCE
Status: COMPLETED | OUTPATIENT
Start: 2025-02-09 | End: 2025-02-09

## 2025-02-09 RX ORDER — POLYETHYLENE GLYCOL 3350 17 G/17G
17 POWDER, FOR SOLUTION ORAL DAILY PRN
Status: DISCONTINUED | OUTPATIENT
Start: 2025-02-09 | End: 2025-02-12 | Stop reason: HOSPADM

## 2025-02-09 RX ORDER — LEVOTHYROXINE SODIUM 112 UG/1
112 TABLET ORAL DAILY
Status: DISCONTINUED | OUTPATIENT
Start: 2025-02-10 | End: 2025-02-12 | Stop reason: HOSPADM

## 2025-02-09 RX ORDER — SODIUM CHLORIDE 0.9 % (FLUSH) 0.9 %
5-40 SYRINGE (ML) INJECTION EVERY 12 HOURS SCHEDULED
Status: DISCONTINUED | OUTPATIENT
Start: 2025-02-09 | End: 2025-02-12 | Stop reason: HOSPADM

## 2025-02-09 RX ORDER — ACETAMINOPHEN 325 MG/1
650 TABLET ORAL EVERY 6 HOURS PRN
Status: DISCONTINUED | OUTPATIENT
Start: 2025-02-09 | End: 2025-02-12 | Stop reason: HOSPADM

## 2025-02-09 RX ORDER — ONDANSETRON 2 MG/ML
4 INJECTION INTRAMUSCULAR; INTRAVENOUS EVERY 6 HOURS PRN
Status: DISCONTINUED | OUTPATIENT
Start: 2025-02-09 | End: 2025-02-12 | Stop reason: HOSPADM

## 2025-02-09 RX ORDER — IOPAMIDOL 755 MG/ML
100 INJECTION, SOLUTION INTRAVASCULAR
Status: COMPLETED | OUTPATIENT
Start: 2025-02-09 | End: 2025-02-09

## 2025-02-09 RX ORDER — ACETAMINOPHEN 650 MG/1
650 SUPPOSITORY RECTAL EVERY 6 HOURS PRN
Status: DISCONTINUED | OUTPATIENT
Start: 2025-02-09 | End: 2025-02-12 | Stop reason: HOSPADM

## 2025-02-09 RX ORDER — SODIUM CHLORIDE 9 MG/ML
INJECTION, SOLUTION INTRAVENOUS PRN
Status: DISCONTINUED | OUTPATIENT
Start: 2025-02-09 | End: 2025-02-12 | Stop reason: HOSPADM

## 2025-02-09 RX ORDER — ONDANSETRON 4 MG/1
4 TABLET, ORALLY DISINTEGRATING ORAL EVERY 8 HOURS PRN
Status: DISCONTINUED | OUTPATIENT
Start: 2025-02-09 | End: 2025-02-12 | Stop reason: HOSPADM

## 2025-02-09 RX ORDER — SODIUM CHLORIDE 0.9 % (FLUSH) 0.9 %
5-40 SYRINGE (ML) INJECTION PRN
Status: DISCONTINUED | OUTPATIENT
Start: 2025-02-09 | End: 2025-02-12 | Stop reason: HOSPADM

## 2025-02-09 RX ORDER — ENOXAPARIN SODIUM 100 MG/ML
30 INJECTION SUBCUTANEOUS DAILY
Status: DISCONTINUED | OUTPATIENT
Start: 2025-02-10 | End: 2025-02-11

## 2025-02-09 RX ADMIN — SODIUM CHLORIDE, PRESERVATIVE FREE 10 ML: 5 INJECTION INTRAVENOUS at 22:31

## 2025-02-09 RX ADMIN — WATER 1000 MG: 1 INJECTION INTRAMUSCULAR; INTRAVENOUS; SUBCUTANEOUS at 16:07

## 2025-02-09 RX ADMIN — SODIUM CHLORIDE 1000 ML: 0.9 INJECTION, SOLUTION INTRAVENOUS at 13:40

## 2025-02-09 RX ADMIN — SODIUM CHLORIDE 1000 ML: 0.9 INJECTION, SOLUTION INTRAVENOUS at 16:17

## 2025-02-09 RX ADMIN — IOPAMIDOL 80 ML: 755 INJECTION, SOLUTION INTRAVENOUS at 12:43

## 2025-02-09 RX ADMIN — IOPAMIDOL 40 ML: 755 INJECTION, SOLUTION INTRAVENOUS at 12:44

## 2025-02-09 RX ADMIN — VANCOMYCIN HYDROCHLORIDE 1250 MG: 1.25 INJECTION, POWDER, LYOPHILIZED, FOR SOLUTION INTRAVENOUS at 16:17

## 2025-02-09 NOTE — PLAN OF CARE
Problem: Safety - Adult  Goal: Free from fall injury  Outcome: Progressing  Flowsheets (Taken 2/9/2025 1800)  Free From Fall Injury: Instruct family/caregiver on patient safety     Problem: Discharge Planning  Goal: Discharge to home or other facility with appropriate resources  Outcome: Progressing  Flowsheets (Taken 2/9/2025 1800)  Discharge to home or other facility with appropriate resources:   Identify barriers to discharge with patient and caregiver   Arrange for needed discharge resources and transportation as appropriate   Identify discharge learning needs (meds, wound care, etc)     Problem: Skin/Tissue Integrity  Goal: Skin integrity remains intact  Description: 1.  Monitor for areas of redness and/or skin breakdown  2.  Assess vascular access sites hourly  3.  Every 4-6 hours minimum:  Change oxygen saturation probe site  4.  Every 4-6 hours:  If on nasal continuous positive airway pressure, respiratory therapy assess nares and determine need for appliance change or resting period  Outcome: Progressing     Problem: ABCDS Injury Assessment  Goal: Absence of physical injury  Outcome: Progressing

## 2025-02-09 NOTE — ED PROVIDER NOTES
[facility] EMERGENCY DEPARTMENT  EMERGENCY DEPARTMENT ENCOUNTER      Pt Name: Saima Celaya  MRN: [id number]  Birthdate 1940  Date of evaluation: 2/9/2025  Provider: Trung Rosales MD    CHIEF COMPLAINT       Chief Complaint   Patient presents with    Extremity Weakness         HISTORY OF PRESENT ILLNESS    This is an 84-year-old female who apparently has some history of memory loss and lives alone.  Neighbors noted that newspapers had not been picked up for the last 3 days and called police.  They broke into the house and found her laying on the floor facedown incontinent of urine and stool.  She was not clear where she was.  She told the squad that she was visiting [place] but then her home address was the how she was sent in.  She was not oriented to time or place.  She felt her leg was broken although on exam by EMS she had no pain or other deformity noted.  She had no focal tenderness.  A level 2 code stroke was called and she was brought to the hospital.  No family is with her.  Squad did note that she had a little left facial droop and some left arm and leg weakness.            Review of External Medical Records:     Nursing Notes were reviewed.    REVIEW OF SYSTEMS       Review of Systems   Constitutional:  Negative for activity change, chills, fatigue and fever.   HENT:  Negative for congestion, ear pain, rhinorrhea, sinus pressure, sinus pain, sore throat and trouble swallowing.    Eyes: Negative.    Respiratory:  Negative for cough, chest tightness, shortness of breath, wheezing and stridor.    Cardiovascular:  Negative for chest pain, palpitations and leg swelling.   Gastrointestinal:  Negative for abdominal pain, blood in stool, diarrhea, nausea and vomiting.   Endocrine: Negative.    Genitourinary:  Negative for decreased urine volume, difficulty urinating, flank pain, frequency and hematuria.        Incontinence of urine and stool   Musculoskeletal:  Negative for back pain, joint

## 2025-02-09 NOTE — ED TRIAGE NOTES
Pt arrives via ems with a chief complaint of L sided facial droop and L sided weakness. According to EMS pt has not been seen by family/neighbors in 3 days. Was found on ground covered in urine by EMS. Blood sugar 126 by EMS

## 2025-02-09 NOTE — H&P
History and Physical    Date of Service:  2/9/2025  Primary Care Provider: Priscila Osman MD  Source of information: electronic medical record    Chief Complaint: Extremity Weakness      History of Presenting Illness:   Saima Celaya is a 84 y.o. female with apmhx HTN, dyslipidemia, dementia and hypothyroidism who presents with suspected fall and altered mental status.  Neighbors did a welfare check when they noted patient had not picked up her paper in three days.  EMS was called, and they found pt. Laying face down on the ground in her urine and feces.  During my interview, patient has no recollection of the events prior to her being found.  She does endorse issue with confusion, and memory.  She states that she was told by her PCP to have stroke workup, but she did not feel it was necessary.  She does still drive to the grocery store near her apartment.    In the ED, she was initially hypothermic to 96.6, tachycardic to 103, and hypertensive to .  Other VSS. Labs showed WBC 19.8, BUN 58, creatinine 1.99, , lactic 2.1.  CT head negative.  CTA head/neck with left ICA fibromuscular dysplasia, and chronic microvascular changes.    In the ED, she received vancomycin, and ceftriaxone     REVIEW OF SYSTEMS:  A comprehensive review of systems was negative except for that written in the History of Present Illness.     Past Medical History:   Diagnosis Date    Chronic pain     LEFT KNEE    DDD (degenerative disc disease), lumbar 3/15/2016    Diverticulosis of colon 1/12    McGroarty/gi    Esophageal stricture 2/03    Fibrosis of lung (HCC) 2004    Scarring @ the apices bilaterally Done @[facility]/histoplasmosis    History of basal cell cancer     History of deep venous thrombosis (DVT) of distal vein of left lower extremity 7/26/2022    HTN, goal below 150/90     Hypercholesterolemia     IBS (irritable bowel syndrome) 8/30/2016    Menopause     LMP-50 years old?    Osteoporosis     previously on

## 2025-02-09 NOTE — ED NOTES
TRANSFER - OUT REPORT:    Verbal report given to DAVID Peres on Saima Celaya  being transferred to [facility] for routine progression of patient care       Report consisted of patient's Situation, Background, Assessment and   Recommendations(SBAR).     Information from the following report(s) ED Encounter Summary, ED SBAR, Neuro Assessment, and Event Log was reviewed with the receiving nurse.    [place] Fall Assessment:    Presents to emergency department  because of falls (Syncope, seizure, or loss of consciousness): No  Age > 70: Yes  Altered Mental Status, Intoxication with alcohol or substance confusion (Disorientation, impaired judgment, poor safety awaremess, or inability to follow instructions): Yes  Impaired Mobility: Ambulates or transfers with assistive devices or assistance; Unable to ambulate or transer.: Yes  Nursing Judgement: Yes          Lines:   Peripheral IV 02/09/25 Left Antecubital (Active)       Peripheral IV 02/09/25 Right Cephalic (Active)        Opportunity for questions and clarification was provided.      Patient transported with:  Monitor and Registered Nurse

## 2025-02-10 LAB
ANION GAP SERPL CALC-SCNC: 10 MMOL/L (ref 2–12)
BACTERIA SPEC CULT: NORMAL
BASOPHILS # BLD: 0.07 K/UL (ref 0–0.1)
BASOPHILS NFR BLD: 0.5 % (ref 0–1)
BUN SERPL-MCNC: 47 MG/DL (ref 6–20)
BUN/CREAT SERPL: 41 (ref 12–20)
CALCIUM SERPL-MCNC: 8.8 MG/DL (ref 8.5–10.1)
CHLORIDE SERPL-SCNC: 112 MMOL/L (ref 97–108)
CO2 SERPL-SCNC: 21 MMOL/L (ref 21–32)
CREAT SERPL-MCNC: 1.14 MG/DL (ref 0.55–1.02)
DIFFERENTIAL METHOD BLD: ABNORMAL
EOSINOPHIL # BLD: 0.09 K/UL (ref 0–0.4)
EOSINOPHIL NFR BLD: 0.7 % (ref 0–7)
ERYTHROCYTE [DISTWIDTH] IN BLOOD BY AUTOMATED COUNT: 13.6 % (ref 11.5–14.5)
GLUCOSE SERPL-MCNC: 67 MG/DL (ref 65–100)
HCT VFR BLD AUTO: 37.9 % (ref 35–47)
HGB BLD-MCNC: 11.8 G/DL (ref 11.5–16)
IMM GRANULOCYTES # BLD AUTO: 0.07 K/UL (ref 0–0.04)
IMM GRANULOCYTES NFR BLD AUTO: 0.5 % (ref 0–0.5)
LYMPHOCYTES # BLD: 1.28 K/UL (ref 0.8–3.5)
LYMPHOCYTES NFR BLD: 9.7 % (ref 12–49)
MCH RBC QN AUTO: 27.6 PG (ref 26–34)
MCHC RBC AUTO-ENTMCNC: 31.1 G/DL (ref 30–36.5)
MCV RBC AUTO: 88.6 FL (ref 80–99)
MONOCYTES # BLD: 1.04 K/UL (ref 0–1)
MONOCYTES NFR BLD: 7.9 % (ref 5–13)
NEUTS SEG # BLD: 10.63 K/UL (ref 1.8–8)
NEUTS SEG NFR BLD: 80.7 % (ref 32–75)
NRBC # BLD: 0 K/UL (ref 0–0.01)
NRBC BLD-RTO: 0 PER 100 WBC
PLATELET # BLD AUTO: 242 K/UL (ref 150–400)
PMV BLD AUTO: 10.7 FL (ref 8.9–12.9)
POTASSIUM SERPL-SCNC: 3.7 MMOL/L (ref 3.5–5.1)
RBC # BLD AUTO: 4.28 M/UL (ref 3.8–5.2)
SERVICE CMNT-IMP: NORMAL
SODIUM SERPL-SCNC: 143 MMOL/L (ref 136–145)
WBC # BLD AUTO: 13.2 K/UL (ref 3.6–11)

## 2025-02-10 PROCEDURE — 6370000000 HC RX 637 (ALT 250 FOR IP): Performed by: FAMILY MEDICINE

## 2025-02-10 PROCEDURE — 1100000000 HC RM PRIVATE

## 2025-02-10 PROCEDURE — 2500000003 HC RX 250 WO HCPCS: Performed by: FAMILY MEDICINE

## 2025-02-10 PROCEDURE — 97165 OT EVAL LOW COMPLEX 30 MIN: CPT

## 2025-02-10 PROCEDURE — 97161 PT EVAL LOW COMPLEX 20 MIN: CPT

## 2025-02-10 PROCEDURE — 97530 THERAPEUTIC ACTIVITIES: CPT

## 2025-02-10 PROCEDURE — 97535 SELF CARE MNGMENT TRAINING: CPT

## 2025-02-10 PROCEDURE — 2060000000 HC ICU INTERMEDIATE R&B

## 2025-02-10 PROCEDURE — 80048 BASIC METABOLIC PNL TOTAL CA: CPT

## 2025-02-10 PROCEDURE — 85025 COMPLETE CBC W/AUTO DIFF WBC: CPT

## 2025-02-10 PROCEDURE — 6360000002 HC RX W HCPCS: Performed by: FAMILY MEDICINE

## 2025-02-10 RX ADMIN — SODIUM CHLORIDE, PRESERVATIVE FREE 10 ML: 5 INJECTION INTRAVENOUS at 09:03

## 2025-02-10 RX ADMIN — LEVOTHYROXINE SODIUM 112 MCG: 0.11 TABLET ORAL at 06:15

## 2025-02-10 RX ADMIN — ENOXAPARIN SODIUM 30 MG: 100 INJECTION SUBCUTANEOUS at 09:02

## 2025-02-10 NOTE — PLAN OF CARE
Problem: Physical Therapy - Adult  Goal: By Discharge: Performs mobility at highest level of function for planned discharge setting.  See evaluation for individualized goals.  Description: FUNCTIONAL STATUS PRIOR TO ADMISSION: Patient was independent and active without use of DME.    HOME SUPPORT PRIOR TO ADMISSION: The patient lived alone with no local support.    Physical Therapy Goals  Initiated 2/10/2025  1.  Patient will move from supine to sit and sit to supine and scoot up and down in bed with minimal assistance within 7 day(s).    2.  Patient will perform sit to stand with minimal assistance within 7 day(s).  3.  Patient will transfer from bed to chair and chair to bed with moderate assistance x1 using the least restrictive device within 7 day(s).  4.  Patient will ambulate with moderate assistance for 10 feet with the least restrictive device within 7 day(s).   5.  Patient will improve Young Balance score by 7 points within 7 days.    Outcome: Progressing   PHYSICAL THERAPY EVALUATION    Patient: Saima Celaya (84 y.o. female)  Date: 2/10/2025  Primary Diagnosis: Dehydration [E86.0]  Altered mental status [R41.82]  Altered mental status, unspecified altered mental status type [R41.82]       Precautions: Restrictions/Precautions: Fall Risk                      ASSESSMENT :   DEFICITS/IMPAIRMENTS:   The patient presents with impaired functional mobility as compared to baseline level 2* global weakness with pronounced L sided weakness, impaired coordination, impaired balance, impaired L VF tracking, L inattention, impaired gait, and impaired cognition (attention, recall, sequencing, insight, etc) following admission after being found down at home. CT negative for acute process; MRI pending. At baseline, she reports that she lived at home alone and was indep with ADLs/mobility without AD and still driving.     Today, she required up to mod/max A x2 for bed mobility with incr time and one step cues. In    Good and requires rest breaks    After treatment:   Patient left in no apparent distress sitting up in chair, Call bell within reach, Bed/ chair alarm activated, Heels elevated for pressure relief, and Updated patient's board on functional status and mobility recommendations    COMMUNICATION/EDUCATION:   The patient's plan of care was discussed with: occupational therapist, registered nurse, and     Patient Education  Education Given To: Patient  Education Provided: Role of Therapy;Plan of Care;Home Exercise Program;Transfer Training  Education Method: Verbal  Barriers to Learning: Cognition  Education Outcome: Verbalized understanding;Continued education needed    Thank you for this referral.  Erica Morel, PT, DPT  Minutes: 50      Physical Therapy Evaluation Charge Determination   History Examination Presentation Decision-Making   MEDIUM  Complexity : 1-2 comorbidities / personal factors will impact the outcome/ POC  HIGH Complexity : 4+ Standardized tests and measures addressing body structure, function, activity limitation and / or participation in recreation  LOW Complexity : Stable, uncomplicated  Young Balance Test  HIGH    Based on the above components, the patient evaluation is determined to be of the following complexity level: Low

## 2025-02-10 NOTE — PLAN OF CARE
complete evaluation   Based on the above components, the patient evaluation is determined to be of the following complexity level: Low

## 2025-02-10 NOTE — WOUND CARE
Wound Care Note:     New consult placed by nurse request for left knee    Chart shows:  Admitted for altered mental status  Past Medical History:   Diagnosis Date    Chronic pain     LEFT KNEE    DDD (degenerative disc disease), lumbar 3/15/2016    Diverticulosis of colon 1/12    McGroarty/gi    Esophageal stricture 2/03    Fibrosis of lung (HCC) 2004    Scarring @ the apices bilaterally Done @[facility]/histoplasmosis    History of basal cell cancer     History of deep venous thrombosis (DVT) of distal vein of left lower extremity 7/26/2022    HTN, goal below 150/90     Hypercholesterolemia     IBS (irritable bowel syndrome) 8/30/2016    Menopause     LMP-50 years old?    Osteoporosis     previously on Fosamax many years ago; does not want further treatment    Overactive bladder     Periprosthetic fracture of proximal end of tibia 03/28/2022    Skin cancer 01/06/2020    scc-right shin    Unspecified hypothyroidism           WBC = 13.2 on 2/10/25  Admitted from home    Assessment:   Patient is alert and talking, confused, incontinent with moderate assistance needed in repositioning.    Bed: [place]  Patient wearing briefs (pull ups) for incontinence and has a Pure Wick in place    Diet: Adult diet regular; 4 carb choices; low fat/low chol/high fiber; 2 gm NA  Patient reports no pain.      Bilateral heels and buttocks skin intact and without erythema.  Sacral skin with light erythema that is blanchable.    1. POA left knee with crusted wound measuring 2.5 cm x 2 cm, no drainage, light erythema extending about 1 cm, center of wound is deeper than surrounding tissue.  Large hydrocolloid applied.    Spoke with Dr. Causey, wound care orders obtained.    Patient repositioned on right side.  Heels offloaded on pillows.     Recommendations:    Left knee- Please maintain Exuderm Hydrocolloid up to one week or change as needed with soiling or rolled edges.  Please use

## 2025-02-10 NOTE — CARE COORDINATION
Care Management Initial Assessment       RUR:  12%  Readmission? No  1st IM letter given? Yes - 2/10/25  1st  letter given: No  Patient has Medicare and [employer]      Admission  dehydration  AMS  stroke    Disposition   SNF   referrals sent to first choice [facility] via [employer]   2nd choice [facility]  and third choice  [facility]  via [employer]  No authorization required for admission to SNF      Transportation   [facility]  no hx     Dme  has [employer] /RW   grab bars shower chair    Medical follow up  PCP and specialist     Contact  Molina Gómez  [phone] [facility]  Molina Bermeo Nasir  [phone]    CM talked with patient in her room to introduce self and explain role.  Patient was alert and was able to confirm demographics, PCP and insurance.  Self care prior to admission and stated she prepares her meals, completes housekeeping, and bathing and dressing without assistance.  Continues to drive herself to the grocery store, appointments and daily errands.  She has nieces who are supportive-- Her 2 sons are -- She has a granddaughter in [place].  Patient is a retired ..      CM and patient discussed discharge planning and she is in agreement with SNF as recommended by physician    She chose  [facility] and [facility] (in order of preference)   Referrals sent via [employer],   [facility] and [facility] via [employer]  Waiting for responses    CM will follow and assist with SNF placement     Cm left message for molina Mcmillan [facility]        02/10/25 7167   Service Assessment   Patient Orientation Alert and Oriented   Cognition Alert  (memory issues)   History Provided By Patient   Support Systems Family Members   PCP Verified by CM Yes   Last Visit to PCP Within last 3 months  (Dayo Osman)   Prior Functional Level Independent in ADLs/IADLs   Current Functional Level Other (see

## 2025-02-10 NOTE — PROGRESS NOTES
Occupational Therapy  02/10/25    Orders received, chart reviewed and patient evaluated by occupational therapy. Pending progression with skilled acute occupational therapy, recommend:    High intensity/comprehensive skilled occupational therapy in a multidisciplinary setting as patient is working towards tolerating up to 3 hours of therapy/day 5-7x/week    Recommend with nursing patient to complete as able in order to maintain strength, endurance and independence: OOB to chair 3x/day wthi 2 assist, ADLs with assist and performing toileting with 2 assist to/from C assist. Thank you for your assistance.     Full evaluation to follow.     Thank you,  TERRA Bowles, OTR/L

## 2025-02-10 NOTE — PLAN OF CARE
Problem: Safety - Adult  Goal: Free from fall injury  2/10/2025 0141 by Felipa Aguilar RN  Outcome: Progressing  Flowsheets (Taken 2/10/2025 0129)  Free From Fall Injury: Based on caregiver fall risk screen, instruct family/caregiver to ask for assistance with transferring infant if caregiver noted to have fall risk factors  2/9/2025 1825 by Larisa Hinton RN  Outcome: Progressing  Flowsheets (Taken 2/9/2025 1800)  Free From Fall Injury: Instruct family/caregiver on patient safety     Problem: Discharge Planning  Goal: Discharge to home or other facility with appropriate resources  2/10/2025 0141 by Felipa Aguilar RN  Outcome: Progressing  Flowsheets (Taken 2/9/2025 2050)  Discharge to home or other facility with appropriate resources: Identify barriers to discharge with patient and caregiver  2/9/2025 1825 by Larisa Hinton RN  Outcome: Progressing  Flowsheets (Taken 2/9/2025 1800)  Discharge to home or other facility with appropriate resources:   Identify barriers to discharge with patient and caregiver   Arrange for needed discharge resources and transportation as appropriate   Identify discharge learning needs (meds, wound care, etc)     Problem: Skin/Tissue Integrity  Goal: Skin integrity remains intact  Description: 1.  Monitor for areas of redness and/or skin breakdown  2.  Assess vascular access sites hourly  3.  Every 4-6 hours minimum:  Change oxygen saturation probe site  4.  Every 4-6 hours:  If on nasal continuous positive airway pressure, respiratory therapy assess nares and determine need for appliance change or resting period  2/10/2025 0141 by Felipa Aguilar RN  Outcome: Progressing  Flowsheets  Taken 2/10/2025 0129  Skin Integrity Remains Intact: Monitor for areas of redness and/or skin breakdown  Taken 2/9/2025 2050  Skin Integrity Remains Intact: Monitor for areas of redness and/or skin breakdown  2/9/2025 1825 by Larisa Hinton RN  Outcome: Progressing     Problem: ABCDS

## 2025-02-10 NOTE — PLAN OF CARE
Problem: Safety - Adult  Goal: Free from fall injury  2/10/2025 1004 by Larisa Hinton RN  Outcome: Progressing  Flowsheets (Taken 2/10/2025 0800)  Free From Fall Injury: Instruct family/caregiver on patient safety  2/10/2025 0141 by Felipa Aguilar RN  Outcome: Progressing  Flowsheets (Taken 2/10/2025 0129)  Free From Fall Injury: Based on caregiver fall risk screen, instruct family/caregiver to ask for assistance with transferring infant if caregiver noted to have fall risk factors     Problem: Discharge Planning  Goal: Discharge to home or other facility with appropriate resources  2/10/2025 1004 by Larisa Hinton RN  Outcome: Progressing  Flowsheets (Taken 2/10/2025 0800)  Discharge to home or other facility with appropriate resources:   Identify barriers to discharge with patient and caregiver   Arrange for needed discharge resources and transportation as appropriate   Identify discharge learning needs (meds, wound care, etc)  2/10/2025 0141 by Felipa Aguilar RN  Outcome: Progressing  Flowsheets (Taken 2/9/2025 2050)  Discharge to home or other facility with appropriate resources: Identify barriers to discharge with patient and caregiver     Problem: Skin/Tissue Integrity  Goal: Skin integrity remains intact  Description: 1.  Monitor for areas of redness and/or skin breakdown  2.  Assess vascular access sites hourly  3.  Every 4-6 hours minimum:  Change oxygen saturation probe site  4.  Every 4-6 hours:  If on nasal continuous positive airway pressure, respiratory therapy assess nares and determine need for appliance change or resting period  2/10/2025 1004 by Larisa Hinton RN  Outcome: Progressing  Flowsheets (Taken 2/10/2025 0800)  Skin Integrity Remains Intact: Monitor for areas of redness and/or skin breakdown  2/10/2025 0141 by Felipa Aguilar RN  Outcome: Progressing  Flowsheets  Taken 2/10/2025 0129  Skin Integrity Remains Intact: Monitor for areas of redness and/or skin

## 2025-02-10 NOTE — PROGRESS NOTES
[facility]  Hospitalist Group                                                                                          Hospitalist Progress Note  Lonny Causey MD  Office Phone: [phone]        Date of Service:  2/10/2025  NAME:  Saima Celaya  :  1940  MRN:  [id number]       Admission Summary:   Saima Celaya is a 84 y.o. female with apmhx HTN, dyslipidemia, dementia and hypothyroidism who presents with suspected fall and altered mental status.  Neighbors did a welfare check when they noted patient had not picked up her paper in three days.  EMS was called, and they found pt. Laying face down on the ground in her urine and feces.  During my interview, patient has no recollection of the events prior to her being found.  She does endorse issue with confusion, and memory.  She states that she was told by her PCP to have stroke workup, but she did not feel it was necessary.  She does still drive to the grocery store near her apartment.     In the ED, she was initially hypothermic to 96.6, tachycardic to 103, and hypertensive to .  Other VSS. Labs showed WBC 19.8, BUN 58, creatinine 1.99, , lactic 2.1.  CT head negative.  CTA head/neck with left ICA fibromuscular dysplasia, and chronic microvascular changes.     In the ED, she received vancomycin, and ceftriaxone         Interval history / Subjective:     Complaining of right buttocks pain     Assessment & Plan:        Dementia  History of fall  Metabolic encephalopathy due to above  -CT head negative for acute process CT head and neck-fibromuscular dysplasia    -PT OT  -Patient declined MRI  -Needs SNF    History of hypothyroidism  -Continue Synthroid    Left knee wound  -Wound care ordered     Code status: Full  Prophylaxis: Lovenox  Central Line:     Care Plan discussed with:   Anticipated Disposition: SNF  Inpatient  Cardiac monitoring: Remote Telemetry         Social Determinants of Health

## 2025-02-11 LAB
ANION GAP SERPL CALC-SCNC: 9 MMOL/L (ref 2–12)
BASOPHILS # BLD: 0.07 K/UL (ref 0–0.1)
BASOPHILS NFR BLD: 0.8 % (ref 0–1)
BUN SERPL-MCNC: 42 MG/DL (ref 6–20)
BUN/CREAT SERPL: 42 (ref 12–20)
CALCIUM SERPL-MCNC: 9 MG/DL (ref 8.5–10.1)
CHLORIDE SERPL-SCNC: 109 MMOL/L (ref 97–108)
CO2 SERPL-SCNC: 24 MMOL/L (ref 21–32)
CREAT SERPL-MCNC: 1 MG/DL (ref 0.55–1.02)
DIFFERENTIAL METHOD BLD: ABNORMAL
EOSINOPHIL # BLD: 0.22 K/UL (ref 0–0.4)
EOSINOPHIL NFR BLD: 2.6 % (ref 0–7)
ERYTHROCYTE [DISTWIDTH] IN BLOOD BY AUTOMATED COUNT: 13.6 % (ref 11.5–14.5)
GLUCOSE SERPL-MCNC: 90 MG/DL (ref 65–100)
HCT VFR BLD AUTO: 36.6 % (ref 35–47)
HGB BLD-MCNC: 11.6 G/DL (ref 11.5–16)
IMM GRANULOCYTES # BLD AUTO: 0.05 K/UL (ref 0–0.04)
IMM GRANULOCYTES NFR BLD AUTO: 0.6 % (ref 0–0.5)
LYMPHOCYTES # BLD: 1.36 K/UL (ref 0.8–3.5)
LYMPHOCYTES NFR BLD: 15.8 % (ref 12–49)
MCH RBC QN AUTO: 27.6 PG (ref 26–34)
MCHC RBC AUTO-ENTMCNC: 31.7 G/DL (ref 30–36.5)
MCV RBC AUTO: 87.1 FL (ref 80–99)
MONOCYTES # BLD: 0.89 K/UL (ref 0–1)
MONOCYTES NFR BLD: 10.3 % (ref 5–13)
NEUTS SEG # BLD: 6.03 K/UL (ref 1.8–8)
NEUTS SEG NFR BLD: 69.9 % (ref 32–75)
NRBC # BLD: 0 K/UL (ref 0–0.01)
NRBC BLD-RTO: 0 PER 100 WBC
PLATELET # BLD AUTO: 230 K/UL (ref 150–400)
PMV BLD AUTO: 10.4 FL (ref 8.9–12.9)
POTASSIUM SERPL-SCNC: 3.5 MMOL/L (ref 3.5–5.1)
RBC # BLD AUTO: 4.2 M/UL (ref 3.8–5.2)
SODIUM SERPL-SCNC: 142 MMOL/L (ref 136–145)
WBC # BLD AUTO: 8.6 K/UL (ref 3.6–11)

## 2025-02-11 PROCEDURE — 80048 BASIC METABOLIC PNL TOTAL CA: CPT

## 2025-02-11 PROCEDURE — 6360000002 HC RX W HCPCS: Performed by: FAMILY MEDICINE

## 2025-02-11 PROCEDURE — 97112 NEUROMUSCULAR REEDUCATION: CPT

## 2025-02-11 PROCEDURE — 1100000000 HC RM PRIVATE

## 2025-02-11 PROCEDURE — 85025 COMPLETE CBC W/AUTO DIFF WBC: CPT

## 2025-02-11 PROCEDURE — 97530 THERAPEUTIC ACTIVITIES: CPT

## 2025-02-11 PROCEDURE — 6370000000 HC RX 637 (ALT 250 FOR IP): Performed by: FAMILY MEDICINE

## 2025-02-11 PROCEDURE — 6370000000 HC RX 637 (ALT 250 FOR IP): Performed by: HOSPITALIST

## 2025-02-11 PROCEDURE — 2500000003 HC RX 250 WO HCPCS: Performed by: FAMILY MEDICINE

## 2025-02-11 PROCEDURE — 97535 SELF CARE MNGMENT TRAINING: CPT

## 2025-02-11 RX ORDER — CIPROFLOXACIN HYDROCHLORIDE 3.5 MG/ML
1 SOLUTION/ DROPS TOPICAL EVERY 6 HOURS SCHEDULED
Status: DISCONTINUED | OUTPATIENT
Start: 2025-02-11 | End: 2025-02-12 | Stop reason: HOSPADM

## 2025-02-11 RX ORDER — ENOXAPARIN SODIUM 100 MG/ML
40 INJECTION SUBCUTANEOUS DAILY
Status: DISCONTINUED | OUTPATIENT
Start: 2025-02-12 | End: 2025-02-12 | Stop reason: HOSPADM

## 2025-02-11 RX ADMIN — CIPROFLOXACIN HYDROCHLORIDE 1 DROP: 3 SOLUTION/ DROPS OPHTHALMIC at 11:52

## 2025-02-11 RX ADMIN — CIPROFLOXACIN HYDROCHLORIDE 1 DROP: 3 SOLUTION/ DROPS OPHTHALMIC at 17:28

## 2025-02-11 RX ADMIN — SODIUM CHLORIDE, PRESERVATIVE FREE 10 ML: 5 INJECTION INTRAVENOUS at 21:14

## 2025-02-11 RX ADMIN — ENOXAPARIN SODIUM 30 MG: 100 INJECTION SUBCUTANEOUS at 08:56

## 2025-02-11 RX ADMIN — LEVOTHYROXINE SODIUM 112 MCG: 0.11 TABLET ORAL at 06:53

## 2025-02-11 RX ADMIN — SODIUM CHLORIDE, PRESERVATIVE FREE 10 ML: 5 INJECTION INTRAVENOUS at 08:56

## 2025-02-11 NOTE — PROGRESS NOTES
[facility]  [facility]ist [facility]                                                                                          Hospitalist Progress Note  Pj Day MD  Office Phone: [phone]        Date of Service:  2025  NAME:  Saima Celaya  :  1940  MRN:  [id number]       Admission Summary:   Saima Celaya is a 84 y.o. female with apmhx HTN, dyslipidemia, dementia and hypothyroidism who presents with suspected fall and altered mental status.  Neighbors did a welfare check when they noted patient had not picked up her paper in three days.  EMS was called, and they found pt. Laying face down on the ground in her urine and feces.  During my interview, patient has no recollection of the events prior to her being found.  She does endorse issue with confusion, and memory.  She states that she was told by her PCP to have stroke workup, but she did not feel it was necessary.  She does still drive to the grocery store near her apartment.     In the ED, she was initially hypothermic to 96.6, tachycardic to 103, and hypertensive to .  Other VSS. Labs showed WBC 19.8, BUN 58, creatinine 1.99, , lactic 2.1.  CT head negative.  CTA head/neck with left ICA fibromuscular dysplasia, and chronic microvascular changes.     In the ED, she received vancomycin, and ceftriaxone         Interval history / Subjective:     Patient seen and examined pleasantly demented  [facility] pending bed availability      Assessment & Plan:        Dementia  History of fall  Metabolic encephalopathy due to above  -CT head negative for acute process CT head and neck-fibromuscular dysplasia    -PT OT  -Patient declined MRI  -Needs SNF    History of hypothyroidism  -Continue Synthroid    Left knee wound  -Wound care ordered     Code status: Full  Prophylaxis: Lovenox  Central Line:     Care Plan discussed with:   Anticipated Disposition: SNF 24

## 2025-02-11 NOTE — PLAN OF CARE
Problem: Physical Therapy - Adult  Goal: By Discharge: Performs mobility at highest level of function for planned discharge setting.  See evaluation for individualized goals.  Description: FUNCTIONAL STATUS PRIOR TO ADMISSION: Patient was independent and active without use of DME.    HOME SUPPORT PRIOR TO ADMISSION: The patient lived alone with no local support.    Physical Therapy Goals  Initiated 2/10/2025  1.  Patient will move from supine to sit and sit to supine and scoot up and down in bed with minimal assistance within 7 day(s).    2.  Patient will perform sit to stand with minimal assistance within 7 day(s).  3.  Patient will transfer from bed to chair and chair to bed with moderate assistance x1 using the least restrictive device within 7 day(s).  4.  Patient will ambulate with moderate assistance for 10 feet with the least restrictive device within 7 day(s).   5.  Patient will improve Young Balance score by 7 points within 7 days.    Outcome: Progressing   PHYSICAL THERAPY TREATMENT    Patient: Saima Celaya (84 y.o. female)  Date: 2/11/2025  Diagnosis: Dehydration [E86.0]  Altered mental status [R41.82]  Altered mental status, unspecified altered mental status type [R41.82] Altered mental status      Precautions: Fall Risk                      ASSESSMENT:  Patient continues to benefit from skilled PT services and is slowly progressing towards goals however remains most limited by L hemiparesis, mild L inattention, impaired cognition (attention to task, recall, insight, etc), impaired balance, impaired L coordination, impaired gait, and decr tolerance to activity leading to increased falls risk and dependency from baseline level.    Received pt supine in bed, cleared for mobility and motivated to participate. She required up to max A for bed mobility with incr time and one step cues. Continues to demo poor sitting balance once upright requiring assist to correct from lateral lean/LOBs. Completed  verbally and visually educated on the BE FAST acronym for signs/symptoms of CVA and TIA.  All questions answered with patient indicating fair  understanding.                                                                                                                                                                                                                                   Pain Ratin/10   Pain Intervention(s):       Activity Tolerance:   Good and requires rest breaks    After treatment:   Patient left in no apparent distress sitting up in chair, Call bell within reach, Bed/ chair alarm activated, Heels elevated for pressure relief, and Updated patient's board on functional status and mobility recommendations      COMMUNICATION/EDUCATION:   The patient's plan of care was discussed with: registered nurse    Patient Education  Education Given To: Patient  Education Provided: Role of Therapy;Plan of Care;Home Exercise Program;Transfer Training  Education Method: Verbal  Barriers to Learning: Cognition  Education Outcome: Verbalized understanding;Continued education needed      Erica Morel, PT, DPT  Minutes: 28

## 2025-02-11 NOTE — CARE COORDINATION
Transition of Care Plan:    SNF - [facility] accepted Pt pending bed availability  - KIP 2/12 after 3 night stay    Transport: BLS    RUR: 11%  Prior Level of Functioning: independent   Disposition: SNF  KIP: 2/12  If SNF or IPR: Date FOC offered: 2/10  Date FOC received: 2/10  Accepting facility: [facility]   Date authorization started with reference number:   Date authorization received and expires:   Follow up appointments: PCP   DME needed: defer to snf   Transportation at discharge: BLS  IM/IMM Medicare/ letter given: 2/10  Caregiver Contact: (molina Gómez [phone])   Discharge Caregiver contacted prior to discharge?   Care Conference needed? No  Barriers to discharge: Medical 48 hours, MRI pending; bed availability     [facility] Pt but likely no beds until end of week.    MAHESH Olguin (Ally).

## 2025-02-11 NOTE — PLAN OF CARE
Problem: Occupational Therapy - Adult  Goal: By Discharge: Performs self-care activities at highest level of function for planned discharge setting.  See evaluation for individualized goals.  Description: FUNCTIONAL STATUS PRIOR TO ADMISSION:  Patient is a limited historian, hx of dementia with self reports of increased sedentary lifestyle. She is IND for ADLs, IADLs (including driving), and mobility with no AD. She lives alone in an apartment.     Occupational Therapy Goals  Initiated 2/10/2025   1.  Patient will perform self-feeding with Minimal Assist with LUE as GM/FM assist within 7 day(s).  2.  Patient will perform at least 1 standing grooming task with Moderate Assist within 7 day(s).  3.  Patient will perform upper body dressing with Minimal Assist within 7 day(s).  4.  Patient will perform lower body dressing with Moderate Assist within 7 day(s).  5.  Patient will perform toilet transfers to/from [facility] with Moderate Assist x2 within 7 day(s).  6.  Patient will perform all aspects of toileting with Maximal Assist within 7 day(s).  7.  Patient will participate in upper extremity therapeutic exercise/activities with Minimal Assist within 7 day(s).    8.  Patient will complete the Fugl Neal within 7 days.    Outcome: Progressing     OCCUPATIONAL THERAPY TREATMENT  Patient: Saima Celaya (84 y.o. female)  Date: 2/11/2025  Primary Diagnosis: Dehydration [E86.0]  Altered mental status [R41.82]  Altered mental status, unspecified altered mental status type [R41.82]       Precautions: Fall Risk                Chart, occupational therapy assessment, plan of care, and goals were reviewed.    ASSESSMENT  Patient continues to benefit from skilled OT services and is progressing towards goals, continues with decreased LUE/LLE function and sitting & standing balance with heavy L lateral lean, however able to progress with ADLs and mobility this session. Patient received in the chair requesting return to bed, Oniel QUIROGA  Exceptions  Arousal/Alertness: Appears intact  Following Commands: Follows one step commands with increased time;Follows one step commands with repetition  Attention Span: Difficulty dividing attention;Difficulty attending to directions;Attends with cues to redirect  Memory: Decreased short term memory;Decreased recall of recent events  Safety Judgement: Decreased awareness of need for assistance;Decreased awareness of need for safety  Problem Solving: Decreased awareness of errors;Assistance required to implement solutions;Assistance required to identify errors made;Assistance required to correct errors made;Assistance required to generate solutions  Insights: Decreased awareness of deficits  Initiation: Requires cues for some  Sequencing: Requires cues for some  Cognition Comment: intermittent confusion however improved from prior session    Functional Mobility and Transfers for ADLs:  Bed Mobility:  Bed Mobility Training  Bed Mobility Training: Yes  Overall Level of Assistance: Substantial/Maximal assistance  Interventions: Safety awareness training;Tactile cues;Verbal cues;Manual cues;Visual cues  Supine to Sit: Substantial/Maximal assistance  Sit to Supine: Substantial/Maximal assistance  Scooting: Substantial/Maximal assistance;Dependent/Total     Transfers:   Transfer Training  Transfer Training: Yes  Overall Level of Assistance: Substantial/Maximal assistance (Zoie Stedy)  Interventions: Safety awareness training;Tactile cues;Verbal cues;Weight shifting training/pressure relief  Sit to Stand: Substantial/Maximal assistance (Zoie Stedy)  Stand to Sit: Substantial/Maximal assistance (Zoie Stedy)  Stand Pivot Transfers: Substantial/Maximal assistance  Bed to Chair: Substantial/Maximal assistance (Zoie Stedy)  Toilet Transfer: Minimal assistance;Substantial/Maximal assistance (rolling in supine for dependent bowel care; Min A to the L, Max A to the R with bed rail use)           Balance:     Balance  Sitting:

## 2025-02-11 NOTE — PLAN OF CARE
Problem: Safety - Adult  Goal: Free from fall injury  2/11/2025 1404 by Salma Kiser RN  Outcome: Progressing  2/11/2025 0710 by Kirsty Gross RN  Outcome: Progressing     Problem: Discharge Planning  Goal: Discharge to home or other facility with appropriate resources  2/11/2025 1404 by Salma Kiser RN  Outcome: Progressing  2/11/2025 0710 by Kirsty Gross RN  Outcome: Progressing     Problem: Skin/Tissue Integrity  Goal: Skin integrity remains intact  Description: 1.  Monitor for areas of redness and/or skin breakdown  2.  Assess vascular access sites hourly  3.  Every 4-6 hours minimum:  Change oxygen saturation probe site  4.  Every 4-6 hours:  If on nasal continuous positive airway pressure, respiratory therapy assess nares and determine need for appliance change or resting period  2/11/2025 1404 by Salma Kiser RN  Outcome: Progressing  2/11/2025 0710 by Kirsty Gross RN  Outcome: Progressing     Problem: ABCDS Injury Assessment  Goal: Absence of physical injury  2/11/2025 1404 by Salma Kiser RN  Outcome: Progressing  2/11/2025 0710 by Kirsty Gross RN  Outcome: Progressing

## 2025-02-12 VITALS
TEMPERATURE: 98.5 F | DIASTOLIC BLOOD PRESSURE: 72 MMHG | OXYGEN SATURATION: 96 % | SYSTOLIC BLOOD PRESSURE: 161 MMHG | HEART RATE: 74 BPM | RESPIRATION RATE: 15 BRPM | BODY MASS INDEX: 28.83 KG/M2 | WEIGHT: 157.6 LBS

## 2025-02-12 LAB
ANION GAP SERPL CALC-SCNC: 5 MMOL/L (ref 2–12)
BASOPHILS # BLD: 0.06 K/UL (ref 0–0.1)
BASOPHILS NFR BLD: 0.8 % (ref 0–1)
BUN SERPL-MCNC: 26 MG/DL (ref 6–20)
BUN/CREAT SERPL: 33 (ref 12–20)
CALCIUM SERPL-MCNC: 8.9 MG/DL (ref 8.5–10.1)
CHLORIDE SERPL-SCNC: 110 MMOL/L (ref 97–108)
CO2 SERPL-SCNC: 25 MMOL/L (ref 21–32)
CREAT SERPL-MCNC: 0.78 MG/DL (ref 0.55–1.02)
DIFFERENTIAL METHOD BLD: ABNORMAL
EOSINOPHIL # BLD: 0.22 K/UL (ref 0–0.4)
EOSINOPHIL NFR BLD: 3 % (ref 0–7)
ERYTHROCYTE [DISTWIDTH] IN BLOOD BY AUTOMATED COUNT: 13.5 % (ref 11.5–14.5)
GLUCOSE SERPL-MCNC: 102 MG/DL (ref 65–100)
HCT VFR BLD AUTO: 36.3 % (ref 35–47)
HGB BLD-MCNC: 11.5 G/DL (ref 11.5–16)
IMM GRANULOCYTES # BLD AUTO: 0.07 K/UL (ref 0–0.04)
IMM GRANULOCYTES NFR BLD AUTO: 1 % (ref 0–0.5)
LYMPHOCYTES # BLD: 1.3 K/UL (ref 0.8–3.5)
LYMPHOCYTES NFR BLD: 17.7 % (ref 12–49)
MCH RBC QN AUTO: 27.8 PG (ref 26–34)
MCHC RBC AUTO-ENTMCNC: 31.7 G/DL (ref 30–36.5)
MCV RBC AUTO: 87.7 FL (ref 80–99)
MONOCYTES # BLD: 0.78 K/UL (ref 0–1)
MONOCYTES NFR BLD: 10.6 % (ref 5–13)
NEUTS SEG # BLD: 4.9 K/UL (ref 1.8–8)
NEUTS SEG NFR BLD: 66.9 % (ref 32–75)
NRBC # BLD: 0 K/UL (ref 0–0.01)
NRBC BLD-RTO: 0 PER 100 WBC
PLATELET # BLD AUTO: 219 K/UL (ref 150–400)
PMV BLD AUTO: 10.5 FL (ref 8.9–12.9)
POTASSIUM SERPL-SCNC: 3.2 MMOL/L (ref 3.5–5.1)
RBC # BLD AUTO: 4.14 M/UL (ref 3.8–5.2)
SODIUM SERPL-SCNC: 140 MMOL/L (ref 136–145)
WBC # BLD AUTO: 7.3 K/UL (ref 3.6–11)

## 2025-02-12 PROCEDURE — 2500000003 HC RX 250 WO HCPCS: Performed by: FAMILY MEDICINE

## 2025-02-12 PROCEDURE — 6370000000 HC RX 637 (ALT 250 FOR IP): Performed by: FAMILY MEDICINE

## 2025-02-12 PROCEDURE — 80048 BASIC METABOLIC PNL TOTAL CA: CPT

## 2025-02-12 PROCEDURE — 85025 COMPLETE CBC W/AUTO DIFF WBC: CPT

## 2025-02-12 PROCEDURE — 6370000000 HC RX 637 (ALT 250 FOR IP): Performed by: HOSPITALIST

## 2025-02-12 PROCEDURE — 6360000002 HC RX W HCPCS: Performed by: HOSPITALIST

## 2025-02-12 RX ORDER — LOSARTAN POTASSIUM 100 MG/1
50 TABLET ORAL DAILY
Qty: 90 TABLET | Refills: 2 | Status: SHIPPED | OUTPATIENT
Start: 2025-02-12

## 2025-02-12 RX ORDER — AMLODIPINE BESYLATE 5 MG/1
5 TABLET ORAL DAILY
Status: DISCONTINUED | OUTPATIENT
Start: 2025-02-12 | End: 2025-02-12 | Stop reason: HOSPADM

## 2025-02-12 RX ORDER — AMLODIPINE BESYLATE 5 MG/1
5 TABLET ORAL DAILY
Qty: 30 TABLET | Refills: 3 | Status: SHIPPED | OUTPATIENT
Start: 2025-02-12

## 2025-02-12 RX ORDER — CIPROFLOXACIN HYDROCHLORIDE 3.5 MG/ML
1 SOLUTION/ DROPS TOPICAL EVERY 6 HOURS
Qty: 1 EACH | Refills: 0 | Status: SHIPPED | OUTPATIENT
Start: 2025-02-12 | End: 2025-02-15

## 2025-02-12 RX ADMIN — CIPROFLOXACIN HYDROCHLORIDE 1 DROP: 3 SOLUTION/ DROPS OPHTHALMIC at 07:01

## 2025-02-12 RX ADMIN — CIPROFLOXACIN HYDROCHLORIDE 1 DROP: 3 SOLUTION/ DROPS OPHTHALMIC at 01:35

## 2025-02-12 RX ADMIN — CIPROFLOXACIN HYDROCHLORIDE 1 DROP: 3 SOLUTION/ DROPS OPHTHALMIC at 10:55

## 2025-02-12 RX ADMIN — ENOXAPARIN SODIUM 40 MG: 100 INJECTION SUBCUTANEOUS at 08:39

## 2025-02-12 RX ADMIN — SODIUM CHLORIDE, PRESERVATIVE FREE 10 ML: 5 INJECTION INTRAVENOUS at 08:39

## 2025-02-12 RX ADMIN — AMLODIPINE BESYLATE 5 MG: 5 TABLET ORAL at 10:56

## 2025-02-12 RX ADMIN — LEVOTHYROXINE SODIUM 112 MCG: 0.11 TABLET ORAL at 07:01

## 2025-02-12 NOTE — PROGRESS NOTES
..TRANSFER - OUT REPORT:    Verbal report given to DAVID Cedillo on Saima Celaya  being transferred to [facility]  for routine progression of patient care       Report consisted of patient's Situation, Background, Assessment and   Recommendations(SBAR).     Information from the following report(s) Nurse Handoff Report, MAR, and Neuro Assessment was reviewed with the receiving nurse.           Lines:       Opportunity for questions and clarification was provided.      Patient transported with:  Patient-specific medications from Pharmacy

## 2025-02-12 NOTE — PLAN OF CARE
Problem: Safety - Adult  Goal: Free from fall injury  Outcome: Completed     Problem: Discharge Planning  Goal: Discharge to home or other facility with appropriate resources  Outcome: Completed     Problem: Skin/Tissue Integrity  Goal: Skin integrity remains intact  Description: 1.  Monitor for areas of redness and/or skin breakdown  2.  Assess vascular access sites hourly  3.  Every 4-6 hours minimum:  Change oxygen saturation probe site  4.  Every 4-6 hours:  If on nasal continuous positive airway pressure, respiratory therapy assess nares and determine need for appliance change or resting period  Outcome: Completed     Problem: ABCDS Injury Assessment  Goal: Absence of physical injury  Outcome: Completed

## 2025-02-12 NOTE — DISCHARGE SUMMARY
Discharge Summary       PATIENT ID: Saima Celaya  MRN: [id number]   YOB: 1940    DATE OF ADMISSION: 2/9/2025 12:07 PM    DATE OF DISCHARGE: 2/12/25   PRIMARY CARE PROVIDER: Priscila Osman MD     ATTENDING PHYSICIAN: pj day  DISCHARGING PROVIDER: Pj Day MD    To contact this individual call [phone] and ask the  to page.  If unavailable ask to be transferred the Adult Hospitalist Department.    CONSULTATIONS: IP CONSULT TO TELE-NEUROLOGY  IP CONSULT TO CASE MANAGEMENT    PROCEDURES/SURGERIES: * No surgery found *    ADMITTING DIAGNOSES & HOSPITAL COURSE:     Saima Celaya is a 84 y.o. female with apmhx HTN, dyslipidemia, dementia and hypothyroidism who presents with suspected fall and altered mental status.  Neighbors did a welfare check when they noted patient had not picked up her paper in three days.  EMS was called, and they found pt. Laying face down on the ground in her urine and feces.  During my interview, patient has no recollection of the events prior to her being found.  She does endorse issue with confusion, and memory.  She states that she was told by her PCP to have stroke workup, but she did not feel it was necessary.  She does still drive to the grocery store near her apartment.     Dementia  History of fall  Metabolic encephalopathy due to above  -CT head negative for acute process CT head and neck-fibromuscular dysplasia  -Patient declined MRI     History of hypothyroidism  -Continue Synthroid     Left knee wound  -Wound care ordered        DISCHARGE DIAGNOSES / PLAN:       D/c to SNF        PENDING TEST RESULTS:   At the time of discharge the following test results are still pending:     [facility]:    [facility]     ADDITIONAL CARE RECOMMENDATIONS:     DIET: regular diet  Oral Nutritional Supplements:     ACTIVITY: activity as tolerated    WOUND CARE:     EQUIPMENT needed:       DISCHARGE MEDICATIONS:     Medication  List        START taking these medications      amLODIPine 5 MG tablet  Commonly known as: NORVASC  Take 1 tablet by mouth daily     ciprofloxacin 0.3 % ophthalmic solution  Commonly known as: CILOXAN  Place 1 drop into the left eye every 6 hours for 3 days            CHANGE how you take these medications      losartan 100 MG tablet  Commonly known as: COZAAR  Take 0.5 tablets by mouth daily  What changed: See the new instructions.            CONTINUE taking these medications      acetaminophen 650 MG extended release tablet  Commonly known as: TYLENOL     levothyroxine 112 MCG tablet  Commonly known as: SYNTHROID  TAKE 1 TABLET EVERY DAY BEFORE BREAKFAST     simvastatin 40 MG tablet  Commonly known as: ZOCOR  Take 1 tablet by mouth nightly            STOP taking these medications      Sennosides-Docusate Sodium 8.6-50 MG Caps               Where to Get Your Medications        These medications were sent to [facility] [address] [place] [address] - P [phone] - F [phone]  [address] [place] [id number]      Phone: [phone]   amLODIPine 5 MG tablet  ciprofloxacin 0.3 % ophthalmic solution  losartan 100 MG tablet           NOTIFY YOUR PHYSICIAN FOR ANY OF THE FOLLOWING:   Fever over 101 degrees for 24 hours.   Chest pain, shortness of breath, fever, chills, nausea, vomiting, diarrhea, change in mentation, falling, weakness, bleeding. Severe pain or pain not relieved by medications.  Or, any other signs or symptoms that you may have questions about.    DISPOSITION:    Home With:   OT  PT  HH  RN      x Long term SNF/Inpatient Rehab    Independent/assisted living    Hospice    Other:       PATIENT CONDITION AT DISCHARGE:     Functional status   x Poor     Deconditioned     Independent      Cognition     Lucid     Forgetful    x Dementia      Catheters/lines (plus indication)    Serra     PICC     PEG    x None      Code status    x Full code     DNR      PHYSICAL EXAMINATION

## 2025-02-12 NOTE — PLAN OF CARE
Problem: Safety - Adult  Goal: Free from fall injury  2/11/2025 2224 by Reva Mac RN  Outcome: Progressing  Flowsheets (Taken 2/11/2025 2000)  Free From Fall Injury:   Instruct family/caregiver on patient safety   Based on caregiver fall risk screen, instruct family/caregiver to ask for assistance with transferring infant if caregiver noted to have fall risk factors  2/11/2025 1404 by Salma Kiser RN  Outcome: Progressing  Flowsheets (Taken 2/11/2025 0800)  Free From Fall Injury: Instruct family/caregiver on patient safety     Problem: Discharge Planning  Goal: Discharge to home or other facility with appropriate resources  2/11/2025 2224 by Reva Mac RN  Outcome: Progressing  Flowsheets (Taken 2/11/2025 2000)  Discharge to home or other facility with appropriate resources:   Identify barriers to discharge with patient and caregiver   Arrange for needed discharge resources and transportation as appropriate   Identify discharge learning needs (meds, wound care, etc)   Arrange for interpreters to assist at discharge as needed   Refer to discharge planning if patient needs post-hospital services based on physician order or complex needs related to functional status, cognitive ability or social support system  2/11/2025 1404 by Salma Kiser RN  Outcome: Progressing  Flowsheets (Taken 2/11/2025 0800)  Discharge to home or other facility with appropriate resources: Identify barriers to discharge with patient and caregiver     Problem: Skin/Tissue Integrity  Goal: Skin integrity remains intact  Description: 1.  Monitor for areas of redness and/or skin breakdown  2.  Assess vascular access sites hourly  3.  Every 4-6 hours minimum:  Change oxygen saturation probe site  4.  Every 4-6 hours:  If on nasal continuous positive airway pressure, respiratory therapy assess nares and determine need for appliance change or resting period  2/11/2025 2224 by Reva Mac, RN  Outcome: Progressing  Flowsheets

## 2025-02-12 NOTE — CARE COORDINATION
Transition of Care Plan to SNF/Rehab    Communication to Patient/Family:  Met with patient and family and they are agreeable to the transition plan. The Plan for Transition of Care is related to the following treatment goals: SNF    The Patient and/or patient representative was provided with a choice of provider and agrees  with the discharge plan.      Yes [x] No []    A Freedom of choice list was provided with basic dialogue that supports the patient's individualized plan of care/goals and shares the quality data associated with the providers.       Yes [x] No []    SNF/Rehab Transition:  Patient has been accepted to [facility] SNF/Rehab and meets criteria for admission.   Date of Inpatient Status Order:   Patient will transported by [facility] and expected to leave at 12pm.    Communication to SNF/Rehab:  Bedside RN, Salma, has been notified to update the transition plan to the facility and call report ([phone]; Room 401).  Discharge information has been updated on the AVS. And communicated to facility via [employer], or CC link.     Discharge instructions to be fax'd to facility at (Fax #). Epic    Nursing Please include all hard scripts for controlled substances, med rec and dc summary, and AVS in packet.     Reviewed and confirmed with facility, [facility], can manage the patient care needs for the following:     Ayan with (X) only those applicable:  Medication:  [x]Medications are available at the facility  []IV Antibiotics    []Controlled Substance - hard copies available sent.  []Weekly Labs    Equipment:  []CPAP/BiPAP  []Wound Vacuum  []Serra or Urinary Device  []PICC/Central Line  []Nebulizer  []Ventilator    Treatment:  []Isolation (for MRSA, VRE, etc.)  []Surgical Drain Management  []Tracheostomy Care  []Dressing Changes  []Dialysis with transportation  []PEG Care  []Oxygen  []Daily Weights for Heart Failure    Dietary:  []Any diet limitations  []Tube Feedings

## 2025-02-12 NOTE — DISCHARGE INSTRUCTIONS
Discharge Instructions       PATIENT ID: Saima Celaya  MRN: [id number]   YOB: 1940    DATE OF ADMISSION: 2/9/2025   DATE OF DISCHARGE: 2/12/2025    PRIMARY CARE PROVIDER: Priscila Osman     ATTENDING PHYSICIAN: Pj Day MD   DISCHARGING PROVIDER: Pj Day MD    To contact this individual call [phone] and ask the  to page.   If unavailable ask to be transferred the Adult Hospitalist Department.    DISCHARGE DIAGNOSES ***    CONSULTATIONS: @[facility]@    PROCEDURES/SURGERIES: * No surgery found *    PENDING TEST RESULTS:   At the time of discharge the following test results are still pending: ***    FOLLOW UP APPOINTMENTS:   @[facility]@     ADDITIONAL CARE RECOMMENDATIONS: ***    DIET: {diet:45135}  Oral Nutritional Supplements: {RDSUPPLEMENTLIST:88716} {RDSUPPFREQUENCY:98317}     ACTIVITY: {discharge activity:55816}    WOUND CARE: ***    EQUIPMENT needed: ***      DISCHARGE MEDICATIONS:   See Medication Reconciliation Form    It is important that you take the medication exactly as they are prescribed.   Keep your medication in the bottles provided by the pharmacist and keep a list of the medication names, dosages, and times to be taken in your wallet.   Do not take other medications without consulting your doctor.       NOTIFY YOUR PHYSICIAN FOR ANY OF THE FOLLOWING:   Fever over 101 degrees for 24 hours.   Chest pain, shortness of breath, fever, chills, nausea, vomiting, diarrhea, change in mentation, falling, weakness, bleeding. Severe pain or pain not relieved by medications.  Or, any other signs or symptoms that you may have questions about.      DISPOSITION:    Home With:   OT  PT  HH  RN       SNF/Inpatient Rehab/LTAC    Independent/assisted living    Hospice    Other:     CDMP Checked:   Yes ***     PROBLEM LIST Updated:  Yes ***       Signed:   Pj Day MD  2/12/2025  10:00 AM

## 2025-02-14 LAB
BACTERIA SPEC CULT: NORMAL
BACTERIA SPEC CULT: NORMAL
SERVICE CMNT-IMP: NORMAL
SERVICE CMNT-IMP: NORMAL

## 2025-02-15 NOTE — PROGRESS NOTES
Physician Progress Note      PATIENT:               TAZ WARREN  CSN #:                  [id number]  :                       1940  ADMIT DATE:       2025 12:07 PM  DISCH DATE:        2025 12:32 PM  RESPONDING  PROVIDER #:        Pj Day MD          QUERY TEXT:    Pt admitted with metabolic encephalopathy . Pt noted to have serum Crt of 1.99   that decreased to 0.78 If possible, please document in the progress notes and   discharge summary if you are evaluating and/or treating any of the following:    The medical record reflects the following:  Risk Factors: Crt 1.99  Clinical Indicators:  25 12:19  BUN,BUNPL: 58 (H)  Creatinine: 1.99 (H)  Est, Glom Filt Rate: 24 (L)      25 03:39  BUN,BUNPL: 26 (H)  Creatinine: 0.78  Est, Glom Filt Rate: 75  Treatment: 2 lts IVF bolus, lab monitoring    Thank you  Camille Restrepo RN, CDI, CCDS, CRCR  Certified  Clinical Documentation   [email or web address]      Defined by Kidney Disease Improving Global Outcomes (KDIGO) clinical practice   guideline for acute kidney injury:  -Increase in SCr by greater than or equal to 0.3 mg/dl within 48 hours; or  -Increase or decrease in SCr to greater than or equal to 1.5 times baseline,   which is known or presumed to have occurred within the prior 7 days; or  -Urine volume < 0.5ml/kg/h for 6 hours  Options provided:  -- Metabolic encephalopathy due to Acute kidney injury POA  -- Acute kidney injury  -- Other - I will add my own diagnosis  -- Disagree - Not applicable / Not valid  -- Disagree - Clinically unable to determine / Unknown  -- Refer to Clinical Documentation Reviewer    PROVIDER RESPONSE TEXT:    This patient has an Acute kidney injury.    Query created by: Camille Restrepo on 2025 11:15 AM      Electronically signed by:  Pj Day MD 2/15/2025 12:37 PM

## 2025-02-20 NOTE — PROGRESS NOTES
Physician Progress Note      PATIENT:               TAZ WARREN  CSN #:                  [id number]  :                       1940  ADMIT DATE:       2025 12:07 PM  DISCH DATE:        2025 12:32 PM  RESPONDING  PROVIDER #:        jP Day MD          QUERY TEXT:    Patient admitted with OMID Noted documentation of metabolic encephalopathy. In   order to support the diagnosis of metabolic encephalopathy, please include   additional clinical indicators in your documentation.  Or please document if   the diagnosis of metabolic encephalopathy has been ruled out after further   study.    The medical record reflects the following:  Risk Factors: OMID  Clinical Indicators:  DCS  Metabolic encephalopathy due to above  -CT head negative for acute process CT head and neck-fibromuscular dysplasia  -Patient declined MRI  Treatment: CT, lab monitoring, high fall risk precautions    Thank you  Camille Restrepo RN, HEMAL, CCDS, CRCR  Certified  Clinical Documentation   Options provided:  -- Metabolic encephalopathy due to OMID  -- Metabolic encephalopathy ruled out  -- Other - I will add my own diagnosis  -- Disagree - Not applicable / Not valid  -- Disagree - Clinically unable to determine / Unknown  -- Refer to Clinical Documentation Reviewer    PROVIDER RESPONSE TEXT:    This patient has Metabolic encephalopathy ruled out.    Query created by: Camille Restrepo on 2025 6:33 AM      QUERY TEXT:    Patient admitted with OMID with troponin levels 36-50  If possible, please   document in the progress notes and discharge summary if you are evaluating   and/or treating any of the following:    The medical record reflects the following:  Risk Factors: OMID  Clinical Indicators:  Patient with OMID was noted to have troponin values of 36-50  Treatment: lab monitoring  Thank you  Camille Restrepo RN, HEMAL, CCDS, CRCR  Certified  Clinical Documentation   Options

## (undated) DEVICE — HYPODERMIC SAFETY NEEDLE: Brand: MAGELLAN

## (undated) DEVICE — SUTURE MCRYL SZ 3-0 L27IN ABSRB UD L19MM PS-2 3/8 CIR PRIM Y427H

## (undated) DEVICE — SYR 20ML LL STRL LF --

## (undated) DEVICE — SCRUB DRY SURG EZ SCRUB BRUSH PREOPERATIVE GRN

## (undated) DEVICE — SPONGE GZ W4XL4IN COT 12 PLY TYP VII WVN C FLD DSGN

## (undated) DEVICE — CONTAINER,SPECIMEN,3OZ,OR STRL: Brand: MEDLINE

## (undated) DEVICE — GLOVE SURG SZ 65 L12IN FNGR THK94MIL STD WHT LTX FREE

## (undated) DEVICE — DRAPE,EXTREMITY,89X128,STERILE: Brand: MEDLINE

## (undated) DEVICE — GOWN,SIRUS,NONRNF,SETINSLV,2XL,18/CS: Brand: MEDLINE

## (undated) DEVICE — GLOVE SURG SZ 8 L12IN FNGR THK94MIL STD WHT LTX FREE

## (undated) DEVICE — APPLICATOR SCRB 26ML TEAL STRL -- CHLORAPREP 26ML

## (undated) DEVICE — DRESSING HYDROCOLLOID BORDER 35X10 IN ALUM PRIMASEAL

## (undated) DEVICE — 4-PORT MANIFOLD: Brand: NEPTUNE 2

## (undated) DEVICE — SUTURE VCRL SZ 2-0 L36IN ABSRB UD L40MM CT 1/2 CIR J957H

## (undated) DEVICE — TUBING SUCT 12FR MAL ALUM SHFT FN CAP VENT UNIV CONN W/ OBT

## (undated) DEVICE — ERASECAUTI INTERMIT TRAY: Brand: MEDLINE INDUSTRIES, INC.

## (undated) DEVICE — STAPLER SKIN H3.9MM WIRE DIA0.58MM CRWN 6.9MM 35 STPL ROT

## (undated) DEVICE — BLADE SAW W083XL354IN THK0047IN CUT THK0047IN SAG FLR

## (undated) DEVICE — BLADE SAW W098XL354IN THK0047IN CUT THK0047IN SAG

## (undated) DEVICE — BANDAGE COMPR M W6INXL10YD WHT BGE VELC E MTRX HK AND LOOP

## (undated) DEVICE — PADDING CAST SPEC 6INX4YD COT --

## (undated) DEVICE — SUTURE VCRL 1 L27IN ABSRB CT BRAID COAT UD J281H

## (undated) DEVICE — TOTAL JOINT - SMH: Brand: MEDLINE INDUSTRIES, INC.

## (undated) DEVICE — PADDING CST 6IN STERILE --

## (undated) DEVICE — T4 HOOD

## (undated) DEVICE — ZIMMER® STERILE DISPOSABLE TOURNIQUET CUFF WITH PLC, DUAL PORT, SINGLE BLADDER, 34 IN. (86 CM)

## (undated) DEVICE — SUTURE STRATAFIX SYMMETRIC PDS + SZ 1 L18IN ABSRB VLT L48MM SXPP1A400

## (undated) DEVICE — DERMABOND SKIN ADH 0.7ML -- DERMABOND ADVANCED 12/BX

## (undated) DEVICE — Device: Brand: JELCO

## (undated) DEVICE — SYSTEM NAVIGATION PALM SZ PRECIS ALIGN TECHNOLOGY DISP FOR

## (undated) DEVICE — TRANSFER SET 3": Brand: MEDLINE INDUSTRIES, INC.

## (undated) DEVICE — PREP SKN CHLRAPRP APL 26ML STR --

## (undated) DEVICE — SOLUTION IRRIG 1000ML STRL H2O USP PLAS POUR BTL

## (undated) DEVICE — SOLUTION SURG PREP 26 CC PURPREP

## (undated) DEVICE — BOWL BNE CEM MIX SPAT CURET SMARTMIX CTS

## (undated) DEVICE — EMPOWR PARTIAL KNEETM, TIBIAL INSERT, SIZE 3, 10MM
Type: IMPLANTABLE DEVICE | Site: KNEE | Status: NON-FUNCTIONAL
Brand: DJO SURGICAL

## (undated) DEVICE — PENCIL SMK EVAC 10 FT BLADE ELECTRD ROCKER FOR TELSCP

## (undated) DEVICE — TOTAL TRAY, 16FR 10ML SIL FOLEY, URN: Brand: MEDLINE

## (undated) DEVICE — 5.0MM ROUND FLUTED

## (undated) DEVICE — DRSG POSTOP PRMSL AG 3.5X14IN

## (undated) DEVICE — BLADE SAW W0.49XL3.15IN THK0.047IN CUT THK0.047IN REPL SAG

## (undated) DEVICE — CONTAINER,SPECIMEN,4OZ,OR STRL: Brand: MEDLINE

## (undated) DEVICE — GLOVE SURG SZ 65 L12IN FNGR THK79MIL GRN LTX FREE

## (undated) DEVICE — SOLUTION IRRIG 1000ML 0.9% SOD CHL USP POUR PLAS BTL

## (undated) DEVICE — GLOVE ORTHO 8   MSG9480

## (undated) DEVICE — PRECISION OFFSET (13.0 X 0.51 X 39.0MM)

## (undated) DEVICE — SOLUTION IRRIG 3000ML 0.9% SOD CHL USP UROMATIC PLAS CONT

## (undated) DEVICE — 2108 SERIES SAGITTAL BLADE AGGRESSIVE  (25.0 X 1.19 X 85.0MM)